# Patient Record
Sex: FEMALE | Race: WHITE | Employment: OTHER | URBAN - NONMETROPOLITAN AREA
[De-identification: names, ages, dates, MRNs, and addresses within clinical notes are randomized per-mention and may not be internally consistent; named-entity substitution may affect disease eponyms.]

---

## 2017-01-11 ENCOUNTER — OFFICE VISIT (OUTPATIENT)
Dept: SURGERY | Age: 72
End: 2017-01-11
Payer: MEDICARE

## 2017-01-11 VITALS
TEMPERATURE: 98.5 F | SYSTOLIC BLOOD PRESSURE: 148 MMHG | WEIGHT: 156.9 LBS | DIASTOLIC BLOOD PRESSURE: 70 MMHG | BODY MASS INDEX: 26.14 KG/M2 | HEIGHT: 65 IN

## 2017-01-11 DIAGNOSIS — N60.12 FIBROCYSTIC CHANGES OF LEFT BREAST: Primary | ICD-10-CM

## 2017-01-11 PROCEDURE — 3017F COLORECTAL CA SCREEN DOC REV: CPT | Performed by: PHYSICIAN ASSISTANT

## 2017-01-11 PROCEDURE — 1090F PRES/ABSN URINE INCON ASSESS: CPT | Performed by: PHYSICIAN ASSISTANT

## 2017-01-11 PROCEDURE — 99212 OFFICE O/P EST SF 10 MIN: CPT | Performed by: PHYSICIAN ASSISTANT

## 2017-01-11 PROCEDURE — 1036F TOBACCO NON-USER: CPT | Performed by: PHYSICIAN ASSISTANT

## 2017-01-11 PROCEDURE — G8427 DOCREV CUR MEDS BY ELIG CLIN: HCPCS | Performed by: PHYSICIAN ASSISTANT

## 2017-01-11 PROCEDURE — G8484 FLU IMMUNIZE NO ADMIN: HCPCS | Performed by: PHYSICIAN ASSISTANT

## 2017-01-11 PROCEDURE — G8420 CALC BMI NORM PARAMETERS: HCPCS | Performed by: PHYSICIAN ASSISTANT

## 2017-01-11 PROCEDURE — 1123F ACP DISCUSS/DSCN MKR DOCD: CPT | Performed by: PHYSICIAN ASSISTANT

## 2017-01-11 PROCEDURE — 3014F SCREEN MAMMO DOC REV: CPT | Performed by: PHYSICIAN ASSISTANT

## 2017-01-11 PROCEDURE — 4040F PNEUMOC VAC/ADMIN/RCVD: CPT | Performed by: PHYSICIAN ASSISTANT

## 2017-01-11 PROCEDURE — G8400 PT W/DXA NO RESULTS DOC: HCPCS | Performed by: PHYSICIAN ASSISTANT

## 2017-04-06 RX ORDER — M-VIT,TX,IRON,MINS/CALC/FOLIC 27MG-0.4MG
1 TABLET ORAL DAILY
COMMUNITY
End: 2022-09-27

## 2017-04-17 ENCOUNTER — OFFICE VISIT (OUTPATIENT)
Dept: GASTROENTEROLOGY | Age: 72
End: 2017-04-17
Payer: MEDICARE

## 2017-04-17 VITALS
BODY MASS INDEX: 25.83 KG/M2 | WEIGHT: 145.8 LBS | DIASTOLIC BLOOD PRESSURE: 88 MMHG | SYSTOLIC BLOOD PRESSURE: 140 MMHG | HEIGHT: 63 IN | HEART RATE: 76 BPM | OXYGEN SATURATION: 99 %

## 2017-04-17 DIAGNOSIS — Z86.010 HISTORY OF COLON POLYPS: Primary | ICD-10-CM

## 2017-04-17 ASSESSMENT — ENCOUNTER SYMPTOMS
BACK PAIN: 0
ABDOMINAL DISTENTION: 0
NAUSEA: 0
COUGH: 0
CHEST TIGHTNESS: 0
VOMITING: 0
CONSTIPATION: 0
DIARRHEA: 0
ABDOMINAL PAIN: 0
BLOOD IN STOOL: 0
SHORTNESS OF BREATH: 0
SORE THROAT: 0
VOICE CHANGE: 0
RECTAL PAIN: 0

## 2017-06-21 ENCOUNTER — TELEPHONE (OUTPATIENT)
Dept: SURGERY | Age: 72
End: 2017-06-21

## 2017-07-06 ENCOUNTER — OFFICE VISIT (OUTPATIENT)
Dept: SURGERY | Age: 72
End: 2017-07-06
Payer: MEDICARE

## 2017-07-06 ENCOUNTER — HOSPITAL ENCOUNTER (OUTPATIENT)
Dept: WOMENS IMAGING | Age: 72
Discharge: HOME | End: 2017-07-06
Payer: MEDICARE

## 2017-07-06 VITALS
RESPIRATION RATE: 18 BRPM | DIASTOLIC BLOOD PRESSURE: 82 MMHG | SYSTOLIC BLOOD PRESSURE: 144 MMHG | HEIGHT: 63 IN | WEIGHT: 136.6 LBS | BODY MASS INDEX: 24.2 KG/M2 | HEART RATE: 80 BPM

## 2017-07-06 DIAGNOSIS — N60.12 FIBROCYSTIC CHANGES OF LEFT BREAST: ICD-10-CM

## 2017-07-06 DIAGNOSIS — N63.0 BREAST MASS: ICD-10-CM

## 2017-07-06 DIAGNOSIS — Z12.31 VISIT FOR SCREENING MAMMOGRAM: Primary | ICD-10-CM

## 2017-07-06 DIAGNOSIS — N63.0 BREAST NODULE: ICD-10-CM

## 2017-07-06 PROCEDURE — 4040F PNEUMOC VAC/ADMIN/RCVD: CPT | Performed by: PHYSICIAN ASSISTANT

## 2017-07-06 PROCEDURE — G8400 PT W/DXA NO RESULTS DOC: HCPCS | Performed by: PHYSICIAN ASSISTANT

## 2017-07-06 PROCEDURE — G8420 CALC BMI NORM PARAMETERS: HCPCS | Performed by: PHYSICIAN ASSISTANT

## 2017-07-06 PROCEDURE — 3014F SCREEN MAMMO DOC REV: CPT | Performed by: PHYSICIAN ASSISTANT

## 2017-07-06 PROCEDURE — 1036F TOBACCO NON-USER: CPT | Performed by: PHYSICIAN ASSISTANT

## 2017-07-06 PROCEDURE — 99212 OFFICE O/P EST SF 10 MIN: CPT | Performed by: PHYSICIAN ASSISTANT

## 2017-07-06 PROCEDURE — 3017F COLORECTAL CA SCREEN DOC REV: CPT | Performed by: PHYSICIAN ASSISTANT

## 2017-07-06 PROCEDURE — 76642 ULTRASOUND BREAST LIMITED: CPT

## 2017-07-06 PROCEDURE — G0279 TOMOSYNTHESIS, MAMMO: HCPCS

## 2017-07-06 PROCEDURE — 1090F PRES/ABSN URINE INCON ASSESS: CPT | Performed by: PHYSICIAN ASSISTANT

## 2017-07-06 PROCEDURE — G8427 DOCREV CUR MEDS BY ELIG CLIN: HCPCS | Performed by: PHYSICIAN ASSISTANT

## 2017-07-06 PROCEDURE — 1123F ACP DISCUSS/DSCN MKR DOCD: CPT | Performed by: PHYSICIAN ASSISTANT

## 2017-09-12 ENCOUNTER — TRANSCRIBE ORDERS (OUTPATIENT)
Dept: PHYSICAL THERAPY | Facility: HOSPITAL | Age: 72
End: 2017-09-12

## 2017-09-12 DIAGNOSIS — M54.9 BACKACHE, UNSPECIFIED: Primary | ICD-10-CM

## 2017-09-19 ENCOUNTER — HOSPITAL ENCOUNTER (OUTPATIENT)
Dept: PHYSICAL THERAPY | Facility: HOSPITAL | Age: 72
Setting detail: THERAPIES SERIES
Discharge: HOME | End: 2017-09-19

## 2017-09-19 DIAGNOSIS — M54.9 BACKACHE, UNSPECIFIED: Primary | ICD-10-CM

## 2017-09-19 PROCEDURE — G8978 MOBILITY CURRENT STATUS: HCPCS | Performed by: PHYSICAL THERAPIST

## 2017-09-19 PROCEDURE — 97162 PT EVAL MOD COMPLEX 30 MIN: CPT | Performed by: PHYSICAL THERAPIST

## 2017-09-19 PROCEDURE — G8979 MOBILITY GOAL STATUS: HCPCS | Performed by: PHYSICAL THERAPIST

## 2017-09-19 NOTE — THERAPY EVALUATION
Outpatient Physical Therapy Ortho Initial Evaluation  [facility]     Patient Name: Rama Goel  : 1945  MRN: [id number]  Today's Date: 2017      Visit Date: 2017    There is no problem list on file for this patient.     Visit  Medicare  Recert date 10/17/17  MD visit TBD    No past medical history on file.     No past surgical history on file.    Visit Dx:     ICD-10-CM ICD-9-CM   1. Backache, unspecified M54.9 724.5             Patient History       17 1800          History    Chief Complaint Pain  -KW      Type of Pain Back pain  -KW      Date Current Problem(s) Began --   few months  -KW      Brief Description of Current Complaint back pain started after remodeling of her home started  -KW      Previous treatment for THIS PROBLEM Medication  -KW      Patient/Caregiver Goals Relieve pain;Return to work;Improve mobility;Improve strength  -KW      Current Tobacco Use none  -KW      Smoking Status no  -KW      Patient's Rating of General Health Excellent  -KW      Hand Dominance right-handed  -KW      Pain     Pain Location Back  -KW      Pain at Present 6  -KW      Pain at Best 4  -KW      Pain at Worst 6  -KW      What Performance Factors Make the Current Problem(s) WORSE? bending, lifting  -KW      What Performance Factors Make the Current Problem(s) BETTER? meds  -KW      Fall Risk Assessment    Any falls in the past year: No  -KW      Services    Prior Rehab/Home Health Experiences No  -KW      Daily Activities    Primary Language English  -KW      Safety    Are you being hurt, hit, or frightened by anyone at home or in your life? No  -KW        User Key  (r) = Recorded By, (t) = Taken By, (c) = Cosigned By    Initials Name Provider Type    SYLVAIN Guerra, PT Physical Therapist                PT Ortho       17 1600    Subjective Comments    Subjective Comments Reports she is remodelling a house and is doing most of the work herself. States she has never had  back pain before, and is complaining  of LBP since the start of her project.  -KW    Subjective Pain    Able to rate subjective pain? yes  -KW    Pre-Treatment Pain Level 5  -KW    Post-Treatment Pain Level 4  -KW    Posture/Observations    Alignment Options Forward head;Cervical lordosis  -KW    Forward Head Mild  -KW    Cervical Lordosis Mild  -KW    Posture/Observations Comments pt ambulates with forward flexion of her trunk  -KW    Myotomal Screen- Lower Quarter Clearing    Hip flexion (L2) 4- (Good -);Left:  -KW    Knee extension (L3) 4- (Good -);Left:  -KW    Ankle DF (L4) 4- (Good -);Left:  -KW    Ankle PF (S1) 4- (Good -);Left:  -KW    Knee flexion (S2) 4- (Good -);Left:  -KW    Lumbar ROM Screen- Lower Quarter Clearing    Lumbar Flexion Normal  -KW    Lumbar Extension Normal  -KW    Lumbar Lateral Flexion Normal  -KW    Lumbar Rotation Normal  -KW    Lumbosacral Palpation    Thoracolumbar Segment Left:;Tender;Guarded/taut  -KW    Quadratus Lumborum Left:;Guarded/taut  -KW    Erector Spinae (Paraspinals) Left:;Guarded/taut  -KW    Lumbosacral Palpation? Yes  -KW    ROM (Range of Motion)    General ROM Detail LUmbar ROM is full and WNL  -KW    MMT (Manual Muscle Testing)    General MMT Assessment Detail bilat hip abd MMT is 3+/5  -KW    Flexibility    Flexibility Tested? Lower Extremity  -KW    Lower Extremity Flexibility    Hamstrings Bilateral:;WNL  -KW    Hip Flexors Bilateral:;Mildly limited  -KW      User Key  (r) = Recorded By, (t) = Taken By, (c) = Cosigned By    Initials Name Provider Type    SYLVAIN Guerra, PT Physical Therapist                            Therapy Education       09/19/17 1845          Therapy Education    Given Pain management  -KW      Program New  -KW        User Key  (r) = Recorded By, (t) = Taken By, (c) = Cosigned By    Initials Name Provider Type    SYLVAIN Guerra, PT Physical Therapist                PT OP Goals       09/19/17 1800       PT Short Term Goals    STG  Date to Achieve 10/10/17  -KW     STG 1 decrease LBP to 3/10  -KW     STG 2 Tolerate 45 min of aquatics with minimum discomfort  -KW     STG 3 Inc bilat hip abd MMT to 4-/5  -KW     Long Term Goals    LTG Date to Achieve 10/17/17  -KW     LTG 1 Ind w/ final HEP  -KW     LTG 2 Decrease Oswersty LBP score to 25% or less  -KW     LTG 3 Tolerate 45 min of aquatics pain free  -KW     LTG 4 Decrease LBP to no pain during activities  -KW     Time Calculation    PT Goal Re-Cert Due Date 10/17/17  -KW       User Key  (r) = Recorded By, (t) = Taken By, (c) = Cosigned By    Initials Name Provider Type    KW Maria E Guerra, PT Physical Therapist                PT Assessment/Plan       09/19/17 1848       PT Assessment    Functional Limitations Impaired gait;Impaired locomotion;Limitation in home management;Limitations in community activities;Performance in leisure activities;Performance in work activities  -KW     Impairments Posture;Joint integrity;Joint mobility;Muscle strength;Pain;Gait;Endurance;Balance  -KW     Assessment Comments weakness in bilat LE R > L,  poor standing posture, antalgic gait  -KW     Please refer to paper survey for additional self-reported information No  -KW     Rehab Potential Good  -KW     Patient/caregiver participated in establishment of treatment plan and goals Yes  -KW     Patient would benefit from skilled therapy intervention Yes  -KW     PT Plan    PT Frequency 2x/week  -KW     Predicted Duration of Therapy Intervention (days/wks) 4 weeks  -KW     Planned CPT's? PT EVAL MOD COMPLELITY: 92222;PT RE-EVAL: 74509;PT THER PROC EA 15 MIN: 00915;PT THER ACT EA 15 MIN: 01595;PT MANUAL THERAPY EA 15 MIN: 07181;PT NEUROMUSC RE-EDUCATION EA 15 MIN: 65593;PT GAIT TRAINING EA 15 MIN: 74469;PT AQUATIC THERAPY EA 15 MIN: 66659;PT ELECTRICAL STIM UNATTEND: ;PT THER SUPP EA 15 MIN;PT ULTRASOUND EA 15 MIN: 75474;PT TRACTION LUMBAR: 02913  -KW     Physical Therapy Interventions (Optional Details)  aquatics exercise;lumbar stabilization;balance training;strengthening;stretching;modalities;manual therapy techniques;swiss ball techniques;gross motor skills;home exercise program;joint mobilization;neuromuscular re-education;patient/family education;postural re-education  -KW     PT Plan Comments PT 2x per week for LBP. aquatics first for the next 4 visits then land PT.  -KW       User Key  (r) = Recorded By, (t) = Taken By, (c) = Cosigned By    Initials Name Provider Type    SYLVAIN Guerra, PT Physical Therapist                  Exercises       09/19/17 1600          Subjective Comments    Subjective Comments Reports she is remodelling a house and is doing most of the work herself. States she has never had back pain before, and is complaining  of LBP since the start of her project.  -KW      Subjective Pain    Able to rate subjective pain? yes  -KW      Pre-Treatment Pain Level 5  -KW      Post-Treatment Pain Level 4  -KW        User Key  (r) = Recorded By, (t) = Taken By, (c) = Cosigned By    Initials Name Provider Type    SYLVAIN Guerra, PT Physical Therapist                              Outcome Measures       09/19/17 1808          Modified Oswestry    Modified Oswestry Score/Comments 38%  -KW        User Key  (r) = Recorded By, (t) = Taken By, (c) = Cosigned By    Initials Name Provider Type    SYLVAIN Guerra, PT Physical Therapist            Time Calculation:   Start Time: 1600  Stop Time: 1644  Time Calculation (min): 44 min  Total Timed Code Minutes- PT: 0 minute(s)     Therapy Charges for Today     Code Description Service Date Service Provider Modifiers Qty    47830788094 HC PT EVAL MOD COMPLEXITY 3 9/19/2017 Maria E Guerra, PT GP 1    89627606053 HC PT MOBILITY CURRENT 9/19/2017 Maria E Guerra, PT GP, CJ 1    48768969198 HC PT MOBILITY PROJECTED 9/19/2017 Maria E Guerra, PT GP, CJ 1          PT G-Codes  PT Professional Judgement Used?: Yes  Outcome Measure Options: Modifed  Owestry  Score: 38%  Functional Limitation: Mobility: Walking and moving around  Mobility: Walking and Moving Around Current Status (): At least 20 percent but less than 40 percent impaired, limited or restricted  Mobility: Walking and Moving Around Goal Status (): At least 20 percent but less than 40 percent impaired, limited or restricted         Maria E Guerra, PT  9/19/2017

## 2017-09-21 ENCOUNTER — HOSPITAL ENCOUNTER (OUTPATIENT)
Dept: PHYSICAL THERAPY | Facility: HOSPITAL | Age: 72
Setting detail: THERAPIES SERIES
Discharge: HOME | End: 2017-09-21

## 2017-09-21 DIAGNOSIS — M54.9 BACKACHE, UNSPECIFIED: Primary | ICD-10-CM

## 2017-09-21 PROCEDURE — 97110 THERAPEUTIC EXERCISES: CPT

## 2017-09-21 NOTE — THERAPY TREATMENT NOTE
Outpatient Physical Therapy Ortho Treatment Note  [facility]     Patient Name: Rama Goel  : 1945  MRN: [id number]  Today's Date: 2017      Visit Date: 2017  Subjective Improvement: 0%  Visit Number:     Recert Date:   10/17/17  MD Visit:   TBD  Total Approved Visits:  Medicare    Visit Dx:    ICD-10-CM ICD-9-CM   1. Backache, unspecified M54.9 724.5       There is no problem list on file for this patient.       No past medical history on file.     No past surgical history on file.          PT Ortho       17 1400    Posture/Observations    Posture/Observations Comments fwd head, cues tonot go to high with SLR  -BB      17 1600    Subjective Comments    Subjective Comments Reports she is remodelling a house and is doing most of the work herself. States she has never had back pain before, and is complaining  of LBP since the start of her project.  -KW    Subjective Pain    Able to rate subjective pain? yes  -KW    Pre-Treatment Pain Level 5  -KW    Post-Treatment Pain Level 4  -KW    Posture/Observations    Alignment Options Forward head;Cervical lordosis  -KW    Forward Head Mild  -KW    Cervical Lordosis Mild  -KW    Posture/Observations Comments pt ambulates with forward flexion of her trunk  -KW    Myotomal Screen- Lower Quarter Clearing    Hip flexion (L2) 4- (Good -);Left:  -KW    Knee extension (L3) 4- (Good -);Left:  -KW    Ankle DF (L4) 4- (Good -);Left:  -KW    Ankle PF (S1) 4- (Good -);Left:  -KW    Knee flexion (S2) 4- (Good -);Left:  -KW    Lumbar ROM Screen- Lower Quarter Clearing    Lumbar Flexion Normal  -KW    Lumbar Extension Normal  -KW    Lumbar Lateral Flexion Normal  -KW    Lumbar Rotation Normal  -KW    Lumbosacral Palpation    Thoracolumbar Segment Left:;Tender;Guarded/taut  -KW    Quadratus Lumborum Left:;Guarded/taut  -KW    Erector Spinae (Paraspinals) Left:;Guarded/taut  -KW    Lumbosacral Palpation? Yes  -KW    ROM (Range of Motion)     General ROM Detail LUmbar ROM is full and WNL  -KW    MMT (Manual Muscle Testing)    General MMT Assessment Detail bilat hip abd MMT is 3+/5  -KW    Flexibility    Flexibility Tested? Lower Extremity  -KW    Lower Extremity Flexibility    Hamstrings Bilateral:;WNL  -KW    Hip Flexors Bilateral:;Mildly limited  -KW      User Key  (r) = Recorded By, (t) = Taken By, (c) = Cosigned By    Initials Name Provider Type    ADELINE Perez PTA Physical Therapy Assistant    SYLVAIN Guerra, PT Physical Therapist                            PT Assessment/Plan       09/21/17 1507       PT Assessment    Assessment Comments Good reji to pool.   -BB     PT Plan    PT Frequency 2x/week  -BB     Predicted Duration of Therapy Intervention (days/wks) x 4 weeks  -BB     PT Plan Comments 3 more pool visits then progress to land per POC.  -BB       User Key  (r) = Recorded By, (t) = Taken By, (c) = Cosigned By    Initials Name Provider Type    ADELINE Perez PTA Physical Therapy Assistant                    Exercises       09/21/17 1400          Subjective Comments    Subjective Comments States she was sore after eval.   -BB      Subjective Pain    Able to rate subjective pain? yes  -BB      Pre-Treatment Pain Level 5  -BB      Post-Treatment Pain Level 3  -BB      Aquatics    Aquatics performed? Yes  -BB      Exercise 1    Exercise Name 1 AQU 3 WAY WALK  -BB      Time (Minutes) 1 5 EA  -BB      Exercise 2    Exercise Name 2 AQU MS  -BB      Reps 2 15  -BB      Exercise 3    Exercise Name 3 AQU CR/TR  -BB      Reps 3 15  -BB      Exercise 4    Exercise Name 4 AQU WLR 3 WAY B  -BB      Reps 4 10  -BB      Exercise 5    Exercise Name 5 AQU PUSH PULL ABC  -BB      Reps 5 15  -BB      Exercise 6    Exercise Name 6 AQU DEEP HANG  -BB      Time (Minutes) 6 5  -BB        User Key  (r) = Recorded By, (t) = Taken By, (c) = Cosigned By    Initials Name Provider Type    ADELINE Perez PTA Physical Therapy Assistant                                PT OP Goals       09/21/17 1400       PT Short Term Goals    STG Date to Achieve 10/10/17  -BB     STG 1 decrease LBP to 3/10  -BB     STG 1 Progress Not Met  -BB     STG 2 Tolerate 45 min of aquatics with minimum discomfort  -BB     STG 2 Progress Progressing  -BB     STG 3 Inc bilat hip abd MMT to 4-/5  -BB     STG 3 Progress Not Met  -BB     Long Term Goals    LTG Date to Achieve 10/17/17  -BB     LTG 1 Ind w/ final HEP  -BB     LTG 1 Progress Not Met  -BB     LTG 2 Decrease Oswersty LBP score to 25% or less  -BB     LTG 2 Progress Not Met  -BB     LTG 3 Tolerate 45 min of aquatics pain free  -BB     LTG 3 Progress Not Met  -BB     LTG 4 Decrease LBP to no pain during activities  -BB     LTG 4 Progress Not Met  -BB     Time Calculation    PT Goal Re-Cert Due Date 10/17/17  -BB       User Key  (r) = Recorded By, (t) = Taken By, (c) = Cosigned By    Initials Name Provider Type    ADELINE Perez PTA Physical Therapy Assistant                Therapy Education       09/21/17 1504          Therapy Education    Given HEP  -BB      Program Reinforced  -BB      How Provided Verbal  -BB      Provided to Patient  -BB      Level of Understanding Verbalized  -BB        User Key  (r) = Recorded By, (t) = Taken By, (c) = Cosigned By    Initials Name Provider Type    ADELINE Perez PTA Physical Therapy Assistant                Outcome Measures       09/19/17 1808          Modified Oswestry    Modified Oswestry Score/Comments 38%  -KW        User Key  (r) = Recorded By, (t) = Taken By, (c) = Cosigned By    Initials Name Provider Type    SYLVAIN Guerra, PT Physical Therapist            Time Calculation:   Start Time: 1437  Stop Time: 1515  Time Calculation (min): 38 min  Total Timed Code Minutes- PT: 38 minute(s)    Therapy Charges for Today     Code Description Service Date Service Provider Modifiers Qty    76143244503 HC PT THER PROC EA 15 MIN 9/21/2017 Darlyn Perez PTA GP 3                     Darlyn Perez, PTA  9/21/2017

## 2017-09-26 ENCOUNTER — HOSPITAL ENCOUNTER (OUTPATIENT)
Dept: PHYSICAL THERAPY | Facility: HOSPITAL | Age: 72
Setting detail: THERAPIES SERIES
Discharge: HOME | End: 2017-09-26

## 2017-09-26 DIAGNOSIS — M54.9 BACKACHE, UNSPECIFIED: Primary | ICD-10-CM

## 2017-09-26 PROCEDURE — 97113 AQUATIC THERAPY/EXERCISES: CPT | Performed by: PHYSICAL THERAPIST

## 2017-09-26 NOTE — THERAPY TREATMENT NOTE
Outpatient Physical Therapy Ortho Treatment Note  [facility]     Patient Name: Rama Goel  : 1945  MRN: [id number]  Today's Date: 2017      Visit Date: 2017    Visit 3/3 Medicare   20% improvement  Recert date 10/17/17  MD visit TBD          Visit Dx:    ICD-10-CM ICD-9-CM   1. Backache, unspecified M54.9 724.5       There is no problem list on file for this patient.       No past medical history on file.     No past surgical history on file.                          PT Assessment/Plan       17 1555       PT Assessment    Assessment Comments very good tolerance to aquatics  -KW     PT Plan    PT Plan Comments 2 more pool visits then progress to land PT as per POC  -KW       User Key  (r) = Recorded By, (t) = Taken By, (c) = Cosigned By    Initials Name Provider Type    SYLVAIN Guerra, PT Physical Therapist                    Exercises       17 1500          Subjective Comments    Subjective Comments Reports the therapy is helping her very much  -KW      Subjective Pain    Able to rate subjective pain? yes  -KW      Pre-Treatment Pain Level 2  -KW      Post-Treatment Pain Level 0  -KW      Exercise 1    Exercise Name 1 aqua walking  -KW      Time (Minutes) 1 15  -KW      Exercise 2    Exercise Name 2 3 way SLR  -KW      Sets 2 3  -KW      Reps 2 30  -KW      Additional Comments each  -KW      Exercise 3    Exercise Name 3 toe raises   -KW      Sets 3 3  -KW      Reps 3 10  -KW      Exercise 4    Exercise Name 4 aqua HS stretch  -KW      Sets 4 3  -KW      Time (Seconds) 4 30  -KW      Exercise 5    Exercise Name 5 Aqua deep hand  -KW      Time (Minutes) 5 15  -KW        User Key  (r) = Recorded By, (t) = Taken By, (c) = Cosigned By    Initials Name Provider Type    SYLVAIN Guerra, PT Physical Therapist                               PT OP Goals       17 1500       PT Short Term Goals    STG Date to Achieve 10/10/17  -KW     STG 1 decrease LBP to 3/10  -KW      STG 1 Progress Met  -KW     STG 2 Tolerate 45 min of aquatics with minimum discomfort  -KW     STG 2 Progress Met  -KW     STG 3 Inc bilat hip abd MMT to 4-/5  -KW     STG 3 Progress Not Met  -KW     Long Term Goals    LTG Date to Achieve 10/17/17  -KW     LTG 1 Ind w/ final HEP  -KW     LTG 1 Progress Not Met  -KW     LTG 2 Decrease Oswersty LBP score to 25% or less  -KW     LTG 2 Progress Not Met  -KW     LTG 3 Tolerate 45 min of aquatics pain free  -KW     LTG 3 Progress Met  -KW     LTG 4 Decrease LBP to no pain during activities  -KW     LTG 4 Progress Not Met  -KW     Time Calculation    PT Goal Re-Cert Due Date 10/17/17  -KW       User Key  (r) = Recorded By, (t) = Taken By, (c) = Cosigned By    Initials Name Provider Type    SYLVAIN Guerra, PT Physical Therapist                Therapy Education       09/26/17 1555          Therapy Education    Given HEP  -KW      Program Reinforced  -KW      How Provided Verbal  -KW      Provided to Patient  -KW      Level of Understanding Verbalized  -KW        User Key  (r) = Recorded By, (t) = Taken By, (c) = Cosigned By    Initials Name Provider Type    SYLVAIN Guerra, PT Physical Therapist                Time Calculation:   Start Time: 1430  Stop Time: 1515  Time Calculation (min): 45 min  Total Timed Code Minutes- PT: 45 minute(s)    Therapy Charges for Today     Code Description Service Date Service Provider Modifiers Qty    88905126715 HC PT AQUATIC THERAPY EA 15 MIN 9/26/2017 Maria E Guerra, PT GP 3                    Maria E Guerra, PT  9/26/2017

## 2017-09-28 ENCOUNTER — HOSPITAL ENCOUNTER (OUTPATIENT)
Dept: PHYSICAL THERAPY | Facility: HOSPITAL | Age: 72
Setting detail: THERAPIES SERIES
Discharge: HOME | End: 2017-09-28

## 2017-09-28 DIAGNOSIS — M54.9 BACKACHE, UNSPECIFIED: Primary | ICD-10-CM

## 2017-09-28 PROCEDURE — 97113 AQUATIC THERAPY/EXERCISES: CPT | Performed by: PHYSICAL THERAPIST

## 2017-09-28 NOTE — THERAPY TREATMENT NOTE
Outpatient Physical Therapy Ortho Treatment Note  [facility]     Patient Name: Rama Goel  : 1945  MRN: [id number]  Today's Date: 2017      Visit Date: 2017      Visit 4/4 Medicare  MD visit TBD  Recert date 10/17/17  20% improvement          Visit Dx:    ICD-10-CM ICD-9-CM   1. Backache, unspecified M54.9 724.5       There is no problem list on file for this patient.       No past medical history on file.     No past surgical history on file.                          PT Assessment/Plan       17 1533       PT Assessment    Assessment Comments very good tolerance to aquatics  -KW     PT Plan    PT Plan Comments 2 more pool visits then progress to land  PT as per POC  -KW       User Key  (r) = Recorded By, (t) = Taken By, (c) = Cosigned By    Initials Name Provider Type    KW Maria E Guerra, PT Physical Therapist                    Exercises       17 1500          Subjective Comments    Subjective Comments Reports R sided LBP today  -KW      Subjective Pain    Able to rate subjective pain? yes  -KW      Pre-Treatment Pain Level 4  -KW      Post-Treatment Pain Level 0  -KW      Aquatics    Aquatics performed? Yes  -KW      Exercise 1    Exercise Name 1 aqua walking  -KW      Time (Minutes) 1 15  -KW      Exercise 2    Exercise Name 2 3 way SLR  -KW      Sets 2 3  -KW      Reps 2 30  -KW      Exercise 3    Exercise Name 3 toe raises   -KW      Sets 3 3  -KW      Reps 3 10  -KW      Exercise 4    Exercise Name 4 aqua HS stretch  -KW      Sets 4 3  -KW      Reps 4 10  -KW      Time (Seconds) 4 30  -KW      Exercise 5    Exercise Name 5 Aqua deep hand  -KW      Reps 5 15  -KW      Time (Minutes) 5 15  -KW      Exercise 6    Exercise Name 6 AQU DEEP HANG  -KW      Time (Minutes) 6 5  -KW      Exercise 7    Exercise Name 7 trans abs with paddles  -KW      Sets 7 3  -KW      Reps 7 10  -KW        User Key  (r) = Recorded By, (t) = Taken By, (c) = Cosigned By    Initials Name  Provider Type    SYLVAIN Guerra, PT Physical Therapist                               PT OP Goals       09/28/17 1500       PT Short Term Goals    STG Date to Achieve 10/10/17  -KW     STG 1 decrease LBP to 3/10  -KW     STG 1 Progress Met  -KW     STG 2 Tolerate 45 min of aquatics with minimum discomfort  -KW     STG 2 Progress Met  -KW     STG 3 Inc bilat hip abd MMT to 4-/5  -KW     STG 3 Progress Not Met  -KW     Long Term Goals    LTG Date to Achieve 10/17/17  -KW     LTG 1 Ind w/ final HEP  -KW     LTG 1 Progress Not Met  -KW     LTG 2 Decrease Oswersty LBP score to 25% or less  -KW     LTG 2 Progress Not Met  -KW     LTG 3 Tolerate 45 min of aquatics pain free  -KW     LTG 3 Progress Met  -KW     LTG 4 Decrease LBP to no pain during activities  -KW     LTG 4 Progress Not Met  -KW     Time Calculation    PT Goal Re-Cert Due Date 10/17/17  -KW       User Key  (r) = Recorded By, (t) = Taken By, (c) = Cosigned By    Initials Name Provider Type    SYLVAIN Guerra, PT Physical Therapist                    Time Calculation:   Start Time: 1430  Stop Time: 1515  Time Calculation (min): 45 min  Total Timed Code Minutes- PT: 45 minute(s)    Therapy Charges for Today     Code Description Service Date Service Provider Modifiers Qty    61224931673 HC PT AQUATIC THERAPY EA 15 MIN 9/28/2017 Maria E Guerra, PT GP 3                    Maria E Guerra, PT  9/28/2017

## 2017-10-04 ENCOUNTER — HOSPITAL ENCOUNTER (OUTPATIENT)
Dept: PHYSICAL THERAPY | Facility: HOSPITAL | Age: 72
Setting detail: THERAPIES SERIES
Discharge: HOME | End: 2017-10-04

## 2017-10-04 DIAGNOSIS — M54.9 ACUTE BACK PAIN, UNSPECIFIED BACK LOCATION, UNSPECIFIED BACK PAIN LATERALITY: Primary | ICD-10-CM

## 2017-10-04 PROCEDURE — 97110 THERAPEUTIC EXERCISES: CPT

## 2017-10-04 NOTE — THERAPY TREATMENT NOTE
Outpatient Physical Therapy Ortho Treatment Note  [facility]     Patient Name: Rama Goel  : 1945  MRN: [id number]  Today's Date: 10/4/2017      Visit Date: 10/04/2017    Subjective Improvement:    Visits Attended: 5/5   Visits Approved Med nec   MD visit: PRN   Recert Date: 10/17/2017       Visit Dx:    ICD-10-CM ICD-9-CM   1. Acute back pain, unspecified back location, unspecified back pain laterality M54.9 724.5       There is no problem list on file for this patient.       No past medical history on file.     No past surgical history on file.                          PT Assessment/Plan       10/04/17 1500       PT Plan    PT Frequency 2x/week  -TW     Predicted Duration of Therapy Intervention (days/wks) 2 more weeks  -TW     PT Plan Comments 1 more pool tx and then progress to land.  -TW       User Key  (r) = Recorded By, (t) = Taken By, (c) = Cosigned By    Initials Name Provider Type    PHILLIP Barrett PTA Physical Therapy Assistant                    Exercises       10/04/17 1500          Subjective Pain    Able to rate subjective pain? yes  -TW      Exercise 1    Exercise Name 1 aqua walking  -TW      Time (Minutes) 1 15  -TW      Exercise 2    Exercise Name 2 3 way SLR  -TW      Sets 2 3  -TW      Reps 2 30  -TW      Exercise 3    Exercise Name 3 toe raises   -TW      Sets 3 3  -TW      Reps 3 10  -TW      Exercise 4    Exercise Name 4 aqua HS stretch  -TW      Sets 4 3  -TW      Time (Seconds) 4 30  -TW      Exercise 5    Exercise Name 5 Aqua deep hang  -TW      Time (Minutes) 5 8'  -TW      Exercise 6    Exercise Name 6 AQU DEEP BIKE  -TW      Time (Minutes) 6 8'  -TW      Exercise 7    Exercise Name 7 trans abs with paddles  -TW      Sets 7 2  -TW      Reps 7 20  -TW      Exercise 8    Exercise Name 8 Mini squats  -TW      Sets 8 2  -TW      Reps 8 20  -TW        User Key  (r) = Recorded By, (t) = Taken By, (c) = Cosigned By    Initials Name Provider Type    PHILLIP ORTIZ  TOAN Barrett Physical Therapy Assistant                               PT OP Goals       10/04/17 1611 10/04/17 1500    PT Short Term Goals    STG Date to Achieve  10/10/17  -TW    STG 1  decrease LBP to 3/10  -TW    STG 1 Progress  Met  -TW    STG 2  Tolerate 45 min of aquatics with minimum discomfort  -TW    STG 2 Progress  Met  -TW    STG 3  Inc bilat hip abd MMT to 4-/5  -TW    STG 3 Progress  Not Met  -TW    Long Term Goals    LTG Date to Achieve  10/17/17  -TW    LTG 1  Ind w/ final HEP  -TW    LTG 1 Progress  Not Met  -TW    LTG 2  Decrease Oswersty LBP score to 25% or less  -TW    LTG 2 Progress  Not Met  -TW    LTG 3  Tolerate 45 min of aquatics pain free  -TW    LTG 3 Progress  Met  -TW    LTG 4  Decrease LBP to no pain during activities  -TW    LTG 4 Progress  Not Met  -TW    Time Calculation    PT Goal Re-Cert Due Date 10/17/17  -TW       User Key  (r) = Recorded By, (t) = Taken By, (c) = Cosigned By    Initials Name Provider Type    PHILLIP Barrett PTA Physical Therapy Assistant                Therapy Education       10/04/17 1500          Therapy Education    Given HEP  -TW      Program Reinforced  -TW      How Provided Verbal  -TW      Provided to Patient  -TW      Level of Understanding Verbalized;Demonstrated  -TW        User Key  (r) = Recorded By, (t) = Taken By, (c) = Cosigned By    Initials Name Provider Type    PHILLIP Barrett PTA Physical Therapy Assistant                Time Calculation:   Start Time: 1501  Stop Time: 1604  Time Calculation (min): 63 min  Total Timed Code Minutes- PT: 63 minute(s)    Therapy Charges for Today     Code Description Service Date Service Provider Modifiers Qty    39321356277  PT THER PROC EA 15 MIN 10/4/2017 Abundio Barrett PTA GP 4                    Abundio Barrett PTA  10/4/2017

## 2017-10-05 ENCOUNTER — HOSPITAL ENCOUNTER (OUTPATIENT)
Dept: PHYSICAL THERAPY | Facility: HOSPITAL | Age: 72
Setting detail: THERAPIES SERIES
Discharge: HOME | End: 2017-10-05

## 2017-10-05 DIAGNOSIS — M54.9 ACUTE BACK PAIN, UNSPECIFIED BACK LOCATION, UNSPECIFIED BACK PAIN LATERALITY: Primary | ICD-10-CM

## 2017-10-05 PROCEDURE — 97110 THERAPEUTIC EXERCISES: CPT

## 2017-10-05 NOTE — THERAPY TREATMENT NOTE
Outpatient Physical Therapy Ortho Treatment Note  [facility]     Patient Name: Rama Goel  : 1945  MRN: [id number]  Today's Date: 10/5/2017      Visit Date: 10/05/2017    Subjective Improvement:    Visits Attended: 6/6   Visits Approved Med nec   MD visit: PRN   Recert Date: 10/17/2017         Visit Dx:    ICD-10-CM ICD-9-CM   1. Acute back pain, unspecified back location, unspecified back pain laterality M54.9 724.5       There is no problem list on file for this patient.       No past medical history on file.     No past surgical history on file.                          PT Assessment/Plan       10/05/17 1400       PT Assessment    Assessment Comments Pt with good pain relief noted after tx today.  -TW     PT Plan    PT Frequency 2x/week  -TW     Predicted Duration of Therapy Intervention (days/wks) 2 more weeks  -TW     PT Plan Comments Re cert next visit.  -TW       User Key  (r) = Recorded By, (t) = Taken By, (c) = Cosigned By    Initials Name Provider Type    TW Abundio Barrett PTA Physical Therapy Assistant                    Exercises       10/05/17 1400          Subjective Comments    Subjective Comments Pt reports she was very sore after last tx but is feeling really good today.  -TW      Subjective Pain    Able to rate subjective pain? yes  -TW      Pre-Treatment Pain Level 0  -TW      Exercise 1    Exercise Name 1 aqua walking  -TW      Time (Minutes) 1 15  -TW      Exercise 2    Exercise Name 2 3 way SLR  -TW      Sets 2 3  -TW      Reps 2 30  -TW      Exercise 3    Exercise Name 3 toe raises   -TW      Sets 3 3  -TW      Reps 3 10  -TW      Exercise 4    Exercise Name 4 aqua HS stretch  -TW      Sets 4 3  -TW      Time (Seconds) 4 30  -TW      Exercise 5    Exercise Name 5 Aqua deep hang  -TW      Time (Minutes) 5 8'  -TW      Exercise 6    Exercise Name 6 AQU DEEP BIKE  -TW      Time (Minutes) 6 8'  -TW      Exercise 7    Exercise Name 7 trans abs with paddles  -TW      Sets  7 2  -TW      Reps 7 10  -TW      Exercise 8    Exercise Name 8 Mini squats  -TW      Sets 8 2  -TW      Reps 8 20  -TW        User Key  (r) = Recorded By, (t) = Taken By, (c) = Cosigned By    Initials Name Provider Type    PHILLIP Barrett PTA Physical Therapy Assistant                               PT OP Goals       10/05/17 1535 10/05/17 1400    PT Short Term Goals    STG Date to Achieve  10/10/17  -TW    STG 1  decrease LBP to 3/10  -TW    STG 1 Progress  Met  -TW    STG 2  Tolerate 45 min of aquatics with minimum discomfort  -TW    STG 2 Progress  Met  -TW    STG 3  Inc bilat hip abd MMT to 4-/5  -TW    STG 3 Progress  Not Met  -TW    Long Term Goals    LTG Date to Achieve  10/17/17  -TW    LTG 1  Ind w/ final HEP  -TW    LTG 1 Progress  Not Met  -TW    LTG 2  Decrease Oswersty LBP score to 25% or less  -TW    LTG 2 Progress  Not Met  -TW    LTG 3  Tolerate 45 min of aquatics pain free  -TW    LTG 3 Progress  Met  -TW    LTG 4  Decrease LBP to no pain during activities  -TW    LTG 4 Progress  Not Met  -TW    Time Calculation    PT Goal Re-Cert Due Date 10/17/17  -TW       10/04/17 1611       Time Calculation    PT Goal Re-Cert Due Date 10/17/17  -TW       User Key  (r) = Recorded By, (t) = Taken By, (c) = Cosigned By    Initials Name Provider Type    PHILLIP Barrett PTA Physical Therapy Assistant                Therapy Education       10/05/17 1400          Therapy Education    Given HEP  -TW      Program Reinforced  -TW      How Provided Verbal  -TW      Provided to Patient  -TW      Level of Understanding Verbalized  -TW        User Key  (r) = Recorded By, (t) = Taken By, (c) = Cosigned By    Initials Name Provider Type    PHILLIP Barrett PTA Physical Therapy Assistant                Time Calculation:   Start Time: 1435  Stop Time: 1531  Time Calculation (min): 56 min  Total Timed Code Minutes- PT: 56 minute(s)    Therapy Charges for Today     Code Description Service Date Service Provider  Modifiers Qty    [id number]  PT THER PROC EA 15 MIN 10/5/2017 Abundio Barrett, PTA GP 4                    Abundio Barrett PTA  10/5/2017

## 2017-10-10 ENCOUNTER — HOSPITAL ENCOUNTER (OUTPATIENT)
Dept: PHYSICAL THERAPY | Facility: HOSPITAL | Age: 72
Setting detail: THERAPIES SERIES
Discharge: HOME | End: 2017-10-10

## 2017-10-10 DIAGNOSIS — M54.9 ACUTE BACK PAIN, UNSPECIFIED BACK LOCATION, UNSPECIFIED BACK PAIN LATERALITY: Primary | ICD-10-CM

## 2017-10-10 PROCEDURE — G8978 MOBILITY CURRENT STATUS: HCPCS | Performed by: PHYSICAL THERAPIST

## 2017-10-10 PROCEDURE — G8979 MOBILITY GOAL STATUS: HCPCS | Performed by: PHYSICAL THERAPIST

## 2017-10-10 PROCEDURE — 97140 MANUAL THERAPY 1/> REGIONS: CPT | Performed by: PHYSICAL THERAPIST

## 2017-10-11 NOTE — THERAPY PROGRESS REPORT/RE-CERT
"    Outpatient Physical Therapy Ortho Progress Note  [facility]     Patient Name: Rama Gole  : 1945  MRN: [id number]  Today's Date: 10/10/2017      Visit Date: 10/10/2017  Attendance:  (Medicare)  Subjective Improvement: 80%  Next MD Appt: PRN  Recert Date: 10/31/17    Therapy Diagnosis: Acute back pain    There is no problem list on file for this patient.       Past Medical History:   Diagnosis Date   • Arrhythmia     intermittent   • Hypertension    • Lupus     drug induced        Past Surgical History:   Procedure Laterality Date   • ANKLE SURGERY Right     \"bone replacement with coral\"   • APPENDECTOMY     • CHOLECYSTECTOMY     • PARATHYROIDECTOMY     • ROTATOR CUFF REPAIR Left    • TYMPANOPLASTY         Visit Dx:     ICD-10-CM ICD-9-CM   1. Acute back pain, unspecified back location, unspecified back pain laterality M54.9 724.5     Changes in Medications: none noted  Changes in MD Orders: none noted  Number of Work Days Lost: n/a                PT Ortho       10/10/17 1400    Subjective Comments    Subjective Comments Doing better. Continued trouble sleeping. \"That is my biggest problem at the moment.\" Reports that she is unable to turn over in bed without the pain waking her up. She typically turns over frequently at night. Starts feeling it at the end of the day. Pain in the right lumbar region. She reports that the pain \"bends me over\" at the end of the day. Has not been given a HEP. 80% subjective improvement.  -SS    Precautions and Contraindications    Precautions/Limitations no known precautions/limitations  -SS    Precautions none  -SS    Contraindications none  -SS    Subjective Pain    Able to rate subjective pain? yes  -SS    Pre-Treatment Pain Level --   0.5; \"I feel it. I am aware of the muscle.\"  -SS    Post-Treatment Pain Level --   worse  -SS    Posture/Observations    Alignment Options Forward head;Cervical lordosis;Thoracic kyphosis;Rounded shoulders;Scapular " elevation;Scapular winging;Lumbar lordosis  -SS    Forward Head Mild  -SS    Cervical Lordosis Normal  -SS    Thoracic Kyphosis Mild  -SS    Rounded Shoulders Mild  -SS    Scapular Elevation Normal  -SS    Scapular winging Bilateral:;Mild;Moderate  -SS    Lumbar lordosis Decreased  -SS    Posture/Observations Comments NAG this date. B knees flexed when lying supine  -SS    Myotomal Screen- Lower Quarter Clearing    Hip flexion (L2) Bilateral:;5 (Normal)  -SS    Knee extension (L3) Bilateral:;5 (Normal)  -SS    Ankle DF (L4) Bilateral:;5 (Normal)  -SS    Great toe extension (L5) Bilateral:;5 (Normal)  -SS    Ankle PF (S1) Bilateral:;5 (Normal)  -SS    Knee flexion (S2) Bilateral:;5 (Normal)  -SS    Lumbar ROM Screen- Lower Quarter Clearing    Lumbar Flexion Normal   pain R LB  -SS    Lumbar Extension Impaired   pain R LB  -SS    Lumbar Lateral Flexion Normal   pain R LB each, to R>L  -SS    Lumbosacral Palpation    SI Right:;Tender   upslip right hemipelvis  -SS    Thoracolumbar Segment Right:;Tender   question L rotation of upper lumbar vertebrae  -SS    Piriformis --   non-tender  -SS    Quadratus Lumborum Right:   non-tender  -SS    Erector Spinae (Paraspinals) Right:;Tender   lower thoracic and superior lumbar  -SS      User Key  (r) = Recorded By, (t) = Taken By, (c) = Cosigned By    Initials Name Provider Type    SS Luis Kraus, DANNY DPT Physical Therapist                            Therapy Education       10/10/17 1400          Therapy Education    Education Details progress, post-manual therapy expectations  -SS      Given Symptoms/condition management  -SS      Program Reinforced  -SS      How Provided Verbal  -SS      Provided to Patient  -SS      Level of Understanding Verbalized  -SS        User Key  (r) = Recorded By, (t) = Taken By, (c) = Cosigned By    Initials Name Provider Type    SS Luis Kraus, PT DPT Physical Therapist                PT OP Goals       10/10/17 1400       PT Short  Term Goals    STG Date to Achieve 10/10/17   further STGs deferred  -     STG 1 decrease LBP to 3/10  -     STG 1 Progress Met  -     STG 2 Tolerate 45 min of aquatics with minimum discomfort  -     STG 2 Progress Met  -     STG 3 Inc bilat hip abd MMT to 4-/5  -     STG 3 Progress --   not assessed  -     Long Term Goals    LTG Date to Achieve 10/31/17  -     LTG 1 Ind w/ final HEP  -SS     LTG 1 Progress Progressing;Ongoing  -     LTG 2 Decrease Oswestry LBP score to 25% or less  -     LTG 2 Progress Progressing;Ongoing  -SS     LTG 3 Tolerate 45 min of aquatics pain free  -SS     LTG 3 Progress Met  -     LTG 4 Decrease LBP to no pain during activities  -     LTG 4 Progress Progressing;Ongoing  -     Time Calculation    PT Goal Re-Cert Due Date 10/31/17  -       User Key  (r) = Recorded By, (t) = Taken By, (c) = Cosigned By    Initials Name Provider Type     Luis Kraus, PT DPT Physical Therapist                PT Assessment/Plan       10/10/17 1400       PT Assessment    Functional Limitations Impaired gait;Impaired locomotion;Limitation in home management;Limitations in community activities;Performance in leisure activities;Performance in work activities  -     Impairments Posture;Joint integrity;Joint mobility;Muscle strength;Pain;Gait;Endurance;Balance  -     Assessment Comments SI/pelvic alignment corrected with manual therapy this date. Patient was more painful and guarded after manual therapy.  -     Rehab Potential Good  -     Patient/caregiver participated in establishment of treatment plan and goals Yes  -     Patient would benefit from skilled therapy intervention Yes  -SS     PT Plan    PT Frequency 2x/week  -     Predicted Duration of Therapy Intervention (days/wks) 2-3 weeks  -     PT Plan Comments Continued aquatics for trunk mobility and generalized strengthening. Manual therapy and MHP as indicated.  -       User Key  (r) = Recorded By, (t)  "= Taken By, (c) = Cosigned By    Initials Name Provider Type     Luis Kraus, PT DPT Physical Therapist                Modalities       10/10/17 1400          Moist Heat    MH Applied Yes  -SS      Location trunk  -SS      Rx Minutes Other:   15 mins  -SS      MH Prior to Rx No  -SS      MH S/P Rx Yes  -SS        User Key  (r) = Recorded By, (t) = Taken By, (c) = Cosigned By    Initials Name Provider Type     Luis Kraus, PT DPT Physical Therapist              Exercises       10/10/17 1400          Subjective Comments    Subjective Comments Doing better. Continued trouble sleeping. \"That is my biggest problem at the moment.\" Reports that she is unable to turn over in bed without the pain waking her up. She typically turns over frequently at night. Starts feeling it at the end of the day. Pain in the right lumbar region. She reports that the pain \"bends me over\" at the end of the day. Has not been given a HEP. 80% subjective improvement.  -SS      Subjective Pain    Able to rate subjective pain? yes  -SS      Pre-Treatment Pain Level --   0.5; \"I feel it. I am aware of the muscle.\"  -SS      Post-Treatment Pain Level --   worse  -SS        User Key  (r) = Recorded By, (t) = Taken By, (c) = Cosigned By    Initials Name Provider Type    SS Luis Kraus, PT DPT Physical Therapist           Manual Rx (last 36 hours)      Manual Treatments       10/10/17 1400          Manual Rx 1    Manual Rx 1 Location lumbar spine  -SS      Manual Rx 1 Type joint mobilization  -SS      Manual Rx 1 Grade 3/4  -SS      Manual Rx 2    Manual Rx 2 Location thoracic spine  -SS      Manual Rx 2 Type joint mobilization  -SS      Manual Rx 2 Grade 4  -SS      Manual Rx 3    Manual Rx 3 Location SI/pelvis  -SS      Manual Rx 3 Type ME shotgun  -SS      Manual Rx 4    Manual Rx 4 Location SI/pelvis  -SS      Manual Rx 4 Type ME R leg length correction  -        User Key  (r) = Recorded By, (t) = Taken By, (c) = " Cosigned By    Initials Name Provider Type    SS Luis Kraus, PT DPT Physical Therapist                            Outcome Measures       10/10/17 1400          Modified Oswestry    Modified Oswestry Score/Comments 15/50 = 30%  -      Functional Assessment    Outcome Measure Options Modified Owestry  -SS        User Key  (r) = Recorded By, (t) = Taken By, (c) = Cosigned By    Initials Name Provider Type    SS Luis Kraus, PT DPT Physical Therapist            Time Calculation:   Start Time: 1435  Stop Time: 1530  Time Calculation (min): 55 min     Therapy Charges for Today     Code Description Service Date Service Provider Modifiers Qty    93337977584 HC PT MOBILITY CURRENT 10/10/2017 Luis Kraus, PT DPT GP, CJ 1    [id number] HC PT MOBILITY PROJECTED 10/10/2017 Luis Kraus, PT DPT GP, CI 1    [id number] HC PT THER SUPP EA 15 MIN 10/10/2017 Luis Kraus, PT DPT GP 1    [id number] HC PT MANUAL THERAPY EA 15 MIN 10/10/2017 Luis Kraus, DANNY DPT GP 3          PT G-Codes  Outcome Measure Options: Modified Owestry  Score: 30%  Functional Limitation: Mobility: Walking and moving around  Mobility: Walking and Moving Around Current Status (): At least 20 percent but less than 40 percent impaired, limited or restricted  Mobility: Walking and Moving Around Goal Status (): At least 1 percent but less than 20 percent impaired, limited or restricted         Luis Kraus, PT, DPT, CHT  10/10/2017

## 2017-10-12 ENCOUNTER — HOSPITAL ENCOUNTER (OUTPATIENT)
Dept: PHYSICAL THERAPY | Facility: HOSPITAL | Age: 72
Setting detail: THERAPIES SERIES
Discharge: HOME | End: 2017-10-12

## 2017-10-12 DIAGNOSIS — M54.9 ACUTE BACK PAIN, UNSPECIFIED BACK LOCATION, UNSPECIFIED BACK PAIN LATERALITY: Primary | ICD-10-CM

## 2017-10-12 PROCEDURE — 97110 THERAPEUTIC EXERCISES: CPT

## 2017-10-12 NOTE — THERAPY TREATMENT NOTE
"    Outpatient Physical Therapy Ortho Treatment Note  [facility]     Patient Name: Rama Goel  : 1945  MRN: [id number]  Today's Date: 10/12/2017      Visit Date: 10/12/2017  Subjective Improvement: 85%  Visit Number:      Recert Date:   10/31/17  MD Visit:   PRN  Total Approved Visits:  Medicare    PT Diagnosis:   Acute Back Pain  Visit Dx:    ICD-10-CM ICD-9-CM   1. Acute back pain, unspecified back location, unspecified back pain laterality M54.9 724.5       There is no problem list on file for this patient.       Past Medical History:   Diagnosis Date   • Arrhythmia     intermittent   • Hypertension    • Lupus     drug induced        Past Surgical History:   Procedure Laterality Date   • ANKLE SURGERY Right     \"bone replacement with coral\"   • APPENDECTOMY     • CHOLECYSTECTOMY     • PARATHYROIDECTOMY     • ROTATOR CUFF REPAIR Left    • TYMPANOPLASTY               PT Ortho       10/12/17 1400    Posture/Observations    Posture/Observations Comments Rounded shlds, fwd head. NAG  -BB    Stairs Assessment/Treatment    Stairs, Comment Used 1 HR entering and exiting pool.   -BB      10/10/17 1400    Subjective Comments    Subjective Comments Doing better. Continued trouble sleeping. \"That is my biggest problem at the moment.\" Reports that she is unable to turn over in bed without the pain waking her up. She typically turns over frequently at night. Starts feeling it at the end of the day. Pain in the right lumbar region. She reports that the pain \"bends me over\" at the end of the day. Has not been given a HEP. 80% subjective improvement.  -SS    Precautions and Contraindications    Precautions/Limitations no known precautions/limitations  -SS    Precautions none  -SS    Contraindications none  -SS    Subjective Pain    Able to rate subjective pain? yes  -SS    Pre-Treatment Pain Level --   0.5; \"I feel it. I am aware of the muscle.\"  -SS    Post-Treatment Pain Level --   worse  -SS    " Posture/Observations    Alignment Options Forward head;Cervical lordosis;Thoracic kyphosis;Rounded shoulders;Scapular elevation;Scapular winging;Lumbar lordosis  -SS    Forward Head Mild  -SS    Cervical Lordosis Normal  -SS    Thoracic Kyphosis Mild  -SS    Rounded Shoulders Mild  -SS    Scapular Elevation Normal  -SS    Scapular winging Bilateral:;Mild;Moderate  -SS    Lumbar lordosis Decreased  -SS    Posture/Observations Comments NAG this date. B knees flexed when lying supine  -SS    Myotomal Screen- Lower Quarter Clearing    Hip flexion (L2) Bilateral:;5 (Normal)  -SS    Knee extension (L3) Bilateral:;5 (Normal)  -SS    Ankle DF (L4) Bilateral:;5 (Normal)  -SS    Great toe extension (L5) Bilateral:;5 (Normal)  -SS    Ankle PF (S1) Bilateral:;5 (Normal)  -SS    Knee flexion (S2) Bilateral:;5 (Normal)  -SS    Lumbar ROM Screen- Lower Quarter Clearing    Lumbar Flexion Normal   pain R LB  -SS    Lumbar Extension Impaired   pain R LB  -SS    Lumbar Lateral Flexion Normal   pain R LB each, to R>L  -SS    Lumbosacral Palpation    SI Right:;Tender   upslip right hemipelvis  -SS    Thoracolumbar Segment Right:;Tender   question L rotation of upper lumbar vertebrae  -SS    Piriformis --   non-tender  -SS    Quadratus Lumborum Right:   non-tender  -SS    Erector Spinae (Paraspinals) Right:;Tender   lower thoracic and superior lumbar  -SS      User Key  (r) = Recorded By, (t) = Taken By, (c) = Cosigned By    Initials Name Provider Type    SS Luis Kraus, PT DPT Physical Therapist    BB Darlyn Perez, PTA Physical Therapy Assistant                            PT Assessment/Plan       10/12/17 1451       PT Assessment    Assessment Comments Good reji to pool. Still has pain with turning over in bed,   -BB     PT Plan    PT Frequency 2x/week  -BB     Predicted Duration of Therapy Intervention (days/wks) x 2-3 weeks  -BB     PT Plan Comments Cont with aquatics. Per PoC manual and MHP as indicated.  -BB       User  Key  (r) = Recorded By, (t) = Taken By, (c) = Cosigned By    Initials Name Provider Type    ADELINE Maloneie ISHAN Perez PTA Physical Therapy Assistant                    Exercises       10/12/17 1400          Subjective Comments    Subjective Comments States she cleaned out a few kenels yesterday and it may have bothered her a bit. States it only hurts on R side now.   Turning over in bed and bending fwd hurt the most.  -BB      Subjective Pain    Able to rate subjective pain? yes  -BB      Pre-Treatment Pain Level --   1.5 Right side.  -BB      Post-Treatment Pain Level 1  -BB      Aquatics    Aquatics performed? Yes  -BB      Exercise 1    Exercise Name 1 AQU 3 WAY WALK  -BB      Time (Minutes) 1 15  -BB      Exercise 2    Exercise Name 2 AQU WAND TRUNK ROTATION  -BB      Reps 2 20  -BB      Time (Seconds) 2 5   -BB      Exercise 3    Exercise Name 3 AQU SLR 3 WAY  -BB      Reps 3 20  -BB      Exercise 4    Exercise Name 4 AQU ABD OPEN/CLOSE DOOR  -BB      Reps 4 20  -BB      Exercise 5    Exercise Name 5 AQU DEEP HANG  -BB      Time (Minutes) 5 8  -BB        User Key  (r) = Recorded By, (t) = Taken By, (c) = Cosigned By    Initials Name Provider Type    ADELINE Malonedavid OLMSTEAD TOAN Perez Physical Therapy Assistant                               PT OP Goals       10/12/17 1511 10/12/17 1400    PT Short Term Goals    STG Date to Achieve  --   further STGs deferred  -BB    Long Term Goals    LTG Date to Achieve  10/31/17  -BB    LTG 1  Ind w/ final HEP  -BB    LTG 1 Progress  Progressing;Ongoing  -BB    LTG 2  Decrease Oswestry LBP score to 25% or less  -BB    LTG 2 Progress  Progressing;Ongoing  -BB    LTG 3  Tolerate 45 min of aquatics pain free  -BB    LTG 3 Progress  Met  -BB    LTG 4  Decrease LBP to no pain during activities  -BB    LTG 4 Progress  Progressing;Ongoing  -BB    Time Calculation    PT Goal Re-Cert Due Date 10/31/17  -BB 10/31/17  -BB      User Key  (r) = Recorded By, (t) = Taken By, (c) = Cosigned By    Initials  Name Provider Type    ADELINE Perez PTA Physical Therapy Assistant                Therapy Education       10/12/17 1448          Therapy Education    Given HEP   Pt to continue original HEP.  -BB      Program Reinforced  -BB      How Provided Verbal  -BB      Provided to Patient  -BB      Level of Understanding Verbalized  -BB        User Key  (r) = Recorded By, (t) = Taken By, (c) = Cosigned By    Initials Name Provider Type    ADELINE Perez PTA Physical Therapy Assistant                Outcome Measures       10/10/17 1400          Modified Oswestry    Modified Oswestry Score/Comments 15/50 = 30%  -SS      Functional Assessment    Outcome Measure Options Modifed Owestry  -SS        User Key  (r) = Recorded By, (t) = Taken By, (c) = Cosigned By    Initials Name Provider Type     Luis Kraus, PT DPT Physical Therapist            Time Calculation:   Start Time: 1440  Stop Time: 1518  Time Calculation (min): 38 min  Total Timed Code Minutes- PT: 38 minute(s)    Therapy Charges for Today     Code Description Service Date Service Provider Modifiers Qty    18798735450 HC PT THER PROC EA 15 MIN 10/12/2017 Darlyn Perez PTA GP 3                    Darlyn Perez PTA  10/12/2017

## 2017-10-17 ENCOUNTER — HOSPITAL ENCOUNTER (OUTPATIENT)
Dept: PHYSICAL THERAPY | Facility: HOSPITAL | Age: 72
Setting detail: THERAPIES SERIES
Discharge: HOME | End: 2017-10-17

## 2017-10-17 DIAGNOSIS — M54.9 ACUTE BACK PAIN, UNSPECIFIED BACK LOCATION, UNSPECIFIED BACK PAIN LATERALITY: Primary | ICD-10-CM

## 2017-10-17 PROCEDURE — 97110 THERAPEUTIC EXERCISES: CPT

## 2017-10-17 NOTE — THERAPY TREATMENT NOTE
"    Outpatient Physical Therapy Ortho Treatment Note  [facility]     Patient Name: Rama Goel  : 1945  MRN: [id number]  Today's Date: 10/17/2017      Visit Date: 10/17/2017     Subjective Improvement 85%  Visits   Visits approved medicare cap  RTMD PRN  recert Date 10-    Acute Back Pain    Visit Dx:    ICD-10-CM ICD-9-CM   1. Acute back pain, unspecified back location, unspecified back pain laterality M54.9 724.5       There is no problem list on file for this patient.       Past Medical History:   Diagnosis Date   • Arrhythmia     intermittent   • Hypertension    • Lupus     drug induced        Past Surgical History:   Procedure Laterality Date   • ANKLE SURGERY Right     \"bone replacement with coral\"   • APPENDECTOMY     • CHOLECYSTECTOMY     • PARATHYROIDECTOMY     • ROTATOR CUFF REPAIR Left    • TYMPANOPLASTY                               PT Assessment/Plan       10/17/17 1516       PT Assessment    Assessment Comments patient was 15 minutes late for appointment this date.  tolerated rx well.  -CP     PT Plan    PT Frequency 2x/week  -CP     Predicted Duration of Therapy Intervention (days/wks) 4 weeks  -CP     PT Plan Comments cont with aquatics progressing as tolerated  -CP       User Key  (r) = Recorded By, (t) = Taken By, (c) = Cosigned By    Initials Name Provider Type    CP Paula Martinez PTA Physical Therapy Assistant                    Exercises       10/17/17 1400          Subjective Comments    Subjective Comments patient states that most of her pain is in low back  -CP      Subjective Pain    Able to rate subjective pain? yes  -CP      Pre-Treatment Pain Level 1  -CP      Post-Treatment Pain Level 2  -CP      Aquatics    Aquatics performed? Yes  -CP      Exercise 1    Exercise Name 1 aqu:walk 3 way  -CP      Time (Minutes) 1 3 min each way  -CP      Exercise 2    Exercise Name 2 aqu trunk rotatiom with cane  -CP      Reps 2 20  -CP      Exercise 3    Exercise Name 3 " aqu; shoulder 3 way with ta  -CP      Reps 3 15  -CP      Exercise 4    Exercise Name 4 aqu mini squats  -CP      Sets 4 2  -CP      Reps 4 10  -CP      Exercise 5    Exercise Name 5 aqu; float steps  -CP      Sets 5 2  -CP      Reps 5 10  -CP      Exercise 6    Exercise Name 6 aqu; deep hang  -CP      Time (Minutes) 6 8  -CP        User Key  (r) = Recorded By, (t) = Taken By, (c) = Cosigned By    Initials Name Provider Type    CP Paula Martinez PTA Physical Therapy Assistant                               PT OP Goals       10/17/17 1600       PT Short Term Goals    STG Date to Achieve --   further STGs deferred  -CP     Long Term Goals    LTG Date to Achieve 10/31/17  -CP     LTG 1 Ind w/ final HEP  -CP     LTG 1 Progress Progressing;Ongoing  -CP     LTG 2 Decrease Oswestry LBP score to 25% or less  -CP     LTG 2 Progress Progressing;Ongoing  -CP     LTG 3 Tolerate 45 min of aquatics pain free  -CP     LTG 3 Progress Met  -CP     LTG 4 Decrease LBP to no pain during activities  -CP     LTG 4 Progress Progressing;Ongoing  -CP     Time Calculation    PT Goal Re-Cert Due Date 10/31/17  -CP       User Key  (r) = Recorded By, (t) = Taken By, (c) = Cosigned By    Initials Name Provider Type    CP Paula Martinez PTA Physical Therapy Assistant                    Time Calculation:   Start Time: 1447  Stop Time: 1520  Time Calculation (min): 33 min  Total Timed Code Minutes- PT: 33 minute(s)    Therapy Charges for Today     Code Description Service Date Service Provider Modifiers Qty    63655355296 HC PT THER PROC EA 15 MIN 10/17/2017 Paula Martinez PTA GP 2                    Paula Martinez PTA  10/17/2017

## 2017-10-19 ENCOUNTER — HOSPITAL ENCOUNTER (OUTPATIENT)
Dept: PHYSICAL THERAPY | Facility: HOSPITAL | Age: 72
Setting detail: THERAPIES SERIES
Discharge: HOME | End: 2017-10-19

## 2017-10-19 DIAGNOSIS — M54.9 ACUTE BACK PAIN, UNSPECIFIED BACK LOCATION, UNSPECIFIED BACK PAIN LATERALITY: Primary | ICD-10-CM

## 2017-10-19 PROCEDURE — 97110 THERAPEUTIC EXERCISES: CPT

## 2017-10-19 NOTE — THERAPY DISCHARGE NOTE
"     Outpatient Physical Therapy Ortho Treatment Note/Discharge Summary  [facility]     Patient Name: Rama Goel  : 1945  MRN: [id number]  Today's Date: 10/19/2017      Visit Date: 10/19/2017    Subjective Improvement 85%  Visits 10/10  Visits approved medicare cp  RTMD PRN  Recert Date 10-    Back Pain    Visit Dx:    ICD-10-CM ICD-9-CM   1. Acute back pain, unspecified back location, unspecified back pain laterality M54.9 724.5       There is no problem list on file for this patient.       Past Medical History:   Diagnosis Date   • Arrhythmia     intermittent   • Hypertension    • Lupus     drug induced        Past Surgical History:   Procedure Laterality Date   • ANKLE SURGERY Right     \"bone replacement with coral\"   • APPENDECTOMY     • CHOLECYSTECTOMY     • PARATHYROIDECTOMY     • ROTATOR CUFF REPAIR Left    • TYMPANOPLASTY                               PT Assessment/Plan       10/19/17 1635       PT Assessment    Assessment Comments Patient is compliant with aquatic HEP  -CP     PT Plan    PT Plan Comments D/C to home and one month free fitness membership  -CP       User Key  (r) = Recorded By, (t) = Taken By, (c) = Cosigned By    Initials Name Provider Type    CP Paula Martinez PTA Physical Therapy Assistant                    Exercises       10/19/17 1600          Subjective Comments    Subjective Comments Patient states that she has been busy today cleaning out her kennels.  States that at the end of the day, her back feels tired.  -CP      Subjective Pain    Able to rate subjective pain? yes  -CP      Pre-Treatment Pain Level 1  -CP      Post-Treatment Pain Level 1  -CP      Aquatics    Aquatics performed? Yes  -CP      Exercise 1    Exercise Name 1 AQU walk 3 way  -CP      Time (Minutes) 1 5  -CP      Time (Seconds) 1 ABC board  -CP      Exercise 2    Exercise Name 2 AQU push pull  -CP      Sets 2 2  -CP      Reps 2 10  -CP      Time (Minutes) 2 ABC board  -CP      Exercise 3 "    Exercise Name 3 AQU: push down  -CP      Sets 3 2  -CP      Reps 3 10  -CP      Time (Minutes) 3 ABC board  -CP      Exercise 4    Exercise Name 4 Aqu float steps  -CP      Sets 4 2  -CP      Reps 4 10  -CP      Time (Minutes) 4 yellow ring float  -CP      Time (Seconds) 4 bilateral  -CP      Exercise 5    Exercise Name 5 Aqu hip 3 way  -CP      Reps 5 10  -CP      Time (Minutes) 5 green ring float  -CP      Time (Seconds) 5 Bilaterl  -CP      Exercise 6    Exercise Name 6 Aqu deep hang  -CP      Time (Minutes) 6 10  -CP        User Key  (r) = Recorded By, (t) = Taken By, (c) = Cosigned By    Initials Name Provider Type    CP Paula Martinez PTA Physical Therapy Assistant                               PT OP Goals       10/19/17 1600       PT Short Term Goals    STG Date to Achieve --   further STGs deferred  -CP     Long Term Goals    LTG Date to Achieve 10/31/17  -CP     LTG 1 Ind w/ final HEP  -CP     LTG 1 Progress Met  -CP     LTG 2 Decrease Oswestry LBP score to 25% or less  -CP     LTG 2 Progress Progressing;Ongoing  -CP     LTG 3 Tolerate 45 min of aquatics pain free  -CP     LTG 3 Progress Met  -CP     LTG 4 Decrease LBP to no pain during activities  -CP     LTG 4 Progress Met  -CP     Time Calculation    PT Goal Re-Cert Due Date 10/31/17  -CP       User Key  (r) = Recorded By, (t) = Taken By, (c) = Cosigned By    Initials Name Provider Type    CP Paula Martinez PTA Physical Therapy Assistant                    Time Calculation:   Start Time: 1437  Stop Time: 1515  Time Calculation (min): 38 min  Total Timed Code Minutes- PT: 38 minute(s)    Therapy Charges for Today     Code Description Service Date Service Provider Modifiers Qty    02843941963 HC PT THER PROC EA 15 MIN 10/19/2017 Paula Martienz PTA GP 3                       Paula Martinez PTA  10/19/2017

## 2017-10-24 ENCOUNTER — APPOINTMENT (OUTPATIENT)
Dept: PHYSICAL THERAPY | Facility: HOSPITAL | Age: 72
End: 2017-10-24

## 2017-10-25 ENCOUNTER — APPOINTMENT (OUTPATIENT)
Dept: PHYSICAL THERAPY | Facility: HOSPITAL | Age: 72
End: 2017-10-25

## 2017-10-26 ENCOUNTER — APPOINTMENT (OUTPATIENT)
Dept: PHYSICAL THERAPY | Facility: HOSPITAL | Age: 72
End: 2017-10-26

## 2017-11-17 ENCOUNTER — TELEPHONE (OUTPATIENT)
Dept: SURGERY | Age: 72
End: 2017-11-17

## 2017-11-17 NOTE — TELEPHONE ENCOUNTER
Called patient and left the following appointment information on her vm. This information will be mailed to the patient as well:    Patient scheduled for follow up ultrsound on 12/5/17 at 2:00.   She will see Celine Romero the same day at 3:00

## 2017-12-05 ENCOUNTER — HOSPITAL ENCOUNTER (OUTPATIENT)
Dept: WOMENS IMAGING | Age: 72
Discharge: HOME | End: 2017-12-05
Payer: MEDICARE

## 2017-12-05 ENCOUNTER — OFFICE VISIT (OUTPATIENT)
Dept: SURGERY | Age: 72
End: 2017-12-05
Payer: MEDICARE

## 2017-12-05 VITALS
WEIGHT: 135 LBS | DIASTOLIC BLOOD PRESSURE: 90 MMHG | HEIGHT: 63 IN | HEART RATE: 80 BPM | SYSTOLIC BLOOD PRESSURE: 160 MMHG | BODY MASS INDEX: 23.92 KG/M2

## 2017-12-05 DIAGNOSIS — Z12.31 VISIT FOR SCREENING MAMMOGRAM: ICD-10-CM

## 2017-12-05 DIAGNOSIS — N63.20 BREAST MASS, LEFT: ICD-10-CM

## 2017-12-05 DIAGNOSIS — Z12.31 VISIT FOR SCREENING MAMMOGRAM: Primary | ICD-10-CM

## 2017-12-05 PROCEDURE — G8420 CALC BMI NORM PARAMETERS: HCPCS | Performed by: PHYSICIAN ASSISTANT

## 2017-12-05 PROCEDURE — G8484 FLU IMMUNIZE NO ADMIN: HCPCS | Performed by: PHYSICIAN ASSISTANT

## 2017-12-05 PROCEDURE — G8400 PT W/DXA NO RESULTS DOC: HCPCS | Performed by: PHYSICIAN ASSISTANT

## 2017-12-05 PROCEDURE — 3017F COLORECTAL CA SCREEN DOC REV: CPT | Performed by: PHYSICIAN ASSISTANT

## 2017-12-05 PROCEDURE — 99212 OFFICE O/P EST SF 10 MIN: CPT | Performed by: PHYSICIAN ASSISTANT

## 2017-12-05 PROCEDURE — 4040F PNEUMOC VAC/ADMIN/RCVD: CPT | Performed by: PHYSICIAN ASSISTANT

## 2017-12-05 PROCEDURE — 1123F ACP DISCUSS/DSCN MKR DOCD: CPT | Performed by: PHYSICIAN ASSISTANT

## 2017-12-05 PROCEDURE — 76642 ULTRASOUND BREAST LIMITED: CPT

## 2017-12-05 PROCEDURE — 1090F PRES/ABSN URINE INCON ASSESS: CPT | Performed by: PHYSICIAN ASSISTANT

## 2017-12-05 PROCEDURE — 1036F TOBACCO NON-USER: CPT | Performed by: PHYSICIAN ASSISTANT

## 2017-12-05 PROCEDURE — 77063 BREAST TOMOSYNTHESIS BI: CPT

## 2017-12-05 PROCEDURE — G8427 DOCREV CUR MEDS BY ELIG CLIN: HCPCS | Performed by: PHYSICIAN ASSISTANT

## 2017-12-05 PROCEDURE — 3014F SCREEN MAMMO DOC REV: CPT | Performed by: PHYSICIAN ASSISTANT

## 2017-12-13 NOTE — PROGRESS NOTES
HPI:  Bony Hemphill is in for 6 month follow-up breast check. She has not noticed any changes in her breasts. Diagnostic mammogram, bilateral breast standard views, 2-D and   tomosynthesis. CAD is also utilized for this exam.   2. Targeted left breast ultrasound. CLINICAL HISTORY: Prior left breast biopsy, reevaluate adjacent   nodule. COMPARISON STUDIES: Exams dated 7/6/2017, 12/28/2016, 10/25/2016. FINDINGS:    Breast density: Heterogeneously dense-category 3   Background breast echotexture: Heterogeneous   A biopsy clip is again identified in the upper outer left breast.   Adjacent circumscribed equal density masses are identified with   developing coarse calcification. Otherwise, no suspicious mass is   identified. Targeted breast ultrasound reveals the [facility] clip at the 3:00   position, approximately 4 cm from the nipple. An adjacent oval   hypoechoic mass with no significant posterior acoustic features is   identified. This is unchanged from the comparison exam.   IMPRESSION AND RECOMMENDATION:    1. Overall stable mammogram and ultrasound. The previously identified   circumscribed mass adjacent to the biopsy clip is unchanged and may   reflect a degenerating fibroadenoma given the mammographic appearance. This is likely benign. 2. Recommend bilateral mammogram in 12 months with additional targeted   left breast ultrasound to ensure stability. BI-RADS CATEGORY 3- Likely benign finding, left breast.         BREAST EXAM:  On examination, she has fibrocystic changes throughout both breasts, no dominant masses, no skin or nipple changes and no axillary adenopathy. I see nothing suspicious for breast cancer. ASSESSMENT:  Normal breast exam and mammogram    PLAN:  She refuses a follow up visit or mammograms. I encouraged her to at least see a primary care provider and have her annual screening mammograms. I will be happy to see her if she changes her mind.

## 2018-02-21 ENCOUNTER — TELEPHONE (OUTPATIENT)
Dept: NEUROSURGERY | Age: 73
End: 2018-02-21

## 2018-02-23 ENCOUNTER — TELEPHONE (OUTPATIENT)
Dept: NEUROSURGERY | Age: 73
End: 2018-02-23

## 2018-03-01 ENCOUNTER — OFFICE VISIT (OUTPATIENT)
Dept: NEUROSURGERY | Age: 73
End: 2018-03-01
Payer: MEDICARE

## 2018-03-01 VITALS
HEIGHT: 63 IN | BODY MASS INDEX: 23.92 KG/M2 | WEIGHT: 135 LBS | HEART RATE: 91 BPM | SYSTOLIC BLOOD PRESSURE: 188 MMHG | OXYGEN SATURATION: 100 % | DIASTOLIC BLOOD PRESSURE: 78 MMHG

## 2018-03-01 DIAGNOSIS — M54.42 CHRONIC MIDLINE LOW BACK PAIN WITH BILATERAL SCIATICA: ICD-10-CM

## 2018-03-01 DIAGNOSIS — M41.80 LEVOSCOLIOSIS: ICD-10-CM

## 2018-03-01 DIAGNOSIS — M79.652 BILATERAL THIGH PAIN: ICD-10-CM

## 2018-03-01 DIAGNOSIS — M79.651 BILATERAL THIGH PAIN: ICD-10-CM

## 2018-03-01 DIAGNOSIS — M46.46 DISCITIS OF LUMBAR REGION: ICD-10-CM

## 2018-03-01 DIAGNOSIS — G89.29 CHRONIC MIDLINE LOW BACK PAIN WITH BILATERAL SCIATICA: ICD-10-CM

## 2018-03-01 DIAGNOSIS — M51.37 DDD (DEGENERATIVE DISC DISEASE), LUMBOSACRAL: ICD-10-CM

## 2018-03-01 DIAGNOSIS — M43.16 SPONDYLOLISTHESIS AT L4-L5 LEVEL: ICD-10-CM

## 2018-03-01 DIAGNOSIS — M54.41 CHRONIC MIDLINE LOW BACK PAIN WITH BILATERAL SCIATICA: ICD-10-CM

## 2018-03-01 DIAGNOSIS — M46.46 DISCITIS OF LUMBAR REGION: Primary | ICD-10-CM

## 2018-03-01 LAB
BASOPHILS ABSOLUTE: 0 K/UL (ref 0–0.2)
BASOPHILS RELATIVE PERCENT: 0.5 % (ref 0–1)
C-REACTIVE PROTEIN: 0.05 MG/DL (ref 0–0.5)
EOSINOPHILS ABSOLUTE: 0.1 K/UL (ref 0–0.6)
EOSINOPHILS RELATIVE PERCENT: 2.3 % (ref 0–5)
HCT VFR BLD CALC: 40.1 % (ref 37–47)
HEMOGLOBIN: 13 G/DL (ref 12–16)
LYMPHOCYTES ABSOLUTE: 1.7 K/UL (ref 1.1–4.5)
LYMPHOCYTES RELATIVE PERCENT: 30.1 % (ref 20–40)
MCH RBC QN AUTO: 29.7 PG (ref 27–31)
MCHC RBC AUTO-ENTMCNC: 32.4 G/DL (ref 33–37)
MCV RBC AUTO: 91.6 FL (ref 81–99)
MONOCYTES ABSOLUTE: 0.5 K/UL (ref 0–0.9)
MONOCYTES RELATIVE PERCENT: 9.4 % (ref 0–10)
NEUTROPHILS ABSOLUTE: 3.2 K/UL (ref 1.5–7.5)
NEUTROPHILS RELATIVE PERCENT: 57.3 % (ref 50–65)
PDW BLD-RTO: 11.9 % (ref 11.5–14.5)
PLATELET # BLD: 307 K/UL (ref 130–400)
PMV BLD AUTO: 10.1 FL (ref 9.4–12.3)
RBC # BLD: 4.38 M/UL (ref 4.2–5.4)
SEDIMENTATION RATE, ERYTHROCYTE: 19 MM/HR (ref 0–25)
WBC # BLD: 5.6 K/UL (ref 4.8–10.8)

## 2018-03-01 PROCEDURE — G8420 CALC BMI NORM PARAMETERS: HCPCS | Performed by: NEUROLOGICAL SURGERY

## 2018-03-01 PROCEDURE — 1090F PRES/ABSN URINE INCON ASSESS: CPT | Performed by: NEUROLOGICAL SURGERY

## 2018-03-01 PROCEDURE — 99204 OFFICE O/P NEW MOD 45 MIN: CPT | Performed by: NEUROLOGICAL SURGERY

## 2018-03-01 PROCEDURE — 3014F SCREEN MAMMO DOC REV: CPT | Performed by: NEUROLOGICAL SURGERY

## 2018-03-01 PROCEDURE — 4040F PNEUMOC VAC/ADMIN/RCVD: CPT | Performed by: NEUROLOGICAL SURGERY

## 2018-03-01 PROCEDURE — G8427 DOCREV CUR MEDS BY ELIG CLIN: HCPCS | Performed by: NEUROLOGICAL SURGERY

## 2018-03-01 PROCEDURE — G8484 FLU IMMUNIZE NO ADMIN: HCPCS | Performed by: NEUROLOGICAL SURGERY

## 2018-03-01 PROCEDURE — 1036F TOBACCO NON-USER: CPT | Performed by: NEUROLOGICAL SURGERY

## 2018-03-01 PROCEDURE — 3017F COLORECTAL CA SCREEN DOC REV: CPT | Performed by: NEUROLOGICAL SURGERY

## 2018-03-01 PROCEDURE — G8400 PT W/DXA NO RESULTS DOC: HCPCS | Performed by: NEUROLOGICAL SURGERY

## 2018-03-01 PROCEDURE — 1123F ACP DISCUSS/DSCN MKR DOCD: CPT | Performed by: NEUROLOGICAL SURGERY

## 2018-03-01 RX ORDER — IBUPROFEN 200 MG
200 TABLET ORAL EVERY 6 HOURS PRN
COMMUNITY
End: 2022-09-27

## 2018-03-01 ASSESSMENT — ENCOUNTER SYMPTOMS
EYES NEGATIVE: 1
GASTROINTESTINAL NEGATIVE: 1
BACK PAIN: 1
RESPIRATORY NEGATIVE: 1

## 2018-03-01 NOTE — PROGRESS NOTES
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 Bilateral anterior thighs R>L that has been present for about 8 months after a fall. The pain does radiate into bilateral anterior thighs with the R>L. Her pain is mostly located within the legs. The patient complains of numbness of the right anterior thigh. She states that she can walk 100 yards. She states that she cannot extend backwards. She reports that she feels some days as if she cannot get out of bed at all. She reports she has gotten worse. She reports that the only infection that she can think she has had over the last 5 years is the shingles. She denies any IV drug use. Her pain is not changed when going from a seated to standing position. Her pain is not changed with walking. Her pain is not changed when lying flat. Overall, indicative that the patient does not have a mechanical nature to their pain. She states that 15% of her pain is located in the back and 85% is leg pain. The patient states that she can no longer turn over in bed without severe pain which has dramatically affected her quality of life. The patient has underwent a non-operative treatment course that has included:  NSAIDs  Muscle Relaxers  Physical Therapy with core strengthening  Water therapy       Of note she does not use tobacco and does not take blood thinning medications.                Past Medical History:   Diagnosis Date    Arthritis     Hemorrhoids     Herpes zoster     Hypertension     Irregular heartbeat 81 y.o. female retired who presents with an abnormal MRI of the lumbar spine concerning for discitis/osteomyelitis at L3-4      ICD-10-CM ICD-9-CM    1. Discitis of lumbar region M46.46 722.93 CBC Auto Differential      Sedimentation Rate      C-Reactive Protein   2. Bilateral thigh pain M79.651 729.5     M79.652     3. Chronic midline low back pain with bilateral sciatica M54.41 724.2     M54.42 724.3     G89.29 338.29    4. DDD (degenerative disc disease), lumbosacral M51.37 722.52    5. Levoscoliosis M41.80 737.39    6. Spondylolisthesis at L4-L5 level M43.16 756.12        PLAN:  -We discussed and reviewed the results/findings of the MRI lumbar spine at length with Mrs. Nikko Dillard and her . We explained that she definitely has pathology on her imaging that may explain her described symptoms. We also explained that we need to obtain further testing before we make further recommendations.     -ESR, CRP, CBC  -Follow up in 1 month         Waldemar Velez DO

## 2018-04-04 ENCOUNTER — OFFICE VISIT (OUTPATIENT)
Dept: PODIATRY | Facility: CLINIC | Age: 73
End: 2018-04-04

## 2018-04-04 VITALS
OXYGEN SATURATION: 97 % | WEIGHT: 135 LBS | HEART RATE: 66 BPM | BODY MASS INDEX: 23.92 KG/M2 | HEIGHT: 63 IN | DIASTOLIC BLOOD PRESSURE: 96 MMHG | SYSTOLIC BLOOD PRESSURE: 209 MMHG

## 2018-04-04 DIAGNOSIS — L84 CORNS: ICD-10-CM

## 2018-04-04 DIAGNOSIS — M20.41 HAMMER TOE OF RIGHT FOOT: Primary | ICD-10-CM

## 2018-04-04 PROCEDURE — 99202 OFFICE O/P NEW SF 15 MIN: CPT | Performed by: PODIATRIST

## 2018-04-04 RX ORDER — IBUPROFEN 200 MG
200 TABLET ORAL
COMMUNITY
End: 2019-11-15 | Stop reason: HOSPADM

## 2018-04-04 RX ORDER — CYCLOBENZAPRINE HCL 10 MG
TABLET ORAL
COMMUNITY
End: 2019-11-15 | Stop reason: HOSPADM

## 2018-04-04 RX ORDER — M-VIT,TX,IRON,MINS/CALC/FOLIC 27MG-0.4MG
TABLET ORAL
COMMUNITY

## 2018-04-04 RX ORDER — ATENOLOL 100 MG/1
100 TABLET ORAL DAILY
COMMUNITY
End: 2019-11-15 | Stop reason: HOSPADM

## 2018-04-04 NOTE — PROGRESS NOTES
"Rama Goel  1945  72 y.o. female   Patient presents today for right 2nd toenail discoloration.   Patient states that it has turned purple 3 different times.     2018    Chief Complaint   Patient presents with   • Right Foot - Nail Problem       History of Present Illness    Rama Goel is a 72 y.o.female presents to clinic today with chief complaint of right second toe pain and discoloration.  Patient states that the second digit has turned purple and been sore on 3 separate occasions.  She denies any injuries to the toe.  Currently she rates her pain as a 0 out of 10.  She has done nothing to treat it.  She has no other pedal complaints at this time.      Past Medical History:   Diagnosis Date   • Arrhythmia     intermittent   • Hypertension    • Lupus     drug induced         Past Surgical History:   Procedure Laterality Date   • ANKLE SURGERY Right     \"bone replacement with coral\"   • APPENDECTOMY     •  SECTION     • CHOLECYSTECTOMY     • PARATHYROIDECTOMY     • ROTATOR CUFF REPAIR Left    • TYMPANOPLASTY           Family History   Problem Relation Age of Onset   • Hypertension Mother    • Heart disease Father    • Hypertension Brother        Allergies   Allergen Reactions   • Calcium Channel Blockers Other (See Comments)   • Hydralazine Hcl Other (See Comments)   • Hyzaar [Losartan Potassium-Hctz]    • Lisinopril    • Procardia [Nifedipine]        Social History     Social History   • Marital status:      Spouse name: N/A   • Number of children: N/A   • Years of education: N/A     Occupational History   • Not on file.     Social History Main Topics   • Smoking status: Never Smoker   • Smokeless tobacco: Never Used   • Alcohol use Yes      Comment: occasional   • Drug use: Unknown   • Sexual activity: Defer     Other Topics Concern   • Not on file     Social History Narrative   • No narrative on file         Current Outpatient Prescriptions   Medication Sig Dispense Refill " "  • atenolol (TENORMIN) 100 MG tablet Take 100 mg by mouth Daily.     • cyclobenzaprine (FLEXERIL) 10 MG tablet Take  by mouth.     • ibuprofen (ADVIL,MOTRIN) 200 MG tablet Take 200 mg by mouth.     • therapeutic multivitamin-minerals (THERAGRAN-M) tablet Take  by mouth.       No current facility-administered medications for this visit.          OBJECTIVE    BP (!) 209/96   Pulse 66   Ht 160 cm (63\")   Wt 61.2 kg (135 lb)   SpO2 97%   BMI 23.91 kg/m²       Review of Systems   Constitutional: Positive for activity change.   HENT: Negative.    Respiratory: Negative.    Cardiovascular: Negative.    Gastrointestinal: Negative.    Genitourinary: Negative.    Musculoskeletal: Positive for back pain.        Foot pain     Skin: Negative.    Psychiatric/Behavioral: Negative.            Physical Exam   Constitutional: She is oriented to person, place, and time. She appears well-developed and well-nourished. No distress.   HENT:   Head: Normocephalic and atraumatic.   Pulmonary/Chest: Effort normal. She has no wheezes.   Neurological: She is alert and oriented to person, place, and time.   Psychiatric: She has a normal mood and affect. Her behavior is normal.         Right Lower Extremity    Cardiovascular:    DP/PT pulses palpable    CFT brisk  to all digits  Skin temp is warm to cool from proximal tibia to distal digits  Pedal hair growth present.   No erythema or edema noted     Musculoskeletal:  Muscle strength is 5/5 for all muscle groups tested   ROM of the 1st MTP is full without pain or crepitus  Semi-rigidly contracted right second digit     Dermatological:   Nails 1-5 are are thickened, discolored with subungual debris  Webspaces 1-4 bilateral are clean, dry and intact.   No subcutaneous nodules or masses noted    No open wounds noted   Hyperkeratotic lesion noted to the distal tip of the right second digit    Neurological:   Protective sensation intact    Sensation intact to light touch  "       Procedures        ASSESSMENT AND PLAN    Rama was seen today for nail problem.    Diagnoses and all orders for this visit:    Hammer toe of right foot    Corns    Other orders  -     Cancel: Small Joint Arthrocentesis  -     Cancel: Nail Removal  -     Cancel: Arthrocentesis      - Comprehensive foot and ankle exam performed.   - Brief discussion held with patient regarding surgical intervention for her second digit hammertoe.  - Educated patient on callus care  - All questions were answered to the patients satisfaction.  - RTC as needed          This document has been electronically signed by Maykel Marinelli DPM on April 4, 2018 5:17 PM     4/4/2018  5:17 PM

## 2018-06-07 ENCOUNTER — TRANSCRIBE ORDERS (OUTPATIENT)
Dept: PHYSICAL THERAPY | Facility: HOSPITAL | Age: 73
End: 2018-06-07

## 2018-06-11 ENCOUNTER — TRANSCRIBE ORDERS (OUTPATIENT)
Dept: PHYSICAL THERAPY | Facility: HOSPITAL | Age: 73
End: 2018-06-11

## 2018-06-11 DIAGNOSIS — I97.821: ICD-10-CM

## 2018-06-11 DIAGNOSIS — G81.94 LEFT HEMIPARESIS (HCC): ICD-10-CM

## 2018-06-11 DIAGNOSIS — I69.354 HEMIPARESIS AFFECTING LEFT SIDE AS LATE EFFECT OF CEREBROVASCULAR ACCIDENT (HCC): Primary | ICD-10-CM

## 2018-06-14 ENCOUNTER — HOSPITAL ENCOUNTER (OUTPATIENT)
Dept: PHYSICAL THERAPY | Facility: HOSPITAL | Age: 73
Setting detail: THERAPIES SERIES
Discharge: HOME | End: 2018-06-14

## 2018-06-14 DIAGNOSIS — I69.354 HEMIPARESIS AFFECTING LEFT SIDE AS LATE EFFECT OF CEREBROVASCULAR ACCIDENT (CVA) (HCC): Primary | ICD-10-CM

## 2018-06-14 PROCEDURE — G8979 MOBILITY GOAL STATUS: HCPCS | Performed by: PHYSICAL THERAPIST

## 2018-06-14 PROCEDURE — 97162 PT EVAL MOD COMPLEX 30 MIN: CPT | Performed by: PHYSICAL THERAPIST

## 2018-06-14 PROCEDURE — G8978 MOBILITY CURRENT STATUS: HCPCS | Performed by: PHYSICAL THERAPIST

## 2018-06-14 PROCEDURE — 97110 THERAPEUTIC EXERCISES: CPT | Performed by: PHYSICAL THERAPIST

## 2018-06-14 NOTE — THERAPY EVALUATION
"    Outpatient Physical Therapy Ortho Initial Evaluation  [facility]     Patient Name: Rama Goel  : 1945  MRN: [id number]  Today's Date: 6/15/2018      Visit Date: 2018     Subjective Improvement: N/A      Attendance:   Approved:  Medicare guidelines         MD follow up:   Ortho 18, Rehab MD 18, Neuro end of July        RC date:  18         There is no problem list on file for this patient.       Past Medical History:   Diagnosis Date   • Arrhythmia     intermittent   • Hypertension    • Lupus     drug induced   • Stroke         Past Surgical History:   Procedure Laterality Date   • ANKLE SURGERY Right     \"bone replacement with coral\"   • APPENDECTOMY     •  SECTION     • CHOLECYSTECTOMY     • PARATHYROIDECTOMY     • ROTATOR CUFF REPAIR Left    • SPINAL FUSION  2018    T11-S1   • TYMPANOPLASTY       Allergies   Allergen Reactions   • Calcium Channel Blockers Other (See Comments)   • Hydralazine Hcl Other (See Comments)   • Hyzaar [Losartan Potassium-Hctz]    • Lisinopril    • Procardia [Nifedipine]        Current Outpatient Prescriptions:   •  atenolol (TENORMIN) 100 MG tablet, Take 100 mg by mouth Daily., Disp: , Rfl:   •  cyclobenzaprine (FLEXERIL) 10 MG tablet, Take  by mouth., Disp: , Rfl:   •  ibuprofen (ADVIL,MOTRIN) 200 MG tablet, Take 200 mg by mouth., Disp: , Rfl:   •  therapeutic multivitamin-minerals (THERAGRAN-M) tablet, Take  by mouth., Disp: , Rfl:       Visit Dx:     ICD-10-CM ICD-9-CM   1. Hemiparesis affecting left side as late effect of cerebrovascular accident (CVA) I69.354 438.20             Patient History     Row Name 18 1400             History    Chief Complaint Balance Problems;Muscle weakness;Joint swelling  -KG      Date Current Problem(s) Began 18  -KG      Brief Description of Current Complaint Pt presents s/p T11-S1 fusion and during the procedura had a CVA with residual L sided weakness. Last 3 days prior " to surgery had difficulty walking. Pt received PT at [facility] until 6/12/18. Pt has a ramp to enter single level home. Pt lives alone; friend from [place] stays with her sometimes as well as her significant other. Has friends close by to assist when needed. Was not ambulating with AD prior to CVA/surgery. Using SPC at this time. Reports her balance and strength are what she wants to improve the most. Reports she wants to be able to walk better. Reports she doesn't go back to the ortho MD for follow up until 6/19/18. Was told to defer any back exercises until then.  -KG      Previous treatment for THIS PROBLEM Rehabilitation  -KG      Patient/Caregiver Goals Improve strength;Improve mobility;Relieve pain   Improve balance, normal gait  -KG      Hand Dominance right-handed  -KG      Occupation/sports/leisure activities Pt is a retired ICU nurse  -KG         Pain     Pain Location Knee  -KG      Pain at Present 0  -KG      Pain at Best 0  -KG      Pain at Worst 7   In knee  -KG      Pain Frequency Intermittent  -KG      Pain Comments States she has no pain in the back since the surgery. L knee is painful most of the time.  -KG      Is your sleep disturbed? No  -KG      Difficulties at work? N/A  -KG      Difficulties with ADL's? Independent but with increased time.  -KG         Fall Risk Assessment    Any falls in the past year: No  -KG        User Key  (r) = Recorded By, (t) = Taken By, (c) = Cosigned By    Initials Name Provider Type    KG Michelle Jamison, PT Physical Therapist                PT Ortho     Row Name 06/14/18 1400       Subjective Comments    Subjective Comments Refer to therapy pt history for details.  -KG       Precautions and Contraindications    Precautions/Limitations spinal precautions;lifting restrictions (specify in comments)  -KG    Precautions No BLT; no lifting more than 10#; s/p CVA  -KG       Subjective Pain    Able to rate subjective pain? yes  -KG    Pre-Treatment Pain Level 0  -KG     Post-Treatment Pain Level 0  -KG       Posture/Observations    Posture/Observations Comments No acute distress. Ambulates with SPC. Decreased john, decreased stance time LLE. R trunk lean at hips.  -KG       Quarter Clearing    Quarter Clearing Lower Quarter Clearing  -KG       Neural Tension Signs- Lower Quarter Clearing    SLR Bilateral:;Negative  -KG       Sensory Screen for Light Touch- Lower Quarter Clearing    L1 (inguinal area) Bilateral:;Intact  -KG    L2 (anterior mid thigh) Bilateral:;Intact  -KG    L3 (distal anterior thigh) Bilateral:;Intact  -KG    L4 (medial lower leg/foot) Bilateral:;Intact  -KG    L5 (lateral lower leg/great toe) Bilateral:;Intact  -KG    S1 (bottom of foot) Bilateral:;Intact  -KG       Lumbar ROM Screen- Lower Quarter Clearing    Lumbar Flexion --   Deferre due to recent surgery/precautions  -KG       Special Tests/Palpation    Special Tests/Palpation Lumbar/SI  -KG       General ROM    RT Upper Ext Rt Shoulder ABduction;Rt Shoulder Flexion  -KG    LT Upper Ext Lt Shoulder ABduction;Lt Shoulder Flexion  -KG       Right Upper Ext    Rt Shoulder Abduction AROM 160  -KG    Rt Shoulder Flexion AROM 160  -KG       Left Upper Ext    Lt Shoulder Abduction AROM 145  -KG    Lt Shoulder Flexion AROM 148  -KG       MMT (Manual Muscle Testing)    Additional Documentation General Assessment (Manual Muscle Testing) (Group)  -KG       General Assessment (Manual Muscle Testing)    General Manual Muscle Testing (MMT) Assessment upper extremity strength deficits identified;lower extremity strength deficits identified  -KG       Upper Extremity (Manual Muscle Testing)    Upper Extremity: Manual Muscle Testing (MMT) left shoulder strength deficit;right shoulder strength deficit;right elbow/forearm strength deficit;left elbow/forearm strength deficit  -KG       Left Shoulder (Manual Muscle Testing)    Comment, MMT: Left Shoulder Flex 4/5, abd 4-/5, ER/IR 4+/5  -KG       Right Shoulder (Manual Muscle  "Testing)    Comment, MMT: Right Shoulder Grossly 5/5  -KG       Left Elbow/Forearm (Manual Muscle Testing)    Comment, MMT: Left Elbow/Forearm Flex 4/5, ext 4+/5  -KG       Right Elbow/Forearm (Manual Muscle Testing)    Comment, MMT: Right Elbow/Forearm Grossly 5/5  -KG       Lower Extremity (Manual Muscle Testing)    Lower Extremity: Manual Muscle Testing (MMT) left hip strength deficit;right hip strength deficit;left knee strength deficit;right knee strength deficit;left ankle strength deficit;right ankle strength deficit  -KG       Left Hip (Manual Muscle Testing)    MMT, Left Hip: Manual Muscle Testing (MMT) Flex 3+/5. abd 3+/5, add 4-/5  -KG       Right Hip (Manual Muscle Testing)    MMT, Right Hip: Manual Muscle Testing (MMT) Flex 4-/5, abd 4-/5, add 4+/5  -KG       Left Knee (Manual Muscle Testing)    Comment, Left Knee: Manual Muscle Testing (MMT) Flex 4/5, ext 4/5  -KG       Right Knee (Manual Muscle Testing)    Comment, Right Knee: Manual Muscle Testing (MMT) Flex/ext 4+/5  -KG       Left Ankle/Foot (Manual Muscle Testing)    Comment, MMT: Left Ankle/Foot DF/PF 4/5  -KG       Right Ankle/Foot (Manual Muscle Testing)    Comment, MMT: Right Ankle/Foot Grossly 5/5 all planes  -KG        Strength Right    # Reps 3  -KG    Right Rung 2  -KG    Right  Test 1 45  -KG    Right  Test 2 40  -KG    Right  Test 3 42  -KG     Strength Average Right 42.33  -KG        Strength Left    # Reps 3  -KG    Left Rung 2  -KG    Left  Test 1 20  -KG    Left  Test 2 26  -KG    Left  Test 3 25  -KG     Strength Average Left 23.67  -KG       Sensation    Sensation WNL? WNL  -KG    Light Touch No apparent deficits  -KG       Balance Skills Training    Balance Comments Level ground: EO/FA 30\", EO/FT 30\", tandem R lead 30\", tandem L lead 20\". Airex: FT/EO 30\", tandem L lead 10\", tandem R lead 18\"  -KG       Hand  Strength     Strength Affected Side Bilateral  -KG      User Key  (r) = " "Recorded By, (t) = Taken By, (c) = Cosigned By    Initials Name Provider Type    KG Michelle Jamison, PT Physical Therapist                      Therapy Education  Education Details: POC education. [facility]; added putty   Given: HEP, Symptoms/condition management, Pain management, Posture/body mechanics, Mobility training  Program: New  How Provided: Verbal, Demonstration, Written  Provided to: Patient  Level of Understanding: Teach back education performed, Verbalized, Demonstrated           PT OP Goals     Row Name 06/14/18 1400          PT Short Term Goals    STG Date to Achieve 07/05/18  -KG     STG 1 Independent/compliant with HEP  -KG     STG 1 Progress New  -KG     STG 2 Complete TUG test in 12\" with least restrictive AD  -KG     STG 2 Progress New  -KG     STG 3 Demonstrate improvements in L hip flex to 4-/5  -KG     STG 3 Progress New  -KG     STG 4 Tolerate 45 minute treatment session without increased pain  -KG     STG 4 Progress New  -KG     STG 5 Perform tandem stance R lead on level surface for 30\" or greater  -KG     STG 5 Progress New  -KG        Long Term Goals    LTG Date to Achieve 07/26/18  -KG     LTG 1 Subjectively report 60% improvement or greater  -KG     LTG 1 Progress New  -KG     LTG 2 Complete TUG test in 9\" or less with no AD  -KG     LTG 2 Progress New  -KG     LTG 3 Ambulate independently with no AD, non-antalgic gait  -KG     LTG 3 Progress New  -KG     LTG 4 Demonstrate bilateral hip flex/abd MMT to 4+/5  -KG     LTG 4 Progress New  -KG     LTG 5 Demonstrate L shoulder flex/abd MMT to 4+/5  -KG     LTG 5 Progress New  -KG     LTG 6 Independent with aquatics  -KG     LTG 6 Progress New  -KG     LTG 7 Improve L  strength to 30# or greater  -KG     LTG 7 Progress New  -KG     LTG 8 Perform tandem stance L/R lead on compliant surface for 30\"  -KG     LTG 8 Progress New  -KG        Time Calculation    PT Goal Re-Cert Due Date 07/05/18  " -KG       User Key  (r) = Recorded By, (t) = Taken By, (c) = Cosigned By    Initials Name Provider Type    KG Michelle Jamison, PT Physical Therapist                PT Assessment/Plan     Row Name 06/14/18 1400          PT Assessment    Functional Limitations Decreased safety during functional activities;Impaired gait;Impaired locomotion;Limitation in home management;Limitations in community activities;Limitations in functional capacity and performance;Performance in leisure activities  -KG     Impairments Balance;Gait;Endurance;Impaired flexibility;Impaired muscle endurance;Muscle strength;Pain;Posture;Range of motion  -KG     Assessment Comments Pt presents s/p T11-S1 spinal fusion and subsequently had CVA during the surgery. Pt presents with residual L sided weakness. Had inpatient rehab at [facility] and was discharged on 6/12/18. Pt demonstrates balance deficits as well, limiting performance of functional mobility/gait performance at this time. Pt will benefit from skilled PT services to address these deficits for improvements in overall functional strength, gait mechanics, balance, and performance with functional mobility.  -KG     Please refer to paper survey for additional self-reported information Yes  -KG     Rehab Potential Good  -KG     Patient/caregiver participated in establishment of treatment plan and goals Yes  -KG     Patient would benefit from skilled therapy intervention Yes  -KG        PT Plan    PT Frequency 3x/week;2x/week  -KG     Predicted Duration of Therapy Intervention (Therapy Eval) 6 weeks  -KG     Planned CPT's? PT EVAL MOD COMPLELITY: 34855;PT RE-EVAL: 40151;PT THER PROC EA 15 MIN: 46534;PT THER ACT EA 15 MIN: 75163;PT MANUAL THERAPY EA 15 MIN: 24492;PT GAIT TRAINING EA 15 MIN: 28922;PT NEUROMUSC RE-EDUCATION EA 15 MIN: 37366;PT AQUATIC THERAPY EA 15 MIN: 68094;PT SELF CARE/HOME MGMT/TRAIN EA 15: 38517;PT THER SUPP EA 15 MIN  -KG     Physical Therapy Interventions (Optional Details)  aquatics exercise;balance training;gait training;home exercise program;lumbar stabilization;manual therapy techniques;modalities;neuromuscular re-education;patient/family education;ROM (Range of Motion);postural re-education;strengthening;stretching  -KG     PT Plan Comments Spinal precautions (No BLT). BLE/BUE strengthening, postural strengthening/core stabilization, challenge dynamic balance, gait training, stretching. Will begin aquatics 1x per week.  -KG       User Key  (r) = Recorded By, (t) = Taken By, (c) = Cosigned By    Initials Name Provider Type    FABIANA Jamison, PT Physical Therapist                  Exercises     Row Name 06/14/18 1400             Precautions    Existing Precautions/Restrictions spinal  -KG         Subjective Comments    Subjective Comments Refer to therapy pt history for details.  -KG         Subjective Pain    Able to rate subjective pain? yes  -KG      Pre-Treatment Pain Level 0  -KG      Post-Treatment Pain Level 0  -KG         Aquatics    Aquatics performed? No  -KG         Exercise 1    Exercise Name 1 Putty  yellow  -KG      Reps 1 2 min  -KG      Additional Comments Review/demo for HEP  -KG         Exercise 2    Exercise Name 2 Putty pinch yellow  -KG      Additional Comments Review/demo for HEP  -KG         Exercise 3    Exercise Name 3 Mini-squats  -KG      Sets 3 1  -KG      Reps 3 10  -KG      Additional Comments Review/demo for current HEP  -KG         Exercise 4    Exercise Name 4 Tandem gait at handrail  -KG      Sets 4 1  -KG      Reps 4 3  -KG      Additional Comments Review/demo for current HEP  -KG         Exercise 5    Exercise Name 5 Static tandem stance  -KG      Additional Comments Review/demo for current HEP  -KG        User Key  (r) = Recorded By, (t) = Taken By, (c) = Cosigned By    Initials Name Provider Type    FABIANA Jamison, PT Physical Therapist                        Outcome Measure Options: Timed Up and Go (TUG)  Timed Up and Go (TUG)  TUG  "Test 1: 16.13 seconds  TUG Test 2: 14.9 seconds  Timed Up and Go Comments: Use of SPC      Time Calculation:     Therapy Suggested Charges     Code   Minutes Charges    None             Start Time: 1435  Stop Time: 1518  Time Calculation (min): 43 min  Total Timed Code Minutes- PT: 15 minute(s)     Therapy Charges for Today     Code Description Service Date Service Provider Modifiers Qty    65126350073 HC PT MOBILITY CURRENT 6/14/2018 Michelle Jamison, PT GP, CK 1    73643339089 HC PT MOBILITY PROJECTED 6/14/2018 Michelle Jamison, PT GP, CI 1    98854665038 HC PT EVAL MOD COMPLEXITY 2 6/14/2018 Michelle Jamison, PT GP 1    57295528898 HC PT THER PROC EA 15 MIN 6/14/2018 Michelle Jamison, PT GP 1          PT G-Codes  PT Professional Judgement Used?: Yes  Outcome Measure Options: Timed Up and Go (TUG)  Score: 16.13\", 14.90\"  Functional Limitation: Mobility: Walking and moving around  Mobility: Walking and Moving Around Current Status (): At least 40 percent but less than 60 percent impaired, limited or restricted  Mobility: Walking and Moving Around Goal Status (): At least 1 percent but less than 20 percent impaired, limited or restricted         Michelle Jamison, PT  6/15/2018      "

## 2018-06-18 ENCOUNTER — HOSPITAL ENCOUNTER (OUTPATIENT)
Dept: PHYSICAL THERAPY | Facility: HOSPITAL | Age: 73
Setting detail: THERAPIES SERIES
Discharge: HOME | End: 2018-06-18

## 2018-06-18 DIAGNOSIS — I69.354 HEMIPARESIS AFFECTING LEFT SIDE AS LATE EFFECT OF CEREBROVASCULAR ACCIDENT (CVA) (HCC): Primary | ICD-10-CM

## 2018-06-18 DIAGNOSIS — M54.9 ACUTE BACK PAIN, UNSPECIFIED BACK LOCATION, UNSPECIFIED BACK PAIN LATERALITY: ICD-10-CM

## 2018-06-18 PROCEDURE — 97110 THERAPEUTIC EXERCISES: CPT | Performed by: PHYSICAL THERAPIST

## 2018-06-18 NOTE — THERAPY TREATMENT NOTE
"    Outpatient Physical Therapy Ortho Treatment Note  [facility]     Patient Name: Rama Goel  : 1945  MRN: [id number]  Today's Date: 2018      Visit Date: 2018     Subjective Improvement: N/A      Attendance:   Approved:  Medicare guidelines         MD follow up:   Ortho 18, Rehab MD 18, Neuro end of July        RC date:  18     Visit Dx:    ICD-10-CM ICD-9-CM   1. Hemiparesis affecting left side as late effect of cerebrovascular accident (CVA) I69.354 438.20   2. Acute back pain, unspecified back location, unspecified back pain laterality M54.9 724.5       There is no problem list on file for this patient.       Past Medical History:   Diagnosis Date   • Arrhythmia     intermittent   • Hypertension    • Lupus     drug induced   • Stroke         Past Surgical History:   Procedure Laterality Date   • ANKLE SURGERY Right     \"bone replacement with coral\"   • APPENDECTOMY     •  SECTION     • CHOLECYSTECTOMY     • PARATHYROIDECTOMY     • ROTATOR CUFF REPAIR Left    • SPINAL FUSION  2018    T11-S1   • TYMPANOPLASTY               PT Ortho     Row Name 18 1600       Subjective Comments    Subjective Comments Pt states she's still doing HEP faithfully at home. Had a fall on Saturday. L knee gave out on her when standing up from a chair and she tried to more weight through her cane, faling on her R side. Reports having no pain from the incident, no bruising. Reports it scared her more than anything.  -KG       Precautions and Contraindications    Precautions/Limitations spinal precautions  -KG    Precautions No BLT; no lifting more than 10#; s/p CVA  -KG       Subjective Pain    Post-Treatment Pain Level 0  -KG       Posture/Observations    Posture/Observations Comments No acute distress. Ambulates with SPC, decreased john, slight decrease stance time LLE. R trunk lean at hips.  -KG      User Key  (r) = Recorded By, (t) = Taken By, (c) = " Cosigned By    Initials Name Provider Type    KG Michelle Jamison, PT Physical Therapist                            PT Assessment/Plan     Row Name 06/18/18 1600          PT Assessment    Functional Limitations Decreased safety during functional activities;Impaired gait;Impaired locomotion;Limitation in home management;Limitations in community activities;Limitations in functional capacity and performance;Performance in leisure activities  -KG     Impairments Balance;Gait;Endurance;Impaired flexibility;Impaired muscle endurance;Muscle strength;Pain;Posture;Range of motion  -KG     Assessment Comments Pt with overall good tolerance to therex this date. Did require ocassional seated rest breaks due to fatigue. [facility]. L quad weakness noteable with standing activities.  -KG     Rehab Potential Good  -KG     Patient/caregiver participated in establishment of treatment plan and goals Yes  -KG     Patient would benefit from skilled therapy intervention Yes  -KG        PT Plan    PT Frequency 3x/week;2x/week  -KG     Predicted Duration of Therapy Intervention (Therapy Eval) 6 weeks  -KG     PT Plan Comments Continue PN/postural education. Continue to progress CKC/balance activities as tolerated. Pt was performing high level activies in rehab at [facility]. Possible tandem gait on airex next visit. Tband shoulder ext NV. Will begin pool 1x/week next week.  -KG       User Key  (r) = Recorded By, (t) = Taken By, (c) = Cosigned By    Initials Name Provider Type    KG Michelle Jamison, PT Physical Therapist                    Exercises     Row Name 06/18/18 1600             Precautions    Existing Precautions/Restrictions spinal  -KG         Subjective Comments    Subjective Comments Pt states she's still doing HEP faithfully at home. Had a fall on Saturday. L knee gave out on her when standing up from a chair and she tried to more weight through her cane,  "faling on her R side. Reports having no pain from the incident, no bruising. Reports it scared her more than anything.  -KG         Subjective Pain    Able to rate subjective pain? yes  -KG      Pre-Treatment Pain Level 0  -KG      Post-Treatment Pain Level 0  -KG         Aquatics    Aquatics performed? No  -KG         Exercise 1    Exercise Name 1 Pro II for ROM/endurance  -KG      Time 1 8 min  -KG      Additional Comments L 2.0  -KG         Exercise 2    Exercise Name 2 Seated HS stretch  -KG      Reps 2 2  -KG      Time 2 30\" hold  -KG         Exercise 3    Exercise Name 3 Seated tband mid rows  -KG      Sets 3 2  -KG      Reps 3 10  -KG      Additional Comments Red tband  -KG         Exercise 4    Exercise Name 4 Seated PN/posture ed  -KG      Time 4 2 min  -KG         Exercise 5    Exercise Name 5 LAQ + hip adduction squeeze  -KG      Sets 5 2  -KG      Reps 5 10  -KG      Time 5 3\" hold  -KG         Exercise 6    Exercise Name 6 Seated CR/TR  -KG      Sets 6 2  -KG      Reps 6 10  -KG         Exercise 7    Exercise Name 7 Airex standing march  -KG      Sets 7 2  -KG      Reps 7 10  -KG      Additional Comments gait belt  -KG         Exercise 8    Exercise Name 8 Airex beam lat sidestepping  -KG      Sets 8 1  -KG      Reps 8 5  -KG      Additional Comments Gait belt  -KG         Exercise 9    Exercise Name 9 Tandem gait at handrail  -KG      Cueing 9 Verbal;Tactile  -KG      Sets 9 1  -KG      Reps 9 5  -KG      Additional Comments Gait belt  -KG         Exercise 10    Exercise Name 10 Standing TKE  -KG      Sets 10 2  -KG      Reps 10 10  -KG      Time 10 5\" hold  -KG      Additional Comments Red tband  -KG         Exercise 11    Exercise Name 11 Standing hip abd bilaterally  -KG      Sets 11 2  -KG      Reps 11 10  -KG      Additional Comments Gait belt  -KG         Exercise 12    Exercise Name 12 Mini squats  -KG      Cueing 12 Verbal  -KG      Reps 12 2  -KG      Time 12 10  -KG      Additional Comments " "Gait belt  -KG        User Key  (r) = Recorded By, (t) = Taken By, (c) = Cosigned By    Initials Name Provider Type    KG Michelle Jamison, PT Physical Therapist                               PT OP Goals     Row Name 06/18/18 1600          PT Short Term Goals    STG Date to Achieve 07/05/18  -KG     STG 1 Independent/compliant with HEP  -KG     STG 1 Progress Progressing  -KG     STG 2 Complete TUG test in 12\" with least restrictive AD  -KG     STG 2 Progress Progressing  -KG     STG 3 Demonstrate improvements in L hip flex to 4-/5  -KG     STG 3 Progress Progressing  -KG     STG 4 Tolerate 45 minute treatment session without increased pain  -KG     STG 4 Progress Progressing  -KG     STG 5 Perform tandem stance R lead on level surface for 30\" or greater  -KG     STG 5 Progress Progressing  -KG        Long Term Goals    LTG Date to Achieve 07/26/18  -KG     LTG 1 Subjectively report 60% improvement or greater  -KG     LTG 1 Progress Progressing  -KG     LTG 2 Complete TUG test in 10\" or less with no AD  -KG     LTG 2 Progress Goal Revised  -KG     LTG 3 Ambulate independently with no AD, non-antalgic gait  -KG     LTG 3 Progress Progressing  -KG     LTG 4 Demonstrate bilateral hip flex/abd MMT to 4+/5  -KG     LTG 4 Progress Progressing  -KG     LTG 5 Demonstrate L shoulder flex/abd MMT to 4+/5  -KG     LTG 5 Progress Progressing  -KG     LTG 6 Independent with aquatics  -KG     LTG 6 Progress Progressing  -KG     LTG 7 Improve L  strength to 30# or greater  -KG     LTG 7 Progress Progressing  -KG     LTG 8 Perform tandem stance L/R lead on compliant surface for 30\"  -KG     LTG 8 Progress Progressing  -KG        Time Calculation    PT Goal Re-Cert Due Date 07/05/18  -KG       User Key  (r) = Recorded By, (t) = Taken By, (c) = Cosigned By    Initials Name Provider Type    KG Michelle Jamison, PT Physical Therapist          Therapy Education  Given: HEP, Symptoms/condition management, Posture/body " mechanics  Program: Reinforced  How Provided: Verbal  Provided to: Patient  Level of Understanding: Verbalized, Demonstrated              Time Calculation:   Start Time: 1600  Stop Time: 1653  Time Calculation (min): 53 min  Total Timed Code Minutes- PT: 53 minute(s)  Therapy Suggested Charges     Code   Minutes Charges    None           Therapy Charges for Today     Code Description Service Date Service Provider Modifiers Qty    17439121996 HC PT THER PROC EA 15 MIN 6/18/2018 Michelle Jamison, PT GP 4                    Michelle Jamison, PT  6/18/2018

## 2018-06-20 ENCOUNTER — HOSPITAL ENCOUNTER (OUTPATIENT)
Dept: PHYSICAL THERAPY | Facility: HOSPITAL | Age: 73
Setting detail: THERAPIES SERIES
Discharge: HOME | End: 2018-06-20

## 2018-06-20 DIAGNOSIS — I69.354 HEMIPARESIS AFFECTING LEFT SIDE AS LATE EFFECT OF CEREBROVASCULAR ACCIDENT (CVA) (HCC): Primary | ICD-10-CM

## 2018-06-20 DIAGNOSIS — M54.9 ACUTE BACK PAIN, UNSPECIFIED BACK LOCATION, UNSPECIFIED BACK PAIN LATERALITY: ICD-10-CM

## 2018-06-20 PROCEDURE — 97110 THERAPEUTIC EXERCISES: CPT

## 2018-06-20 NOTE — THERAPY TREATMENT NOTE
"    Outpatient Physical Therapy Ortho Treatment Note  [facility]       Patient Name: Rama Goel  : 1945  MRN: [id number]  Today's Date: 2018      Visit Date: 2018  Subjective Improvement: N/A      Attendance: 3/3  Approved:  Medicare guidelines         MD follow up:   Ortho 18, Rehab MD 18, Neuro end of July        RC date:  18   Visit Dx:    ICD-10-CM ICD-9-CM   1. Hemiparesis affecting left side as late effect of cerebrovascular accident (CVA) I69.354 438.20   2. Acute back pain, unspecified back location, unspecified back pain laterality M54.9 724.5       There is no problem list on file for this patient.       Past Medical History:   Diagnosis Date   • Arrhythmia     intermittent   • Hypertension    • Lupus     drug induced   • Stroke         Past Surgical History:   Procedure Laterality Date   • ANKLE SURGERY Right     \"bone replacement with coral\"   • APPENDECTOMY     •  SECTION     • CHOLECYSTECTOMY     • PARATHYROIDECTOMY     • ROTATOR CUFF REPAIR Left    • SPINAL FUSION  2018    T11-S1   • TYMPANOPLASTY               PT Ortho     Row Name 18 1300       Subjective Comments    Subjective Comments Pt states she is doing ok. States she walks in her small home sometimes without the cane.  -PM       Precautions and Contraindications    Precautions/Limitations spinal precautions  -PM    Precautions No BLT; no lifting more than 10#; s/p CVA  -PM       Subjective Pain    Able to rate subjective pain? yes  -PM    Pre-Treatment Pain Level 0  -PM    Post-Treatment Pain Level 0   \"fatigued, janet L knee\"  -PM       Posture/Observations    Posture/Observations Comments No acute distress. Ambulates with SPC, decreased john, slight decrease stance time LLE. R trunk lean at hips.  -PM    Row Name 18 1600       Subjective Comments    Subjective Comments Pt states she's still doing HEP faithfully at home. Had a fall on Saturday. L knee gave out " on her when standing up from a chair and she tried to more weight through her cane, faling on her R side. Reports having no pain from the incident, no bruising. Reports it scared her more than anything.  -KG       Precautions and Contraindications    Precautions/Limitations spinal precautions  -KG    Precautions No BLT; no lifting more than 10#; s/p CVA  -KG       Subjective Pain    Post-Treatment Pain Level 0  -KG       Posture/Observations    Posture/Observations Comments No acute distress. Ambulates with SPC, decreased john, slight decrease stance time LLE. R trunk lean at hips.  -KG      User Key  (r) = Recorded By, (t) = Taken By, (c) = Cosigned By    Initials Name Provider Type    PM Day Manzano, PTA Physical Therapy Assistant    KG Michelle Jamison, PT Physical Therapist                            PT Assessment/Plan     Row Name 06/20/18 1300          PT Assessment    Functional Limitations Decreased safety during functional activities;Impaired gait;Impaired locomotion;Limitation in home management;Limitations in community activities;Limitations in functional capacity and performance;Performance in leisure activities  -PM     Impairments Balance;Gait;Endurance;Impaired flexibility;Impaired muscle endurance;Muscle strength;Pain;Posture;Range of motion  -PM     Assessment Comments Gait unsteady at times. Unable to get completely pelvic neutral(note that hardware goes to sacral area). Excellent effort.   -PM     Rehab Potential Good  -PM     Patient/caregiver participated in establishment of treatment plan and goals Yes  -PM     Patient would benefit from skilled therapy intervention Yes  -PM        PT Plan    PT Frequency 2x/week;3x/week  -PM     Predicted Duration of Therapy Intervention (Therapy Eval) 6 weeks  -PM     PT Plan Comments tandem stance @ HR with CGA; incr cycle to 10 min  -PM       User Key  (r) = Recorded By, (t) = Taken By, (c) = Cosigned By    Initials Name Provider Type    IVELISSE Bernstein  "Vivienne Manzano PTA Physical Therapy Assistant                    Exercises     Row Name 06/20/18 1300             Precautions    Existing Precautions/Restrictions spinal  -PM         Subjective Comments    Subjective Comments Pt states she is doing ok. States she walks in her small home sometimes without the cane.  -PM         Subjective Pain    Able to rate subjective pain? yes  -PM      Pre-Treatment Pain Level 0  -PM      Post-Treatment Pain Level 0   \"fatigued, janet L knee\"  -PM         Aquatics    Aquatics performed? No  -PM         Exercise 1    Exercise Name 1 Pro II for ROM/endurance  -PM      Time 1 8 min  -PM      Additional Comments L3 UE/LE's  -PM         Exercise 2    Exercise Name 2 Seated HS stretch  -PM      Reps 2 2  -PM      Time 2 30\" hold  -PM         Exercise 3    Exercise Name 3 Seated tband mid rows PN instr w/TA  -PM      Sets 3 2  -PM      Reps 3 10  -PM      Additional Comments red TB  -PM         Exercise 4    Exercise Name 4 Seated PN/posture ed  -PM      Cueing 4 Verbal;Tactile  -PM      Time 4 2 min  -PM         Exercise 5    Exercise Name 5 std shldr ext w/posture TA  -PM      Sets 5 2  -PM      Reps 5 10  -PM      Time 5 --  -PM      Additional Comments red TB  -PM         Exercise 6    Exercise Name 6 Seated CR/TR  -PM      Sets 6 1  -PM      Reps 6 10  -PM      Additional Comments review  -PM         Exercise 7    Exercise Name 7 Airex standing march  -PM      Sets 7 2  -PM      Reps 7 10  -PM         Exercise 8    Exercise Name 8 Airex beam lat sidestepping  -PM      Sets 8 1  -PM      Reps 8 5  -PM         Exercise 9    Exercise Name 9 Tandem gait at handrail  -PM      Cueing 9 Verbal;Tactile  -PM      Sets 9 1  -PM      Reps 9 5  -PM      Additional Comments gait belt CGA  -PM         Exercise 10    Exercise Name 10 Standing TKE  -PM      Sets 10 2  -PM      Reps 10 10  -PM      Time 10 5\" hold  -PM      Additional Comments red TB  -PM         Exercise 11    Exercise Name 11 " "Standing hip abd bilaterally  -PM      Sets 11 2  -PM      Reps 11 10  -PM      Additional Comments gait belt CGA  -PM         Exercise 12    Exercise Name 12 Mini squats  -PM      Cueing 12 Verbal  -PM      Reps 12 2  -PM      Time 12 10  -PM      Additional Comments gait belt CGA  -PM         Exercise 13    Exercise Name 13 standing hip ext B alternating with iso TA  -PM      Cueing 13 Verbal  -PM      Sets 13 2  -PM      Reps 13 10  -PM        User Key  (r) = Recorded By, (t) = Taken By, (c) = Cosigned By    Initials Name Provider Type    PM Day Manzano, PTA Physical Therapy Assistant                               PT OP Goals     Row Name 06/20/18 1300          PT Short Term Goals    STG Date to Achieve 07/05/18  -PM     STG 1 Independent/compliant with HEP  -PM     STG 1 Progress Progressing  -PM     STG 2 Complete TUG test in 12\" with least restrictive AD  -PM     STG 2 Progress Progressing  -PM     STG 3 Demonstrate improvements in L hip flex to 4-/5  -PM     STG 3 Progress Progressing  -PM     STG 4 Tolerate 45 minute treatment session without increased pain  -PM     STG 4 Progress Progressing  -PM     STG 5 Perform tandem stance R lead on level surface for 30\" or greater  -PM     STG 5 Progress Progressing  -PM        Long Term Goals    LTG Date to Achieve 07/26/18  -PM     LTG 1 Subjectively report 60% improvement or greater  -PM     LTG 1 Progress Progressing  -PM     LTG 2 Complete TUG test in 10\" or less with no AD  -PM     LTG 2 Progress Goal Revised  -PM     LTG 3 Ambulate independently with no AD, non-antalgic gait  -PM     LTG 3 Progress Progressing  -PM     LTG 4 Demonstrate bilateral hip flex/abd MMT to 4+/5  -PM     LTG 4 Progress Progressing  -PM     LTG 5 Demonstrate L shoulder flex/abd MMT to 4+/5  -PM     LTG 5 Progress Progressing  -PM     LTG 6 Independent with aquatics  -PM     LTG 6 Progress Progressing  -PM     LTG 7 Improve L  strength to 30# or greater  -PM     LTG 7 Progress " "Progressing  -PM     LTG 8 Perform tandem stance L/R lead on compliant surface for 30\"  -PM     LTG 8 Progress Progressing  -PM        Time Calculation    PT Goal Re-Cert Due Date 07/05/18  -PM       User Key  (r) = Recorded By, (t) = Taken By, (c) = Cosigned By    Initials Name Provider Type    PM Day Manzano PTA Physical Therapy Assistant          Therapy Education  Given: HEP, Symptoms/condition management, Posture/body mechanics  Program: Reinforced  How Provided: Verbal  Provided to: Patient  Level of Understanding: Verbalized, Demonstrated              Time Calculation:   Start Time: 1350  Stop Time: 1440  Time Calculation (min): 50 min  Total Timed Code Minutes- PT: 50 minute(s)  Therapy Suggested Charges     Code   Minutes Charges    None           Therapy Charges for Today     Code Description Service Date Service Provider Modifiers Qty    65488329381 HC PT THER PROC EA 15 MIN 6/20/2018 Day Manzano PTA GP 3                    Day Manzano PTA  6/20/2018     "

## 2018-06-22 ENCOUNTER — HOSPITAL ENCOUNTER (OUTPATIENT)
Dept: PHYSICAL THERAPY | Facility: HOSPITAL | Age: 73
Setting detail: THERAPIES SERIES
Discharge: HOME | End: 2018-06-22

## 2018-06-22 DIAGNOSIS — I69.354 HEMIPARESIS AFFECTING LEFT SIDE AS LATE EFFECT OF CEREBROVASCULAR ACCIDENT (CVA) (HCC): Primary | ICD-10-CM

## 2018-06-22 DIAGNOSIS — M54.9 ACUTE BACK PAIN, UNSPECIFIED BACK LOCATION, UNSPECIFIED BACK PAIN LATERALITY: ICD-10-CM

## 2018-06-22 PROCEDURE — 97110 THERAPEUTIC EXERCISES: CPT

## 2018-06-22 NOTE — THERAPY TREATMENT NOTE
"    Outpatient Physical Therapy Ortho Treatment Note  [facility]  Tiera Hernandez, PTA  18  2:12 PM       Patient Name: Rama Goel  : 1945  MRN: [id number]  Today's Date: 2018      Visit Date: 2018    Subjective Improvement: 0%       Attendance:   Approved: medicare guidelines           MD follow up:Rehab MD 18, Neuro end                    date:     18    Visit Dx:    ICD-10-CM ICD-9-CM   1. Hemiparesis affecting left side as late effect of cerebrovascular accident (CVA) I69.354 438.20   2. Acute back pain, unspecified back location, unspecified back pain laterality M54.9 724.5       There is no problem list on file for this patient.       Past Medical History:   Diagnosis Date   • Arrhythmia     intermittent   • Hypertension    • Lupus     drug induced   • Stroke         Past Surgical History:   Procedure Laterality Date   • ANKLE SURGERY Right     \"bone replacement with coral\"   • APPENDECTOMY     •  SECTION     • CHOLECYSTECTOMY     • PARATHYROIDECTOMY     • ROTATOR CUFF REPAIR Left    • SPINAL FUSION  2018    T11-S1   • TYMPANOPLASTY               PT Ortho     Row Name 18 1300       Subjective Comments    Subjective Comments pt states that her neck/shoulder is tender from sleeping in a weird position she thinks. Pt reports no pain in back  -KM       Precautions and Contraindications    Precautions/Limitations spinal precautions  -KM    Precautions No BLT; no lifting more than 10#; s/p CVA  -KM       Subjective Pain    Able to rate subjective pain? yes  -KM    Pre-Treatment Pain Level 0   \"2/10 in neck/shoulder from sleeping funny\"  -KM    Post-Treatment Pain Level 0  -KM       Posture/Observations    Posture/Observations Comments No acute distress. Ambulates with SPC, decreased john, slight decrease stance time LLE. R trunk lean at hips.  -KM    Row Name 18 1300       Subjective Comments    Subjective Comments " "Pt states she is doing ok. States she walks in her small home sometimes without the cane.  -PM       Precautions and Contraindications    Precautions/Limitations spinal precautions  -PM    Precautions No BLT; no lifting more than 10#; s/p CVA  -PM       Subjective Pain    Able to rate subjective pain? yes  -PM    Pre-Treatment Pain Level 0  -PM    Post-Treatment Pain Level 0   \"fatigued, janet L knee\"  -PM       Posture/Observations    Posture/Observations Comments No acute distress. Ambulates with SPC, decreased john, slight decrease stance time LLE. R trunk lean at hips.  -PM      User Key  (r) = Recorded By, (t) = Taken By, (c) = Cosigned By    Initials Name Provider Type    PM Day Manzano [facility] Physical Therapy Assistant     Tiera Fournier [facility] Physical Therapy Assistant                            PT Assessment/Plan     Row Name 06/22/18 1400          PT Assessment    Functional Limitations Decreased safety during functional activities;Impaired gait;Impaired locomotion;Limitation in home management;Limitations in community activities;Limitations in functional capacity and performance;Performance in leisure activities  -KM     Impairments Balance;Gait;Endurance;Impaired flexibility;Impaired muscle endurance;Muscle strength;Pain;Posture;Range of motion  -KM     Assessment Comments pt able to acheive 30 sec in tandem stance with both L lead and R lead (CGA). Improved performance of tandem gait on airex beam as well.   -KM     Rehab Potential Good  -KM     Patient/caregiver participated in establishment of treatment plan and goals Yes  -KM     Patient would benefit from skilled therapy intervention Yes  -KM        PT Plan    PT Frequency 2x/week;3x/week  -KM     Predicted Duration of Therapy Intervention (Therapy Eval) 6 weeks  -KM     PT Plan Comments Possible step ups NV  -KM       User Key  (r) = Recorded By, (t) = Taken By, (c) = Cosigned By    Initials Name Provider Type    NIEVES JONES" "TOAN Fournier Physical Therapy Assistant                    Exercises     Row Name 06/22/18 1300             Precautions    Existing Precautions/Restrictions spinal  -KM         Subjective Comments    Subjective Comments pt states that her neck/shoulder is tender from sleeping in a weird position she thinks. Pt reports no pain in back  -KM         Subjective Pain    Able to rate subjective pain? yes  -KM      Pre-Treatment Pain Level 0   \"2/10 in neck/shoulder from sleeping funny\"  -KM      Post-Treatment Pain Level 0  -KM         Aquatics    Aquatics performed? No  -KM         Exercise 1    Exercise Name 1 Pro II for ROM/endurance  -KM      Time 1 10  -KM      Additional Comments L3.0 UE/LES  -KM         Exercise 2    Exercise Name 2 Seated HS stretch  -KM      Reps 2 2  -KM      Time 2 30\" hold  -KM         Exercise 3    Exercise Name 3 Seated tband mid rows PN instr w/TA  -KM      Sets 3 2  -KM      Reps 3 10  -KM         Exercise 4    Exercise Name 4 Seated PN/posture ed  -KM      Cueing 4 Verbal;Tactile  -KM      Time 4 2 min  -KM         Exercise 5    Exercise Name 5 std shldr ext w/posture TA  -KM      Sets 5 2  -KM      Reps 5 10  -KM         Exercise 6    Exercise Name 6 Seated CR/TR  -KM      Sets 6 1  -KM      Reps 6 10  -KM         Exercise 7    Exercise Name 7 Airex standing march  -KM      Sets 7 2  -KM      Reps 7 10  -KM         Exercise 8    Exercise Name 8 Airex beam lat sidestepping  -KM      Sets 8 1  -KM      Reps 8 5  -KM         Exercise 9    Exercise Name 9 Sit to stands from airex in chair  -KM      Cueing 9 --  -KM      Sets 9 1  -KM      Reps 9 10  -KM      Additional Comments using B arm rests  -KM         Exercise 10    Exercise Name 10 Standing TKE  -KM      Sets 10 2  -KM      Reps 10 10  -KM      Time 10 5\" hold  -KM      Additional Comments red  -KM         Exercise 11    Exercise Name 11 Standing hip abd bilaterally  -KM      Sets 11 2  -KM      Reps 11 10  -KM         Exercise 12 " "   Exercise Name 12 Mini squats  -KM      Cueing 12 Verbal  -KM      Reps 12 2  -KM      Time 12 10  -KM         Exercise 13    Exercise Name 13 standing hip ext B alternating with iso TA  -KM      Cueing 13 Verbal  -KM      Sets 13 2  -KM      Reps 13 10  -KM         Exercise 14    Exercise Name 14 Tandem: R lead  -KM      Time 14 30 sec   -KM      Additional Comments CGA  -KM         Exercise 15    Exercise Name 15 Tandem L lead    -KM      Time 15 30 sec   -KM      Additional Comments CGA  -KM        User Key  (r) = Recorded By, (t) = Taken By, (c) = Cosigned By    Initials Name Provider Type    NIEVES Fournier, PTA Physical Therapy Assistant                               PT OP Goals     Row Name 06/22/18 1300          PT Short Term Goals    STG Date to Achieve 07/05/18  -KM     STG 1 Independent/compliant with HEP  -KM     STG 1 Progress Progressing  -KM     STG 2 Complete TUG test in 12\" with least restrictive AD  -KM     STG 2 Progress Progressing  -KM     STG 3 Demonstrate improvements in L hip flex to 4-/5  -KM     STG 3 Progress Progressing  -KM     STG 4 Tolerate 45 minute treatment session without increased pain  -KM     STG 4 Progress Progressing  -KM     STG 5 Perform tandem stance R lead on level surface for 30\" or greater  -KM     STG 5 Progress Progressing  -KM        Long Term Goals    LTG Date to Achieve 07/26/18  -KM     LTG 1 Subjectively report 60% improvement or greater  -KM     LTG 1 Progress Progressing  -KM     LTG 2 Complete TUG test in 10\" or less with no AD  -KM     LTG 2 Progress Goal Revised  -KM     LTG 3 Ambulate independently with no AD, non-antalgic gait  -KM     LTG 3 Progress Progressing  -KM     LTG 4 Demonstrate bilateral hip flex/abd MMT to 4+/5  -KM     LTG 4 Progress Progressing  -KM     LTG 5 Demonstrate L shoulder flex/abd MMT to 4+/5  -KM     LTG 5 Progress Progressing  -KM     LTG 6 Independent with aquatics  -KM     LTG 6 Progress Progressing  -KM     LTG 7 " "Improve L  strength to 30# or greater  -KM     LTG 7 Progress Progressing  -KM     LTG 8 Perform tandem stance L/R lead on compliant surface for 30\"  -KM     LTG 8 Progress Progressing  -KM        Time Calculation    PT Goal Re-Cert Due Date 07/05/18  -KM       User Key  (r) = Recorded By, (t) = Taken By, (c) = Cosigned By    Initials Name Provider Type     Tiera Hernandez PTA Physical Therapy Assistant          Therapy Education  Given: HEP, Symptoms/condition management, Posture/body mechanics  Program: Reinforced  How Provided: Verbal  Provided to: Patient  Level of Understanding: Verbalized, Demonstrated              Time Calculation:   Start Time: 1320  Stop Time: 1405  Time Calculation (min): 45 min  Total Timed Code Minutes- PT: 45 minute(s)  Therapy Suggested Charges     Code   Minutes Charges    None           Therapy Charges for Today     Code Description Service Date Service Provider Modifiers Qty    74263207242 HC PT THER PROC EA 15 MIN 6/22/2018 Tiera Hernandez PTA GP 3                    Tiera Hernandez PTA  6/22/2018     "

## 2018-06-25 ENCOUNTER — HOSPITAL ENCOUNTER (OUTPATIENT)
Dept: PHYSICAL THERAPY | Facility: HOSPITAL | Age: 73
Setting detail: THERAPIES SERIES
Discharge: HOME | End: 2018-06-25

## 2018-06-25 DIAGNOSIS — I69.354 HEMIPARESIS AFFECTING LEFT SIDE AS LATE EFFECT OF CEREBROVASCULAR ACCIDENT (CVA) (HCC): Primary | ICD-10-CM

## 2018-06-25 DIAGNOSIS — M54.9 ACUTE BACK PAIN, UNSPECIFIED BACK LOCATION, UNSPECIFIED BACK PAIN LATERALITY: ICD-10-CM

## 2018-06-25 PROCEDURE — 97110 THERAPEUTIC EXERCISES: CPT | Performed by: PHYSICAL THERAPIST

## 2018-06-25 NOTE — THERAPY TREATMENT NOTE
"    Outpatient Physical Therapy Ortho Treatment Note  [facility]     Patient Name: Rama Goel  : 1945  MRN: [id number]  Today's Date: 2018      Visit Date: 2018     Subjective Improvement: \"little\"       Attendance:   Approved:  Medicare guidelines         MD follow up:    Rehab MD 18, Neuro end of July        date: 18          Visit Dx:    ICD-10-CM ICD-9-CM   1. Hemiparesis affecting left side as late effect of cerebrovascular accident (CVA) I69.354 438.20   2. Acute back pain, unspecified back location, unspecified back pain laterality M54.9 724.5       There is no problem list on file for this patient.       Past Medical History:   Diagnosis Date   • Arrhythmia     intermittent   • Hypertension    • Lupus     drug induced   • Stroke         Past Surgical History:   Procedure Laterality Date   • ANKLE SURGERY Right     \"bone replacement with coral\"   • APPENDECTOMY     •  SECTION     • CHOLECYSTECTOMY     • PARATHYROIDECTOMY     • ROTATOR CUFF REPAIR Left    • SPINAL FUSION  2018    T11-S1   • TYMPANOPLASTY               PT Ortho     Row Name 18 1600       Precautions and Contraindications    Precautions/Limitations spinal precautions  -KG    Precautions No BLT; no lifting more than 25#; s/p CVA  -KG       Subjective Pain    Post-Treatment Pain Level 2  -KG       Posture/Observations    Posture/Observations Comments No acute distress. Ambulates with SPC. Decreased john, decreased stance time LLE. Guarded at c-spine this date.  -KG      User Key  (r) = Recorded By, (t) = Taken By, (c) = Cosigned By    Initials Name Provider Type    KG Michelle Jamison PT Physical Therapist                            PT Assessment/Plan     Row Name 18 1600          PT Assessment    Functional Limitations Decreased safety during functional activities;Impaired gait;Impaired locomotion;Limitation in home management;Limitations in community " "activities;Limitations in functional capacity and performance;Performance in leisure activities  -KG     Impairments Balance;Gait;Endurance;Impaired flexibility;Impaired muscle endurance;Muscle strength;Pain;Posture;Range of motion  -KG     Assessment Comments Pt more sore/painful today, janet in neck. Reports cleaning floors in home today. Educated on keeping good body mechanics/spinal precautions with activity at home. Pt with more difficulty this date standing core but good effort overall. Pt very challenged with sup<>sit log roll transfer due to BUE weakness.  -KG     Rehab Potential Good  -KG     Patient/caregiver participated in establishment of treatment plan and goals Yes  -KG     Patient would benefit from skilled therapy intervention Yes  -KG        PT Plan    PT Frequency 2x/week;3x/week  -KG     Predicted Duration of Therapy Intervention (Therapy Eval) 6 weeks  -KG     PT Plan Comments First aquatic treatment session next visit. Continue core activities, balance, endurance. Recheck next week.  -KG       User Key  (r) = Recorded By, (t) = Taken By, (c) = Cosigned By    Initials Name Provider Type    KG Michelle Jamison, PT Physical Therapist                    Exercises     Row Name 06/25/18 1600             Precautions    Existing Precautions/Restrictions spinal  -KG         Subjective Comments    Subjective Comments Pt states she's having trouble get in/out of bed without pain as her bed is so tall. Painful in neck & back  -KG         Subjective Pain    Able to rate subjective pain? yes  -KG      Pre-Treatment Pain Level 2  -KG      Post-Treatment Pain Level 2  -KG      Subjective Pain Comment Pain in neck  -KG         Aquatics    Aquatics performed? No  -KG         Exercise 1    Exercise Name 1 Pro II for ROM/endurance  -KG      Time 1 10  -KG      Additional Comments L3.0 UE/LES  -KG         Exercise 2    Exercise Name 2 Seated HS stretch  -KG      Reps 2 2  -KG      Time 2 30\" hold  -KG         " "Exercise 3    Exercise Name 3 Standing tband mid rows PN instr w/TA  -KG      Sets 3 2  -KG      Reps 3 10  -KG      Additional Comments Red tband  -KG         Exercise 4    Exercise Name 4 std shldr ext w/posture TA  -KG      Sets 4 2  -KG      Reps 4 10  -KG      Additional Comments Red tband  -KG         Exercise 5    Exercise Name 5 Airex standing march  -KG      Sets 5 2  -KG      Reps 5 10  -KG         Exercise 6    Exercise Name 6 Airex beam lat sidestepping  -KG      Cueing 6 Verbal;Tactile  -KG      Sets 6 1  -KG      Reps 6 5  -KG         Exercise 7    Exercise Name 7 Airex beam tandem gait  -KG      Cueing 7 Verbal;Tactile  -KG      Sets 7 1  -KG      Reps 7 5  -KG         Exercise 8    Exercise Name 8 Sit to stands from airex in chair  -KG      Sets 8 1  -KG      Reps 8 10  -KG      Additional Comments using B arm rests  -KG         Exercise 9    Exercise Name 9 Mini squats  -KG      Sets 9 2  -KG      Reps 9 10  -KG         Exercise 10    Exercise Name 10 Supine<>sit education/demonstration  -KG      Time 10 10 min  -KG      Additional Comments Pt very weak BUE's and difficult to perform transfer at home.  -KG        User Key  (r) = Recorded By, (t) = Taken By, (c) = Cosigned By    Initials Name Provider Type    KG Michelle Jamison, PT Physical Therapist                               PT OP Goals     Row Name 06/25/18 1600          PT Short Term Goals    STG Date to Achieve 07/05/18  -KG     STG 1 Independent/compliant with HEP  -KG     STG 1 Progress Met;Ongoing  -KG     STG 2 Complete TUG test in 12\" with least restrictive AD  -KG     STG 2 Progress Progressing  -KG     STG 3 Demonstrate improvements in L hip flex to 4-/5  -KG     STG 3 Progress Progressing  -KG     STG 4 Tolerate 45 minute treatment session without increased pain  -KG     STG 4 Progress Progressing  -KG     STG 5 Perform tandem stance R lead on level surface for 30\" or greater  -KG     STG 5 Progress Progressing  -KG        Long Term " "Goals    LTG Date to Achieve 07/26/18  -KG     LTG 1 Subjectively report 60% improvement or greater  -KG     LTG 1 Progress Progressing  -KG     LTG 2 Complete TUG test in 10\" or less with no AD  -KG     LTG 2 Progress Goal Revised  -KG     LTG 3 Ambulate independently with no AD, non-antalgic gait  -KG     LTG 3 Progress Progressing  -KG     LTG 4 Demonstrate bilateral hip flex/abd MMT to 4+/5  -KG     LTG 4 Progress Progressing  -KG     LTG 5 Demonstrate L shoulder flex/abd MMT to 4+/5  -KG     LTG 5 Progress Progressing  -KG     LTG 6 Independent with aquatics  -KG     LTG 6 Progress Progressing  -KG     LTG 7 Improve L  strength to 30# or greater  -KG     LTG 7 Progress Progressing  -KG     LTG 8 Perform tandem stance L/R lead on compliant surface for 30\"  -KG     LTG 8 Progress Progressing  -KG        Time Calculation    PT Goal Re-Cert Due Date 07/05/18  -KG       User Key  (r) = Recorded By, (t) = Taken By, (c) = Cosigned By    Initials Name Provider Type    KG Michelle Jamison, PT Physical Therapist          Therapy Education  Given: HEP, Symptoms/condition management, Posture/body mechanics  Program: Reinforced  How Provided: Verbal, Demonstration  Provided to: Patient  Level of Understanding: Verbalized, Demonstrated              Time Calculation:   Start Time: 1605  Stop Time: 1650  Time Calculation (min): 45 min  Total Timed Code Minutes- PT: 45 minute(s)  Therapy Suggested Charges     Code   Minutes Charges    None           Therapy Charges for Today     Code Description Service Date Service Provider Modifiers Qty    13006840469 HC PT THER PROC EA 15 MIN 6/25/2018 Michelle Jamison, PT GP 3                    Michelle Jamison, PT  6/25/2018     "

## 2018-06-27 ENCOUNTER — HOSPITAL ENCOUNTER (OUTPATIENT)
Dept: PHYSICAL THERAPY | Facility: HOSPITAL | Age: 73
Setting detail: THERAPIES SERIES
Discharge: HOME | End: 2018-06-27

## 2018-06-27 DIAGNOSIS — I69.354 HEMIPARESIS AFFECTING LEFT SIDE AS LATE EFFECT OF CEREBROVASCULAR ACCIDENT (CVA) (HCC): Primary | ICD-10-CM

## 2018-06-27 PROCEDURE — 97113 AQUATIC THERAPY/EXERCISES: CPT | Performed by: PHYSICAL THERAPIST

## 2018-06-27 NOTE — THERAPY TREATMENT NOTE
"    Outpatient Physical Therapy Ortho Treatment Note  [facility]     Patient Name: Rama Goel  : 1945  MRN: [id number]  Today's Date: 2018      Visit Date: 2018     Subjective Improvement: \"little\"       Attendance:   Approved:  Medicare guidelines         MD follow up:    Rehab MD 18, Neuro end of July        date: 18        Visit Dx:    ICD-10-CM ICD-9-CM   1. Hemiparesis affecting left side as late effect of cerebrovascular accident (CVA) I69.354 438.20       There is no problem list on file for this patient.       Past Medical History:   Diagnosis Date   • Arrhythmia     intermittent   • Hypertension    • Lupus     drug induced   • Stroke         Past Surgical History:   Procedure Laterality Date   • ANKLE SURGERY Right     \"bone replacement with coral\"   • APPENDECTOMY     •  SECTION     • CHOLECYSTECTOMY     • PARATHYROIDECTOMY     • ROTATOR CUFF REPAIR Left    • SPINAL FUSION  2018    T11-S1   • TYMPANOPLASTY               PT Ortho     Row Name 18 1400       Precautions and Contraindications    Precautions/Limitations spinal precautions  -KG    Precautions No BLT; no lifting more than 25#; s/p CVA  -KG       Subjective Pain    Able to rate subjective pain? yes  -KG       Posture/Observations    Posture/Observations Comments No acute distress. Slow john entering pool area with SPC. Decreased stance time RLE.  -KG    Row Name 18 1600       Precautions and Contraindications    Precautions/Limitations spinal precautions  -KG    Precautions No BLT; no lifting more than 25#; s/p CVA  -KG       Subjective Pain    Post-Treatment Pain Level 2  -KG       Posture/Observations    Posture/Observations Comments No acute distress. Ambulates with SPC. Decreased john, decreased stance time LLE. Guarded at c-spine this date.  -KG      User Key  (r) = Recorded By, (t) = Taken By, (c) = Cosigned By    Initials Name Provider Type    FABIANA ROBERTS" [place], PT Physical Therapist                            PT Assessment/Plan     Row Name 06/27/18 1400          PT Assessment    Functional Limitations Decreased safety during functional activities;Impaired gait;Impaired locomotion;Limitation in home management;Limitations in community activities;Limitations in functional capacity and performance;Performance in leisure activities  -KG     Impairments Balance;Gait;Endurance;Impaired flexibility;Impaired muscle endurance;Muscle strength;Pain;Posture;Range of motion  -KG     Assessment Comments Pt with good tolerance to first aquatic treatment. Able to maintain to maintain TA/pelvic positioning more easily in the water, still required cuing. Effects of water assist in mobility and performance and tolerance of activity.  -KG     Rehab Potential Good  -KG     Patient/caregiver participated in establishment of treatment plan and goals Yes  -KG     Patient would benefit from skilled therapy intervention Yes  -KG        PT Plan    PT Frequency 2x/week;3x/week  -KG     Predicted Duration of Therapy Intervention (Therapy Eval) 6 weeks  -KG     PT Plan Comments Continue standing core/CKC strengthening. Step ups next visits on land; challenge static/dynamic balance.  -KG       User Key  (r) = Recorded By, (t) = Taken By, (c) = Cosigned By    Initials Name Provider Type    FABIANA Jamison, PT Physical Therapist                Modalities     Row Name 06/27/18 1400             Subjective Pain    Post-Treatment Pain Level 0  -KG        User Key  (r) = Recorded By, (t) = Taken By, (c) = Cosigned By    Initials Name Provider Type    FABIANA Jamison PT Physical Therapist                Exercises     Row Name 06/27/18 1400             Precautions    Existing Precautions/Restrictions spinal  -KG         Subjective Comments    Subjective Comments Pt states she's felt better the past 2 days than she did Monday. Neck still feels stiff but better.  -KG         Subjective Pain     Able to rate subjective pain? yes  -KG      Pre-Treatment Pain Level 0  -KG      Post-Treatment Pain Level 0  -KG      Subjective Pain Comment Stiff neck  -KG         Aquatics    Aquatics performed? Yes  -KG         Exercise 1    Exercise Name 1 AQ fwd walk  -KG      Time 1 4 min  -KG         Exercise 2    Exercise Name 2 AQ lateral walking  -KG      Time 2 4 min  -KG         Exercise 3    Exercise Name 3 AQ standing CR/TR  -KG      Cueing 3 Verbal  -KG      Sets 3 2  -KG      Reps 3 10  -KG         Exercise 4    Exercise Name 4 AQ Mini squats  -KG      Cueing 4 Verbal  -KG      Sets 4 2  -KG      Reps 4 10  -KG         Exercise 5    Exercise Name 5 AQ 3 way SLR BLE  -KG      Cueing 5 Verbal  -KG      Sets 5 2  -KG      Reps 5 10  -KG         Exercise 6    Exercise Name 6 AQ bilateral shoulder horizontal abd/add  -KG      Cueing 6 Verbal  -KG      Sets 6 2  -KG      Reps 6 10  -KG      Additional Comments Wall sit + TA  -KG         Exercise 7    Exercise Name 7 AQ bilateral shoulder abd/add  -KG      Cueing 7 Verbal  -KG      Sets 7 2  -KG      Reps 7 10  -KG      Additional Comments Wall sit + TA  -KG         Exercise 8    Exercise Name 8 AQ bilateral shoulder flex/ext  -KG      Cueing 8 Verbal  -KG      Sets 8 2  -KG      Reps 8 10  -KG      Additional Comments Wall sit + TA  -KG         Exercise 9    Exercise Name 9 AQ push/pull with med cookie  -KG      Sets 9 2  -KG      Reps 9 10  -KG      Additional Comments Wall sit + TA  -KG         Exercise 10    Exercise Name 10 AQ deep end bicycle kick  -KG      Time 10 3 min  -KG         Exercise 11    Exercise Name 11 AQ deep end scissor kick  -KG      Time 11 3 min  -KG         Exercise 12    Exercise Name 12 AQ deep end hang  -KG      Time 12 3 min  -KG        User Key  (r) = Recorded By, (t) = Taken By, (c) = Cosigned By    Initials Name Provider Type    KG Michelle Jamison, PT Physical Therapist                               PT OP Goals     Row Name 06/27/18 1400  "         PT Short Term Goals    STG Date to Achieve 07/05/18  -KG     STG 1 Independent/compliant with HEP  -KG     STG 1 Progress Met;Ongoing  -KG     STG 2 Complete TUG test in 12\" with least restrictive AD  -KG     STG 2 Progress Progressing  -KG     STG 3 Demonstrate improvements in L hip flex to 4-/5  -KG     STG 3 Progress Progressing  -KG     STG 4 Tolerate 45 minute treatment session without increased pain  -KG     STG 4 Progress Progressing  -KG     STG 5 Perform tandem stance R lead on level surface for 30\" or greater  -KG     STG 5 Progress Progressing  -KG        Long Term Goals    LTG Date to Achieve 07/26/18  -KG     LTG 1 Subjectively report 60% improvement or greater  -KG     LTG 1 Progress Progressing  -KG     LTG 2 Complete TUG test in 10\" or less with no AD  -KG     LTG 2 Progress Goal Revised  -KG     LTG 3 Ambulate independently with no AD, non-antalgic gait  -KG     LTG 3 Progress Progressing  -KG     LTG 4 Demonstrate bilateral hip flex/abd MMT to 4+/5  -KG     LTG 4 Progress Progressing  -KG     LTG 5 Demonstrate L shoulder flex/abd MMT to 4+/5  -KG     LTG 5 Progress Progressing  -KG     LTG 6 Independent with aquatics  -KG     LTG 6 Progress Progressing  -KG     LTG 7 Improve L  strength to 30# or greater  -KG     LTG 7 Progress Progressing  -KG     LTG 8 Perform tandem stance L/R lead on compliant surface for 30\"  -KG     LTG 8 Progress Progressing  -KG        Time Calculation    PT Goal Re-Cert Due Date 07/05/18  -KG       User Key  (r) = Recorded By, (t) = Taken By, (c) = Cosigned By    Initials Name Provider Type    KG Michelle Jamison, PT Physical Therapist          Therapy Education  Education Details: Educated pt that she may need railing on bed to assist with sup<>sit transfers, as she is having so much difficulty.  Given: HEP, Symptoms/condition management, Posture/body mechanics, Mobility training  Program: Reinforced  How Provided: Verbal  Provided to: Patient  Level of " Understanding: Verbalized, Demonstrated              Time Calculation:   Start Time: 1430  Stop Time: 1515  Time Calculation (min): 45 min  Total Timed Code Minutes- PT: 45 minute(s)  Therapy Suggested Charges     Code   Minutes Charges    None           Therapy Charges for Today     Code Description Service Date Service Provider Modifiers Qty    49555886365 HC PT AQUATIC THERAPY EA 15 MIN 6/27/2018 Michelle Jamison, PT GP 3                    Michelle Jamison, PT  6/27/2018

## 2018-06-29 ENCOUNTER — HOSPITAL ENCOUNTER (OUTPATIENT)
Dept: PHYSICAL THERAPY | Facility: HOSPITAL | Age: 73
Setting detail: THERAPIES SERIES
Discharge: HOME | End: 2018-06-29

## 2018-06-29 DIAGNOSIS — M54.9 ACUTE BACK PAIN, UNSPECIFIED BACK LOCATION, UNSPECIFIED BACK PAIN LATERALITY: ICD-10-CM

## 2018-06-29 DIAGNOSIS — I69.354 HEMIPARESIS AFFECTING LEFT SIDE AS LATE EFFECT OF CEREBROVASCULAR ACCIDENT (CVA) (HCC): Primary | ICD-10-CM

## 2018-06-29 PROCEDURE — 97110 THERAPEUTIC EXERCISES: CPT

## 2018-06-29 NOTE — THERAPY TREATMENT NOTE
"    Outpatient Physical Therapy Ortho Treatment Note  [facility]  Tiera Hernandez, PTA  18  2:01 PM       Patient Name: Rama Goel  : 1945  MRN: [id number]  Today's Date: 2018      Visit Date: 2018    Subjective Improvement: \"little\"       Attendance:    Approved: medicare guidelines          MD follow up: Rehab MD 18, Neuro end                   date: 18        Visit Dx:    ICD-10-CM ICD-9-CM   1. Hemiparesis affecting left side as late effect of cerebrovascular accident (CVA) I69.354 438.20   2. Acute back pain, unspecified back location, unspecified back pain laterality M54.9 724.5       There is no problem list on file for this patient.       Past Medical History:   Diagnosis Date   • Arrhythmia     intermittent   • Hypertension    • Lupus     drug induced   • Stroke         Past Surgical History:   Procedure Laterality Date   • ANKLE SURGERY Right     \"bone replacement with coral\"   • APPENDECTOMY     •  SECTION     • CHOLECYSTECTOMY     • PARATHYROIDECTOMY     • ROTATOR CUFF REPAIR Left    • SPINAL FUSION  2018    T11-S1   • TYMPANOPLASTY               PT Ortho     Row Name 18 1300       Subjective Comments    Subjective Comments pt states that her neck is not as stiff as it was last visit. pt states that her muscles feel sore today  -KM       Precautions and Contraindications    Precautions/Limitations spinal precautions  -KM    Precautions No BLT; no lifting more than 25#; s/p CVA  -KM       Subjective Pain    Able to rate subjective pain? yes  -KM    Pre-Treatment Pain Level 0  -KM    Post-Treatment Pain Level 0  -KM       Posture/Observations    Posture/Observations Comments No acute distress. Slow john entering pool area with SPC. Decreased stance time RLE.  -KM    Row Name 18 1400       Precautions and Contraindications    Precautions/Limitations spinal precautions  -KG    Precautions No BLT; no lifting " more than 25#; s/p CVA  -KG       Subjective Pain    Able to rate subjective pain? yes  -KG       Posture/Observations    Posture/Observations Comments No acute distress. Slow john entering pool area with SPC. Decreased stance time RLE.  -KG      User Key  (r) = Recorded By, (t) = Taken By, (c) = Cosigned By    Initials Name Provider Type    KG Michelle Jamison, PT Physical Therapist     Tiera Fournier PTA Physical Therapy Assistant                            PT Assessment/Plan     Row Name 06/29/18 1300          PT Assessment    Functional Limitations Decreased safety during functional activities;Impaired gait;Impaired locomotion;Limitation in home management;Limitations in community activities;Limitations in functional capacity and performance;Performance in leisure activities  -KM     Impairments Balance;Gait;Endurance;Impaired flexibility;Impaired muscle endurance;Muscle strength;Pain;Posture;Range of motion  -KM     Assessment Comments pt did fairly well with intro to step ups. Pt challenged with LLE step ups more than R. pt still quite unsteady with tandem gait on airex beam. pt able to do sit to stands much easier today when cued for nose over toes.  -KM     Rehab Potential Good  -KM     Patient/caregiver participated in establishment of treatment plan and goals Yes  -KM     Patient would benefit from skilled therapy intervention Yes  -KM        PT Plan    PT Frequency 2x/week;3x/week  -KM     Predicted Duration of Therapy Intervention (Therapy Eval) 6 weeks  -KM     PT Plan Comments Possible obstacle course w/ hurdles and step ups at HR next visit  -KM       User Key  (r) = Recorded By, (t) = Taken By, (c) = Cosigned By    Initials Name Provider Type     Tiera Fournier PTA Physical Therapy Assistant                    Exercises     Row Name 06/29/18 1300             Precautions    Existing Precautions/Restrictions spinal  -KM         Subjective Comments    Subjective Comments pt states  "that her neck is not as stiff as it was last visit. pt states that her muscles feel sore today  -KM         Subjective Pain    Able to rate subjective pain? yes  -KM      Pre-Treatment Pain Level 0  -KM      Post-Treatment Pain Level 0  -KM         Aquatics    Aquatics performed? No  -KM         Exercise 1    Exercise Name 1 Pro II for ROM/endurance  -KM      Time 1 10  -KM         Exercise 2    Exercise Name 2 Seated HS stretch  -KM      Reps 2 2  -KM      Time 2 30\" hold  -KM         Exercise 3    Exercise Name 3 Standing tband mid rows PN instr w/TA  -KM      Sets 3 2  -KM      Reps 3 10  -KM         Exercise 4    Exercise Name 4 std shldr ext w/posture TA  -KM      Sets 4 2  -KM      Reps 4 10  -KM         Exercise 5    Exercise Name 5 Airex standing march  -KM      Sets 5 2  -KM      Reps 5 10  -KM         Exercise 6    Exercise Name 6 Airex beam lat sidestepping  -KM      Cueing 6 Verbal;Tactile  -KM      Sets 6 1  -KM      Reps 6 5  -KM         Exercise 7    Exercise Name 7 Airex beam tandem gait  -KM      Cueing 7 Verbal;Tactile  -KM      Sets 7 1  -KM      Reps 7 5  -KM         Exercise 8    Exercise Name 8 Sit to stands from airex in chair  -KM      Sets 8 1  -KM      Reps 8 10  -KM         Exercise 9    Exercise Name 9 Airex Mini squats  -KM      Sets 9 2  -KM      Reps 9 10  -KM         Exercise 10    Exercise Name 10 Step ups fwd  -KM      Sets 10 2  -KM      Reps 10 10  -KM         Exercise 11    Exercise Name 11 Step ups lat  -KM      Sets 11 2  -KM      Reps 11 10  -KM         Exercise 12    Exercise Name 12 Airex march  -KM      Reps 12 2x10  -KM        User Key  (r) = Recorded By, (t) = Taken By, (c) = Cosigned By    Initials Name Provider Type    NIEVES Fournier, PTA Physical Therapy Assistant                               PT OP Goals     Row Name 06/29/18 1300          PT Short Term Goals    STG Date to Achieve 07/05/18  -KM     STG 1 Independent/compliant with HEP  -KM     STG 1 " "Progress Met;Ongoing  -KM     STG 2 Complete TUG test in 12\" with least restrictive AD  -KM     STG 2 Progress Progressing  -KM     STG 3 Demonstrate improvements in L hip flex to 4-/5  -KM     STG 3 Progress Progressing  -KM     STG 4 Tolerate 45 minute treatment session without increased pain  -KM     STG 4 Progress Progressing  -KM     STG 5 Perform tandem stance R lead on level surface for 30\" or greater  -KM     STG 5 Progress Progressing  -KM        Long Term Goals    LTG Date to Achieve 07/26/18  -KM     LTG 1 Subjectively report 60% improvement or greater  -KM     LTG 1 Progress Progressing  -KM     LTG 2 Complete TUG test in 10\" or less with no AD  -KM     LTG 2 Progress Goal Revised  -KM     LTG 3 Ambulate independently with no AD, non-antalgic gait  -KM     LTG 3 Progress Progressing  -KM     LTG 4 Demonstrate bilateral hip flex/abd MMT to 4+/5  -KM     LTG 4 Progress Progressing  -KM     LTG 5 Demonstrate L shoulder flex/abd MMT to 4+/5  -KM     LTG 5 Progress Progressing  -KM     LTG 6 Independent with aquatics  -KM     LTG 6 Progress Progressing  -KM     LTG 7 Improve L  strength to 30# or greater  -KM     LTG 7 Progress Progressing  -KM     LTG 8 Perform tandem stance L/R lead on compliant surface for 30\"  -KM     LTG 8 Progress Progressing  -KM        Time Calculation    PT Goal Re-Cert Due Date 07/05/18  -KM       User Key  (r) = Recorded By, (t) = Taken By, (c) = Cosigned By    Initials Name Provider Type     Tiera Fournier, TOAN Physical Therapy Assistant          Therapy Education  Given: HEP, Symptoms/condition management, Posture/body mechanics, Mobility training  Program: Reinforced  How Provided: Verbal  Provided to: Patient  Level of Understanding: Verbalized, Demonstrated              Time Calculation:   Start Time: 1315  Stop Time: 1400  Time Calculation (min): 45 min  Total Timed Code Minutes- PT: 45 minute(s)  Therapy Suggested Charges     Code   Minutes Charges    None       "     Therapy Charges for Today     Code Description Service Date Service Provider Modifiers Qty    [id number] HC PT THER PROC EA 15 MIN 6/29/2018 Tiera Hernandez, PTA GP 3                    Tiera Hernandez, PTA  6/29/2018

## 2018-07-02 ENCOUNTER — HOSPITAL ENCOUNTER (OUTPATIENT)
Dept: PHYSICAL THERAPY | Facility: HOSPITAL | Age: 73
Setting detail: THERAPIES SERIES
Discharge: HOME | End: 2018-07-02

## 2018-07-02 DIAGNOSIS — I69.354 HEMIPARESIS AFFECTING LEFT SIDE AS LATE EFFECT OF CEREBROVASCULAR ACCIDENT (CVA) (HCC): Primary | ICD-10-CM

## 2018-07-02 DIAGNOSIS — M54.9 ACUTE BACK PAIN, UNSPECIFIED BACK LOCATION, UNSPECIFIED BACK PAIN LATERALITY: ICD-10-CM

## 2018-07-02 PROCEDURE — 97110 THERAPEUTIC EXERCISES: CPT | Performed by: PHYSICAL THERAPIST

## 2018-07-02 PROCEDURE — G8978 MOBILITY CURRENT STATUS: HCPCS | Performed by: PHYSICAL THERAPIST

## 2018-07-02 PROCEDURE — G8979 MOBILITY GOAL STATUS: HCPCS | Performed by: PHYSICAL THERAPIST

## 2018-07-02 NOTE — THERAPY PROGRESS REPORT/RE-CERT
"    Outpatient Physical Therapy Ortho Progress Note  [facility]     Patient Name: Rama Goel  : 1945  MRN: [id number]  Today's Date: 2018      Visit Date: 2018     Subjective Improvement: 50%       Attendance:   Approved:    Medicare guidelines       MD follow up:      Rehab MD 18, Neuro end of July      RC date:   18        There is no problem list on file for this patient.       Past Medical History:   Diagnosis Date   • Arrhythmia     intermittent   • Hypertension    • Lupus     drug induced   • Stroke (CMS/Formerly [facility])         Past Surgical History:   Procedure Laterality Date   • ANKLE SURGERY Right     \"bone replacement with coral\"   • APPENDECTOMY     •  SECTION     • CHOLECYSTECTOMY     • PARATHYROIDECTOMY     • ROTATOR CUFF REPAIR Left    • SPINAL FUSION  2018    T11-S1   • TYMPANOPLASTY         Visit Dx:     ICD-10-CM ICD-9-CM   1. Hemiparesis affecting left side as late effect of cerebrovascular accident (CVA) (CMS/[facility]ly [facility]) I69.354 438.20   2. Acute back pain, unspecified back location, unspecified back pain laterality M54.9 724.5                 PT Ortho     Row Name 18 1500       Precautions and Contraindications    Precautions/Limitations spinal precautions  -KG    Precautions No BLT; no lifting more than 25#; s/p CVA  -KG       Posture/Observations    Posture/Observations Comments No acute distress. Ambulate with improved john with SPC. Decreased L TKE, stance time. R trunk lean at hips present.  -KG       Sensory Screen for Light Touch- Lower Quarter Clearing    L1 (inguinal area) Bilateral:;Intact  -KG    L2 (anterior mid thigh) Bilateral:;Intact  -KG    L3 (distal anterior thigh) Bilateral:;Intact  -KG    L4 (medial lower leg/foot) Bilateral:;Intact  -KG    L5 (lateral lower leg/great toe) Bilateral:;Intact  -KG    S1 (bottom of foot) Bilateral:;Intact  -KG       Left Upper Ext    Lt Shoulder Abduction AROM 155  -KG    Lt Shoulder " "Flexion AROM 150  -KG       Left Shoulder (Manual Muscle Testing)    Comment, MMT: Left Shoulder Flex 4/5, abd 4/5, ER/IR 4+/5  -KG       Left Elbow/Forearm (Manual Muscle Testing)    Comment, MMT: Left Elbow/Forearm Flex 4/5, ext 4+/5  -KG       Left Hip (Manual Muscle Testing)    MMT, Left Hip: Manual Muscle Testing (MMT) Flex 4-/5, abd 3+/5, add 4-/5  -KG       Right Hip (Manual Muscle Testing)    MMT, Right Hip: Manual Muscle Testing (MMT) Flex 4/5. and 4-/5, add 4+/5  -KG       Left Knee (Manual Muscle Testing)    Comment, Left Knee: Manual Muscle Testing (MMT) Flex & ext 4/5  -KG       Right Knee (Manual Muscle Testing)    Comment, Right Knee: Manual Muscle Testing (MMT) Flex/ext 4+/5  -KG        Strength Left    # Reps 3  -KG    Left Rung 2  -KG    Left  Test 1 28  -KG    Left  Test 2 26  -KG    Left  Test 3 32  -KG     Strength Average Left 28.67  -KG       Balance Skills Training    Balance Comments Level ground: tandem L/R lead 30\" each  -KG      User Key  (r) = Recorded By, (t) = Taken By, (c) = Cosigned By    Initials Name Provider Type    KG Michelle Jamison, PT Physical Therapist                      Therapy Education  Given: HEP, Symptoms/condition management, Posture/body mechanics, Fall prevention and home safety  Program: Reinforced  How Provided: Verbal  Provided to: Patient  Level of Understanding: Verbalized, Demonstrated           PT OP Goals     Row Name 07/02/18 1500          PT Short Term Goals    STG Date to Achieve 07/05/18  -KG     STG 1 Independent/compliant with HEP  -KG     STG 1 Progress Met;Ongoing  -KG     STG 2 Complete TUG test in 12\" with least restrictive AD  -KG     STG 2 Progress Met  -KG     STG 3 Demonstrate improvements in L hip flex MMT to 4-/5  -KG     STG 3 Progress Progressing  -KG     STG 4 Tolerate 45 minute treatment session without increased pain  -KG     STG 4 Progress Met  -KG     STG 5 Perform tandem stance R lead on level surface for 30\" or " "greater  -KG     STG 5 Progress Progressing  -KG        Long Term Goals    LTG Date to Achieve 07/26/18  -KG     LTG 1 Subjectively report 60% improvement or greater  -KG     LTG 1 Progress Progressing  -KG     LTG 2 Complete TUG test in 10\" or less with no AD  -KG     LTG 2 Progress Goal Revised  -KG     LTG 3 Ambulate independently with no AD, non-antalgic gait  -KG     LTG 3 Progress Progressing  -KG     LTG 4 Demonstrate bilateral hip flex/abd MMT to 4+/5  -KG     LTG 4 Progress Progressing  -KG     LTG 5 Demonstrate L shoulder flex/abd MMT to 4+/5  -KG     LTG 5 Progress Progressing  -KG     LTG 6 Independent with aquatics  -KG     LTG 6 Progress Progressing  -KG     LTG 7 Improve L  strength to 30# or greater  -KG     LTG 7 Progress Progressing  -KG     LTG 8 Perform tandem stance L/R lead on compliant surface for 30\"  -KG     LTG 8 Progress Progressing  -KG        Time Calculation    PT Goal Re-Cert Due Date 07/23/18  -KG       User Key  (r) = Recorded By, (t) = Taken By, (c) = Cosigned By    Initials Name Provider Type    KG Michelle Jamison PT Physical Therapist                PT Assessment/Plan     Row Name 07/02/18 1500          PT Assessment    Functional Limitations Decreased safety during functional activities;Impaired gait;Impaired locomotion;Limitation in home management;Limitations in community activities;Limitations in functional capacity and performance;Performance in leisure activities  -KG     Impairments Balance;Gait;Endurance;Impaired flexibility;Impaired muscle endurance;Muscle strength;Pain;Posture;Range of motion  -KG     Assessment Comments Pt is progressing fairly well with PT at this time as evidenced by improvements in gait speed/TUG score, posture awareness, and tolerance to standing activities. Pt continues to require intermittent cuing for proper posture/TA activation but overall does well when cued and self corrects at times. Pt does fatigue easily but works hard & gives great " "effort throughout treatment. Gait mechanics have improved but pt continues to be unsteady at time with ambulation & would benefit from further use of SPC at this time. Pt will continue to benefit from skilled PT services to further improve functional strength, balance, gait mechanics, and tolerance to daily functional activities.  -KG     Rehab Potential Good  -KG     Patient/caregiver participated in establishment of treatment plan and goals Yes  -KG     Patient would benefit from skilled therapy intervention Yes  -KG        PT Plan    PT Frequency 2x/week  -KG     Predicted Duration of Therapy Intervention (Therapy Eval) 4-6 weeks  -KG     PT Plan Comments Continue to challenge dyamic/static balance. Possible obstacle course activities next visit. Continue core stabilization, BLE strengthening.  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 Overall states therapy is helping and she's more steady on her feet. States she does not use SPC around the house.  -KG         Subjective Pain    Able to rate subjective pain? yes  -KG      Pre-Treatment Pain Level 0  -KG      Post-Treatment Pain Level 0  -KG         Aquatics    Aquatics performed? No  -KG         Exercise 1    Exercise Name 1 Pro II for ROM/endurance  -KG      Time 1 10 min  -KG      Additional Comments L 3.0 UE/LEs  -KG         Exercise 2    Exercise Name 2 Seated HS stretch  -KG      Reps 2 2  -KG      Time 2 30\" hold  -KG         Exercise 4    Exercise Name 4 std shldr ext w/posture TA  -KG      Cueing 4 Verbal  -KG      Sets 4 2  -KG      Reps 4 10  -KG         " "Exercise 5    Exercise Name 5 Fwd step up over small hurdles  -KG      Cueing 5 Verbal  -KG      Sets 5 1  -KG      Reps 5 5  -KG         Exercise 6    Exercise Name 6 Lat step up/over small hurdles  -KG      Cueing 6 Verbal  -KG      Sets 6 1  -KG      Reps 6 5  -KG         Exercise 7    Exercise Name 7 Airex beam lat sidestepping  -KG      Cueing 7 Verbal  -KG      Sets 7 1  -KG      Reps 7 5  -KG         Exercise 8    Exercise Name 8 Airex beam tandem gait  -KG      Cueing 8 Verbal  -KG      Sets 8 1  -KG      Reps 8 5  -KG         Exercise 9    Exercise Name 9 Step ups fwd 4\"  -KG      Sets 9 2  -KG      Reps 9 10  -KG         Exercise 10    Exercise Name 10 TUG test  -KG      Additional Comments See outcome measures for details.  -KG        User Key  (r) = Recorded By, (t) = Taken By, (c) = Cosigned By    Initials Name Provider Type    KG Michelle Jamison, PT Physical Therapist                        Outcome Measure Options: Timed Up and Go (TUG)  Timed Up and Go (TUG)  TUG Test 1: 12.86 seconds  TUG Test 2: 11.54 seconds  Timed Up and Go Comments: Use of SPC      Time Calculation:     Therapy Suggested Charges     Code   Minutes Charges    None             Start Time: 1520  Stop Time: 1603  Time Calculation (min): 43 min  Total Timed Code Minutes- PT: 43 minute(s)     Therapy Charges for Today     Code Description Service Date Service Provider Modifiers Qty    59388750138 HC PT MOBILITY CURRENT 7/2/2018 Michelle Jamison, PT GP, CK 1    80726249639 HC PT MOBILITY PROJECTED 7/2/2018 Michelle Jamison, PT GP, CI 1    49790611214 HC PT THER PROC EA 15 MIN 7/2/2018 Michelle Jamison, PT GP 3          PT G-Codes  PT Professional Judgement Used?: Yes  Outcome Measure Options: Timed Up and Go (TUG)  Score: 11.54\"  Functional Limitation: Mobility: Walking and moving around  Mobility: Walking and Moving Around Current Status (): At least 40 percent but less than 60 percent impaired, limited or restricted  Mobility: " Walking and Moving Around Goal Status (): At least 1 percent but less than 20 percent impaired, limited or restricted         Michelle Jamison, PT  7/2/2018

## 2018-07-05 ENCOUNTER — HOSPITAL ENCOUNTER (OUTPATIENT)
Dept: PHYSICAL THERAPY | Facility: HOSPITAL | Age: 73
Setting detail: THERAPIES SERIES
Discharge: HOME | End: 2018-07-05

## 2018-07-05 DIAGNOSIS — I69.354 HEMIPARESIS AFFECTING LEFT SIDE AS LATE EFFECT OF CEREBROVASCULAR ACCIDENT (CVA) (HCC): Primary | ICD-10-CM

## 2018-07-05 PROCEDURE — 97113 AQUATIC THERAPY/EXERCISES: CPT | Performed by: PHYSICAL THERAPIST

## 2018-07-05 NOTE — THERAPY TREATMENT NOTE
"    Outpatient Physical Therapy Ortho Treatment Note  [facility]     Patient Name: Rama Goel  : 1945  MRN: [id number]  Today's Date: 2018      Visit Date: 2018     Subjective Improvement: 50%       Attendance:   Approved:   Medicare guidelines        MD follow up:      Rehab MD 18, Neuro end of July          date:    18       Visit Dx:    ICD-10-CM ICD-9-CM   1. Hemiparesis affecting left side as late effect of cerebrovascular accident (CVA) (CMS/[facility]) I69.354 438.20       There is no problem list on file for this patient.       Past Medical History:   Diagnosis Date   • Arrhythmia     intermittent   • Hypertension    • Lupus     drug induced   • Stroke (CMS/[facility])         Past Surgical History:   Procedure Laterality Date   • ANKLE SURGERY Right     \"bone replacement with coral\"   • APPENDECTOMY     •  SECTION     • CHOLECYSTECTOMY     • PARATHYROIDECTOMY     • ROTATOR CUFF REPAIR Left    • SPINAL FUSION  2018    T11-S1   • TYMPANOPLASTY               PT Ortho     Row Name 18 1300       Subjective Comments    Subjective Comments Pt states she's feeling good today. Been really working hard on her walking/balance.  -KG       Precautions and Contraindications    Precautions/Limitations spinal precautions  -KG    Precautions No BLT; no lifting more than 25#; s/p CVA  -KG       Subjective Pain    Able to rate subjective pain? yes  -KG    Pre-Treatment Pain Level 0  -KG    Post-Treatment Pain Level 0  -KG       Posture/Observations    Posture/Observations Comments No acute distress. Ambulates with SPC, slow john. Slow getting in/out of pool.  -KG      User Key  (r) = Recorded By, (t) = Taken By, (c) = Cosigned By    Initials Name Provider Type    FABIANA Jamison, PT Physical Therapist                            PT Assessment/Plan     Row Name 18 1300          PT Assessment    Functional Limitations Decreased safety during functional " activities;Impaired gait;Impaired locomotion;Limitation in home management;Limitations in community activities;Limitations in functional capacity and performance;Performance in leisure activities  -KG     Impairments Balance;Gait;Endurance;Impaired flexibility;Impaired muscle endurance;Muscle strength;Pain;Posture;Range of motion  -KG     Assessment Comments Pt with less cuing needed for wall sit positioning + TA with aquatic activities. Did well with new progressions. Intermittent postural cuing required throughout. Good effort & performance.  -KG     Rehab Potential Good  -KG     Patient/caregiver participated in establishment of treatment plan and goals Yes  -KG     Patient would benefit from skilled therapy intervention Yes  -KG        PT Plan    PT Frequency 2x/week  -KG     Predicted Duration of Therapy Intervention (Therapy Eval) 4-6 weeks  -KG     PT Plan Comments Add low resistance to hip activities in pool next visit.  -KG       User Key  (r) = Recorded By, (t) = Taken By, (c) = Cosigned By    Initials Name Provider Type    KG Michelle Jamison, PT Physical Therapist                    Exercises     Row Name 07/05/18 1300             Precautions    Existing Precautions/Restrictions spinal  -KG         Subjective Comments    Subjective Comments Pt states she's feeling good today. Been really working hard on her walking/balance.  -KG         Subjective Pain    Able to rate subjective pain? yes  -KG      Pre-Treatment Pain Level 0  -KG      Post-Treatment Pain Level 0  -KG         Aquatics    Aquatics performed? No  -KG         Exercise 1    Exercise Name 1 AQ fwd/bwd walk  -KG      Time 1 4 min  -KG         Exercise 2    Exercise Name 2 AQ lateral walking  -KG      Time 2 4 min  -KG         Exercise 3    Exercise Name 3 AQ march walk  -KG      Time 3 3 min  -KG         Exercise 4    Exercise Name 4 AQ CR/TR  -KG      Sets 4 2  -KG      Reps 4 10  -KG         Exercise 5    Exercise Name 5 AQ Mini squats  -KG    "   Cueing 5 Verbal  -KG      Sets 5 2  -KG      Reps 5 10  -KG         Exercise 6    Exercise Name 6 AQ 3 way SLR BLE  -KG      Cueing 6 Verbal  -KG      Sets 6 2  -KG      Reps 6 10  -KG      Additional Comments Each direction bilaterally  -KG         Exercise 7    Exercise Name 7 AQ Float steps  -KG      Cueing 7 Verbal  -KG      Sets 7 2  -KG      Reps 7 10  -KG      Additional Comments YRF; bilateral  -KG         Exercise 8    Exercise Name 8 AQ bilateral shoulder horizontal abd/add  -KG      Cueing 8 Verbal  -KG      Sets 8 2  -KG      Reps 8 10  -KG      Additional Comments Wall sit + TA  -KG         Exercise 9    Exercise Name 9 AQ bilateral shoulder abd/add  -KG      Cueing 9 Verbal  -KG      Sets 9 2  -KG      Reps 9 10  -KG      Additional Comments Wall sit + TA  -KG         Exercise 10    Exercise Name 10 AQ push/pull with large cookie  -KG      Cueing 10 Verbal  -KG      Sets 10 2  -KG      Reps 10 10  -KG      Additional Comments Wall sit + TA  -KG         Exercise 11    Exercise Name 11 AQ push down with large cookie  -KG      Cueing 11 Verbal  -KG      Sets 11 2  -KG      Reps 11 10  -KG      Additional Comments Wall sit + TA  -KG         Exercise 12    Exercise Name 12 AQ deep end bicycle kick  -KG      Time 12 3 min  -KG         Exercise 13    Exercise Name 13 AQ deep end scissor kick  -KG      Reps 13 3 min  -KG         Exercise 14    Exercise Name 14 AQ deep end hang  -KG      Time 14 4 min  -KG        User Key  (r) = Recorded By, (t) = Taken By, (c) = Cosigned By    Initials Name Provider Type    KG Michelle Jamison, PT Physical Therapist                               PT OP Goals     Row Name 07/05/18 1300          PT Short Term Goals    STG Date to Achieve 07/05/18  -KG     STG 1 Independent/compliant with HEP  -KG     STG 1 Progress Met;Ongoing  -KG     STG 2 Complete TUG test in 12\" with least restrictive AD  -KG     STG 2 Progress Met  -KG     STG 3 Demonstrate improvements in L hip flex MMT " "to 4-/5  -KG     STG 3 Progress Progressing  -KG     STG 4 Tolerate 45 minute treatment session without increased pain  -KG     STG 4 Progress Met  -KG     STG 5 Perform tandem stance R lead on level surface for 30\" or greater  -KG     STG 5 Progress Progressing  -KG        Long Term Goals    LTG Date to Achieve 07/26/18  -KG     LTG 1 Subjectively report 60% improvement or greater  -KG     LTG 1 Progress Progressing  -KG     LTG 2 Complete TUG test in 10\" or less with no AD  -KG     LTG 2 Progress Goal Revised  -KG     LTG 3 Ambulate independently with no AD, non-antalgic gait  -KG     LTG 3 Progress Progressing  -KG     LTG 4 Demonstrate bilateral hip flex/abd MMT to 4+/5  -KG     LTG 4 Progress Progressing  -KG     LTG 5 Demonstrate L shoulder flex/abd MMT to 4+/5  -KG     LTG 5 Progress Progressing  -KG     LTG 6 Independent with aquatics  -KG     LTG 6 Progress Progressing  -KG     LTG 7 Improve L  strength to 30# or greater  -KG     LTG 7 Progress Progressing  -KG     LTG 8 Perform tandem stance L/R lead on compliant surface for 30\"  -KG     LTG 8 Progress Progressing  -KG        Time Calculation    PT Goal Re-Cert Due Date 07/23/18  -KG       User Key  (r) = Recorded By, (t) = Taken By, (c) = Cosigned By    Initials Name Provider Type    KG Michelle Jamison, PT Physical Therapist          Therapy Education  Given: HEP, Symptoms/condition management, Posture/body mechanics  Program: Reinforced  How Provided: Verbal  Provided to: Patient  Level of Understanding: Verbalized, Demonstrated              Time Calculation:   Start Time: 1301  Stop Time: 1355  Time Calculation (min): 54 min  Total Timed Code Minutes- PT: 54 minute(s)  Therapy Suggested Charges     Code   Minutes Charges    None           Therapy Charges for Today     Code Description Service Date Service Provider Modifiers Qty    84563061080 HC PT AQUATIC THERAPY EA 15 MIN 7/5/2018 Michelle Jamison, PT GP 4                    Michelle Jamison, " PT  7/5/2018

## 2018-07-09 ENCOUNTER — HOSPITAL ENCOUNTER (OUTPATIENT)
Dept: PHYSICAL THERAPY | Facility: HOSPITAL | Age: 73
Setting detail: THERAPIES SERIES
Discharge: HOME | End: 2018-07-09

## 2018-07-09 DIAGNOSIS — I69.354 HEMIPARESIS AFFECTING LEFT SIDE AS LATE EFFECT OF CEREBROVASCULAR ACCIDENT (CVA) (HCC): Primary | ICD-10-CM

## 2018-07-09 DIAGNOSIS — M54.9 ACUTE BACK PAIN, UNSPECIFIED BACK LOCATION, UNSPECIFIED BACK PAIN LATERALITY: ICD-10-CM

## 2018-07-09 PROCEDURE — 97113 AQUATIC THERAPY/EXERCISES: CPT

## 2018-07-09 NOTE — THERAPY TREATMENT NOTE
"    Outpatient Physical Therapy Ortho Treatment Note  [facility]  Tiera Hernandez PTA  18  4:07 PM       Patient Name: Rama Goel  : 1945  MRN: [id number]  Today's Date: 2018      Visit Date: 2018    Subjective Improvement: 50%       Attendance: 10/10  Approved: medicare guidelines          MD follow up: Rehab MD 18, Neuro end of July                   date: 18         Visit Dx:    ICD-10-CM ICD-9-CM   1. Hemiparesis affecting left side as late effect of cerebrovascular accident (CVA) (CMS/[facility]) I69.354 438.20   2. Acute back pain, unspecified back location, unspecified back pain laterality M54.9 724.5       There is no problem list on file for this patient.       Past Medical History:   Diagnosis Date   • Arrhythmia     intermittent   • Hypertension    • Lupus     drug induced   • Stroke (CMS/[facility])         Past Surgical History:   Procedure Laterality Date   • ANKLE SURGERY Right     \"bone replacement with coral\"   • APPENDECTOMY     •  SECTION     • CHOLECYSTECTOMY     • PARATHYROIDECTOMY     • ROTATOR CUFF REPAIR Left    • SPINAL FUSION  2018    T11-S1   • TYMPANOPLASTY               PT Ortho     Row Name 18 1500       Subjective Comments    Subjective Comments pt states that she has not had any pain since she has been operated on.  -KM       Precautions and Contraindications    Precautions/Limitations spinal precautions  -KM    Precautions No BLT; no lifting more than 25#; s/p CVA  -KM       Subjective Pain    Able to rate subjective pain? yes  -KM    Pre-Treatment Pain Level 0  -KM    Post-Treatment Pain Level 0  -KM       Posture/Observations    Posture/Observations Comments No acute distress. Ambulates with SPC, slow john. Slow getting in/out of pool.  -KM      User Key  (r) = Recorded By, (t) = Taken By, (c) = Cosigned By    Initials Name Provider Type     Tiera Hernandez PTA Physical Therapy Assistant    "                         PT Assessment/Plan     Row Name 07/09/18 1500          PT Assessment    Functional Limitations Decreased safety during functional activities;Impaired gait;Impaired locomotion;Limitation in home management;Limitations in community activities;Limitations in functional capacity and performance;Performance in leisure activities  -KM     Impairments Balance;Gait;Endurance;Impaired flexibility;Impaired muscle endurance;Muscle strength;Pain;Posture;Range of motion  -KM     Assessment Comments pt did fairly well with posture today and only needed minor cues. pt very focused throughout treatment and would constantly be reminding herself for TA contraction. pt did well with light resistance added to hip exercises.  -KM     Rehab Potential Good  -KM     Patient/caregiver participated in establishment of treatment plan and goals Yes  -KM     Patient would benefit from skilled therapy intervention Yes  -KM        PT Plan    PT Frequency 2x/week  -KM     Predicted Duration of Therapy Intervention (Therapy Eval) 4-6 weeks  -KM     PT Plan Comments Hip circles next aquatic visit  -KM       User Key  (r) = Recorded By, (t) = Taken By, (c) = Cosigned By    Initials Name Provider Type     Tiera Fournier, TOAN Physical Therapy Assistant                    Exercises     Row Name 07/09/18 1500             Precautions    Existing Precautions/Restrictions spinal  -KM         Subjective Comments    Subjective Comments pt states that she has not had any pain since she has been operated on.  -KM         Subjective Pain    Able to rate subjective pain? yes  -KM      Pre-Treatment Pain Level 0  -KM      Post-Treatment Pain Level 0  -KM         Aquatics    Aquatics performed? No  -KM         Exercise 1    Exercise Name 1 AQ fwd/bwd walk  -KM      Time 1 4 min  -KM         Exercise 2    Exercise Name 2 AQ lateral walking  -KM      Time 2 4 min  -KM         Exercise 3    Exercise Name 3 AQ march walk  -KM      Time  "3 3 min  -KM         Exercise 4    Exercise Name 4 AQ CR/TR  -KM      Sets 4 2  -KM      Reps 4 10  -KM         Exercise 5    Exercise Name 5 AQ Mini squats  -KM      Cueing 5 Verbal  -KM      Sets 5 2  -KM      Reps 5 10  -KM         Exercise 6    Exercise Name 6 AQ 3 way SLR BLE  -KM      Cueing 6 Verbal  -KM      Sets 6 2  -KM      Reps 6 10  -KM      Additional Comments YRF bilateral  -KM         Exercise 7    Exercise Name 7 AQ Float steps  -KM      Cueing 7 Verbal  -KM      Sets 7 2  -KM      Reps 7 10  -KM      Additional Comments YRF; bilateral  -KM         Exercise 8    Exercise Name 8 AQ bilateral shoulder horizontal abd/add  -KM      Cueing 8 Verbal  -KM      Sets 8 2  -KM      Reps 8 10  -KM      Additional Comments wall sit + TA  -KM         Exercise 9    Exercise Name 9 AQ bilateral shoulder abd/add  -KM      Cueing 9 Verbal  -KM      Sets 9 2  -KM      Reps 9 10  -KM      Additional Comments wall sit + TA  -KM         Exercise 10    Exercise Name 10 AQ push/pull with large cookie  -KM      Cueing 10 Verbal  -KM      Sets 10 2  -KM      Reps 10 10  -KM         Exercise 11    Exercise Name 11 AQ push down with large cookie  -KM      Cueing 11 Verbal  -KM      Sets 11 2  -KM      Reps 11 10  -KM         Exercise 12    Exercise Name 12 AQ deep end bicycle kick  -KM      Time 12 3 min  -KM         Exercise 13    Exercise Name 13 AQ deep end scissor kick  -KM      Reps 13 3 min  -KM         Exercise 14    Exercise Name 14 AQ deep end hang  -KM      Time 14 4 min  -KM        User Key  (r) = Recorded By, (t) = Taken By, (c) = Cosigned By    Initials Name Provider Type    NIEVES Fournier, PTA Physical Therapy Assistant                               PT OP Goals     Row Name 07/09/18 1500          PT Short Term Goals    STG Date to Achieve 07/05/18  -KM     STG 1 Independent/compliant with HEP  -KM     STG 1 Progress Met;Ongoing  -KM     STG 2 Complete TUG test in 12\" with least restrictive AD  -KM     " "STG 2 Progress Met  -KM     STG 3 Demonstrate improvements in L hip flex MMT to 4-/5  -KM     STG 3 Progress Progressing  -KM     STG 4 Tolerate 45 minute treatment session without increased pain  -KM     STG 4 Progress Met  -KM     STG 5 Perform tandem stance R lead on level surface for 30\" or greater  -KM     STG 5 Progress Progressing  -KM        Long Term Goals    LTG Date to Achieve 07/26/18  -KM     LTG 1 Subjectively report 60% improvement or greater  -KM     LTG 1 Progress Progressing  -KM     LTG 2 Complete TUG test in 10\" or less with no AD  -KM     LTG 2 Progress Goal Revised  -KM     LTG 3 Ambulate independently with no AD, non-antalgic gait  -KM     LTG 3 Progress Progressing  -KM     LTG 4 Demonstrate bilateral hip flex/abd MMT to 4+/5  -KM     LTG 4 Progress Progressing  -KM     LTG 5 Demonstrate L shoulder flex/abd MMT to 4+/5  -KM     LTG 5 Progress Progressing  -KM     LTG 6 Independent with aquatics  -KM     LTG 6 Progress Progressing  -KM     LTG 7 Improve L  strength to 30# or greater  -KM     LTG 7 Progress Progressing  -KM     LTG 8 Perform tandem stance L/R lead on compliant surface for 30\"  -KM     LTG 8 Progress Progressing  -KM        Time Calculation    PT Goal Re-Cert Due Date 07/23/18  -KM       User Key  (r) = Recorded By, (t) = Taken By, (c) = Cosigned By    Initials Name Provider Type     Tiera Fournier, TOAN Physical Therapy Assistant          Therapy Education  Given: HEP, Symptoms/condition management, Posture/body mechanics  Program: Reinforced  How Provided: Verbal  Provided to: Patient  Level of Understanding: Verbalized, Demonstrated              Time Calculation:   Start Time: 1515  Stop Time: 1606  Time Calculation (min): 51 min  Total Timed Code Minutes- PT: 51 minute(s)  Therapy Suggested Charges     Code   Minutes Charges    None           Therapy Charges for Today     Code Description Service Date Service Provider Modifiers Qty    90580058230 HC PT AQUATIC " THERAPY EA 15 MIN 7/9/2018 Tiera Hernandez, PTA GP 3                    Tiera Hernandez, PTA  7/9/2018

## 2018-07-11 ENCOUNTER — HOSPITAL ENCOUNTER (OUTPATIENT)
Dept: PHYSICAL THERAPY | Facility: HOSPITAL | Age: 73
Setting detail: THERAPIES SERIES
Discharge: HOME | End: 2018-07-11

## 2018-07-11 DIAGNOSIS — M54.9 ACUTE BACK PAIN, UNSPECIFIED BACK LOCATION, UNSPECIFIED BACK PAIN LATERALITY: ICD-10-CM

## 2018-07-11 DIAGNOSIS — I69.354 HEMIPARESIS AFFECTING LEFT SIDE AS LATE EFFECT OF CEREBROVASCULAR ACCIDENT (CVA) (HCC): Primary | ICD-10-CM

## 2018-07-11 PROCEDURE — 97110 THERAPEUTIC EXERCISES: CPT

## 2018-07-11 NOTE — THERAPY TREATMENT NOTE
"    Outpatient Physical Therapy Ortho Treatment Note  [facility]  Tiera Hernandez, PTA  18  4:52 PM       Patient Name: Rama Goel  : 1945  MRN: [id number]  Today's Date: 2018      Visit Date: 2018    Subjective Improvement: 50%       Attendance:    Approved:medicare guidelines           MD follow up: Rehab MD 18, Neuro end of July             date: 18         Visit Dx:    ICD-10-CM ICD-9-CM   1. Hemiparesis affecting left side as late effect of cerebrovascular accident (CVA) (CMS/[facility]) I69.354 438.20   2. Acute back pain, unspecified back location, unspecified back pain laterality M54.9 724.5       There is no problem list on file for this patient.       Past Medical History:   Diagnosis Date   • Arrhythmia     intermittent   • Hypertension    • Lupus     drug induced   • Stroke (CMS/[facility])         Past Surgical History:   Procedure Laterality Date   • ANKLE SURGERY Right     \"bone replacement with coral\"   • APPENDECTOMY     •  SECTION     • CHOLECYSTECTOMY     • PARATHYROIDECTOMY     • ROTATOR CUFF REPAIR Left    • SPINAL FUSION  2018    T11-S1   • TYMPANOPLASTY               PT Ortho     Row Name 18 1500       Precautions and Contraindications    Precautions/Limitations spinal precautions  -KM    Precautions No BLT; no lifting more than 25#; s/p CVA  -KM       Posture/Observations    Posture/Observations Comments No acute distress. Ambulates with SPC, slow john. Slow getting in/out of pool.  -KM    Row Name 18 1500       Subjective Comments    Subjective Comments pt states that she has not had any pain since she has been operated on.  -KM       Precautions and Contraindications    Precautions/Limitations spinal precautions  -KM    Precautions No BLT; no lifting more than 25#; s/p CVA  -KM       Subjective Pain    Able to rate subjective pain? yes  -KM    Pre-Treatment Pain Level 0  -KM    Post-Treatment Pain Level 0  " -KM       Posture/Observations    Posture/Observations Comments No acute distress. Ambulates with SPC, slow john. Slow getting in/out of pool.  -KM      User Key  (r) = Recorded By, (t) = Taken By, (c) = Cosigned By    Initials Name Provider Type    NIEVES Fournier PTA Physical Therapy Assistant                            PT Assessment/Plan     Row Name 07/11/18 1500          PT Assessment    Functional Limitations Decreased safety during functional activities;Impaired gait;Impaired locomotion;Limitation in home management;Limitations in community activities;Limitations in functional capacity and performance;Performance in leisure activities  -KM     Impairments Balance;Gait;Endurance;Impaired flexibility;Impaired muscle endurance;Muscle strength;Pain;Posture;Range of motion  -KM     Assessment Comments MD note sent. pt did well with balance activites today. Did require CGA for SLS and for FT/EC activitiy. Minimal fatigue noted at this time  -KM     Rehab Potential Good  -KM     Patient/caregiver participated in establishment of treatment plan and goals Yes  -KM     Patient would benefit from skilled therapy intervention Yes  -KM        PT Plan    PT Frequency 2x/week  -KM     Predicted Duration of Therapy Intervention (Therapy Eval) 4-6 weeks  -KM     PT Plan Comments Recip stairs or step up and overs NV  -KM       User Key  (r) = Recorded By, (t) = Taken By, (c) = Cosigned By    Initials Name Provider Type    NIEVES Fournier PTA Physical Therapy Assistant                    Exercises     Row Name 07/11/18 1500             Precautions    Existing Precautions/Restrictions spinal  -KM         Subjective Comments    Subjective Comments pt states that she goes to MD on Monday  -KM         Subjective Pain    Able to rate subjective pain? yes  -KM      Pre-Treatment Pain Level 0  -KM      Post-Treatment Pain Level 0  -KM         Aquatics    Aquatics performed? No  -KM         Exercise 1    Exercise  "Name 1 PRO II for ROM  -KM      Time 1 10 min  -KM      Additional Comments L4.0  -KM         Exercise 2    Exercise Name 2 Seated HS S  -KM      Reps 2 3  -KM      Time 2 30 sec hold  -KM         Exercise 3    Exercise Name 3 Sit to stands   -KM      Sets 3 2  -KM      Reps 3 10  -KM      Additional Comments no UE assist  -KM         Exercise 4    Exercise Name 4 Airex beam: side stepping  -KM      Reps 4 5 laps  -KM         Exercise 5    Exercise Name 5 Airex beam tandem gait  -KM      Reps 5 5 laps  -KM         Exercise 6    Exercise Name 6 Step ups fwd  -KM      Sets 6 2  -KM      Reps 6 10  -KM      Additional Comments 6 inch  -KM         Exercise 7    Exercise Name 7 Lat step ups  -KM      Sets 7 2  -KM      Reps 7 10  -KM      Additional Comments 6 inch  -KM         Exercise 8    Exercise Name 8 SLS RLE  -KM      Time 8 18 sec best trial  -KM         Exercise 9    Exercise Name 9 SLS on LLE  -KM      Time 9 16 sec best trial  -KM         Exercise 10    Exercise Name 10 Tandem stance R lead  -KM      Time 10 30 sec  -KM         Exercise 11    Exercise Name 11 Tandem stance L lead  -KM      Time 11 30 sec   -KM         Exercise 12    Exercise Name 12 FT/EC on compliant surface  -KM      Time 12 30 sec   -KM      Additional Comments CGA  -KM        User Key  (r) = Recorded By, (t) = Taken By, (c) = Cosigned By    Initials Name Provider Type     Tiera Fournier PTA Physical Therapy Assistant                               PT OP Goals     Row Name 07/11/18 1500          PT Short Term Goals    STG Date to Achieve 07/05/18  -KM     STG 1 Independent/compliant with HEP  -KM     STG 1 Progress Met;Ongoing  -KM     STG 2 Complete TUG test in 12\" with least restrictive AD  -KM     STG 2 Progress Met  -KM     STG 3 Demonstrate improvements in L hip flex MMT to 4-/5  -KM     STG 3 Progress Progressing  -KM     STG 4 Tolerate 45 minute treatment session without increased pain  -KM     STG 4 Progress Met  -KM     " "STG 5 Perform tandem stance R lead on level surface for 30\" or greater  -KM     STG 5 Progress Progressing  -KM        Long Term Goals    LTG Date to Achieve 07/26/18  -KM     LTG 1 Subjectively report 60% improvement or greater  -KM     LTG 1 Progress Progressing  -KM     LTG 2 Complete TUG test in 10\" or less with no AD  -KM     LTG 2 Progress Goal Revised  -KM     LTG 3 Ambulate independently with no AD, non-antalgic gait  -KM     LTG 3 Progress Progressing  -KM     LTG 4 Demonstrate bilateral hip flex/abd MMT to 4+/5  -KM     LTG 4 Progress Progressing  -KM     LTG 5 Demonstrate L shoulder flex/abd MMT to 4+/5  -KM     LTG 5 Progress Progressing  -KM     LTG 6 Independent with aquatics  -KM     LTG 6 Progress Progressing  -KM     LTG 7 Improve L  strength to 30# or greater  -KM     LTG 7 Progress Progressing  -KM     LTG 8 Perform tandem stance L/R lead on compliant surface for 30\"  -KM     LTG 8 Progress Progressing  -KM        Time Calculation    PT Goal Re-Cert Due Date 07/23/18  -KM       User Key  (r) = Recorded By, (t) = Taken By, (c) = Cosigned By    Initials Name Provider Type     Tiera Hernandez PTA Physical Therapy Assistant          Therapy Education  Given: HEP, Symptoms/condition management, Posture/body mechanics  Program: Reinforced  How Provided: Verbal  Provided to: Patient  Level of Understanding: Verbalized, Demonstrated              Time Calculation:   Start Time: 1515  Stop Time: 1600  Time Calculation (min): 45 min  Total Timed Code Minutes- PT: 45 minute(s)  Therapy Suggested Charges     Code   Minutes Charges    None           Therapy Charges for Today     Code Description Service Date Service Provider Modifiers Qty    65097668415 HC PT THER PROC EA 15 MIN 7/11/2018 Tiera Hernandez PTA GP 3                    Tiera Hernandez PTA  7/11/2018     "

## 2019-10-31 ENCOUNTER — TRANSCRIBE ORDERS (OUTPATIENT)
Dept: PHYSICAL THERAPY | Facility: CLINIC | Age: 74
End: 2019-10-31

## 2019-10-31 DIAGNOSIS — I63.50 CEREBROVASCULAR ACCIDENT DUE TO CEREBRAL ARTERY OCCLUSION (HCC): Primary | ICD-10-CM

## 2019-11-03 ENCOUNTER — APPOINTMENT (OUTPATIENT)
Dept: GENERAL RADIOLOGY | Facility: HOSPITAL | Age: 74
End: 2019-11-03

## 2019-11-03 ENCOUNTER — HOSPITAL ENCOUNTER (OUTPATIENT)
Facility: HOSPITAL | Age: 74
Setting detail: OBSERVATION
Discharge: NURSING, REHAB OR LONG-TERM CARE | End: 2019-11-06
Attending: EMERGENCY MEDICINE | Admitting: INTERNAL MEDICINE

## 2019-11-03 ENCOUNTER — APPOINTMENT (OUTPATIENT)
Dept: CT IMAGING | Facility: HOSPITAL | Age: 74
End: 2019-11-03

## 2019-11-03 DIAGNOSIS — R41.82 ALTERED MENTAL STATUS, UNSPECIFIED ALTERED MENTAL STATUS TYPE: Primary | ICD-10-CM

## 2019-11-03 DIAGNOSIS — R13.12 OROPHARYNGEAL DYSPHAGIA: ICD-10-CM

## 2019-11-03 DIAGNOSIS — Z74.09 IMPAIRED FUNCTIONAL MOBILITY, BALANCE, GAIT, AND ENDURANCE: ICD-10-CM

## 2019-11-03 DIAGNOSIS — Z74.09 IMPAIRED MOBILITY AND ADLS: ICD-10-CM

## 2019-11-03 DIAGNOSIS — Z78.9 IMPAIRED MOBILITY AND ADLS: ICD-10-CM

## 2019-11-03 DIAGNOSIS — R47.1 DYSARTHRIA: ICD-10-CM

## 2019-11-03 LAB
ABO GROUP BLD: NORMAL
ALBUMIN SERPL-MCNC: 4.2 G/DL (ref 3.5–5.2)
ALBUMIN/GLOB SERPL: 1.4 G/DL
ALP SERPL-CCNC: 121 U/L (ref 39–117)
ALT SERPL W P-5'-P-CCNC: 17 U/L (ref 1–33)
ANION GAP SERPL CALCULATED.3IONS-SCNC: 14 MMOL/L (ref 5–15)
APTT PPP: 27.5 SECONDS (ref 20–40.3)
AST SERPL-CCNC: 29 U/L (ref 1–32)
BASOPHILS # BLD AUTO: 0.02 10*3/MM3 (ref 0–0.2)
BASOPHILS NFR BLD AUTO: 0.3 % (ref 0–1.5)
BILIRUB SERPL-MCNC: 0.6 MG/DL (ref 0.2–1.2)
BLD GP AB SCN SERPL QL: NEGATIVE
BUN BLD-MCNC: 19 MG/DL (ref 8–23)
BUN/CREAT SERPL: 20.2 (ref 7–25)
CALCIUM SPEC-SCNC: 9.4 MG/DL (ref 8.6–10.5)
CHLORIDE SERPL-SCNC: 103 MMOL/L (ref 98–107)
CO2 SERPL-SCNC: 27 MMOL/L (ref 22–29)
CREAT BLD-MCNC: 0.94 MG/DL (ref 0.57–1)
DEPRECATED RDW RBC AUTO: 42.5 FL (ref 37–54)
EOSINOPHIL # BLD AUTO: 0.08 10*3/MM3 (ref 0–0.4)
EOSINOPHIL NFR BLD AUTO: 1 % (ref 0.3–6.2)
ERYTHROCYTE [DISTWIDTH] IN BLOOD BY AUTOMATED COUNT: 12.9 % (ref 12.3–15.4)
GFR SERPL CREATININE-BSD FRML MDRD: 59 ML/MIN/1.73
GLOBULIN UR ELPH-MCNC: 3 GM/DL
GLUCOSE BLD-MCNC: 122 MG/DL (ref 65–99)
GLUCOSE BLDC GLUCOMTR-MCNC: 114 MG/DL (ref 70–130)
HCT VFR BLD AUTO: 35.9 % (ref 34–46.6)
HGB BLD-MCNC: 11.7 G/DL (ref 12–15.9)
HOLD SPECIMEN: NORMAL
HOLD SPECIMEN: NORMAL
IMM GRANULOCYTES # BLD AUTO: 0.03 10*3/MM3 (ref 0–0.05)
IMM GRANULOCYTES NFR BLD AUTO: 0.4 % (ref 0–0.5)
INR PPP: 1.06 (ref 0.8–1.2)
LYMPHOCYTES # BLD AUTO: 0.93 10*3/MM3 (ref 0.7–3.1)
LYMPHOCYTES NFR BLD AUTO: 11.7 % (ref 19.6–45.3)
Lab: NORMAL
MCH RBC QN AUTO: 29.6 PG (ref 26.6–33)
MCHC RBC AUTO-ENTMCNC: 32.6 G/DL (ref 31.5–35.7)
MCV RBC AUTO: 90.9 FL (ref 79–97)
MONOCYTES # BLD AUTO: 0.64 10*3/MM3 (ref 0.1–0.9)
MONOCYTES NFR BLD AUTO: 8 % (ref 5–12)
NEUTROPHILS # BLD AUTO: 6.26 10*3/MM3 (ref 1.7–7)
NEUTROPHILS NFR BLD AUTO: 78.6 % (ref 42.7–76)
NRBC BLD AUTO-RTO: 0 /100 WBC (ref 0–0.2)
PLATELET # BLD AUTO: 231 10*3/MM3 (ref 140–450)
PMV BLD AUTO: 9.9 FL (ref 6–12)
POTASSIUM BLD-SCNC: 4.3 MMOL/L (ref 3.5–5.2)
PROT SERPL-MCNC: 7.2 G/DL (ref 6–8.5)
PROTHROMBIN TIME: 13.6 SECONDS (ref 11.1–15.3)
RBC # BLD AUTO: 3.95 10*6/MM3 (ref 3.77–5.28)
RH BLD: POSITIVE
SODIUM BLD-SCNC: 144 MMOL/L (ref 136–145)
T&S EXPIRATION DATE: NORMAL
TROPONIN T SERPL-MCNC: <0.01 NG/ML (ref 0–0.03)
WBC NRBC COR # BLD: 7.96 10*3/MM3 (ref 3.4–10.8)
WHOLE BLOOD HOLD SPECIMEN: NORMAL
WHOLE BLOOD HOLD SPECIMEN: NORMAL

## 2019-11-03 PROCEDURE — 80053 COMPREHEN METABOLIC PANEL: CPT | Performed by: EMERGENCY MEDICINE

## 2019-11-03 PROCEDURE — 96361 HYDRATE IV INFUSION ADD-ON: CPT

## 2019-11-03 PROCEDURE — 86901 BLOOD TYPING SEROLOGIC RH(D): CPT | Performed by: EMERGENCY MEDICINE

## 2019-11-03 PROCEDURE — 72170 X-RAY EXAM OF PELVIS: CPT

## 2019-11-03 PROCEDURE — G0378 HOSPITAL OBSERVATION PER HR: HCPCS

## 2019-11-03 PROCEDURE — 70450 CT HEAD/BRAIN W/O DYE: CPT

## 2019-11-03 PROCEDURE — 85610 PROTHROMBIN TIME: CPT | Performed by: EMERGENCY MEDICINE

## 2019-11-03 PROCEDURE — 99204 OFFICE O/P NEW MOD 45 MIN: CPT | Performed by: PSYCHIATRY & NEUROLOGY

## 2019-11-03 PROCEDURE — 71045 X-RAY EXAM CHEST 1 VIEW: CPT

## 2019-11-03 PROCEDURE — 99285 EMERGENCY DEPT VISIT HI MDM: CPT

## 2019-11-03 PROCEDURE — 86900 BLOOD TYPING SEROLOGIC ABO: CPT | Performed by: EMERGENCY MEDICINE

## 2019-11-03 PROCEDURE — 84484 ASSAY OF TROPONIN QUANT: CPT | Performed by: EMERGENCY MEDICINE

## 2019-11-03 PROCEDURE — 96360 HYDRATION IV INFUSION INIT: CPT

## 2019-11-03 PROCEDURE — 85730 THROMBOPLASTIN TIME PARTIAL: CPT | Performed by: EMERGENCY MEDICINE

## 2019-11-03 PROCEDURE — 73552 X-RAY EXAM OF FEMUR 2/>: CPT

## 2019-11-03 PROCEDURE — 82962 GLUCOSE BLOOD TEST: CPT

## 2019-11-03 PROCEDURE — 93005 ELECTROCARDIOGRAM TRACING: CPT | Performed by: EMERGENCY MEDICINE

## 2019-11-03 PROCEDURE — 85025 COMPLETE CBC W/AUTO DIFF WBC: CPT | Performed by: EMERGENCY MEDICINE

## 2019-11-03 PROCEDURE — 86850 RBC ANTIBODY SCREEN: CPT | Performed by: EMERGENCY MEDICINE

## 2019-11-03 PROCEDURE — 93010 ELECTROCARDIOGRAM REPORT: CPT | Performed by: INTERNAL MEDICINE

## 2019-11-03 RX ORDER — ROPINIROLE 0.5 MG/1
0.5 TABLET, FILM COATED ORAL NIGHTLY
COMMUNITY

## 2019-11-03 RX ORDER — ATORVASTATIN CALCIUM 40 MG/1
80 TABLET, FILM COATED ORAL NIGHTLY
Status: DISCONTINUED | OUTPATIENT
Start: 2019-11-04 | End: 2019-11-06 | Stop reason: HOSPADM

## 2019-11-03 RX ORDER — GABAPENTIN 400 MG/1
400 CAPSULE ORAL EVERY 6 HOURS
Status: DISCONTINUED | OUTPATIENT
Start: 2019-11-04 | End: 2019-11-06 | Stop reason: HOSPADM

## 2019-11-03 RX ORDER — ASPIRIN 81 MG/1
81 TABLET, CHEWABLE ORAL DAILY
Status: DISCONTINUED | OUTPATIENT
Start: 2019-11-04 | End: 2019-11-06 | Stop reason: HOSPADM

## 2019-11-03 RX ORDER — GABAPENTIN 300 MG/1
400 CAPSULE ORAL 4 TIMES DAILY
COMMUNITY

## 2019-11-03 RX ORDER — TRIAMTERENE AND HYDROCHLOROTHIAZIDE 37.5; 25 MG/1; MG/1
1 CAPSULE ORAL EVERY MORNING
COMMUNITY
End: 2019-11-15 | Stop reason: HOSPADM

## 2019-11-03 RX ORDER — BACLOFEN 10 MG/1
10 TABLET ORAL 2 TIMES DAILY
Status: ON HOLD | COMMUNITY
End: 2019-11-15 | Stop reason: SDUPTHER

## 2019-11-03 RX ORDER — SODIUM CHLORIDE 9 MG/ML
100 INJECTION, SOLUTION INTRAVENOUS CONTINUOUS
Status: DISCONTINUED | OUTPATIENT
Start: 2019-11-03 | End: 2019-11-06 | Stop reason: HOSPADM

## 2019-11-03 RX ORDER — ASPIRIN 300 MG/1
300 SUPPOSITORY RECTAL ONCE
Status: COMPLETED | OUTPATIENT
Start: 2019-11-03 | End: 2019-11-03

## 2019-11-03 RX ORDER — SODIUM CHLORIDE 0.9 % (FLUSH) 0.9 %
10 SYRINGE (ML) INJECTION EVERY 12 HOURS SCHEDULED
Status: DISCONTINUED | OUTPATIENT
Start: 2019-11-04 | End: 2019-11-06 | Stop reason: HOSPADM

## 2019-11-03 RX ORDER — ATORVASTATIN CALCIUM 20 MG/1
20 TABLET, FILM COATED ORAL NIGHTLY
COMMUNITY
End: 2019-11-15 | Stop reason: HOSPADM

## 2019-11-03 RX ORDER — SODIUM CHLORIDE 0.9 % (FLUSH) 0.9 %
10 SYRINGE (ML) INJECTION AS NEEDED
Status: DISCONTINUED | OUTPATIENT
Start: 2019-11-03 | End: 2019-11-06 | Stop reason: HOSPADM

## 2019-11-03 RX ORDER — DIAZEPAM 10 MG/1
10 TABLET ORAL 2 TIMES DAILY PRN
COMMUNITY
End: 2019-11-15 | Stop reason: HOSPADM

## 2019-11-03 RX ORDER — GABAPENTIN 400 MG/1
400 CAPSULE ORAL 4 TIMES DAILY
COMMUNITY
End: 2019-11-06 | Stop reason: HOSPADM

## 2019-11-03 RX ORDER — CLOPIDOGREL BISULFATE 75 MG/1
75 TABLET ORAL DAILY
Status: DISCONTINUED | OUTPATIENT
Start: 2019-11-04 | End: 2019-11-06 | Stop reason: HOSPADM

## 2019-11-03 RX ORDER — ASPIRIN 81 MG/1
81 TABLET, CHEWABLE ORAL DAILY
COMMUNITY

## 2019-11-03 RX ORDER — CLOPIDOGREL BISULFATE 75 MG/1
75 TABLET ORAL DAILY
COMMUNITY

## 2019-11-03 RX ORDER — DIAZEPAM 2 MG/1
2 TABLET ORAL 3 TIMES DAILY PRN
COMMUNITY
End: 2019-11-15 | Stop reason: HOSPADM

## 2019-11-03 RX ORDER — CARVEDILOL 25 MG/1
25 TABLET ORAL 2 TIMES DAILY WITH MEALS
COMMUNITY

## 2019-11-03 RX ADMIN — SODIUM CHLORIDE 100 ML/HR: 9 INJECTION, SOLUTION INTRAVENOUS at 21:39

## 2019-11-03 RX ADMIN — ASPIRIN 300 MG: 300 SUPPOSITORY RECTAL at 22:27

## 2019-11-04 ENCOUNTER — APPOINTMENT (OUTPATIENT)
Dept: GENERAL RADIOLOGY | Facility: HOSPITAL | Age: 74
End: 2019-11-04

## 2019-11-04 ENCOUNTER — APPOINTMENT (OUTPATIENT)
Dept: CARDIOLOGY | Facility: HOSPITAL | Age: 74
End: 2019-11-04

## 2019-11-04 ENCOUNTER — APPOINTMENT (OUTPATIENT)
Dept: MRI IMAGING | Facility: HOSPITAL | Age: 74
End: 2019-11-04

## 2019-11-04 ENCOUNTER — APPOINTMENT (OUTPATIENT)
Dept: CT IMAGING | Facility: HOSPITAL | Age: 74
End: 2019-11-04

## 2019-11-04 LAB
CHOLEST SERPL-MCNC: 112 MG/DL (ref 0–200)
HBA1C MFR BLD: 5.3 % (ref 4.8–5.6)
HDLC SERPL-MCNC: 53 MG/DL (ref 40–60)
HOLD SPECIMEN: NORMAL
LDLC SERPL CALC-MCNC: 43 MG/DL (ref 0–100)
LDLC/HDLC SERPL: 0.82 {RATIO}
TRIGL SERPL-MCNC: 78 MG/DL (ref 0–150)
VLDLC SERPL-MCNC: 15.6 MG/DL
WHOLE BLOOD HOLD SPECIMEN: NORMAL

## 2019-11-04 PROCEDURE — 0 IOPAMIDOL PER 1 ML: Performed by: INTERNAL MEDICINE

## 2019-11-04 PROCEDURE — 70496 CT ANGIOGRAPHY HEAD: CPT

## 2019-11-04 PROCEDURE — 83036 HEMOGLOBIN GLYCOSYLATED A1C: CPT | Performed by: PSYCHIATRY & NEUROLOGY

## 2019-11-04 PROCEDURE — 80061 LIPID PANEL: CPT | Performed by: PSYCHIATRY & NEUROLOGY

## 2019-11-04 PROCEDURE — 70551 MRI BRAIN STEM W/O DYE: CPT

## 2019-11-04 PROCEDURE — 96361 HYDRATE IV INFUSION ADD-ON: CPT

## 2019-11-04 PROCEDURE — 25010000002 ENOXAPARIN PER 10 MG: Performed by: PSYCHIATRY & NEUROLOGY

## 2019-11-04 PROCEDURE — G0378 HOSPITAL OBSERVATION PER HR: HCPCS

## 2019-11-04 PROCEDURE — 92610 EVALUATE SWALLOWING FUNCTION: CPT | Performed by: SPEECH-LANGUAGE PATHOLOGIST

## 2019-11-04 PROCEDURE — 99213 OFFICE O/P EST LOW 20 MIN: CPT | Performed by: PSYCHIATRY & NEUROLOGY

## 2019-11-04 PROCEDURE — 97530 THERAPEUTIC ACTIVITIES: CPT

## 2019-11-04 PROCEDURE — 92523 SPEECH SOUND LANG COMPREHEN: CPT | Performed by: SPEECH-LANGUAGE PATHOLOGIST

## 2019-11-04 PROCEDURE — 96372 THER/PROPH/DIAG INJ SC/IM: CPT

## 2019-11-04 PROCEDURE — 70498 CT ANGIOGRAPHY NECK: CPT

## 2019-11-04 PROCEDURE — 97166 OT EVAL MOD COMPLEX 45 MIN: CPT

## 2019-11-04 PROCEDURE — 74230 X-RAY XM SWLNG FUNCJ C+: CPT

## 2019-11-04 PROCEDURE — 92611 MOTION FLUOROSCOPY/SWALLOW: CPT | Performed by: SPEECH-LANGUAGE PATHOLOGIST

## 2019-11-04 PROCEDURE — 97162 PT EVAL MOD COMPLEX 30 MIN: CPT

## 2019-11-04 RX ORDER — ROPINIROLE 0.5 MG/1
0.5 TABLET, FILM COATED ORAL NIGHTLY
Status: DISCONTINUED | OUTPATIENT
Start: 2019-11-04 | End: 2019-11-06 | Stop reason: HOSPADM

## 2019-11-04 RX ORDER — ASPIRIN 81 MG/1
81 TABLET, CHEWABLE ORAL DAILY
Status: DISCONTINUED | OUTPATIENT
Start: 2019-11-04 | End: 2019-11-04 | Stop reason: SDUPTHER

## 2019-11-04 RX ORDER — DIAZEPAM 5 MG/1
10 TABLET ORAL 2 TIMES DAILY PRN
Status: DISCONTINUED | OUTPATIENT
Start: 2019-11-04 | End: 2019-11-06 | Stop reason: HOSPADM

## 2019-11-04 RX ORDER — CYCLOBENZAPRINE HCL 10 MG
10 TABLET ORAL 3 TIMES DAILY PRN
Status: DISCONTINUED | OUTPATIENT
Start: 2019-11-04 | End: 2019-11-06 | Stop reason: HOSPADM

## 2019-11-04 RX ORDER — GABAPENTIN 400 MG/1
400 CAPSULE ORAL 4 TIMES DAILY
Status: DISCONTINUED | OUTPATIENT
Start: 2019-11-04 | End: 2019-11-04 | Stop reason: SDUPTHER

## 2019-11-04 RX ORDER — MULTIVITAMIN,THERAPEUTIC
1 TABLET ORAL DAILY
Status: DISCONTINUED | OUTPATIENT
Start: 2019-11-04 | End: 2019-11-06 | Stop reason: HOSPADM

## 2019-11-04 RX ORDER — CLOPIDOGREL BISULFATE 75 MG/1
75 TABLET ORAL DAILY
Status: DISCONTINUED | OUTPATIENT
Start: 2019-11-04 | End: 2019-11-04 | Stop reason: SDUPTHER

## 2019-11-04 RX ORDER — TRIAMTERENE AND HYDROCHLOROTHIAZIDE 37.5; 25 MG/1; MG/1
1 CAPSULE ORAL EVERY MORNING
Status: DISCONTINUED | OUTPATIENT
Start: 2019-11-05 | End: 2019-11-06 | Stop reason: HOSPADM

## 2019-11-04 RX ORDER — BACLOFEN 10 MG/1
10 TABLET ORAL 2 TIMES DAILY
Status: DISCONTINUED | OUTPATIENT
Start: 2019-11-04 | End: 2019-11-06 | Stop reason: HOSPADM

## 2019-11-04 RX ORDER — ATENOLOL 50 MG/1
100 TABLET ORAL DAILY
Status: DISCONTINUED | OUTPATIENT
Start: 2019-11-04 | End: 2019-11-04 | Stop reason: ALTCHOICE

## 2019-11-04 RX ORDER — IBUPROFEN 200 MG
200 TABLET ORAL EVERY 6 HOURS PRN
Status: DISCONTINUED | OUTPATIENT
Start: 2019-11-04 | End: 2019-11-06 | Stop reason: HOSPADM

## 2019-11-04 RX ORDER — CARVEDILOL 25 MG/1
25 TABLET ORAL 2 TIMES DAILY WITH MEALS
Status: DISCONTINUED | OUTPATIENT
Start: 2019-11-04 | End: 2019-11-06 | Stop reason: HOSPADM

## 2019-11-04 RX ORDER — ATORVASTATIN CALCIUM 20 MG/1
20 TABLET, FILM COATED ORAL NIGHTLY
Status: DISCONTINUED | OUTPATIENT
Start: 2019-11-04 | End: 2019-11-04 | Stop reason: ALTCHOICE

## 2019-11-04 RX ORDER — DIAZEPAM 2 MG/1
2 TABLET ORAL 3 TIMES DAILY PRN
Status: DISCONTINUED | OUTPATIENT
Start: 2019-11-04 | End: 2019-11-04 | Stop reason: ALTCHOICE

## 2019-11-04 RX ADMIN — ROPINIROLE 0.5 MG: 0.5 TABLET, FILM COATED ORAL at 21:47

## 2019-11-04 RX ADMIN — ASPIRIN 81 MG 81 MG: 81 TABLET ORAL at 09:13

## 2019-11-04 RX ADMIN — GABAPENTIN 400 MG: 400 CAPSULE ORAL at 06:31

## 2019-11-04 RX ADMIN — BARIUM SULFATE 40 ML: 960 POWDER, FOR SUSPENSION ORAL at 13:30

## 2019-11-04 RX ADMIN — SODIUM CHLORIDE 100 ML/HR: 9 INJECTION, SOLUTION INTRAVENOUS at 17:15

## 2019-11-04 RX ADMIN — ATORVASTATIN CALCIUM 80 MG: 40 TABLET, FILM COATED ORAL at 21:47

## 2019-11-04 RX ADMIN — THERA TABS 1 TABLET: TAB at 17:13

## 2019-11-04 RX ADMIN — CLOPIDOGREL BISULFATE 75 MG: 75 TABLET ORAL at 09:13

## 2019-11-04 RX ADMIN — CARVEDILOL 25 MG: 25 TABLET, FILM COATED ORAL at 17:13

## 2019-11-04 RX ADMIN — SODIUM CHLORIDE 100 ML/HR: 9 INJECTION, SOLUTION INTRAVENOUS at 06:32

## 2019-11-04 RX ADMIN — GABAPENTIN 400 MG: 400 CAPSULE ORAL at 11:26

## 2019-11-04 RX ADMIN — SODIUM CHLORIDE, PRESERVATIVE FREE 10 ML: 5 INJECTION INTRAVENOUS at 09:14

## 2019-11-04 RX ADMIN — BACLOFEN 10 MG: 10 TABLET ORAL at 21:47

## 2019-11-04 RX ADMIN — GABAPENTIN 400 MG: 400 CAPSULE ORAL at 17:13

## 2019-11-04 RX ADMIN — GABAPENTIN 400 MG: 400 CAPSULE ORAL at 00:06

## 2019-11-04 RX ADMIN — ENOXAPARIN SODIUM 40 MG: 40 INJECTION SUBCUTANEOUS at 01:18

## 2019-11-04 RX ADMIN — IOPAMIDOL 90 ML: 755 INJECTION, SOLUTION INTRAVENOUS at 11:30

## 2019-11-04 NOTE — ED PROVIDER NOTES
"Subjective   72yo female pmh significant htn/hyperlipidemia/pad/cva/LLE spasticity, recently discharged [facility] 10.29.2019 secondary to acute left pontine cva, presents ED via EMS reported last known well 4hrs prior to arrival, with reported AMS/slurred speech.  Pt unable to provide hx secondary to dysarthria.  Pt reportedly may have fallen.        History provided by:  Patient and EMS personnel  Neurologic Problem   The patient's primary symptoms include an altered mental status and slurred speech. This is a new problem. The current episode started today.       Review of Systems   Unable to perform ROS: Mental status change       Past Medical History:   Diagnosis Date   • Arrhythmia     intermittent   • Hypertension    • Lupus     drug induced   • Stroke (CMS/HCC)        Allergies   Allergen Reactions   • Calcium Channel Blockers Other (See Comments)   • Hydralazine Hcl Other (See Comments)   • Hyzaar [Losartan Potassium-Hctz]    • Lisinopril    • Procardia [Nifedipine]        Past Surgical History:   Procedure Laterality Date   • ANKLE SURGERY Right     \"bone replacement with coral\"   • APPENDECTOMY     •  SECTION     • CHOLECYSTECTOMY     • PARATHYROIDECTOMY     • ROTATOR CUFF REPAIR Left    • SPINAL FUSION  2018    T11-S1   • TYMPANOPLASTY         Family History   Problem Relation Age of Onset   • Hypertension Mother    • Heart disease Father    • Hypertension Brother        Social History     Socioeconomic History   • Marital status:      Spouse name: Not on file   • Number of children: Not on file   • Years of education: Not on file   • Highest education level: Not on file   Tobacco Use   • Smoking status: Never Smoker   • Smokeless tobacco: Never Used   Substance and Sexual Activity   • Alcohol use: Yes     Comment: occasional   • Drug use: Defer   • Sexual activity: Defer           Objective   Physical Exam   Constitutional: She appears well-developed and well-nourished. She " appears lethargic.   HENT:   Head: Normocephalic and atraumatic.   Right Ear: External ear normal.   Left Ear: External ear normal.   Nose: Nose normal.   Mouth/Throat: Mucous membranes are dry.   Eyes: EOM are normal. Pupils are equal, round, and reactive to light.   Neck: Normal range of motion. Neck supple. No JVD present. No tracheal deviation present.   Cardiovascular: Normal rate, regular rhythm, normal heart sounds and intact distal pulses. Exam reveals no gallop and no friction rub.   No murmur heard.  Pulmonary/Chest: Effort normal and breath sounds normal. She has no wheezes. She has no rales.   Abdominal: Soft. Bowel sounds are normal. She exhibits no distension and no mass. There is no tenderness. There is no guarding.   Musculoskeletal: She exhibits no edema.        Legs:  Lymphadenopathy:     She has no cervical adenopathy.   Neurological: She has normal strength. She appears lethargic. No cranial nerve deficit or sensory deficit. Coordination normal. GCS eye subscore is 3. GCS verbal subscore is 5. GCS motor subscore is 6.   Skin: Skin is warm and dry.   Nursing note and vitals reviewed.      ECG 12 Lead    Date/Time: 11/3/2019 8:58 PM  Performed by: Dutch Venegas MD  Authorized by: Dutch Venegas MD   Interpreted by physician  Rhythm: sinus rhythm  Rate: normal  BPM: 77  QRS axis: normal  Conduction: conduction normal  ST Segments: ST segments normal  T depression: aVL  Other: no other findings  Clinical impression: non-specific ECG                 ED Course  ED Course as of Nov 03 2203   Sun Nov 03, 2019 2102 [facility] Stroke consult requested.   TPA contraindicated secondary to documented acute left pontine CVA demonstrated on MRI brain 10.28.2019.  [SD]      ED Course User Index  [SD] Dutch Venegas MD        Labs Reviewed   COMPREHENSIVE METABOLIC PANEL - Abnormal; Notable for the following components:       Result Value    Glucose 122 (*)     Alkaline Phosphatase 121 (*)     eGFR Non   Amer 59 (*)     All other components within normal limits    Narrative:     GFR Normal >60  Chronic Kidney Disease <60  Kidney Failure <15   CBC WITH AUTO DIFFERENTIAL - Abnormal; Notable for the following components:    Hemoglobin 11.7 (*)     Neutrophil % 78.6 (*)     Lymphocyte % 11.7 (*)     All other components within normal limits   PROTIME-INR - Normal    Narrative:     Therapeutic range for most indications is 2.0-3.0 INR,  or 2.5-3.5 for mechanical heart valves.   APTT - Normal    Narrative:     The recommended Heparin therapeutic range is 68-97 seconds.   TROPONIN (IN-HOUSE) - Normal    Narrative:     Troponin T Reference Range:  <= 0.03 ng/mL-   Negative for AMI  >0.03 ng/mL-     Abnormal for myocardial necrosis.  Clinicians would have to utilize clinical acumen, EKG, Troponin and serial changes to determine if it is an Acute Myocardial Infarction or myocardial injury due to an underlying chronic condition.    POCT GLUCOSE FINGERSTICK - Normal   RAINBOW DRAW    Narrative:     The following orders were created for panel order [place]ayer Draw.  Procedure                               Abnormality         Status                     ---------                               -----------         ------                     Light Blue Top[[id number]]                                   In process                 Green Top (Gel)[[id number]]                                  In process                 Lavender Top[[id number]]                                     In process                 Gold Top - SST[[id number]]                                   In process                   Please view results for these tests on the individual orders.   POCT GLUCOSE FINGERSTICK   TYPE AND SCREEN   PREVIOUS HISTORY   CBC AND DIFFERENTIAL    Narrative:     The following orders were created for panel order CBC & Differential.  Procedure                               Abnormality         Status                     ---------                                -----------         ------                     CBC Auto Differential[[id number]]        Abnormal            Final result                 Please view results for these tests on the individual orders.   LIGHT BLUE TOP   GREEN TOP   LAVENDER TOP   GOLD TOP - SST   EXTRA TUBES    Narrative:     The following orders were created for panel order Extra Tubes.  Procedure                               Abnormality         Status                     ---------                               -----------         ------                     Alvarez Top[[id number]]                                         In process                   Please view results for these tests on the individual orders.   GRAY TOP     Xr Femur 2 View Bilateral    Result Date: 11/3/2019  Narrative: Bilateral femurs two view on 11/3/2019 CLINICAL INDICATION: Bilateral leg pain after fall COMPARISON: None FINDINGS: Left femur: Changes of osteoarthritis are noted in the knee. There are no fractures. Visualized joints are well aligned. Right femur: Mild changes of osteoarthritis are noted in the knee. There are no fractures. Visualized joints are well aligned.     Impression: No acute abnormality in either femur. Electronically signed by:  Hesham Hernandez  11/3/2019 9:43 PM CST Workstation: [id number]    Xr Chest 1 View    Result Date: 11/3/2019  Narrative: Chest single view on  11/3/2019 CLINICAL INDICATION: Stroke protocol COMPARISON: 8/6/2015 FINDINGS: Posterior fusion hardware is noted in the lower thoracic and upper lumbar spine. Vascular calcification is noted in the aorta. The lungs are clear. Cardiac, hilar and mediastinal contours are within normal limits. Pulmonary vascularity is within normal.     Impression: No acute disease. Electronically signed by:  Hesham Hernandez  11/3/2019 9:08 PM CST Workstation: [id number]    Xr Pelvis 1 Or 2 View    Result Date: 11/3/2019  Narrative: Pelvis single view on 11/3/2019 CLINICAL INDICATION: Pain after fall  COMPARISON: None FINDINGS: Extensive hardware is partially imaged in the lower lumbar spine extending into the bilateral iliac bones. The hips are well located. The SI joints are well aligned. Calcification in the right pelvis likely represents calcified uterine fibroid. There are no fractures.     Impression: No acute abnormality. Electronically signed by:  Hesham Hernandez  11/3/2019 9:42 PM CST Workstation: [id number]    Ct Head Without Contrast Stroke Protocol    Result Date: 11/3/2019  Narrative: CT head without contrast on 11/3/2019 CLINICAL INDICATION: Slurred speech, weakness, stroke TECHNIQUE: Multiple axial images are obtained throughout the head without the administration of contrast. This exam was performed according to our departmental dose-optimization program, which includes automated exposure control, adjustment of the mA and/or kV according to patient size and/or use of iterative reconstruction technique. Total DLP is 908.2 mGy*cm. COMPARISON: None FINDINGS: There is no hydrocephalus. There is no hemorrhage. There are no abnormal extra-axial fluid collections. There is no mass, mass effect or midline shift. Mucous retention cyst is noted in the right maxillary sinus. No bony abnormality is noted. There is a small age-indeterminate left periventricular lacunar infarct. If clinically indicated MRI could better evaluate. No other CT evidence of infarct is noted.     Impression: Small age-indeterminate left periventricular lacunar infarct. If clinically indicated MRI could better evaluate. Electronically signed by:  Hesham Hernandez  11/3/2019 9:07 PM CST Workstation: [id number]              [facility]    Final diagnoses:   Altered mental status, unspecified altered mental status type              Dutch Venegas MD  11/03/19 [id number]

## 2019-11-04 NOTE — NURSING NOTE
"Pt can be heard screaming from the nursing station pt is c/o \"excruciating\" muscle spasms in right leg. There is no visible spasms noted nor felt. Pt states her spasms had been under control with her bacolfen which she hasn't gotten while here. Dr. Barrett notified of pt condition, complaints. No new orders received. Will continue to monitor.  "

## 2019-11-04 NOTE — THERAPY EVALUATION
"Acute Care - Speech Language Pathology   Swallow Initial Evaluation [facility]     Patient Name: Rama Goel  : 1945  MRN: [id number]  Today's Date: 2019  Onset of Illness/Injury or Date of Surgery: 19     Referring Physician: Roman Guerrero MD       Admit Date: 11/3/2019    Recommendation: Mechanical soft. Thin liquids via 1/2   teaspoon size with chin tuck.       Visit Dx:     ICD-10-CM ICD-9-CM   1. Altered mental status, unspecified altered mental status type R41.82 780.97   2. Oropharyngeal dysphagia R13.12 787.22     Patient Active Problem List   Diagnosis   • Altered mental status     Past Medical History:   Diagnosis Date   • Arrhythmia     intermittent   • CVA (cerebral vascular accident) (CMS/HCC) 2018    also had one last week 10/2019 in [facility]   • Hypertension    • Lupus (CMS/HCC)     drug induced   • Stroke (CMS/HCC)      Past Surgical History:   Procedure Laterality Date   • ANKLE SURGERY Right     \"bone replacement with coral\"   • APPENDECTOMY     •  SECTION     • CHOLECYSTECTOMY     • PARATHYROIDECTOMY     • ROTATOR CUFF REPAIR Left    • SPINAL FUSION  2018    T11-S1   • TYMPANOPLASTY          SWALLOW EVALUATION (last 72 hours)      SLP Adult Swallow Evaluation     Row Name 19 1315 19 0738                Rehab Evaluation    Document Type  evaluation  -EK  evaluation  -CK       Subjective Information  no complaints  -EK  complains of;pain  -CK       Patient Observations  alert;agree to therapy;cooperative  -EK  alert;cooperative;agree to therapy  -CK       Patient/Family Observations  MBS completed in radiology.   -EK  Pt alert; supine in bed.  No family present  -CK       Patient Effort  good  -EK  good  -CK          General Information    Patient Profile Reviewed  --  yes  -CK       Pertinent History Of Current Problem  --  Pt w/recent hx of pontine cva and admitted to [place] w/dysarthria and dysphagia.  Pt d/c'd w/no f/u ST services " according to pt.  SLP at [facility]thin liquids 1/2 tsp w/chin tuck  -CK       Current Method of Nutrition  --  NPO  -CK       Precautions/Limitations, Vision  --  WFL;for purposes of eval  -CK       Precautions/Limitations, Hearing  --  WFL;for purposes of eval  -CK       Prior Level of Function-Communication  --  motor speech impairment  -CK       Prior Level of Function-Swallowing  --  mechanical soft textures;thin liquids  -CK          Pain Assessment    Additional Documentation  --  Pain Scale: Numbers Pre/Post-Treatment (Group)  -CK          Pain Scale: Numbers Pre/Post-Treatment    Pain Scale: Numbers, Pretreatment  --  3/10  -CK       Pain Location - Side  Right  -EK  --       Pain Location  --  other (see comments) coccyx  -CK          Oral Motor and Function    Dentition Assessment  --  natural, present and adequate  -CK       Secretion Management  --  WNL/WFL  -CK       Mucosal Quality  --  moist, healthy  -CK       Volitional Swallow  --  delayed  -CK       Volitional Cough  --  weak  -CK          MBS/VFSS    Utensils Used  spoon;cup  -EK  --       Consistencies Trialed  thin liquids;pureed;soft textures;regular textures  -EK  --          MBS/VFSS Interpretation    Oral Prep Phase  WFL  -EK  --       Oral Transit Phase  WFL  -EK  --       Oral Residue  WFL  -EK  --          Initiation of Pharyngeal Swallow    Initiation of Pharyngeal Swallow  bolus in pyriform sinuses  -EK  --       Pharyngeal Phase  impaired pharyngeal phase of swallowing  -EK  --       Penetration During the Swallow  thin liquids  -EK  --       Aspiration During the Swallow  nectar-thick liquids;thin liquids  -EK  --       Response to Penetration  shallow;deep  -EK  --       Response to Aspiration  other (see comments) silent penetration  -EK  --       Attempted Compensatory Maneuvers  chin tuck  -EK  --       Response to Attempted Compensatory Maneuvers  other (see comments) reduced penetration   -EK  --       Pharyngeal Phase, Comment  All trials of thin and NTL resulted to premature spillage, delayed swallow reflex to the level of the pyriform sinus. Pt with deep laryngeal penetration on the thin by spoon; deep penetration on the NTL by cup. Pt displayed decrease bolus control. SLP recommends mech soft and thin liquids by spoon with chin tuck. SLP educated on less than a full tsp and chin tuck. SLP will also address and educate pt on oral prep set and ensure safety of chin tuck.   -EK  --          SLP Communication to Radiology    Severity Level of Dysphagia  mild dysphagia  -EK  --       Consistencies Aspirated/Penetrated  penetrated;thin liquids;nectar-thick liquids  -EK  --       Summary Statement  Pt with silent laryngeal penetration, the depth the penetration is contigent on the bolus size. Least restrictive diet recommendation is mech soft and thin liquids by spoon with chin tuck.   -EK  --          Clinical Impression    SLP Swallowing Diagnosis  mild;pharyngeal dysfunction  -EK  --       Functional Impact  risk of aspiration/pneumonia  -EK  --       Rehab Potential/Prognosis, Swallowing  good, to achieve stated therapy goals  -EK  --          Recommendations    Therapy Frequency (Swallow)  5 days per week  -EK  --       Predicted Duration Therapy Intervention (Days)  until discharge  -EK  --       SLP Diet Recommendation  soft textures;ground;thin liquids  -EK  --       Recommended Precautions and Strategies  upright posture during/after eating;small bites of food and sips of liquid;alternate between small bites of food and sips of liquid  -EK  --       SLP Rec. for Method of Medication Administration  meds whole;with pudding or applesauce  -EK  --       Monitor for Signs of Aspiration  yes  -EK  --          Swallow Goals (SLP)    Oral Nutrition/Hydration Goal Selection (SLP)  oral nutrition/hydration, SLP goal 1  -EK  --       Swallow Compensatory Strategies Goal Selection (SLP)  swallow  compensatory strategies, SLP goal 1  -EK  --          Oral Nutrition/Hydration Goal 1 (SLP)    Oral Nutrition/Hydration Goal 1, SLP  Pt to tolerate least restrictive diet with no s/s of aspiration for adequate nutirtion and hydration.   -EK  --       Time Frame (Oral Nutrition/Hydration Goal 1, SLP)  by discharge  -EK  --       Progress/Outcomes (Oral Nutrition/Hydration Goal 1, SLP)  other (see comments) new goal  -EK  --          Swallow Compensatory Strategies Goal 1 (SLP)    Activity (Swallow Compensatory Strategies/Techniques Goal 1, SLP)  compensatory strategies;small bites;chin tuck posture;alternate food/liquid intake;postural techniques;during p.o. trials;liquids by teaspoon only  -EK  --       [place]/Accuracy (Swallow Compensatory Strategies/Techniques Goal 1, SLP)  independently (over 90% accuracy)  -EK  --       Progress/Outcomes (Swallow Compensatory Strategies/Techniques Goal 1, SLP)  other (see comments) new goal  -EK  --         User Key  (r) = Recorded By, (t) = Taken By, (c) = Cosigned By    Initials Name Effective Dates    Megan Wells, CCC-[facility] 07/24/19 -     CK Romina Garcia MS CCC-SLP 04/03/18 -           EDUCATION  The patient has been educated in the following areas:   Dysphagia (Swallowing Impairment).    SLP Recommendation and Plan  SLP Swallowing Diagnosis: mild, pharyngeal dysfunction  SLP Diet Recommendation: soft textures, ground, thin liquids  Recommended Precautions and Strategies: upright posture during/after eating, small bites of food and sips of liquid, alternate between small bites of food and sips of liquid  SLP Rec. for Method of Medication Administration: meds whole, with pudding or applesauce     Monitor for Signs of Aspiration: yes           Rehab Potential/Prognosis, Swallowing: good, to achieve stated therapy goals  Therapy Frequency (Swallow): 5 days per week  Predicted Duration Therapy Intervention (Days): until discharge            SLP  GOALS     Row Name 11/04/19 1315             Oral Nutrition/Hydration Goal 1 (SLP)    Oral Nutrition/Hydration Goal 1, SLP  Pt to tolerate least restrictive diet with no s/s of aspiration for adequate nutirtion and hydration.   -EK      Time Frame (Oral Nutrition/Hydration Goal 1, SLP)  by discharge  -EK      Progress/Outcomes (Oral Nutrition/Hydration Goal 1, SLP)  other (see comments) new goal  -EK         Swallow Compensatory Strategies Goal 1 (SLP)    Activity (Swallow Compensatory Strategies/Techniques Goal 1, SLP)  compensatory strategies;small bites;chin tuck posture;alternate food/liquid intake;postural techniques;during p.o. trials;liquids by teaspoon only  -EK      [place]/Accuracy (Swallow Compensatory Strategies/Techniques Goal 1, SLP)  independently (over 90% accuracy)  -EK      Progress/Outcomes (Swallow Compensatory Strategies/Techniques Goal 1, SLP)  other (see comments) new goal  -EK        User Key  (r) = Recorded By, (t) = Taken By, (c) = Cosigned By    Initials Name Provider Type    Megan Wells CCC-SLP Speech and Language Pathologist             Time Calculation:   Time Calculation- SLP     Row Name 11/04/19 1454             Time Calculation- SLP    SLP Start Time  1315  -EK      SLP Stop Time  1340  -EK      SLP Time Calculation (min)  25 min  -EK      SLP Received On  11/04/19  -EK        User Key  (r) = Recorded By, (t) = Taken By, (c) = Cosigned By    Initials Name Provider Type    Megan Wells CCC-SLP Speech and Language Pathologist          Therapy Charges for Today     Code Description Service Date Service Provider Modifiers Qty    [id number] HC ST MOTION FLUORO EVAL SWALLOW 2 11/4/2019 Megan Garcia CCC-SLP GN 1               ELIAS Rod  11/4/2019

## 2019-11-04 NOTE — THERAPY EVALUATION
"Acute Care - Occupational Therapy Initial Evaluation  [facility]     Patient Name: Rama Goel  : 1945  MRN: [id number]  Today's Date: 2019  Onset of Illness/Injury or Date of Surgery: 19  Date of Referral to OT: 19  Referring Physician: Yolanda METCALF    Admit Date: 11/3/2019       ICD-10-CM ICD-9-CM   1. Altered mental status, unspecified altered mental status type R41.82 780.97   2. Oropharyngeal dysphagia R13.12 787.22   3. Impaired mobility and ADLs Z74.09 799.89   4. Impaired functional mobility, balance, gait, and endurance Z74.09 V49.89     Patient Active Problem List   Diagnosis   • Altered mental status     Past Medical History:   Diagnosis Date   • Arrhythmia     intermittent   • CVA (cerebral vascular accident) (CMS/HCC) 2018    also had one last week 10/2019 in [facility]   • Hypertension    • Lupus (CMS/HCC)     drug induced   • Stroke (CMS/HCC)      Past Surgical History:   Procedure Laterality Date   • ANKLE SURGERY Right     \"bone replacement with coral\"   • APPENDECTOMY     •  SECTION     • CHOLECYSTECTOMY     • PARATHYROIDECTOMY     • ROTATOR CUFF REPAIR Left    • SPINAL FUSION  2018    T11-S1   • TYMPANOPLASTY            OT ASSESSMENT FLOWSHEET (last 12 hours)      Occupational Therapy Evaluation     Row Name 19 1402 19 1335                OT Evaluation Time/Intention    Subjective Information  complains of;pain  -ME  --       Document Type  evaluation  -ME  --       Mode of Treatment  individual therapy;occupational therapy  -ME  occupational therapy;physical therapy  -ME       Total Evaluation Minutes, Occupational Therapy  85 25 treatment units   -ME  --       Evaluation Not Performed  --  patient unavailable for evaluation;other (see comments) out of room at this time; barium swallow study; will recheck  -ME       Patient Effort  good  -ME  --       Comment  pt complains frequently of RLE; states that she had the same spasms following " previous stroke (affecting the LLE); spasms were visibly observed; pt educated on fine motor exercises throughout session. also educated on good positioning for spasm relief. pt is impulsive at times, and lacks good safety. Simultaneous filing. User may be unaware of other data.  -ME  --          General Information    Patient Profile Reviewed?  yes  -ME  --       Onset of Illness/Injury or Date of Surgery  11/03/19  -ME  --       Referring Physician  Roman Guerrero MD   -ME  --       Patient Observations  alert;cooperative;agree to therapy  -ME  --       Patient/Family Observations  no family present   -ME  --       General Observations of Patient  pt supine in bed, HOB elevated, nursing in room, pt grimacing and loudly moaning in pain, having spasms in RLE, tele, IV, on room air  -ME  --       Prior Level of Function  independent:;all household mobility;community mobility;home management;ADL's;driving  -ME  --       Equipment Currently Used at Home  walker, rolling;cane, straight;grab bar raised toilet; built in shower seat;using RW after [place]   -ME  --       Pertinent History of Current Functional Problem  s/p CVA (one in 2018, other last week)  -ME  --       Existing Precautions/Restrictions  fall  -ME  --       Limitations/Impairments  safety/cognitive  -ME  --       Equipment Issued to Patient  gait belt  -ME  --       Risks Reviewed  patient:;nausea/vomiting;LOB;dizziness;increased discomfort;change in vital signs  -ME  --       Benefits Reviewed  patient:;improve function;increase independence;increase strength;increase balance;decrease pain;increase knowledge  -ME  --       Barriers to Rehab  -- frequent falls;safety  -ME  --          Relationship/Environment    Lives With  alone Ruba (friend) has been staying with her since out of [place]  -ME  --          Resource/Environmental Concerns    Current Living Arrangements  home/apartment/condo  -ME  --          Home Main Entrance    Number of Stairs, Main Entrance   four  -ME  --       Stair Railings, Main Entrance  railing on right side (ascending)  -ME  --          Cognitive Assessment/Intervention- PT/OT    Affect/Mental Status (Cognitive)  WFL gets a little agitated at times about pain meds   -ME  --       Orientation Status (Cognition)  oriented x 4  -ME  --       Follows Commands (Cognition)  follows one step commands;follows two step commands;over 90% accuracy;repetition of directions required;verbal cues/prompting required  -ME  --       Safety Deficit (Cognitive)  awareness of need for assistance;impulsivity;insight into deficits/self awareness;safety precautions awareness;safety precautions follow-through/compliance  -ME  --       Personal Safety Interventions  fall prevention program maintained;gait belt;nonskid shoes/slippers when out of bed  -ME  --          Safety Issues, Functional Mobility    Safety Issues Affecting Function (Mobility)  awareness of need for assistance;impulsivity;insight into deficits/self awareness;judgment;safety precaution awareness;safety precautions follow-through/compliance  -ME  --       Impairments Affecting Function (Mobility)  balance;coordination;pain;strength;other (see comments);motor planning spasms in RLE  -ME  --       Comment, Safety Issues/Impairments (Mobility)  pts RLE spasms are a limiting factor to functional mobility at this time. pt also states that she needs a R knee replacement, and this knee often jesus   -ME  --          Bed Mobility Assessment/Treatment    Bed Mobility Assessment/Treatment  supine-sit;scooting/bridging  -ME  --       Scooting/Bridging [place] (Bed Mobility)  supervision  -ME  --       Supine-Sit [place] (Bed Mobility)  minimum assist (75% patient effort)  -ME  --       Assistive Device (Bed Mobility)  head of bed elevated;bed rails  -ME  --       Comment (Bed Mobility)  to relieve pressure, and attempt to help with spasms, pt was asked to scoot up in bed in order to create a straighter  posture. educated on benefits of this posture. completed with supervision and use of bed rails and HOB elevated. min A to sit EOB with HOB almost fully elevated.   -ME  --          Functional Mobility    Functional Mobility- Ind. Level  minimum assist (75% patient effort);verbal cues required  -ME  --       Functional Mobility- Device  rolling walker  -ME  --       Functional Mobility- Safety Issues  balance decreased during turns;step length decreased;sequencing ability decreased;other (see comments) RLE spasm and knee buckling   -ME  --       Functional Mobility- Comment  pt ambulated from one side of the bed to the other (to the restroom) with RW and min A. pt then ambulated out of the room and down the braden, back to the room. then took a break on the EOB to get chair set up, pt then ambulated from far EOB to other side of room in order to sit up in recliner chair. educated proper hand placement on walker for safest functional mobility. pt often looks down at the floor while ambulating, pt reminded to look up when completing mobility. pt presents with stooped posture. pt states that she frequently falls, OT/PT assessing the why to this, and discussing with pt, mostly stating it was from the spasms and the locking/buckling of the R knee). motor planning decreased. when going out of the bathroom pt required increased vc's to complete (unable to turn to get out of bathroom).   -ME  --          Transfer Assessment/Treatment    Transfer Assessment/Treatment  sit-stand transfer;stand-sit transfer;toilet transfer  -ME  --       Comment (Transfers)  pt requires reminders to push up from surface she is transferring from instead of pushing up from the walker. pt demonstrates impulsivity at times. toilet in this room is much lower than the one that she has at home, requires more assistance to get down to the toilet than she normally would.   -ME  --          Sit-Stand Transfer    Sit-Stand [place] (Transfers)  minimum  assist (75% patient effort);verbal cues  -ME  --       Assistive Device (Sit-Stand Transfers)  walker, front-wheeled  -ME  --          Stand-Sit Transfer    Stand-Sit [place] (Transfers)  minimum assist (75% patient effort);verbal cues  -ME  --       Assistive Device (Stand-Sit Transfers)  walker, front-wheeled  -ME  --          Toilet Transfer    Type (Toilet Transfer)  sit-stand;stand-sit  -ME  --       [place] Level (Toilet Transfer)  minimum assist (75% patient effort);verbal cues  -ME  --       Assistive Device (Toilet Transfer)  walker, front-wheeled;grab bars/safety frame  -ME  --          ADL Assessment/Intervention    BADL Assessment/Intervention  lower body dressing;toileting  -ME  --          Lower Body Dressing Assessment/Training    Lower Body Dressing [place] Level  doff;don;socks;set up;supervision  -ME  --       Lower Body Dressing Position  edge of bed sitting  -ME  --       Comment (Lower Body Dressing)  pt donned/doffed socks while sitting on EOB. pt is able to complete this task with no physical assistance, but supervision for safety due to pt decreased strength and core control.   -ME  --          Toileting Assessment/Training    [place] Level (Toileting)  adjust/manage clothing;minimum assist (75% patient effort);verbal cues;perform perineal hygiene;supervision  -ME  --       Assistive Devices (Toileting)  grab bar/safety frame  -ME  --       Toileting Position  unsupported sitting;supported standing  -ME  --       Comment (Toileting)  pt completed management of clothes before and after toileting with min A, use of grab bar, and RW in standing. pt completed perineal hygiene while sitting on the toilet with supervision.   -ME  --          BADL Safety/Performance    Impairments, BADL Safety/Performance  balance;endurance/activity tolerance;coordination;motor planning;pain;strength;trunk/postural control  -ME  --          General ROM    GENERAL ROM COMMENTS  BUE AROM WFL  grossly; oppostion completed with good accuracy   -ME  --          MMT (Manual Muscle Testing)    General MMT Comments  BUE and  strength 4- to 4/5 grossly   -ME  --          Motor Assessment/Interventions    Additional Documentation  Balance (Group);Fine Motor Testing & Training (Group)  -ME  --          Balance    Balance  static sitting balance  -ME  --          Static Sitting Balance    Level of [place] (Unsupported Sitting, Static Balance)  supervision  -ME  --       Sitting Position (Unsupported Sitting, Static Balance)  sitting on edge of bed  -ME  --       Time Able to Maintain Position (Unsupported Sitting, Static Balance)  more than 5 minutes  -ME  --          Fine Motor Testing & Training    Fine Motor Tests  9 Hole Peg Test of Fine Motor Coordination Results  -ME  --       Results, 9 Hole Peg Test of Fine Motor Coordination-Right  40 seconds   -ME  --       Results, 9 Hole Peg Test of Fine Motor Coordination-Left  33 seconds   -ME  --       Comment, Fine Motor Coordination  pt is right handed; during testing pt dropped pegs x 1; noticeable difficulty with R hand finger translation, coordination, and over all fine motor coordination. pt is concerned with decreased fine motor coordination of the R hand as she quilts, and has to handwrite lots of things. pt also wrote name for OT. name is legible but pt states that this is not her baseline, and that it usually looks nicer. education on exercises that she can practice to increase her fine motor coordination skills. exercises given include coin translation from palm to finger tips, and picking up coins from slick surface facilitating coordination.   -ME  --          Sensory Assessment/Intervention    Sensory General Assessment  other (see comments) light touch testing   -ME  --          Light Touch Sensation Assessment    Left Upper Extremity: Light Touch Sensation Assessment  intact  -ME  --       Right Upper Extremity: Light Touch Sensation  "Assessment  intact  -ME  --          Hearing Assessment    Hearing Status  WFL  -ME  --       Impact of Hearing Impairment on Functional Status  states that she does have a \"fake\" ear drum in the R ear  -ME  --          Vision Assessment/Intervention    Visual Impairment/Limitations  WFL  -ME  --          Positioning and Restraints    Pre-Treatment Position  in bed  -ME  --       Post Treatment Position  chair  -ME  --       In Chair  notified nsg;sitting;call light within reach;encouraged to call for assist;exit alarm on  -ME  --          Pain Assessment    Additional Documentation  Pain Scale: Numbers Pre/Post-Treatment (Group)  -ME  --          Pain Scale: Numbers Pre/Post-Treatment    Pain Scale: Numbers, Pretreatment  10/10  -ME  --       Pain Location - Side  Right  -ME  --       Pain Location  other (see comments) states that pain is from the knee up to the coccyx   -ME  --       Pre/Post Treatment Pain Comment  also reported R buttock pain;very noticeable bruises all along the R side including buttocks, back, and leg   -ME  --       Pain Intervention(s)  Repositioned;Medication (See MAR);Ambulation/increased activity;Distraction  -ME  --          Plan of Care Review    Plan of Care Reviewed With  patient  -ME  --          Clinical Impression (OT)    Date of Referral to OT  11/03/19  -ME  --       OT Diagnosis  impaired mobility and ADLs   -ME  --       Prognosis (OT Eval)  good   -ME  --       Functional Level at Time of Evaluation (OT Eval)  pt is min A for all transfers and functional mobility with use of RW. supervision and set-up for donning/doffing socks while sitting EOB. toileting (clothing management) completed with min A, RW, and grab bar. toileting crystal hygiene completed with supervision while seated on the toilet. pt is impulsive at times, and lacks good safety. pt frequently falls at home.   -ME  --       Patient/Family Goals Statement (OT Eval)  to return back home   -ME  --       Criteria for " Skilled Therapeutic Interventions Met (OT Eval)  yes;treatment indicated  -ME  --       Rehab Potential (OT Eval)  good, to achieve stated therapy goals  -ME  --       Therapy Frequency (OT Eval)  daily  -ME  --       Predicted Duration of Therapy Intervention (Therapy Eval)  until d/c   -ME  --       Care Plan Review (OT)  evaluation/treatment results reviewed;care plan/treatment goals reviewed;risks/benefits reviewed;current/potential barriers reviewed;patient/other agree to care plan  -ME  --       Anticipated Equipment Needs at Discharge (OT)  other (see comments) unsure at this time;will continue to assess with progress   -ME  --       Anticipated Discharge Disposition (OT)  anticipate therapy at next level of care;inpatient rehabilitation facility;home with 24/7 care  -ME  --          Vital Signs    Post Systolic BP Rehab  165  -ME  --       Post Treatment Diastolic BP  76  -ME  --       Posttreatment Heart Rate (beats/min)  73  -ME  --       Post SpO2 (%)  96  -ME  --       O2 Delivery Post Treatment  room air  -ME  --       Post Patient Position  Sitting  -ME  --          Planned OT Interventions    Planned Therapy Interventions (OT Eval)  activity tolerance training;BADL retraining;adaptive equipment training;functional balance retraining;IADL retraining;neuromuscular control/coordination retraining;occupation/activity based interventions;patient/caregiver education/training;ROM/therapeutic exercise;strengthening exercise;transfer/mobility retraining  -ME  --          OT Goals    Transfer Goal Selection (OT)  transfer, OT goal 1  -ME  --       Bathing Goal Selection (OT)  bathing, OT goal 1  -ME  --       Dressing Goal Selection (OT)  dressing, OT goal 1  -ME  --       Toileting Goal Selection (OT)  toileting, OT goal 1  -ME  --       Functional Mobility Goal Selection (OT)  functional mobility, OT goal 1  -ME  --       Coordination Goal Selection (OT)  --  -ME  --       Problem Specific Goal Selection (OT)   problem specific goal 1, OT  -ME  --       Additional Documentation  Functional Mobility Selection (OT) (Row);Problem Specific Goal Selection (OT) (Row)  -ME  --          Transfer Goal 1 (OT)    Activity/Assistive Device (Transfer Goal 1, OT)  toilet  -ME  --       [place] Level/Cues Needed (Transfer Goal 1, OT)  supervision required  -ME  --       Time Frame (Transfer Goal 1, OT)  long term goal (LTG);by discharge  -ME  --       Progress/Outcome (Transfer Goal 1, OT)  goal not met  -ME  --          Bathing Goal 1 (OT)    Activity/Assistive Device (Bathing Goal 1, OT)  bathing skills, all;shower chair  -ME  --       [place] Level/Cues Needed (Bathing Goal 1, OT)  supervision required  -ME  --       Time Frame (Bathing Goal 1, OT)  long term goal (LTG);by discharge  -ME  --       Progress/Outcomes (Bathing Goal 1, OT)  goal not met  -ME  --          Dressing Goal 1 (OT)    Activity/Assistive Device (Dressing Goal 1, OT)  dressing skills, all  -ME  --       [place]/Cues Needed (Dressing Goal 1, OT)  supervision required  -ME  --       Time Frame (Dressing Goal 1, OT)  long term goal (LTG);by discharge  -ME  --       Progress/Outcome (Dressing Goal 1, OT)  goal not met  -ME  --          Toileting Goal 1 (OT)    Activity/Device (Toileting Goal 1, OT)  toileting skills, all  -ME  --       [place] Level/Cues Needed (Toileting Goal 1, OT)  supervision required  -ME  --       Time Frame (Toileting Goal 1, OT)  long term goal (LTG);by discharge  -ME  --       Progress/Outcome (Toileting Goal 1, OT)  goal not met  -ME  --          Functional Mobility Goal 1 (OT)    Activity/Assistive Device (Functional Mobility Goal 1, OT)  walker, rolling  -ME  --       [place] Level/Cues Needed (Functional Mobility Goal 1, OT)  standby assist  -ME  --       Distance Goal 1 (Functional Mobility, OT)  for ADLs and community mobility with no LOB and good safety.  -ME  --       Time Frame (Functional Mobility Goal 1,  OT)  long term goal (LTG);by discharge  -ME  --       Progress/Outcome (Functional Mobility Goal 1, OT)  goal not met  -ME  --          Coordination Goal 1 (OT)    Activity/Assistive Device (Coordination Goal 1, OT)  --  -ME  --       Time Frame (Coordination Goal 1, OT)  --  -ME  --       Progress/Outcomes (Coordination Goal 1, OT)  --  -ME  --          Problem Specific Goal 1 (OT)    Problem Specific Goal 1 (OT)  pt will complete 9 hole peg using R hand in 35 seconds or less, and without dropping a peg.   -ME  --       Time Frame (Problem Specific Goal 1, OT)  long term goal (LTG);by discharge  -ME  --       Progress/Outcome (Problem Specific Goal 1, OT)  goal not met  -ME  --          Living Environment    Home Accessibility  stairs to enter home pt has a ramp at home   -ME  --         User Key  (r) = Recorded By, (t) = Taken By, (c) = Cosigned By    Initials Name Effective Dates    ME Che Oh OT 09/10/19 -          Occupational Therapy Education     Title: PT OT SLP Therapies (Done)     Topic: Occupational Therapy (Done)     Point: ADL training (Done)     Description: Instruct learner(s) on proper safety adaptation and remediation techniques during self care or transfers.   Instruct in proper use of assistive devices.    Learning Progress Summary           Patient Acceptance, E, GISSELLE HYDE,NR by ME at 11/4/2019  4:40 PM    Comment:  Educated on OT and POC. Educated to call for assist. Educated on fine motor coordination/translation exercises. Educated on proper hand placement for mobility and transfers. Educated on good/relieving positions for spasms. Educated on possible AE needs.                   Point: Home exercise program (Done)     Description: Instruct learner(s) on appropriate technique for monitoring, assisting and/or progressing therapeutic exercises/activities.    Learning Progress Summary           Patient Acceptance, E, BARRETT,GISSELLE,NR by ME at 11/4/2019  4:40 PM    Comment:  Educated on OT and POC.  Educated to call for assist. Educated on fine motor coordination/translation exercises. Educated on proper hand placement for mobility and transfers. Educated on good/relieving positions for spasms. Educated on possible AE needs.                   Point: Precautions (Done)     Description: Instruct learner(s) on prescribed precautions during self-care and functional transfers.    Learning Progress Summary           Patient Acceptance, E, VU,DU,NR by ME at 11/4/2019  4:40 PM    Comment:  Educated on OT and POC. Educated to call for assist. Educated on fine motor coordination/translation exercises. Educated on proper hand placement for mobility and transfers. Educated on good/relieving positions for spasms. Educated on possible AE needs.                   Point: Body mechanics (Done)     Description: Instruct learner(s) on proper positioning and spine alignment during self-care, functional mobility activities and/or exercises.    Learning Progress Summary           Patient Acceptance, E, VU,DU,NR by ME at 11/4/2019  4:40 PM    Comment:  Educated on OT and POC. Educated to call for assist. Educated on fine motor coordination/translation exercises. Educated on proper hand placement for mobility and transfers. Educated on good/relieving positions for spasms. Educated on possible AE needs.                               User Key     Initials Effective Dates Name Provider Type Discipline    ME 09/10/19 -  Che Oh, OT Occupational Therapist OT                  OT Recommendation and Plan  Outcome Summary/Treatment Plan (OT)  Anticipated Equipment Needs at Discharge (OT): other (see comments)(unsure at this time;will continue to assess with progress )  Anticipated Discharge Disposition (OT): anticipate therapy at next level of care, inpatient rehabilitation facility, home with 24/7 care  Planned Therapy Interventions (OT Eval): activity tolerance training, BADL retraining, adaptive equipment training, functional balance  retraining, IADL retraining, neuromuscular control/coordination retraining, occupation/activity based interventions, patient/caregiver education/training, ROM/therapeutic exercise, strengthening exercise, transfer/mobility retraining  Therapy Frequency (OT Eval): daily  Plan of Care Review  Plan of Care Reviewed With: patient  Plan of Care Reviewed With: patient  Outcome Summary: OT evaluation completed this date. Pt was pleasant and cooperative, but pt is in a lot of pain, and severe RLE spasms were observed. Pt states that these spasms have increased her falls at home, as well as R knee buckling. Sup to sit completed with min A, HOB elevated, and use of bed rails. Scooting up in bed completed with supervision, HOB elevated, and use of bed rails. Sit-stand, stand-sit, and toilet transfers completed with min A, use of RW, and vc's for proper hand placement and safety. functional mobility completed with min A, use of RW, and increased vc's for safe/proper gait as pt has stooped posture, and decreased step length. while sitting EOB pt donned/doffed socks with set-up and supervision. clothing management before and after toileting completed with min A, use of RW and grab bar for steadying assistance. crystal hygiene following toileting completed while sitting on toilet, and with supervision. fine motor deficit noted when completing 9 hole peg test (40 seconds with R dominant hand, 33 seconds with L non dominant hand). translation and coordination difficulties noted with the R hand. pt educated on exercises that she can complete while in the hospital and once returning home to work on this fine motor skills (translation of coins, picking up coins from table top surface). at time of evaluation deficits noted in the areas of ADL/IADL completion, functional mobility, safety, transfers, and balance. pt would benefit from further skilled OT services to address these deficits. OT recommends IRF prior to discharging home. she will  need 24/7 care at this time for safety until more stable.     Outcome Measures     Row Name 11/04/19 1600 11/04/19 1402          9 Hole Peg    9-Hole Peg Left  --  33 seconds   -ME     9-Hole Peg Right  --  40 seconds  pt is right handed   -ME        How much help from another person do you currently need...    Turning from your back to your side while in flat bed without using bedrails?  4  -GB  --     Moving from lying on back to sitting on the side of a flat bed without bedrails?  3  -GB  --     Moving to and from a bed to a chair (including a wheelchair)?  3  -GB  --     Standing up from a chair using your arms (e.g., wheelchair, bedside chair)?  3  -GB  --     Climbing 3-5 steps with a railing?  2  -GB  --     To walk in hospital room?  3  -GB  --     AM-PAC 6 Clicks Score (PT)  18  -GB  --        How much help from another is currently needed...    Putting on and taking off regular lower body clothing?  --  3  -ME     Bathing (including washing, rinsing, and drying)  --  2  -ME     Toileting (which includes using toilet bed pan or urinal)  --  3  -ME     Putting on and taking off regular upper body clothing  --  3  -ME     Taking care of personal grooming (such as brushing teeth)  --  3  -ME     Eating meals  --  4  -ME     AM-PAC 6 Clicks Score (OT)  --  18  -ME        Modified Barthel    Modified Barthel Comments  --  feeding 5, bathing 0, grooming 0, dressing 5, bowels 10, bladder 10, toilet use 5, transfers 5, mobility 10, stairs 5, total 45/100  -ME        Modified Nicolasa Scale    Modified Nicolasa Scale  --  4 - Moderately severe disability.  Unable to walk without assistance, and unable to attend to own bodily needs without assistance.  -ME        Functional Assessment    Outcome Measure Options  AM-PAC 6 Clicks Basic Mobility (PT)  -GB  AM-PAC 6 Clicks Daily Activity (OT);9 Hole Peg;Modified Barthel Index;Modified Cobb  -ME       User Key  (r) = Recorded By, (t) = Taken By, (c) = Cosigned By     Initials Name Provider Type    Kandy Tolentino, PT Physical Therapist    ME Che Oh, OT Occupational Therapist          Time Calculation:   Time Calculation- OT     Row Name 11/04/19 1542             Time Calculation- OT    OT Start Time  1402  -ME      OT Stop Time  1527  -ME      OT Time Calculation (min)  85 min  -ME      Total Timed Code Minutes- OT  25 minute(s)  -ME      OT Received On  11/04/19  -ME      OT Goal Re-Cert Due Date  11/17/19  -ME        User Key  (r) = Recorded By, (t) = Taken By, (c) = Cosigned By    Initials Name Provider Type    ME Che Oh, OT Occupational Therapist        Therapy Charges for Today     Code Description Service Date Service Provider Modifiers Qty    84683426035  OT EVAL MOD COMPLEXITY 4 11/4/2019 Che Oh OT GO 1    [id number]  OT THERAPEUTIC ACT EA 15 MIN 11/4/2019 Che Oh OT GO 2               Che Oh OT  11/4/2019

## 2019-11-04 NOTE — PROGRESS NOTES
Stroke Progress Note       Chief Complaint:  Spasms in legs    Subjective     Subjective:  Feeling better than yest, and back to her baseline. Does have spasms more on Rt leg compared to L, and trouble swallowing. Denies any new symptoms.     Review of Systems   Constitutional: Negative.    HENT: Negative.    Eyes: Negative.    Respiratory: Negative.    Cardiovascular: Negative.    Gastrointestinal: Negative.    Endocrine: Negative.    Genitourinary: Negative.    Musculoskeletal:        Denies any neck pain or back pain. Does have spasms in BLE, janet in thighs.    Hematological: Negative.    Psychiatric/Behavioral: Negative.         Objective      Temp:  [96.6 °F (35.9 °C)-98.7 °F (37.1 °C)] 96.6 °F (35.9 °C)  Heart Rate:  [61-79] 61  Resp:  [12-21] 18  BP: (139-175)/(63-74) 139/63    Neurological Exam  Mental Status  Awake, alert and oriented to person, place and time. Mild dysarthria present. Language is fluent with no aphasia. Attention and concentration are normal.    Cranial Nerves  CN II: Visual fields full to confrontation.  CN III, IV, VI: Extraocular movements intact bilaterally.  CN V: Facial sensation is normal.  CN VII: Full and symmetric facial movement.  CN VIII: Hearing is normal.  CN IX, X:  Right: Gag is intact.  Left: Gag is reduced.  CN XI: Shoulder shrug strength is normal.  CN XII: Tongue midline without atrophy or fasciculations.    Motor  Normal muscle bulk throughout. No fasciculations present. Normal muscle tone.  Mild decreased FFM on Rt side. RUE/LUE- 5/5  RLE- some drift, maybe 4/5, 2/2 spasms. LLE- okay.   .    Sensory  Light touch is normal in upper and lower extremities.     Reflexes  Not assessed.    Coordination  Right: Finger-to-nose normal.  Left: Finger-to-nose normal.    Gait  Not assessed.      Physical Exam   Constitutional: She appears well-developed and well-nourished.   Neck: Normal range of motion.   Cardiovascular: Normal rate.   Pulmonary/Chest: Effort normal.        Results Review:    I reviewed the patient's new clinical results.    Lab Results (last 24 hours)     Procedure Component Value Units Date/Time    Extra Tubes [[id number]] Collected:  11/04/19 0728    Specimen:  Blood, Venous Line Updated:  11/04/19 0902    Narrative:       The following orders were created for panel order Extra Tubes.  Procedure                               Abnormality         Status                     ---------                               -----------         ------                     Lavender Top[[id number]]                                     Final result                 Please view results for these tests on the individual orders.    Lavender Top [[id number]] Collected:  11/04/19 0728    Specimen:  Blood Updated:  11/04/19 0902     Extra Tube hold for add-on     Comment: Auto resulted       Lipid Panel [[id number]] Collected:  11/04/19 0728    Specimen:  Blood Updated:  11/04/19 0817     Total Cholesterol 112 mg/dL      Triglycerides 78 mg/dL      HDL Cholesterol 53 mg/dL      LDL Cholesterol  43 mg/dL      VLDL Cholesterol 15.6 mg/dL      LDL/HDL Ratio 0.82    Narrative:       Cholesterol Reference Ranges  (U.S. Department of Health and Human Services ATP III Classifications)    Desirable          <200 mg/dL  Borderline High    200-239 mg/dL  High Risk          >240 mg/dL      Triglyceride Reference Ranges  (U.S. Department of Health and Human Services ATP III Classifications)    Normal           <150 mg/dL  Borderline High  150-199 mg/dL  High             200-499 mg/dL  Very High        >500 mg/dL    HDL Reference Ranges  (U.S. Department of Health and Human Services ATP III Classifcations)    Low     <40 mg/dl (major risk factor for CHD)  High    >60 mg/dl ('negative' risk factor for CHD)        LDL Reference Ranges  (U.S. Department of Health and Human Services ATP III Classifcations)    Optimal          <100 mg/dL  Near Optimal     100-129 mg/dL  Borderline High  130-159 mg/dL  High              160-189 mg/dL  Very High        >189 mg/dL    Hemoglobin A1c [[id number]]  (Normal) Collected:  11/04/19 0558    Specimen:  Blood Updated:  11/04/19 0723     Hemoglobin A1C 5.30 %     Narrative:       Hemoglobin A1C Ranges:    Increased Risk for Diabetes  5.7% to 6.4%  Diabetes                     >= 6.5%  Diabetic Goal                < 7.0%    Extra Tubes [[id number]] Collected:  11/03/19 2107    Specimen:  Blood, Venous Line Updated:  11/04/19 0116    Narrative:       The following orders were created for panel order Extra Tubes.  Procedure                               Abnormality         Status                     ---------                               -----------         ------                     Alvarez Top[[id number]]                                         Final result                 Please view results for these tests on the individual orders.    Gray Top [[id number]] Collected:  11/03/19 2107    Specimen:  Blood Updated:  11/04/19 0116     Extra Tube Hold for add-ons.     Comment: Auto resulted.       [place] Draw [[id number]] Collected:  11/03/19 2107    Specimen:  Blood Updated:  11/03/19 2215    Narrative:       The following orders were created for panel order [place] Draw.  Procedure                               Abnormality         Status                     ---------                               -----------         ------                     Light Blue Top[[id number]]                                   Final result               Green Top (Gel)[[id number]]                                  Final result               Lavender Top[[id number]]                                     Final result               Gold Top - SST[[id number]]                                   Final result                 Please view results for these tests on the individual orders.    Light Blue Top [[id number]] Collected:  11/03/19 2107    Specimen:  Blood Updated:  11/03/19 2215     Extra Tube hold for add-on     Comment: Auto  resulted       Green Top (Gel) [[id number]] Collected:  11/03/19 2107    Specimen:  Blood Updated:  11/03/19 2215     Extra Tube Hold for add-ons.     Comment: Auto resulted.       Lavender Top [[id number]] Collected:  11/03/19 2107    Specimen:  Blood Updated:  11/03/19 2215     Extra Tube hold for add-on     Comment: Auto resulted       Gold Top - SST [[id number]] Collected:  11/03/19 2107    Specimen:  Blood Updated:  11/03/19 2215     Extra Tube Hold for add-ons.     Comment: Auto resulted.       Comprehensive Metabolic Panel [[id number]]  (Abnormal) Collected:  11/03/19 2107    Specimen:  Blood Updated:  11/03/19 2133     Glucose 122 mg/dL      BUN 19 mg/dL      Creatinine 0.94 mg/dL      Sodium 144 mmol/L      Potassium 4.3 mmol/L      Chloride 103 mmol/L      CO2 27.0 mmol/L      Calcium 9.4 mg/dL      Total Protein 7.2 g/dL      Albumin 4.20 g/dL      ALT (SGPT) 17 U/L      AST (SGOT) 29 U/L      Alkaline Phosphatase 121 U/L      Total Bilirubin 0.6 mg/dL      eGFR Non African Amer 59 mL/min/1.73      Globulin 3.0 gm/dL      A/G Ratio 1.4 g/dL      BUN/Creatinine Ratio 20.2     Anion Gap 14.0 mmol/L     Narrative:       GFR Normal >60  Chronic Kidney Disease <60  Kidney Failure <15    Troponin [[id number]]  (Normal) Collected:  11/03/19 2107    Specimen:  Blood Updated:  11/03/19 2133     Troponin T <0.010 ng/mL     Narrative:       Troponin T Reference Range:  <= 0.03 ng/mL-   Negative for AMI  >0.03 ng/mL-     Abnormal for myocardial necrosis.  Clinicians would have to utilize clinical acumen, EKG, Troponin and serial changes to determine if it is an Acute Myocardial Infarction or myocardial injury due to an underlying chronic condition.     Protime-INR [[id number]]  (Normal) Collected:  11/03/19 2107    Specimen:  Blood Updated:  11/03/19 2128     Protime 13.6 Seconds      INR 1.06    Narrative:       Therapeutic range for most indications is 2.0-3.0 INR,  or 2.5-3.5 for mechanical heart valves.    aPTT  [[id number]]  (Normal) Collected:  11/03/19 2107    Specimen:  Blood Updated:  11/03/19 2128     PTT 27.5 seconds     Narrative:       The recommended Heparin therapeutic range is 68-97 seconds.    CBC & Differential [[id number]] Collected:  11/03/19 2107    Specimen:  Blood Updated:  11/03/19 2111    Narrative:       The following orders were created for panel order CBC & Differential.  Procedure                               Abnormality         Status                     ---------                               -----------         ------                     CBC Auto Differential[[id number]]        Abnormal            Final result                 Please view results for these tests on the individual orders.    CBC Auto Differential [[id number]]  (Abnormal) Collected:  11/03/19 2107    Specimen:  Blood Updated:  11/03/19 2111     WBC 7.96 10*3/mm3      RBC 3.95 10*6/mm3      Hemoglobin 11.7 g/dL      Hematocrit 35.9 %      MCV 90.9 fL      MCH 29.6 pg      MCHC 32.6 g/dL      RDW 12.9 %      RDW-SD 42.5 fl      MPV 9.9 fL      Platelets 231 10*3/mm3      Neutrophil % 78.6 %      Lymphocyte % 11.7 %      Monocyte % 8.0 %      Eosinophil % 1.0 %      Basophil % 0.3 %      Immature Grans % 0.4 %      Neutrophils, Absolute 6.26 10*3/mm3      Lymphocytes, Absolute 0.93 10*3/mm3      Monocytes, Absolute 0.64 10*3/mm3      Eosinophils, Absolute 0.08 10*3/mm3      Basophils, Absolute 0.02 10*3/mm3      Immature Grans, Absolute 0.03 10*3/mm3      nRBC 0.0 /100 WBC     POC Glucose Once [[id number]]  (Normal) Collected:  11/03/19 2036    Specimen:  Blood Updated:  11/03/19 2048     Glucose 114 mg/dL      Comment: Result Not ConfirmedOperator: [id number] LEOBARDO ALONAMeter ID: [id number]           Xr Femur 2 View Bilateral    Result Date: 11/3/2019  No acute abnormality in either femur. Electronically signed by:  Hesham Hernandez  11/3/2019 9:43 PM [facility] Workstation: [id number]    Xr Chest 1 View    Result Date: 11/3/2019  No acute  disease. Electronically signed by:  Hesham David  11/3/2019 9:08 PM [facility] Workstation: [id number]    Xr Pelvis 1 Or 2 View    Result Date: 11/3/2019  No acute abnormality. Electronically signed by:  Hesham David  11/3/2019 9:42 PM [facility] Workstation: [id number]    Ct Head Without Contrast Stroke Protocol    Result Date: 11/3/2019  Small age-indeterminate left periventricular lacunar infarct. If clinically indicated MRI could better evaluate. Electronically signed by:  Hesham Hernandez  11/3/2019 9:07 PM CST Workstation: [id number]           Assessment/Plan     Assessment/Plan:  74 yo RHWF with h/o HTN, HLD, chronic back pain s/p back surgery in 2018, when post-op she had small Rt pontine stroke, with complete recovery- who was recently admitted to [facility] with R>L body spasms, worse in legs, when she was found to have a small L pontine stroke- was discharged from [facility], and yest had worsening of her spasms, her legs gave away, and had colorful spots in front of her vision with clsoed eyes. She was eval by Stroke team in ER, and given she still had some dysphagia, and worsening symptoms- to make sure no new events or worsening of recent stroke.       1. L pontine stroke- Recent. Pt with some worsening of her symptoms, for which she has been admitted. Will get MRI brain stroke protocol today. Cont ASA, plavix and statins. Pt had CTA head/neck done at [facility] recently, which had shown extensive atherosclerotic disease in anterior and posterior circulation. No images available, but reprts noted.   2. BLE weakness- with spasms- Unusual symptoms for stroke. Pt has had back surgery done, and she had MRI C-spine at [facility] which shows significant degenerative changes. Will recommend her to see her surgeon as outpt within few weeks. Concern is that her symptoms could be 2/2 degenerative spine issues rather than stroke.   3. HTN- Normal BP goals. Okay to start BP medications.   4. HLD- Cont statins.   5. Activity-  Increase activity with PT/OT. May need acute rehab, f/u on recommendations  6. Diet- Speech therapy eval, and pt will need MBS today. F/u recommendations.     Case was d/w pt, nursing, and Dr Barrett. All questions answered.           Anirudh Marquez MD  11/04/19  9:17 AM    This was an audio and video enabled telemedicine encounter.

## 2019-11-04 NOTE — PLAN OF CARE
Problem: Patient Care Overview  Goal: Plan of Care Review  Outcome: Ongoing (interventions implemented as appropriate)   11/04/19 8123   Coping/Psychosocial   Plan of Care Reviewed With patient   OTHER   Outcome Summary Cognitive-linguistic and dysphagia evaluations completed this date. SLP administered the MoCA to pt who scored a 30/30 placing her WNL. Pt does present w/slurred speech, but intelligible to which she states she is approximately 20% away from baseline. Pt had FEES completed at [facility] that reported silent aspiration on thin and NTL. Recommendations were Dysphagia diet level 2 w/thin liquids in 1/2 tsp bolus size and w/chin tuck. Pt demonstrated inconsistent cough w/thin liquids via spoon both w/a full 5 ml and 1/2 tsp. SLP recommended MBS and spoke w/MD concerning wishes to have MBS completed. MD in agreement. MBS was completed at 1300 this date w/recommendations of [facility] soft solids/thin liquids via spoon w/chin tuck. Pt did demonstrate penetration w/thin liquids and more significant penetration w/NTL. No aspiration was noted during swallow study. SLP discussed results and recommendations w/pt who was in agreement.

## 2019-11-04 NOTE — PLAN OF CARE
Problem: Patient Care Overview  Goal: Plan of Care Review  Outcome: Ongoing (interventions implemented as appropriate)   11/04/19 8202   Coping/Psychosocial   Plan of Care Reviewed With patient   OTHER   Outcome Summary OT evaluation completed this date. Pt was pleasant and cooperative, but pt is in a lot of pain, and severe RLE spasms were observed. Pt states that these spasms have increased her falls at home, as well as R knee buckling. Sup to sit completed with min A, HOB elevated, and use of bed rails. Scooting up in bed completed with supervision, HOB elevated, and use of bed rails. Sit-stand, stand-sit, and toilet transfers completed with min A, use of RW, and vc's for proper hand placement and safety. functional mobility completed with min A, use of RW, and increased vc's for safe/proper gait as pt has stooped posture, and decreased step length. while sitting EOB pt donned/doffed socks with set-up and supervision. clothing management before and after toileting completed with min A, use of RW and grab bar for steadying assistance. crystal hygiene following toileting completed while sitting on toilet, and with supervision. fine motor deficit noted when completing 9 hole peg test (40 seconds with R dominant hand, 33 seconds with L non dominant hand). translation and coordination difficulties noted with the R hand. pt educated on exercises that she can complete while in the hospital and once returning home to work on this fine motor skills (translation of coins, picking up coins from table top surface). at time of evaluation deficits noted in the areas of ADL/IADL completion, functional mobility, safety, transfers, and balance. pt would benefit from further skilled OT services to address these deficits. OT recommends IRF prior to discharging home. she will need 24/7 care at this time for safety until more stable.

## 2019-11-04 NOTE — NURSING NOTE
Code status discussed with POA and patient.  Both agreed upon DNR status.  MD notified and order changed from FULL CODE to DNR.

## 2019-11-04 NOTE — NURSING NOTE
"Brought water to patient to do swallow eval.  Pt states she cannot drink liquids, she will choke.  She states that she hasn't been able to drink liquids since her previous stroke last week.  Pt states she was advised not to drink liquids, to have popsicles, Jello, ect in its place.  She said she was advised to have pills in applesauce.  Informed patient that she would need to stay NPO until speech saw her if she was unable to drink thin liquids.  Pt states that she can take her Neurontin with applesauce.  I educated patient on the possiblity of aspiration.  Pt states understanding but insists that she wants her medication.  I gave patient pill in applesauce.  Pt coughed immediately afterwards.  Pt states \"I always do that.  It's fine.\"  Pt sit upright afterwards and monitored for s/s of aspiration.  Did not give other PO meds because of this.  Speech eval ordered for patient in AM.    "

## 2019-11-04 NOTE — ED NOTES
Dr. Venegas at bedside to speak with [facility] physician.      Jeferson Cintron RN  11/03/19 8178

## 2019-11-04 NOTE — PROGRESS NOTES
Discharge Planning Assessment  [facility]     Patient Name: Rama Goel  MRN: [id number]  Today's Date: 11/4/2019    Admit Date: 11/3/2019    Discharge Needs Assessment     Row Name 11/04/19 1125       Living Environment    Lives With  friend(s)    Current Living Arrangements  home/apartment/condo    Primary Care Provided by  self    Provides Primary Care For  no one    Family Caregiver if Needed  friend(s) Pt's friend Ruba has been staying with her since her d/c from [place].     Quality of Family Relationships  helpful;supportive    Able to Return to Prior Arrangements  -- May benefit from ARU.    Living Arrangement Comments  Pt resides at home with her friend Rbua. She has been staying with her since her d/c from [place].        Transition Planning    Patient/Family Anticipates Transition to  inpatient rehabilitation facility;home    Patient/Family Anticipated Services at Transition  rehabilitation services    Transportation Anticipated  family or friend will provide       Discharge Needs Assessment    Concerns to be Addressed  adjustment to diagnosis/illness    Equipment Currently Used at Home  cane, straight;walker, rolling    Equipment Needed After Discharge  -- Awaiting PT/OT recomendations.     Discharge Facility/Level of Care Needs  acute rehab    Current Discharge Risk  chronically ill    Discharge Coordination/Progress  Pt refuses home health follow up.         Discharge Plan     Row Name 11/04/19 1130       Plan    Plan Comments  LSW assessment complete. Pt typically resides at home alone. She reports her friend Ruba has been staying with her since her d/c from [place]. Pt reports being independent prior to hospitalization. She informed LSW that Ruba assist with housekeeping, shopping and caring for her dogs. Pt reports falling at home several times. She does use cane and rolling walker to assist with mobility. Pt's goal is to return home at d/c. She refuses home health. She informed  LSW that she would be agreeable to ARU if Ruba could stay at her home and care for the dogs. LSW awaiting recomendations from MD and therapy. LSW/case mgt will follow up as consulted and complete arrangements as ordered. Huan Mandel IONAW    Row Name 11/04/19 1048       Plan    Plan Comments  continued stay review- recent stroke, MRI pending. d/c from [place] on 10/29/19, failed swallow evaluation, plan modified barium swallow, PT/OT consults pending...Trixie Wang RN    Row Name 11/04/19 1038       Plan    Plan Comments  LSW attempted to assess pt. She was gone to MRI. LSW/case mgt will follow up later today. Huan Mandel LSW        Destination      No service coordination in this encounter.      Durable Medical Equipment      No service coordination in this encounter.      Dialysis/Infusion      No service coordination in this encounter.      Home Medical Care      No service coordination in this encounter.      Therapy      No service coordination in this encounter.      Community Resources      No service coordination in this encounter.        Expected Discharge Date and Time     Expected Discharge Date Expected Discharge Time    Nov 5, 2019         Demographic Summary     Row Name 11/04/19 1120       General Information    Admission Type  observation    Referral Source  physician    Reason for Consult  discharge planning    Preferred Language  English     Used During This Interaction  no       Contact Information    Contact Information Obtained for          Functional Status     Row Name 11/04/19 1121       Functional Status    Usual Activity Tolerance  fair    Current Activity Tolerance  fair    Functional Status Comments  Pt uses rolling walker and cane to assist with mobility. Pt reports falling several times at home.       Functional Status, IADL    Medications  independent Pt uses [facility].     Meal Preparation  independent    Housekeeping  assistive person    Laundry  independent     Shopping  assistive person    IADL Comments  Pt's friend Ruba helps with housekeeping and shopping.        Mental Status Summary    Recent Changes in Mental Status/Cognitive Functioning  no changes        Psychosocial    No documentation.       Abuse/Neglect    No documentation.       Legal    No documentation.       Substance Abuse    No documentation.       Patient Forms    No documentation.           VICKEY Lee

## 2019-11-04 NOTE — THERAPY EVALUATION
SDU - Speech Language Pathology Initial Evaluation  [facility]     Patient Name: Rama Goel  : 1945  MRN: [id number]  Today's Date: 2019  Onset of Illness/Injury or Date of Surgery: 19     Referring Physician: Yolanda METCALF      Admit Date: 11/3/2019     Cognitive-linguistic and dysphagia evaluations completed this date. SLP administered the MoCA to pt who scored a 30/30 placing her WNL. Pt does present w/slurred speech, but intelligible to which she states she is approximately 20% away from baseline. Pt had FEES completed at [place] that reported silent aspiration on thin and NTL. Recommendations were Dysphagia diet level 2 w/thin liquids in 1/2 tsp bolus size and w/chin tuck. Pt demonstrated inconsistent cough w/thin liquids via spoon both w/a full 5 ml and 1/2 tsp. SLP recommended MBS and spoke w/MD concerning wishes to have MBS completed. MD in agreement. MBS was completed at 1300 this date w/recommendations of [facility] soft solids/thin liquids via spoon w/chin tuck. Pt did demonstrate penetration w/thin liquids and more significant penetration w/NTL. No aspiration was noted during swallow study. SLP discussed results and recommendations w/pt who was in agreement.    Goals:  1.  Pt to tolerate least restrictive diet with no s/s of aspiration for adequate nutirtion and hydration:    2.  Pt will implement compensatory strategies of small bites, chin tuck posture, alternate food/liquid intake, postural techniques during PO trials w/liquids by teaspoon only:    3.  Pt will complete over-articulation at phrase level and connected speech w/90% intelligibility:       Visit Dx:    ICD-10-CM ICD-9-CM   1. Altered mental status, unspecified altered mental status type R41.82 780.97   2. Oropharyngeal dysphagia R13.12 787.22   3. Impaired mobility and ADLs Z74.09 799.89   4. Impaired functional mobility, balance, gait, and endurance Z74.09 V49.89     Patient Active Problem List   Diagnosis   • Altered  "mental status     Past Medical History:   Diagnosis Date   • Arrhythmia     intermittent   • CVA (cerebral vascular accident) (CMS/HCC) 2018    also had one last week 10/2019 in [facility]   • Hypertension    • Lupus (CMS/HCC)     drug induced   • Stroke (CMS/HCC)      Past Surgical History:   Procedure Laterality Date   • ANKLE SURGERY Right     \"bone replacement with coral\"   • APPENDECTOMY     •  SECTION     • CHOLECYSTECTOMY     • PARATHYROIDECTOMY     • ROTATOR CUFF REPAIR Left    • SPINAL FUSION  2018    T11-S1   • TYMPANOPLASTY          SLP EVALUATION (last 72 hours)      SLP SLC Evaluation     Row Name 19 0738                   Communication Assessment/Intervention    Total Evaluation Minutes, SLP  70  -CK           General Information    Patient Level of Education  Bachelor's degree  -CK        Prior Level of Function-Communication  WFL  -CK        Plans/Goals Discussed with  patient;agreed upon  -CK        Barriers to Rehab  previous functional deficit  -CK        Patient's Goals for Discharge  return to home  -CK           Pain Scale: Numbers Pre/Post-Treatment    Pain Scale: Numbers, Post-Treatment  3/10  -CK           Comprehension Assessment/Intervention    Comprehension Assessment/Intervention  Auditory Comprehension  -CK           Auditory Comprehension Assessment/Intervention    Auditory Comprehension (Communication)  WFL  -CK        Able to Identify Objects/Pictures (Communication)  WFL  -CK        Answers Questions (Communication)  WFL  -CK        Able to Follow Commands (Communication)  WFL  -CK        Narrative Discourse  WFL  -CK           Expression Assessment/Intervention    Expression Assessment/Intervention  verbal expression  -CK           Verbal Expression Assessment/Intervention    Verbal Expression  WFL  -CK        Automatic Speech (Communication)  WFL  -CK        Repetition  WFL  -CK        Phrase Completion  WFL  -CK        Responsive Naming  WFL  -CK        " Confrontational Naming  WFL  -CK        Spontaneous/Functional Words  WFL  -CK        Sentence Formulation  WFL  -CK        Conversational Discourse/Fluency  WFL  -CK           Motor Speech Assessment/Intervention    Motor Speech Function  mild impairment  -CK        Characteristics Consistent with Dysarthria  slurred speech;decreased articulation  -CK        Initiation of Phonation (Communication)  WFL  -CK        Automatic Speech (Communication)  WFL  -CK        Verbal Repetition (Communication)  WFL  -CK        Phase Completion  WFL  -CK        Conversational Speech (Communication)  mild impairment  -CK           Cognitive Assessment Intervention- SLP    Cognitive Function (Cognition)  WFL  -CK        Orientation Status (Cognition)  WFL  -CK        Memory (Cognitive)  WFL  -CK        Attention (Cognitive)  WFL  -CK        Thought Organization (Cognitive)  WFL  -CK        Reasoning (Cognitive)  WFL  -CK        Problem Solving (Cognitive)  WFL  -CK        Functional Math (Cognitive)  WFL  -CK        Executive Function (Cognition)  WFL  -CK        Pragmatics (Communication)  WFL  -CK        Right Hemisphere Function  WFL  -CK           Standardized Tests    Cognitive/Memory Tests  MOCA: [place] Cognitive Assessment  -CK           MOCA: [employer] [place] Cognitive Assessment    MOCA Total Score  30  -CK        MOCA Total Score Indicative Of:  Normal Cognitive Function  -CK           SLP Clinical Impressions    SLP Diagnosis  Mild dysarthria  -CK        Rehab Potential/Prognosis  good  -CK        SLC Criteria for Skilled Therapy Interventions Met  yes  -CK        Functional Impact  functional impact in social situations  -CK           Recommendations    Therapy Frequency (SLP SLC)  5 days per week  -CK        Predicted Duration Therapy Intervention (Days)  until discharge  -CK        Anticipated Dischage Disposition  unknown  -CK           Communication Treatment Objective and Progress Goals (SLP)    Motor  Speech/Dysarthria Treatment Objectives  Motor Speech/Dysarthria Treatment Objectives (Group)  -CK           Motor Speech/Dysarthria Treatment Objectives    Articulation Selection  articulation, SLP goal 1  -CK           Articulation Goal 1 (SLP)    Improve Articulation Goal 1 (SLP)  by over-articulating at phrase level;by over-articulating in connected speech;90%  -CK        Time Frame (Articulation Goal 1, SLP)  by discharge  -CK        Barriers (Articulation Goal 1, SLP)  right sided weakness  -CK        Progress (Articulation Goal 1, SLP)  other (comment) new goal  -CK        Progress/Outcomes (Articulation Goal 1, SLP)  other (see comments) new goal  -CK          User Key  (r) = Recorded By, (t) = Taken By, (c) = Cosigned By    Initials Name Effective Dates    Romina Griffin, MS CCC-SLP 04/03/18 -              EDUCATION  The patient has been educated in the following areas:     Communication Impairment.    SLP Recommendation and Plan  SLP Diagnosis: Mild dysarthria     Swallow Criteria for Skilled Therapeutic Interventions Met: demonstrates skilled criteria  SLC Criteria for Skilled Therapy Interventions Met: yes  Anticipated Dischage Disposition: unknown  Therapy Frequency (Swallow): 5 days per week  Predicted Duration Therapy Intervention (Days): until discharge    Plan of Care Reviewed With: patient  Outcome Summary: Cognitive-linguistic and dysphagia evaluations completed this date.  SLP administered the MoCA to pt who scored a 30/30 placing her WNL.  Pt does present w/slurred speech, but intelligible to which she states she is approximately 20% away from baseline.  Pt had FEES completed at [place] that reported silent aspiration on thin and NTL.  Recommendations were Dysphagia diet level 2 w/thin liquids in 1/2 tsp bolus size and w/chin tuck.   Pt demonstrated inconsistent cough w/thin liquids via spoon both w/a full 5 ml and 1/2 tsp.  SLP recommended MBS and spoke w/MD concerning wishes to have  MBS completed.  MD in agreement.  MBS was completed at 1300 this date w/recommendations of [facility] soft solids/thin liquids via spoon w/chin tuck.  Pt did demonstrate penetration w/thin liquids and more significant penetration w/NTL.  No aspiration was noted during swallow study.  SLP discussed results and recommendations w/pt who was in agreement.      SLP GOALS     Row Name 11/04/19 1315 11/04/19 0795          Oral Nutrition/Hydration Goal 1 (SLP)    Oral Nutrition/Hydration Goal 1, SLP  Pt to tolerate least restrictive diet with no s/s of aspiration for adequate nutirtion and hydration.   -EK  --     Time Frame (Oral Nutrition/Hydration Goal 1, SLP)  by discharge  -EK  --     Progress/Outcomes (Oral Nutrition/Hydration Goal 1, SLP)  other (see comments) new goal  -EK  --        Swallow Compensatory Strategies Goal 1 (SLP)    Activity (Swallow Compensatory Strategies/Techniques Goal 1, SLP)  compensatory strategies;small bites;chin tuck posture;alternate food/liquid intake;postural techniques;during p.o. trials;liquids by teaspoon only  -EK  --     [place]/Accuracy (Swallow Compensatory Strategies/Techniques Goal 1, SLP)  independently (over 90% accuracy)  -EK  --     Progress/Outcomes (Swallow Compensatory Strategies/Techniques Goal 1, SLP)  other (see comments) new goal  -EK  --        Articulation Goal 1 (SLP)    Improve Articulation Goal 1 (SLP)  --  by over-articulating at phrase level;by over-articulating in connected speech;90%  -CK     Time Frame (Articulation Goal 1, SLP)  --  by discharge  -CK     Barriers (Articulation Goal 1, SLP)  --  right sided weakness  -CK     Progress (Articulation Goal 1, SLP)  --  other (comment) new goal  -CK     Progress/Outcomes (Articulation Goal 1, SLP)  --  other (see comments) new goal  -CK       User Key  (r) = Recorded By, (t) = Taken By, (c) = Cosigned By    Initials Name Provider Type    Megan Wells CCC-SLP Speech and Language Pathologist    CK  Romina Garcia, MS CCC-SLP Speech and Language Pathologist                  Time Calculation:     Time Calculation- SLP     Row Name 19 1558 19 1454          Time Calculation- SLP    SLP Start Time  0738  -CK  1315  -EK     SLP Stop Time  0903  -CK  1340  -EK     SLP Time Calculation (min)  85 min  -CK  25 min  -EK     Total Timed Code Minutes- SLP  70 minute(s)  -CK  --     SLP Received On  19  -CK  19  -EK     SLP Goal Re-Cert Due Date  19  -CK  --       User Key  (r) = Recorded By, (t) = Taken By, (c) = Cosigned By    Initials Name Provider Type    Megan Wells, CCC-SLP Speech and Language Pathologist    Romina Griffin, MS CCC-SLP Speech and Language Pathologist          Therapy Charges for Today     Code Description Service Date Service Provider Modifiers Qty    27439589066 HC ST EVAL SPEECH AND PROD W LANG  2 2019 Romina Garcia MS CCC-SLP GN 1    [id number] HC ST EVAL ORAL PHARYNG SWALLOW 2 2019 Romina Garcia MS CCC-SLP GN 1                     Romina Garcia MS CCC-SLP  2019 and SDU - Speech Language Pathology   Swallow Initial Evaluation [facility]     Patient Name: Rama Goel  : 1945  MRN: [id number]  Today's Date: 2019  Onset of Illness/Injury or Date of Surgery: 19     Referring Physician: Yolanda METCALF      Admit Date: 11/3/2019    Visit Dx:     ICD-10-CM ICD-9-CM   1. Altered mental status, unspecified altered mental status type R41.82 780.97   2. Oropharyngeal dysphagia R13.12 787.22   3. Impaired mobility and ADLs Z74.09 799.89   4. Impaired functional mobility, balance, gait, and endurance Z74.09 V49.89     Patient Active Problem List   Diagnosis   • Altered mental status     Past Medical History:   Diagnosis Date   • Arrhythmia     intermittent   • CVA (cerebral vascular accident) (CMS/HCC) 2018    also had one last week 10/2019 in [facility]   • Hypertension    • Lupus (CMS/HCC)      "drug induced   • Stroke (CMS/HCC)      Past Surgical History:   Procedure Laterality Date   • ANKLE SURGERY Right     \"bone replacement with coral\"   • APPENDECTOMY     •  SECTION     • CHOLECYSTECTOMY     • PARATHYROIDECTOMY     • ROTATOR CUFF REPAIR Left    • SPINAL FUSION  2018    T11-S1   • TYMPANOPLASTY          SWALLOW EVALUATION (last 72 hours)      SLP Adult Swallow Evaluation     Row Name 19 1315 19 0738                Rehab Evaluation    Document Type  evaluation  -EK  evaluation  -CK       Subjective Information  no complaints  -EK  complains of;pain  -CK       Patient Observations  alert;agree to therapy;cooperative  -EK  alert;cooperative;agree to therapy  -CK       Patient/Family Observations  MBS completed in radiology.   -EK  Pt alert; supine in bed.  No family present  -CK       Patient Effort  good  -EK  good  -CK          General Information    Patient Profile Reviewed  --  yes  -CK       Pertinent History Of Current Problem  --  Pt w/recent hx of pontine cva and admitted to [facility] w/dysarthria and dysphagia.  Pt d/c'd w/no f/u ST services according to pt.  SLP at [facility]thin liquids 1/2 tsp w/chin tuck  -CK       Current Method of Nutrition  --  NPO  -CK       Precautions/Limitations, Vision  --  WFL;for purposes of eval  -CK       Precautions/Limitations, Hearing  --  WFL;for purposes of eval  -CK       Prior Level of Function-Communication  --  motor speech impairment  -CK       Prior Level of Function-Swallowing  --  mechanical soft textures;thin liquids  -CK          Pain Assessment    Additional Documentation  --  Pain Scale: Numbers Pre/Post-Treatment (Group)  -CK          Pain Scale: Numbers Pre/Post-Treatment    Pain Scale: Numbers, Pretreatment  --  3/10  -CK       Pain Location - Side  Right  -EK  --       Pain Location  --  other (see comments) coccyx  -CK          Oral Motor and Function    Dentition Assessment  --  " natural, present and adequate  -CK       Secretion Management  --  WNL/WFL  -CK       Mucosal Quality  --  moist, healthy  -CK       Gag Response  --  absent or diminished diminished on left side  -CK       Volitional Swallow  --  delayed  -CK       Volitional Cough  --  weak  -CK          General Eating/Swallowing Observations    Respiratory Support Currently in Use  --  nasal cannula  -CK       Eating/Swallowing Skills  --  self-fed  -CK       Positioning During Eating  --  upright in bed  -CK       Utensils Used  --  spoon  -CK       Consistencies Trialed  --  regular textures;pureed;thin liquids  -CK          Clinical Swallow Eval    Oral Prep Phase  --  impaired  -CK       Oral Transit  --  impaired  -CK       Oral Residue  --  WFL  -CK       Pharyngeal Phase  --  suspected pharyngeal impairment  -CK       Esophageal Phase  --  unremarkable  -CK          Oral Prep Concerns    Oral Prep Concerns  --  prolonged mastication;increased prep time  -CK       Prolonged Mastication  --  regular consistencies  -CK       Increased Prep Time  --  regular consistencies  -CK          Oral Transit Concerns    Oral Transit Concerns  --  delayed initiation of bolus transit  -CK       Delayed Intiation of Bolus Transit  --  regular consistencies  -CK          Pharyngeal Phase Concerns    Pharyngeal Phase Concerns  --  cough  -CK       Cough  --  thin  -CK          MBS/VFSS    Utensils Used  spoon;cup  -EK  --       Consistencies Trialed  thin liquids;pureed;soft textures;regular textures  -EK  --          MBS/VFSS Interpretation    Oral Prep Phase  WFL  -EK  --       Oral Transit Phase  WFL  -EK  --       Oral Residue  WFL  -EK  --          Initiation of Pharyngeal Swallow    Initiation of Pharyngeal Swallow  bolus in pyriform sinuses  -EK  --       Pharyngeal Phase  impaired pharyngeal phase of swallowing  -EK  --       Penetration During the Swallow  thin liquids  -EK  --       Aspiration During the Swallow  nectar-thick  liquids;thin liquids  -EK  --       Response to Penetration  shallow;deep  -EK  --       Response to Aspiration  other (see comments) silent penetration  -EK  --       Attempted Compensatory Maneuvers  chin tuck  -EK  --       Response to Attempted Compensatory Maneuvers  other (see comments) reduced penetration  -EK  --       Pharyngeal Phase, Comment  All trials of thin and NTL resulted to premature spillage, delayed swallow reflex to the level of the pyriform sinus. Pt with deep laryngeal penetration on the thin by spoon; deep penetration on the NTL by cup. Pt displayed decrease bolus control. SLP recommends mech soft and thin liquids by spoon with chin tuck. SLP educated on less than a full tsp and chin tuck. SLP will also address and educate pt on oral prep set and ensure safety of chin tuck.   -EK  --          SLP Communication to Radiology    Severity Level of Dysphagia  mild dysphagia  -EK  --       Consistencies Aspirated/Penetrated  penetrated;thin liquids;nectar-thick liquids  -EK  --       Summary Statement  Pt with silent laryngeal penetration, the depth the penetration is contigent on the bolus size. Least restrictive diet recommendation is mech soft and thin liquids by spoon with chin tuck.   -EK  --          Clinical Impression    SLP Swallowing Diagnosis  mild;pharyngeal dysfunction  -EK  mild;suspected pharyngeal dysfunction;oral dysfunction  -CK       Functional Impact  risk of aspiration/pneumonia  -EK  risk of aspiration/pneumonia  -CK       Rehab Potential/Prognosis, Swallowing  good, to achieve stated therapy goals  -EK  good, to achieve stated therapy goals  -CK       Swallow Criteria for Skilled Therapeutic Interventions Met  --  demonstrates skilled criteria  -CK          Recommendations    Therapy Frequency (Swallow)  5 days per week  -EK  5 days per week  -CK       Predicted Duration Therapy Intervention (Days)  until discharge  -EK  --       SLP Diet Recommendation  soft  textures;ground;thin liquids  -EK  soft textures;ground;thin liquids  -CK       Recommended Diagnostics  --  VFSS (MBS)  -CK       Recommended Precautions and Strategies  upright posture during/after eating;small bites of food and sips of liquid;alternate between small bites of food and sips of liquid  -EK  upright posture during/after eating;small bites of food and sips of liquid;no straw;liquid via spoon only;chin Tuck  -CK       SLP Rec. for Method of Medication Administration  meds whole;with pudding or applesauce  -EK  --       Monitor for Signs of Aspiration  yes  -EK  yes  -CK          Swallow Goals (SLP)    Oral Nutrition/Hydration Goal Selection (SLP)  oral nutrition/hydration, SLP goal 1  -EK  --       Swallow Compensatory Strategies Goal Selection (SLP)  swallow compensatory strategies, SLP goal 1  -EK  --          Oral Nutrition/Hydration Goal 1 (SLP)    Oral Nutrition/Hydration Goal 1, SLP  Pt to tolerate least restrictive diet with no s/s of aspiration for adequate nutirtion and hydration.   -EK  --       Time Frame (Oral Nutrition/Hydration Goal 1, SLP)  by discharge  -EK  --       Progress/Outcomes (Oral Nutrition/Hydration Goal 1, SLP)  other (see comments) new goal  -EK  --          Swallow Compensatory Strategies Goal 1 (SLP)    Activity (Swallow Compensatory Strategies/Techniques Goal 1, SLP)  compensatory strategies;small bites;chin tuck posture;alternate food/liquid intake;postural techniques;during p.o. trials;liquids by teaspoon only  -EK  --       [place]/Accuracy (Swallow Compensatory Strategies/Techniques Goal 1, SLP)  independently (over 90% accuracy)  -EK  --       Progress/Outcomes (Swallow Compensatory Strategies/Techniques Goal 1, SLP)  other (see comments) new goal  -EK  --         User Key  (r) = Recorded By, (t) = Taken By, (c) = Cosigned By    Initials Name Effective Dates    EK Megan Garcia, CCC-SLP 07/24/19 -     Romina Griffin MS CCC-SLP 04/03/18 -            EDUCATION  The patient has been educated in the following areas:   Dysphagia (Swallowing Impairment) Modified Diet Instruction.    SLP Recommendation and Plan  SLP Swallowing Diagnosis: mild, suspected pharyngeal dysfunction, oral dysfunction  SLP Diet Recommendation: soft textures, ground, thin liquids  Recommended Precautions and Strategies: upright posture during/after eating, small bites of food and sips of liquid, no straw, liquid via spoon only, chin Tuck        Monitor for Signs of Aspiration: yes  Recommended Diagnostics: VFSS (MBS)  Swallow Criteria for Skilled Therapeutic Interventions Met: demonstrates skilled criteria  Anticipated Dischage Disposition: unknown  Rehab Potential/Prognosis, Swallowing: good, to achieve stated therapy goals  Therapy Frequency (Swallow): 5 days per week  Predicted Duration Therapy Intervention (Days): until discharge       Plan of Care Reviewed With: patient  Outcome Summary: Cognitive-linguistic and dysphagia evaluations completed this date.  SLP administered the MoCA to pt who scored a 30/30 placing her WNL.  Pt does present w/slurred speech, but intelligible to which she states she is approximately 20% away from baseline.  Pt had FEES completed at [place] that reported silent aspiration on thin and NTL.  Recommendations were Dysphagia diet level 2 w/thin liquids in 1/2 tsp bolus size and w/chin tuck.   Pt demonstrated inconsistent cough w/thin liquids via spoon both w/a full 5 ml and 1/2 tsp.  SLP recommended MBS and spoke w/MD concerning wishes to have MBS completed.  MD in agreement.  MBS was completed at 1300 this date w/recommendations of [facility] soft solids/thin liquids via spoon w/chin tuck.  Pt did demonstrate penetration w/thin liquids and more significant penetration w/NTL.  No aspiration was noted during swallow study.  SLP discussed results and recommendations w/pt who was in agreement.    SLP GOALS     Row Name 11/04/19 6451 11/04/19 8811          Oral  Nutrition/Hydration Goal 1 (SLP)    Oral Nutrition/Hydration Goal 1, SLP  Pt to tolerate least restrictive diet with no s/s of aspiration for adequate nutirtion and hydration.   -EK  --     Time Frame (Oral Nutrition/Hydration Goal 1, SLP)  by discharge  -EK  --     Progress/Outcomes (Oral Nutrition/Hydration Goal 1, SLP)  other (see comments) new goal  -EK  --        Swallow Compensatory Strategies Goal 1 (SLP)    Activity (Swallow Compensatory Strategies/Techniques Goal 1, SLP)  compensatory strategies;small bites;chin tuck posture;alternate food/liquid intake;postural techniques;during p.o. trials;liquids by teaspoon only  -EK  --     [place]/Accuracy (Swallow Compensatory Strategies/Techniques Goal 1, SLP)  independently (over 90% accuracy)  -EK  --     Progress/Outcomes (Swallow Compensatory Strategies/Techniques Goal 1, SLP)  other (see comments) new goal  -EK  --        Articulation Goal 1 (SLP)    Improve Articulation Goal 1 (SLP)  --  by over-articulating at phrase level;by over-articulating in connected speech;90%  -CK     Time Frame (Articulation Goal 1, SLP)  --  by discharge  -CK     Barriers (Articulation Goal 1, SLP)  --  right sided weakness  -CK     Progress (Articulation Goal 1, SLP)  --  other (comment) new goal  -CK     Progress/Outcomes (Articulation Goal 1, SLP)  --  other (see comments) new goal  -CK       User Key  (r) = Recorded By, (t) = Taken By, (c) = Cosigned By    Initials Name Provider Type    Megan Wells, CCC-SLP Speech and Language Pathologist    Romina Griffin MS CCC-SLP Speech and Language Pathologist             Time Calculation:   Time Calculation- SLP     Row Name 11/04/19 1558 11/04/19 1454          Time Calculation- SLP    SLP Start Time  0738  -CK  1315  -EK     SLP Stop Time  0903  -CK  1340  -EK     SLP Time Calculation (min)  85 min  -CK  25 min  -EK     Total Timed Code Minutes- SLP  70 minute(s)  -CK  --     SLP Received On  11/04/19  -CK   11/04/19  -FUENTES     SLP Goal Re-Cert Due Date  11/18/19  -EUGENIE  --       User Key  (r) = Recorded By, (t) = Taken By, (c) = Cosigned By    Initials Name Provider Type    Megan Wells, CCC-SLP Speech and Language Pathologist    Romina Griffin, MS CCC-SLP Speech and Language Pathologist          Therapy Charges for Today     Code Description Service Date Service Provider Modifiers Qty    13155034085 HC ST EVAL SPEECH AND PROD W LANG  2 11/4/2019 Romina Garcia, MS CCC-SLP GN 1    [id number] HC ST EVAL ORAL PHARYNG SWALLOW 2 11/4/2019 Romina Garcia, MS CCC-SLP GN 1               Romina Garcia MS CCC-SLP  11/4/2019

## 2019-11-04 NOTE — PROGRESS NOTES
[facility] Medicine Services  INPATIENT PROGRESS NOTE    Length of Stay: 0  Date of Admission: 11/3/2019  Primary Care Physician: Inna Harris APRN    Subjective   Chief Complaint: Confusion and leg cramps  HPI: Patient states that she is still having significant right lower extremity cramps.  She describes these as spasms.  She states these have happened increasingly frequently since the stroke she had at [place].    Review of Systems   Constitutional: Negative for appetite change, chills, fatigue, fever and unexpected weight change.   Respiratory: Negative for cough, choking, chest tightness, shortness of breath and wheezing.    Cardiovascular: Negative for chest pain, palpitations and leg swelling.   Gastrointestinal: Negative for abdominal pain, blood in stool, constipation, diarrhea, nausea and vomiting.   Genitourinary: Negative for dysuria, flank pain and hematuria.   Musculoskeletal: Positive for myalgias.        Right lower extremity cramps   Neurological: Negative for dizziness, seizures, syncope, speech difficulty, weakness, light-headedness, numbness and headaches.   Hematological: Does not bruise/bleed easily.        All pertinent negatives and positives are as above. All other systems have been reviewed and are negative unless otherwise stated.     Objective    Temp:  [96.6 °F (35.9 °C)-98.7 °F (37.1 °C)] 97.9 °F (36.6 °C)  Heart Rate:  [] 64  Resp:  [12-21] 18  BP: (139-175)/(63-79) 175/79    Physical Exam   Constitutional: She appears well-developed and well-nourished.   HENT:   Head: Normocephalic and atraumatic.   Eyes: EOM are normal. Pupils are equal, round, and reactive to light.   Neck: Normal range of motion. Neck supple.   Cardiovascular: Normal rate, regular rhythm and normal heart sounds. Exam reveals no gallop and no friction rub.   No murmur heard.  Pulmonary/Chest: Effort normal and breath sounds normal. No respiratory distress. She  has no wheezes. She has no rales. She exhibits no tenderness.   Abdominal: Soft. Bowel sounds are normal. She exhibits no distension. There is no tenderness. There is no guarding.   Musculoskeletal: She exhibits no edema.   Skin: Skin is warm and dry.   Psychiatric: She has a normal mood and affect. Her behavior is normal. Thought content normal.   Vitals reviewed.      Results Review:  I have reviewed the labs, radiology results, and diagnostic studies.    Laboratory Data:   Results from last 7 days   Lab Units 11/03/19 2107   SODIUM mmol/L 144   POTASSIUM mmol/L 4.3   CHLORIDE mmol/L 103   CO2 mmol/L 27.0   BUN mg/dL 19   CREATININE mg/dL 0.94   GLUCOSE mg/dL 122*   CALCIUM mg/dL 9.4   BILIRUBIN mg/dL 0.6   ALK PHOS U/L 121*   ALT (SGPT) U/L 17   AST (SGOT) U/L 29   ANION GAP mmol/L 14.0     Estimated Creatinine Clearance: 60.1 mL/min (by C-G formula based on SCr of 0.94 mg/dL).          Results from last 7 days   Lab Units 11/03/19 2107   WBC 10*3/mm3 7.96   HEMOGLOBIN g/dL 11.7*   HEMATOCRIT % 35.9   PLATELETS 10*3/mm3 231     Results from last 7 days   Lab Units 11/03/19 2107   INR  1.06       Culture Data:   No results found for: BLOODCX  No results found for: URINECX  No results found for: RESPCX  No results found for: WOUNDCX  No results found for: STOOLCX  No components found for: BODYFLD    Radiology Data:   Imaging Results (Last 24 Hours)     Procedure Component Value Units Date/Time    FL Video Swallow With Speech [[id number]] Collected:  11/04/19 1307     Updated:  11/04/19 1441    Narrative:       Procedure: Modified barium swallow with speech pathology    Reason for exam: CVA. Silent aspiration.    FINDINGS: Modified barium swallow was performed in the presence  of speech pathology. Total fluoroscopic time 1 minute 6 seconds.  One image obtained. Please see speech pathology report for  findings as well as recommendations.      Impression:       1.  Please see speech pathology report for findings as  well as  recommendations.    Electronically signed by:  Carmine De Luna MD  11/4/2019 2:39 PM CST  Workstation: [id number]    CT Angiogram Head [[id number]] Collected:  11/04/19 1004     Updated:  11/04/19 1204    Narrative:       PROCEDURE: CT ANGIOGRAM HEAD (accession [id number]), CT  ANGIOGRAM NECK (accession [id number])    CLINICAL INDICATION: Stroke, altered mental status    COMPARISON STUDY: CT of head without contrast dated 11/3/2019 and  MRI brain dated 11/4/2019    TECHNIQUE: 5 mm axial images were acquired without the use of  intravenous contrast. Following this, 3 mm axial images from the  aortic arch through the brain were acquired after the intravenous  administration of 90 mL of Isovue-370. Sagittal and coronal  reformatted images were also provided, as well as 3-D  reconstructions performed on separate workstation of major  vascular structures.    NASCET criteria were utilized to calculate degree of vascular  stenosis    DLP is 525.0    FINDINGS:  Precontrast images demonstrate no mass, midline shift,  intracranial mass or extra-axial collection. Patient's known  focal acute subacute medullary infarction can only be faintly  visualized. There is no mass, midline shift, intracranial  hemorrhage or extra-axial collection identified.    Mucosal thickening and a right maxillary retention cyst are seen  as well as mucosal thickening in the ethmoid sinuses. Mastoids  are clear    FINDINGS: The apex of the aortic arch is excluded from the study.  There appears to be three-vessel aortic arch. There is mild  narrowing of the origin of the left subclavian artery estimated  at approximately 40%. It is otherwise normal course and caliber.  The visualized portion of the left common carotid artery is  normal in course and caliber to the bifurcation, where there is  also a 60% stenosis of the origin of the right internal carotid  artery which is otherwise normal in course and caliber to the  skull base. It is mildly  tortuous proximally. The brachiocephalic  artery is patent. The right subclavian artery originates a normal  fashion and is normal in course and caliber. The right common  carotid artery originates a normal fashion. There is no  significant stenosis at the bifurcation, and the right internal  carotid artery is patent to the skull base and normal in caliber.  The right external carotid artery is patent and normal in course  and caliber.    The bilateral vertebral arteries originate in normal fashion from  the subclavian arteries bilaterally. There are tortuous at their  origin bilaterally. Bilateral vertebral arteries are normal in  course and caliber and appear codominant. Distal vertebral  arteries are moderately irregularly narrowed, left greater than  right, with approximately 50% stenosis of the distal left  vertebral artery. The vertebral junction and the basilar artery  are normal in course and caliber. The bilateral posterior  cerebral arteries are patent, but there is moderate stenosis of  the P1 segments bilaterally. A tiny right posterior communicating  artery and is somewhat more prominent left posterior  communicating artery are present, and are patent. The cavernous  portion of the carotid arteries demonstrate atherosclerotic  calcification moderate irregular narrowing, but no evidence of  occlusion. The carotid bifurcations, and bilateral middle  cerebral arteries are patent as well as there proximal branches.  Mild irregular narrowing of the right M1 segment is present,  though it is somewhat larger in caliber than the left M1 segment.  The left A1 segment is atretic. The right A1 segment is normal in  caliber. The anterior to the cava artery and A2 segments are  patent bilaterally.    There is no abnormal enhancement of brain parenchyma.    No cervical masses or lymphadenopathy is present. The parotid and  submandibular glands are symmetric. There is a low-attenuation  lesion in the left thyroid lobe  measuring 1.0 cm in greatest  dimension. Additional smaller hypoattenuating lesions are seen in  the thyroid lobes bilaterally. No further follow-up of these is  recommended based on size criteria, according to best practice  recommendations.    Emphysema is noted in visualized portion of lung apices.    It still note is made of prominence of venous structures draining  to the jugular veins in the posterior neck bilaterally, right  greater than left.    Degenerative changes of the cervical spinal present, which appear  age congruent      Impression:       1. The apex of the aortic arch is not included in the study,  somewhat limiting evaluation as the origins of the great vessels  are not evaluated.  2. Irregular narrowing of both distal vertebral arteries, right  left greater than right, with approximately 50% narrowing of the  distal left vertebral artery.  3. No evidence of aneurysm or major vascular occlusion  4. Chronic appearing ethmoid and maxillary sinusitis as above  5. Please see findings section above for further details.      Electronically signed by:  Camila Ortiz MD  11/4/2019 12:02 PM  CST Workstation: [id number]    CT Angiogram Neck [[id number]] Collected:  11/04/19 1004     Updated:  11/04/19 1204    Narrative:       PROCEDURE: CT ANGIOGRAM HEAD (accession [id number]), CT  ANGIOGRAM NECK (accession [id number])    CLINICAL INDICATION: Stroke, altered mental status    COMPARISON STUDY: CT of head without contrast dated 11/3/2019 and  MRI brain dated 11/4/2019    TECHNIQUE: 5 mm axial images were acquired without the use of  intravenous contrast. Following this, 3 mm axial images from the  aortic arch through the brain were acquired after the intravenous  administration of 90 mL of Isovue-370. Sagittal and coronal  reformatted images were also provided, as well as 3-D  reconstructions performed on separate workstation of major  vascular structures.    NASCET criteria were utilized to calculate degree of  vascular  stenosis    DLP is 525.0    FINDINGS:  Precontrast images demonstrate no mass, midline shift,  intracranial mass or extra-axial collection. Patient's known  focal acute subacute medullary infarction can only be faintly  visualized. There is no mass, midline shift, intracranial  hemorrhage or extra-axial collection identified.    Mucosal thickening and a right maxillary retention cyst are seen  as well as mucosal thickening in the ethmoid sinuses. Mastoids  are clear    FINDINGS: The apex of the aortic arch is excluded from the study.  There appears to be three-vessel aortic arch. There is mild  narrowing of the origin of the left subclavian artery estimated  at approximately 40%. It is otherwise normal course and caliber.  The visualized portion of the left common carotid artery is  normal in course and caliber to the bifurcation, where there is  also a 60% stenosis of the origin of the right internal carotid  artery which is otherwise normal in course and caliber to the  skull base. It is mildly tortuous proximally. The brachiocephalic  artery is patent. The right subclavian artery originates a normal  fashion and is normal in course and caliber. The right common  carotid artery originates a normal fashion. There is no  significant stenosis at the bifurcation, and the right internal  carotid artery is patent to the skull base and normal in caliber.  The right external carotid artery is patent and normal in course  and caliber.    The bilateral vertebral arteries originate in normal fashion from  the subclavian arteries bilaterally. There are tortuous at their  origin bilaterally. Bilateral vertebral arteries are normal in  course and caliber and appear codominant. Distal vertebral  arteries are moderately irregularly narrowed, left greater than  right, with approximately 50% stenosis of the distal left  vertebral artery. The vertebral junction and the basilar artery  are normal in course and caliber. The  bilateral posterior  cerebral arteries are patent, but there is moderate stenosis of  the P1 segments bilaterally. A tiny right posterior communicating  artery and is somewhat more prominent left posterior  communicating artery are present, and are patent. The cavernous  portion of the carotid arteries demonstrate atherosclerotic  calcification moderate irregular narrowing, but no evidence of  occlusion. The carotid bifurcations, and bilateral middle  cerebral arteries are patent as well as there proximal branches.  Mild irregular narrowing of the right M1 segment is present,  though it is somewhat larger in caliber than the left M1 segment.  The left A1 segment is atretic. The right A1 segment is normal in  caliber. The anterior to the cava artery and A2 segments are  patent bilaterally.    There is no abnormal enhancement of brain parenchyma.    No cervical masses or lymphadenopathy is present. The parotid and  submandibular glands are symmetric. There is a low-attenuation  lesion in the left thyroid lobe measuring 1.0 cm in greatest  dimension. Additional smaller hypoattenuating lesions are seen in  the thyroid lobes bilaterally. No further follow-up of these is  recommended based on size criteria, according to best practice  recommendations.    Emphysema is noted in visualized portion of lung apices.    It still note is made of prominence of venous structures draining  to the jugular veins in the posterior neck bilaterally, right  greater than left.    Degenerative changes of the cervical spinal present, which appear  age congruent      Impression:       1. The apex of the aortic arch is not included in the study,  somewhat limiting evaluation as the origins of the great vessels  are not evaluated.  2. Irregular narrowing of both distal vertebral arteries, right  left greater than right, with approximately 50% narrowing of the  distal left vertebral artery.  3. No evidence of aneurysm or major vascular  occlusion  4. Chronic appearing ethmoid and maxillary sinusitis as above  5. Please see findings section above for further details.      Electronically signed by:  Camila Ortiz MD  11/4/2019 12:02 PM  CST Workstation: [id number]    MRI Brain Without Contrast [[id number]] Collected:  11/04/19 0949     Updated:  11/04/19 1058    Addenda:         ADDENDUM   ADDENDUM #1       Addendum: Referring clinician notified of findings by phone at  10:57 AM on 11/4/2019.    Electronically signed by:  Carmine De Luna MD  11/4/2019 10:57 AM CST  Workstation: [id number]    Signed:  11/04/19 1057 by Jn De Luna MD    Narrative:       Procedure: Limited MR brain stroke protocol    Reason for exam: Altered mental status. Stroke.    FINDINGS: Multisequence multiplanar MR imaging of the brain was  performed stroke protocol. Diffusion weighted anterior inferior  left medullary small oval focus of increased signal which is  lower signal on the apparent coefficient map. This focus measures  3.5 mm in greatest diameter. This lesion would be suspicious for  acute lacunar infarct in this region. Otherwise the diffusion  weighted series is normal. Mild cerebral involutional changes.  Scattered right frontal lobe subcortical deep white matter very  small foci of increased signal on FLAIR sequence. Left frontal  lobe periventricular deep white matter small irregular shaped  focus of abnormal increased signal on FLAIR sequence. Otherwise  cerebral and cerebellar parenchymal are normal. Ventricular  system and subarachnoid spaces are normal.      Impression:       1.  Diffusion weighted anterior inferior left medullary small  oval focus of increased signal which is lower signal on the  apparent coefficient map. This focus measures 3.5 mm in greatest  diameter. This lesion would be suspicious for acute lacunar  infarct in this region.   2.  Mild cerebral involutional changes.  3.  Right frontal lobe subcortical deep white matter and left  frontal lobe  periventricular deep white matter small foci of  abnormal signal as described above suspicious for chronic  ischemic gliosis secondary to microvascular disease.  4.  Remainder of MR brain exam unremarkable.    Electronically signed by:  Carmine De Luna MD  11/4/2019 10:43 AM CST  Workstation: [id number]    XR Femur 2 View Bilateral [[id number]] Collected:  11/03/19 2123     Updated:  11/03/19 2144    Narrative:       Bilateral femurs two view on 11/3/2019    CLINICAL INDICATION: Bilateral leg pain after fall    COMPARISON: None    FINDINGS:    Left femur: Changes of osteoarthritis are noted in the knee.  There are no fractures. Visualized joints are well aligned.    Right femur: Mild changes of osteoarthritis are noted in the  knee. There are no fractures. Visualized joints are well aligned.      Impression:       No acute abnormality in either femur.    Electronically signed by:  Hesham Hernandez  11/3/2019 9:43 PM  CST Workstation: [id number]    XR Pelvis 1 or 2 View [[id number]] Collected:  11/03/19 2123     Updated:  11/03/19 2143    Narrative:       Pelvis single view on 11/3/2019    CLINICAL INDICATION: Pain after fall    COMPARISON: None    FINDINGS: Extensive hardware is partially imaged in the lower  lumbar spine extending into the bilateral iliac bones. The hips  are well located. The SI joints are well aligned. Calcification  in the right pelvis likely represents calcified uterine fibroid.  There are no fractures.      Impression:       No acute abnormality.    Electronically signed by:  Hesham Hernandez  11/3/2019 9:42 PM  CST Workstation: [id number]    XR Chest 1 View [[id number]] Collected:  11/03/19 2054     Updated:  11/03/19 2109    Narrative:         Chest single view on  11/3/2019     CLINICAL INDICATION: Stroke protocol    COMPARISON: 8/6/2015    FINDINGS: Posterior fusion hardware is noted in the lower  thoracic and upper lumbar spine. Vascular calcification is noted  in the aorta. The lungs are clear.  Cardiac, hilar and mediastinal  contours are within normal limits. Pulmonary vascularity is  within normal.      Impression:       No acute disease.    Electronically signed by:  Hesham Hernandez  11/3/2019 9:08 PM  CST Workstation: [id number]    CT Head Without Contrast Stroke Protocol [[id number]] Collected:  11/03/19 2051     Updated:  11/03/19 2108    Narrative:         CT head without contrast on 11/3/2019     CLINICAL INDICATION: Slurred speech, weakness, stroke    TECHNIQUE: Multiple axial images are obtained throughout the head  without the administration of contrast. This exam was performed  according to our departmental dose-optimization program, which  includes automated exposure control, adjustment of the mA and/or  kV according to patient size and/or use of iterative  reconstruction technique.   Total DLP is 908.2 mGy*cm.    COMPARISON: None    FINDINGS: There is no hydrocephalus. There is no hemorrhage.  There are no abnormal extra-axial fluid collections. There is no  mass, mass effect or midline shift. Mucous retention cyst is  noted in the right maxillary sinus. No bony abnormality is noted.  There is a small age-indeterminate left periventricular lacunar  infarct. If clinically indicated MRI could better evaluate. No  other CT evidence of infarct is noted.      Impression:       Small age-indeterminate left periventricular lacunar  infarct. If clinically indicated MRI could better evaluate.    Electronically signed by:  Hesham Hernandez  11/3/2019 9:07 PM  CST Workstation: [id number]          I have reviewed the patient's current medications.     Assessment/Plan     Active Hospital Problems    Diagnosis   • Altered mental status       Plan:    1.  Inferior left medullary lacunar infarct: Will continue aspirin, Plavix, and statins.  Physical therapy, Occupational Therapy are working with the patient.  Neurology following.  2.  Right lower extremity muscle spasm: Continue home baclofen.  3.  Dysphagia:  Speech therapy following.  Modified barium swallow today.  4.  Hypertension: Continue home medications.    Discharge Planning: I expect patient to be discharged to home vs ARU in 1-2 days.        This document has been electronically signed by Blayne Barrett MD on November 4, 2019 4:46 PM

## 2019-11-04 NOTE — PLAN OF CARE
Problem: Patient Care Overview  Goal: Plan of Care Review  Outcome: Ongoing (interventions implemented as appropriate)   11/04/19 0320   Coping/Psychosocial   Plan of Care Reviewed With patient   Plan of Care Review   Progress no change   OTHER   Outcome Summary Pt new admit to SDU for stroke     Goal: Individualization and Mutuality  Outcome: Ongoing (interventions implemented as appropriate)    Goal: Discharge Needs Assessment  Outcome: Ongoing (interventions implemented as appropriate)      Problem: Fall Risk (Adult)  Goal: Identify Related Risk Factors and Signs and Symptoms  Outcome: Ongoing (interventions implemented as appropriate)    Goal: Absence of Fall  Outcome: Ongoing (interventions implemented as appropriate)      Problem: Pain, Chronic (Adult)  Goal: Identify Related Risk Factors and Signs and Symptoms  Outcome: Ongoing (interventions implemented as appropriate)    Goal: Acceptable Pain/Comfort Level and Functional Ability  Outcome: Ongoing (interventions implemented as appropriate)      Problem: Stroke (Ischemic) (Adult)  Goal: Signs and Symptoms of Listed Potential Problems Will be Absent, Minimized or Managed (Stroke)  Outcome: Ongoing (interventions implemented as appropriate)

## 2019-11-04 NOTE — CONSULTS
Stroke Consult Note    Patient Name: Rama Goel   MRN: [id number]  Age: 73 y.o.  Sex: female  : 1945    Primary Care Physician: Inna Harris APRN  Referring Physician:  Dutch Venegas MD    TIME STROKE TEAM CALLED: 10:00pm  EST     TIME PATIENT SEEN: 10:40pm EST    Handedness: right  Race:      Chief Complaint/Reason for Consultation: frequent falls    HPI:   She was admitted to [facility] 10/27/19 for dysarthria, dysphagia, falls, worsening leg spasms of the and found to have a new right pontine stroke.  Exam was documented to be mild slurred speech, 5/5 throughout, normal sensation, normal coordination, and hyperreflexic throughout.      CTA head and neck 10/29/19 showed extensive atherosclerosis which was worse intracranially than extracranially and more notable at the vertebral arteries.  MRI brain without contrast 10/28/19 showed a punctate left pontine stroke.      She was seen by speech therapy and recommended to be on a thin liquids diet with medications crushed in puree.      She was discharged home on dual antiplatelet therapy.    She was contact on 19 and was noted to say that she had new right arm weakness and increased falls and thought she had another stroke.  EMS was called to her house.      She notes increased falls since discharge.  She provides minimal history.  Cannot say why she did not go to EMS, why she is here today, or how she got here.     Last Known Normal Date/Time: patient cannot state    Review of Systems     Temp:  [98.7 °F (37.1 °C)] 98.7 °F (37.1 °C)  Heart Rate:  [76] 76  Resp:  [21] 21  BP: (175)/(74) 175/74    Neurological Exam  Mental Status  Awake, alert and oriented to person, place and time.Alert. dysarthria present. Language is fluent with no aphasia. Attention and concentration are normal.  Slow speech, minimal responses to questions, poor historian.    Cranial Nerves  CN II: Visual fields full to confrontation.  CN III, IV, VI: Extraocular  movements intact bilaterally. Extraocular movements intact bilaterally. Normal lids and orbits bilaterally.  CN V:  Right: Facial sensation is normal.  Left: Facial sensation is normal on the left.  CN VII:  Right: There is no facial weakness.  Left: There is no facial weakness.  CN XI:  Right: Sternocleidomastoid strength is normal.  Left: Sternocleidomastoid strength is normal.  CN XII: Tongue midline without atrophy or fasciculations.    Motor  Normal muscle bulk throughout. Right pronator drift.  Mild right drift, arm does not hit harmony  Decreased fine finger movements bilaterally.  Leg raise 3/5 bilaterally, pain limted  .    Sensory  Light touch is normal in upper and lower extremities.     Coordination  Right: Finger-to-nose normal. Heel-to-shin normal.  Left: Finger-to-nose normal. Heel-to-shin normal.    Gait  Deferred due to history of falls.      Physical Exam   Constitutional: She appears well-developed and well-nourished.   HENT:   Head: Normocephalic and atraumatic.   Eyes: EOM and lids are normal.   Neurological: She is alert.   Nursing note and vitals reviewed.      Acute Stroke Data    Alteplase (tPA) Inclusion / Exclusion Criteria    Time: 9:17 PM  Person Administering Scale: Roman Guerrero MD    Inclusion Criteria  []   18 years of age or greater   []   Onset of symptoms < 4.5 hours before beginning treatment (stroke onset = time patient was last seen well or without symptoms).   []   Diagnosis of acute ischemic stroke causing measurable disabling deficit (Complete Hemianopia, Any Aphasia, Visual or Sensory Extinction, Any weakness limiting sustained effort against gravity)   []   Any remaining deficit considered potentially disabling in view of patient and practitioner   Exclusion criteria (Do not proceed with Alteplase if any are checked under exclusion criteria)  []   Onset unknown or GREATER than 4.5 hours   []   ICH on CT/MRI   []   CT demonstrates hypodensity representing acute or subacute  infarct   []   Significant head trauma or prior stroke in the previous 3 months   []   Symptoms suggestive of subarachnoid hemorrhage   []   History of un-ruptured intracranial aneurysm GREATER than 10 mm   []   Recent intracranial or intraspinal surgery within the last 3 months   []   Arterial puncture at a non-compressible site in the previous 7 days   []   Active internal bleeding   []   Acute bleeding tendency   []   Platelet count LESS than 100,000 for known hematological diseases such as leukemia, thrombocytopenia or chronic cirrhosis   []   Current use of anticoagulant with INR GREATER than 1.7 or PT GREATER than 15 seconds, aPTT GREATER than 40 seconds   []   Heparin received within 48 hours, resulting in abnormally elevated aPTT GREATER than upper limit of normal   []   Current use of direct thrombin inhibitors or direct factor Xa inhibitors in the past 48 hours   []   Elevated blood pressure refractory to treatment (systolic GREATER than 185 mm/Hg or diastolic  GREATER than 110 mm/Hg   []   Suspected infective endocarditis and aortic arch dissection   []   Current use of therapeutic treatment dose of low-molecular-weight heparin (LMWH) within the previous 24 hours   []   Structural GI malignancy or bleed   Relative exclusion for all patients  []   Only minor non-disabling symptoms   []   Pregnancy   []   Seizure at onset with postictal residual neurological impairments   []   Major surgery or previous trauma within past 14 days   []   History of previous spontaneous ICH, intracranial neoplasm, or AV malformation   []   Postpartum (within previous 14 days)   []   Recent GI or urinary tract hemorrhage (within previous 21 days)   []   Recent acute MI (within previous 3 months)   []   History of un-ruptured intracranial aneurysm LESS than 10 mm   []   History of ruptured intracranial aneurysm   []   Blood glucose LESS than 50 mg/dL (2.7 mmol/L)   []   Dural puncture within the last 7 days   []   Known GREATER  "than 10 cerebral microbleeds   Additional exclusions for patients with symptoms onset between 3 and 4.5 hours.  []   Age > 80.   []   On any anticoagulants regardless of INR  >>> Warfarin (Coumadin), Heparin, Enoxaparin (Lovenox), fondaparinux (Arixtra), bivalirudin (Angiomax), Argatroban, dabigatran (Pradaxa), rivaroxaban (Xarelto), or apixaban (Eliquis)   []   Severe stroke (NIHSS > 25).   []   History of BOTH diabetes and previous ischemic stroke.   []   The risks and benefits have been discussed with the patient or family related to the administration of IV Alteplase for stroke symptoms.   []   I have discussed and reviewed the patient's case and imaging with the attending prior to IV Alteplase.    Time Alteplase administered       Past Medical History:   Diagnosis Date   • Arrhythmia     intermittent   • Hypertension    • Lupus     drug induced   • Stroke (CMS/HCC)      Past Surgical History:   Procedure Laterality Date   • ANKLE SURGERY Right     \"bone replacement with coral\"   • APPENDECTOMY     •  SECTION     • CHOLECYSTECTOMY     • PARATHYROIDECTOMY     • ROTATOR CUFF REPAIR Left    • SPINAL FUSION  2018    T11-S1   • TYMPANOPLASTY       Family History   Problem Relation Age of Onset   • Hypertension Mother    • Heart disease Father    • Hypertension Brother      Social History     Socioeconomic History   • Marital status:      Spouse name: Not on file   • Number of children: Not on file   • Years of education: Not on file   • Highest education level: Not on file   Tobacco Use   • Smoking status: Never Smoker   • Smokeless tobacco: Never Used   Substance and Sexual Activity   • Alcohol use: Yes     Comment: occasional   • Drug use: Defer   • Sexual activity: Defer     Allergies   Allergen Reactions   • Calcium Channel Blockers Other (See Comments)   • Hydralazine Hcl Other (See Comments)   • Hyzaar [Losartan Potassium-Hctz]    • Lisinopril    • Procardia [Nifedipine]      Prior to " Admission medications    Medication Sig Start Date End Date Taking? Authorizing Provider   atenolol (TENORMIN) 100 MG tablet Take 100 mg by mouth Daily.    Padmini Farnsworth MD   cyclobenzaprine (FLEXERIL) 10 MG tablet Take  by mouth.    Padmini Farnsworth MD   ibuprofen (ADVIL,MOTRIN) 200 MG tablet Take 200 mg by mouth.    Padmini Farnsworth MD   therapeutic multivitamin-minerals (THERAGRAN-M) tablet Take  by mouth.    Padmini Farnsworth MD       Hospital Meds:  Scheduled-    Infusions-   sodium chloride 100 mL/hr      PRNs- •  sodium chloride    Functional Status Prior to Current Stroke/Nicolasa Score: 1    NIH Stroke Scale  Time: 9:17 PM  Person Administering Scale: Roman Guerrero MD    1a  Level of consciousness: 0=alert; keenly responsive   1b. LOC questions:  0=Performs both tasks correctly   1c. LOC commands: 0=Performs both tasks correctly   2.  Best Gaze: 0=normal   3.  Visual: 0=No visual loss   4. Facial Palsy: 0=Normal symmetric movement   5a.  Motor left arm: 0=No drift, limb holds 90 (or 45) degrees for full 10 seconds   5b.  Motor right arm: 1=Drift, limb holds 90 (or 45) degrees but drifts down before full 10 seconds: does not hit bed   6a. motor left le=Drift, limb holds 90 (or 45) degrees but drifts down before full 10 seconds: does not hit bed   6b  Motor right le=Drift, limb holds 90 (or 45) degrees but drifts down before full 10 seconds: does not hit bed   7. Limb Ataxia: 0=Absent   8.  Sensory: 0=Normal; no sensory loss   9. Best Language:  0=No aphasia, normal   10. Dysarthria: 1=Mild to moderate, patient slurs at least some words and at worst, can be understood with some difficulty   11. Extinction and Inattention: 0=No abnormality    Total:   4       Results Reviewed:  I have personally reviewed current lab, radiology, and data and agree with results.  Lab Results (last 24 hours)     Procedure Component Value Units Date/Time    CBC & Differential [[id number]] Collected:   11/03/19 2107    Specimen:  Blood Updated:  11/03/19 2111    Narrative:       The following orders were created for panel order CBC & Differential.  Procedure                               Abnormality         Status                     ---------                               -----------         ------                     CBC Auto Differential[[id number]]        Abnormal            Final result                 Please view results for these tests on the individual orders.    CBC Auto Differential [[id number]]  (Abnormal) Collected:  11/03/19 2107    Specimen:  Blood Updated:  11/03/19 2111     WBC 7.96 10*3/mm3      RBC 3.95 10*6/mm3      Hemoglobin 11.7 g/dL      Hematocrit 35.9 %      MCV 90.9 fL      MCH 29.6 pg      MCHC 32.6 g/dL      RDW 12.9 %      RDW-SD 42.5 fl      MPV 9.9 fL      Platelets 231 10*3/mm3      Neutrophil % 78.6 %      Lymphocyte % 11.7 %      Monocyte % 8.0 %      Eosinophil % 1.0 %      Basophil % 0.3 %      Immature Grans % 0.4 %      Neutrophils, Absolute 6.26 10*3/mm3      Lymphocytes, Absolute 0.93 10*3/mm3      Monocytes, Absolute 0.64 10*3/mm3      Eosinophils, Absolute 0.08 10*3/mm3      Basophils, Absolute 0.02 10*3/mm3      Immature Grans, Absolute 0.03 10*3/mm3      nRBC 0.0 /100 WBC     Extra Tubes [[id number]] Collected:  11/03/19 2107    Specimen:  Blood, Venous Line Updated:  11/03/19 2109    Narrative:       The following orders were created for panel order Extra Tubes.  Procedure                               Abnormality         Status                     ---------                               -----------         ------                     Alvarez Top[[id number]]                                         In process                   Please view results for these tests on the individual orders.    Gray Top [[id number]] Collected:  11/03/19 2107    Specimen:  Blood Updated:  11/03/19 2109    Comprehensive Metabolic Panel [[id number]] Collected:  11/03/19 2107    Specimen:  Blood Updated:   11/03/19 2108    Troponin [[id number]] Collected:  11/03/19 2107    Specimen:  Blood Updated:  11/03/19 2108    Green Top (Gel) [[id number]] Collected:  11/03/19 2107    Specimen:  Blood Updated:  11/03/19 2108    [place] Draw [[id number]] Collected:  11/03/19 2107    Specimen:  Blood Updated:  11/03/19 2108    Narrative:       The following orders were created for panel order [place] Draw.  Procedure                               Abnormality         Status                     ---------                               -----------         ------                     Light Blue Top[[id number]]                                   In process                 Green Top (Gel)[[id number]]                                  In process                 Lavender Top[[id number]]                                     In process                 Gold Top - SST[[id number]]                                   In process                   Please view results for these tests on the individual orders.    Gold Top - SST [[id number]] Collected:  11/03/19 2107    Specimen:  Blood Updated:  11/03/19 2108    Lavender Top [[id number]] Collected:  11/03/19 2107    Specimen:  Blood Updated:  11/03/19 2108    Protime-INR [[id number]] Collected:  11/03/19 2107    Specimen:  Blood Updated:  11/03/19 2108    aPTT [[id number]] Collected:  11/03/19 2107    Specimen:  Blood Updated:  11/03/19 2108    Light Blue Top [[id number]] Collected:  11/03/19 2107    Specimen:  Blood Updated:  11/03/19 2108    POC Glucose Once [[id number]]  (Normal) Collected:  11/03/19 2036    Specimen:  Blood Updated:  11/03/19 2048     Glucose 114 mg/dL      Comment: Result Not ConfirmedOperator: [id number] LEOBARDO ALONAMeter ID: [id number]           Imaging Results (Last 24 Hours)     Procedure Component Value Units Date/Time    XR Chest 1 View [[id number]] Collected:  11/03/19 2054     Updated:  11/03/19 2109    Narrative:         Chest single view on  11/3/2019     CLINICAL INDICATION: Stroke  protocol    COMPARISON: 8/6/2015    FINDINGS: Posterior fusion hardware is noted in the lower  thoracic and upper lumbar spine. Vascular calcification is noted  in the aorta. The lungs are clear. Cardiac, hilar and mediastinal  contours are within normal limits. Pulmonary vascularity is  within normal.      Impression:       No acute disease.    Electronically signed by:  Hesham Hernandez  11/3/2019 9:08 PM  CST Workstation: [id number]    CT Head Without Contrast Stroke Protocol [[id number]] Collected:  11/03/19 2051     Updated:  11/03/19 2108    Narrative:         CT head without contrast on 11/3/2019     CLINICAL INDICATION: Slurred speech, weakness, stroke    TECHNIQUE: Multiple axial images are obtained throughout the head  without the administration of contrast. This exam was performed  according to our departmental dose-optimization program, which  includes automated exposure control, adjustment of the mA and/or  kV according to patient size and/or use of iterative  reconstruction technique.   Total DLP is 908.2 mGy*cm.    COMPARISON: None    FINDINGS: There is no hydrocephalus. There is no hemorrhage.  There are no abnormal extra-axial fluid collections. There is no  mass, mass effect or midline shift. Mucous retention cyst is  noted in the right maxillary sinus. No bony abnormality is noted.  There is a small age-indeterminate left periventricular lacunar  infarct. If clinically indicated MRI could better evaluate. No  other CT evidence of infarct is noted.      Impression:       Small age-indeterminate left periventricular lacunar  infarct. If clinically indicated MRI could better evaluate.    Electronically signed by:  Hesham Hernandez  11/3/2019 9:07 PM  CST Workstation: [id number]             Assessment/Plan:      Rama Goel is a 73 year-old woman with PMH HTN, lumbar laminectomy and fusion in 2018 with subsequent left pontine stroke, new right pontine stroke late 10/2019 presenting for increasing falls  and worsed right arm weakness.      She noted increased falls and presented to [facility] 10/27/19 and was found to have a new punctate right pontine stroke.  She was discharged home on dual antiplatelets.  She was contacted by [facility] neurology and was documented to have per the patient worsened right arm weakness and increased falls.  EMS was called to her house but likely did not take her to hospital.  She is unable to state clearly why she is here today or how she got here.      Exam shows moderate dysarthria, slow speech, mild right arm drift, decreased fine finger movements on the right hand, weak bilateral leg raise limited by pain.    Imaging per report at [facility]: CTA head and neck 10/29/19 showed extensive atherosclerosis which was worse intracranially than extracranially and more notable at the vertebral arteries.  MRI brain without contrast 10/28/19 showed a punctate left pontine stroke.      She may have had extension of her pontine stroke.  Will admit for further workup.  She is not a tPA candidate due to recent stroke and unclear onset of new symptoms.      · MRI brain without contrast  · CTA head and neck with contrast  · TTE  · Permissive hypertension with SBP < 220/105 for 24 hours, then slow normalization  · Continue aspirin 81 mg daily, clopidogrel 75 mg daily, increase atorvastatin to 80 mg daily  · Labs: A1c, lipid panel  · PT/OT/Speech evaluation.  If beside swallow passed, thin liquid diet and meds crushed in puree from previous recs at [facility]  · Will continue to follow      Roman Guerrero MD  November 3, 2019  9:17 PM    Verbal consent taken.  Patient agreeable to be seen via telemedicine.    This was an audio and video enabled telemedicine encounter.

## 2019-11-04 NOTE — THERAPY EVALUATION
"Acute Care - Physical Therapy Initial Evaluation  [facility]     Patient Name: Rama Goel  : 1945  MRN: [id number]  Today's Date: 2019   Onset of Illness/Injury or Date of Surgery: 19     Referring Physician: Yolanda METCALF      Admit Date: 11/3/2019    Visit Dx:     ICD-10-CM ICD-9-CM   1. Altered mental status, unspecified altered mental status type R41.82 780.97   2. Oropharyngeal dysphagia R13.12 787.22   3. Impaired mobility and ADLs Z74.09 799.89   4. Impaired functional mobility, balance, gait, and endurance Z74.09 V49.89     Patient Active Problem List   Diagnosis   • Altered mental status     Past Medical History:   Diagnosis Date   • Arrhythmia     intermittent   • CVA (cerebral vascular accident) (CMS/HCC) 2018    also had one last week 10/2019 in [facility]   • Hypertension    • Lupus (CMS/HCC)     drug induced   • Stroke (CMS/HCC)      Past Surgical History:   Procedure Laterality Date   • ANKLE SURGERY Right     \"bone replacement with coral\"   • APPENDECTOMY     •  SECTION     • CHOLECYSTECTOMY     • PARATHYROIDECTOMY     • ROTATOR CUFF REPAIR Left    • SPINAL FUSION  2018    T11-S1   • TYMPANOPLASTY          PT ASSESSMENT (last 12 hours)      Physical Therapy Evaluation     Row Name 19 1420          PT Evaluation Time/Intention    Document Type  evaluation  -GB     Mode of Treatment  individual therapy;physical therapy  -GB     Comment  c/o of frequent LE spasms which were observed;   -GB     Row Name 19 1420 19 0837       General Information    Patient Profile Reviewed?  yes  -GB  --    Onset of Illness/Injury or Date of Surgery  19  -GB  --    Referring Physician  Yolanda METCALF  -GB  --    Patient Observations  alert;agree to therapy;cooperative  -GB  --    Patient/Family Observations  no visitors   -GB  --    General Observations of Patient  supine, HOB up  -GB  reports hx knees collapsing & she says R sigce collapses & falls R regularly; w/leg " spasms she drops; says 2 types falls, drops vs spasms of LE's; using walker since home p [facility]; limping but no falls prior to that; back sx had not pain;   -GB    Prior Level of Function  independent:;all household mobility;community mobility;gait;ADL's  -GB  --    Equipment Currently Used at Home  walker, rolling;cane, straight;grab bar raised toilet; built in shower seat;using RW after [facility]   -GB  --    Risks Reviewed  patient:;LOB;dizziness  -GB  --    Benefits Reviewed  patient:;improve function;increase independence;increase strength  -GB  --    Row Name 11/04/19 1420 11/04/19 1402       Relationship/Environment    Primary Source of Support/Comfort  -- friend/POA  -GB  --  -GB    Lives With  alone friend staying w/ her  -GB  --    Row Name 11/04/19 1420          Resource/Environmental Concerns    Current Living Arrangements  home/apartment/condo  -GB     Row Name 11/04/19 1420          Living Environment    Home Accessibility  stairs to enter home ramp available  -GB     Row Name 11/04/19 1420          Home Main Entrance    Number of Stairs, Main Entrance  four  -GB     Stair Railings, Main Entrance  railing on right side (ascending)  -GB     Row Name 11/04/19 1420          Cognitive Assessment/Intervention- PT/OT    Affect/Mental Status (Cognitive)  WFL gets a little agitated at times about pain meds   -GB     Orientation Status (Cognition)  oriented x 4  -GB     Follows Commands (Cognition)  WFL  -GB     Safety Deficit (Cognitive)  awareness of need for assistance  -GB     Personal Safety Interventions  fall prevention program maintained;gait belt;muscle strengthening facilitated;nonskid shoes/slippers when out of bed;supervised activity  -GB     Row Name 11/04/19 1420          Safety Issues, Functional Mobility    Impairments Affecting Function (Mobility)  -- spasms sonali LE/s R>L  -GB     Row Name 11/04/19 1420 11/04/19 1402       Bed Mobility Assessment/Treatment    Bed Mobility Assessment/Treatment  bed  mobility (all) activities  -GB  --    Barton Level (Bed Mobility)  minimum assist (75% patient effort)  -GB  --  -GB (r) ME (t)    Assistive Device (Bed Mobility)  bed rails;head of bed elevated  -  --    Row Name 11/04/19 1420          Transfer Assessment/Treatment    Transfer Assessment/Treatment  toilet transfer  -GB     Sit-Stand Barton (Transfers)  minimum assist (75% patient effort)  -GB     Stand-Sit Barton (Transfers)  minimum assist (75% patient effort)  -GB     Barton Level (Toilet Transfer)  minimum assist (75% patient effort);1 person assist;1 person to manage equipment  -GB     Assistive Device (Toilet Transfer)  walker, front-wheeled  -GB     Row Name 11/04/19 1420          Sit-Stand Transfer    Assistive Device (Sit-Stand Transfers)  walker, front-wheeled  -GB     Row Name 11/04/19 1420          Stand-Sit Transfer    Assistive Device (Stand-Sit Transfers)  walker, front-wheeled  -GB     Row Name 11/04/19 1420          Gait/Stairs Assessment/Training    Gait/Stairs Assessment/Training  gait/ambulation independence;gait/ambulation assistive device;distance ambulated;gait pattern;gait deviations  -GB     Barton Level (Gait)  minimum assist (75% patient effort);1 person assist;1 person to manage equipment  -GB     Assistive Device (Gait)  walker, front-wheeled  -GB     Distance in Feet (Gait)  14,100,12,  -GB     Pattern (Gait)  swing-through  -GB     Deviations/Abnormal Patterns (Gait)  john decreased;gait speed decreased;stride length decreased RLE comes forward more in gait than L, short stride length;   -GB     Comment (Gait/Stairs)  states after about 75 ft she felt her LE's were getting tired and could collapse as they do sometimes but did not report specific spasms in these gait distances  -GB     Row Name 11/04/19 1420          General ROM    GENERAL ROM COMMENTS  AROM WFL sonali UE/LEs  -     Row Name 11/04/19 1420          MMT (Manual Muscle Testing)    General  MMT Comments  sonali LEs reji MMT at 4-/5 with care taken to avoid spasms testing in sitting EOB; She did not show synergy motion w/ her testing but does c/o pain in quad when she spasms tho it is hip flexor that is pulling LE up primarily; she states she has less spasms when sitting EOB tho has signficant bruise on glut R that hurts w/ sitting.   -     Row Name 11/04/19 1420          Sensory Assessment/Intervention    Sensory General Assessment  other (see comments) light touch testing   -     Row Name 11/04/19 1420          Hearing Assessment    Hearing Status  WFL  -     Row Name 11/04/19 1420          Light Touch Sensation Assessment    Left Upper Extremity: Light Touch Sensation Assessment  intact  -     Right Upper Extremity: Light Touch Sensation Assessment  intact  -     Row Name 11/04/19 1420          Vision Assessment/Intervention    Visual Impairment/Limitations  WFL  -[facility] Name 11/04/19 1420          Pain Assessment    Additional Documentation  Pain Scale: Numbers Pre/Post-Treatment (Group)  -[facility] Name 11/04/19 1420          Pain Scale: Numbers Pre/Post-Treatment    Pain Scale: Numbers, Pretreatment  10/10  -GB     Pain Location - Side  Right  -GB     Pain Location  other (see comments) states that pain is from the knee up to the coccyx   -GB     Pre/Post Treatment Pain Comment  reported R buttock painful but once sitting it eases in chair  -GB     Pain Intervention(s)  Repositioned;Ambulation/increased activity  -     Row Name 11/04/19 1420          Plan of Care Review    Plan of Care Reviewed With  patient  -[facility] Name 11/04/19 1420          Physical Therapy Clinical Impression    PT Diagnosis (PT Clinical Impression)  impaired functional mobility  -GB     Patient/Family Goals Statement (PT Clinical Impression)  -- wants to go home post d/c  -GB     Criteria for Skilled Interventions Met (PT Clinical Impression)  yes  -GB     Pathology/Pathophysiology Noted (Describe  Specifically for Each System)  musculoskeletal;neuromuscular  -GB     Impairments Found (describe specific impairments)  aerobic capacity/endurance;gait, locomotion, and balance  -GB     Rehab Potential (PT Clinical Summary)  good, to achieve stated therapy goals  -GB     Predicted Duration of Therapy (PT)  till d/c or goals met; possible 1 wk  -GB     Care Plan Review (PT)  patient/other agree to care plan  -GB     Row Name 11/04/19 1420 11/04/19 1402       Vital Signs    Pre Systolic BP Rehab  169  -GB  169  -GB    Pre Treatment Diastolic BP  75  -GB  75  -GB    Post Systolic BP Rehab  165  -GB  --    Post Treatment Diastolic BP  76  -GB  --    Pretreatment Heart Rate (beats/min)  79  -GB  79  -GB    Posttreatment Heart Rate (beats/min)  73  -GB  --    Pre SpO2 (%)  95  -GB  95  -GB    O2 Delivery Pre Treatment  room air  -GB  room air  -GB    Post SpO2 (%)  96  -GB  --    O2 Delivery Post Treatment  room air  -GB  --    Pre Patient Position  Supine  -GB  Supine  -GB    Intra Patient Position  Standing  -GB  --    Post Patient Position  Sitting  -GB  --    Row Name 11/04/19 1420          Physical Therapy Goals    Bed Mobility Goal Selection (PT)  bed mobility, PT goal 1  -GB     Transfer Goal Selection (PT)  transfer, PT goal 1  -GB     Gait Training Goal Selection (PT)  gait training, PT goal 1  -GB     Stairs Goal Selection (PT)  stairs, PT goal 1  -GB     Additional Documentation  Stairs Goal Selection (PT) (Row)  -     Row Name 11/04/19 1420          Bed Mobility Goal 1 (PT)    Activity/Assistive Device (Bed Mobility Goal 1, PT)  bed mobility activities, all;rolling to left;rolling to right;supine to sit  -GB     [place] Level/Cues Needed (Bed Mobility Goal 1, PT)  independent  -GB     Time Frame (Bed Mobility Goal 1, PT)  short term goal (STG);1 week  -GB     Progress/Outcomes (Bed Mobility Goal 1, PT)  continuing progress toward goal;goal not met  -     Row Name 11/04/19 1420          Transfer Goal  1 (PT)    Activity/Assistive Device (Transfer Goal 1, PT)  bed-to-chair/chair-to-bed;toilet  -GB     [place]arding Level/Cues Needed (Transfer Goal 1, PT)  conditional independence  -GB     Time Frame (Transfer Goal 1, PT)  long term goal (LTG);by discharge  -GB     Barriers (Transfers Goal 1, PT)  weakness; falls   -GB     Progress/Outcome (Transfer Goal 1, PT)  goal not met  -GB     Row Name 11/04/19 1420          Gait Training Goal 1 (PT)    Activity/Assistive Device (Gait Training Goal 1, PT)  gait (walking locomotion);assistive device use;decrease fall risk;diminish gait deviation;forward stepping;increase endurance/gait distance;sidestepping;turning, left;turning, right;walker, rolling  -GB     [place] Level (Gait Training Goal 1, PT)  conditional independence  -GB     Distance (Gait Goal 1, PT)  450 or more feet w/out falls  -GB     Time Frame (Gait Training Goal 1, PT)  long term goal (LTG);by discharge  -GB     Barriers (Gait Training Goal 1, PT)  weakness; spasms  -GB     Progress/Outcome (Gait Training Goal 1, PT)  goal not met;goal ongoing  -GB     Row Name 11/04/19 8233          Stairs Goal 1 (PT)    Activity/Assistive Device (Stairs Goal 1, PT)  ascending stairs;descending stairs;decrease fall risk  -GB     [place] Level/Cues Needed (Stairs Goal 1, PT)  conditional independence  -GB     Number of Stairs (Stairs Goal 1, PT)  1 step for curb if in community and if reji  -GB     Time Frame (Stairs Goal 1, PT)  by discharge  -GB     Barriers (Stairs Goal 1, PT)  spasms  -GB     Progress/Outcome (Stairs Goal 1, PT)  continuing progress toward goal;goal not met  -GB     Row Name 11/04/19 142          Positioning and Restraints    Pre-Treatment Position  in bed  -GB     Post Treatment Position  chair  -GB     In Chair  notified nsg;sitting;call light within reach;encouraged to call for assist;exit alarm on  -GB       User Key  (r) = Recorded By, (t) = Taken By, (c) = Cosigned By    Initials Name  Provider Type    Kandy Tolentino, PT Physical Therapist    Che Hinkle, OT Occupational Therapist        Physical Therapy Education     Title: PT OT SLP Therapies (Done)     Topic: Physical Therapy (Done)     Point: Mobility training (Done)     Learning Progress Summary           Patient Acceptance, E,D, VU,NR,DU by MARGARET at 11/4/2019  4:12 PM    Comment:  POC; mobiity w/ FWRW and in/out of bathroom; 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  Also discussed ways to manage spasms via positioning and use of hip extension actively to try to counter hip flx spasms.                   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  Also discussed ways to manage spasms via positioning and use of hip extension actively to try to counter hip flx spasms.                   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  Also discussed ways to manage spasms via positioning and use of hip extension actively to try to counter hip flx spasms.                               User Key     Initials Effective Dates Name Provider Type Discipline     04/03/18 -  Kandy Camara PT Physical Therapist PT              PT Recommendation and Plan  Anticipated Discharge Disposition (PT): home with assist, inpatient rehabilitation facility, transitional care  Planned Therapy Interventions (PT Eval): balance training, bed mobility training, gait training, home exercise program, patient/family education, ROM (range of  motion), stair training, strengthening, transfer training  Therapy Frequency (PT Clinical Impression): daily  Outcome Summary/Treatment Plan (PT)  Anticipated Discharge Disposition (PT): home with assist, inpatient rehabilitation facility, transitional care  Plan of Care Reviewed With: patient  Outcome Summary: PT viki completed bedside. While there we witnessed several repetitive spasms RLE which appear hip flx/psoas w/ overflow tone/spasm to R quads, but her quads didn't extend R knee w/ the spasms. She states she had found sitting can decrease spasms some. Primary safety risk is her reports of 2 types fall, spasms of LE (s), and LE collapsing which she feels is stroke related more than spine, but Dr Monson recommended in rounds today she see her surgeon for follow up as well post d/c.  She was able to move sup to sit w/ min assist of 1 w/ HOB up and use of rail. Sit to stand and bathroom transfer, gait needed min assist of 1 with FWRW and 1 for IV. She walked 100 ft in braden after in room gait of 14 ft. No spasms in gait nor collapse tho she reported after 75 ft feeling she is at risk for collapse of LEs due to fatigue. To help decrease spasms we placed her in recliner which she reji well, exit alarm on & RN aware. She reported no dizziness w/ any activity and VS not signficant change. May benefit from IRF prior to home. She will need 24/7 care for safety til more stable in motor control in mobility.  Outcome Measures     Row Name 11/04/19 1600             How much help from another person do you currently need...    Turning from your back to your side while in flat bed without using bedrails?  4  -GB      Moving from lying on back to sitting on the side of a flat bed without bedrails?  3  -GB      Moving to and from a bed to a chair (including a wheelchair)?  3  -GB      Standing up from a chair using your arms (e.g., wheelchair, bedside chair)?  3  -GB      Climbing 3-5 steps with a railing?  2  -GB      To walk in  hospital room?  3  -GB      AM-PAC 6 Clicks Score (PT)  18  -GB         Functional Assessment    Outcome Measure Options  AM-PAC 6 Clicks Basic Mobility (PT)  -GB        User Key  (r) = Recorded By, (t) = Taken By, (c) = Cosigned By    Initials Name Provider Type    Kandy Tolentino, PT Physical Therapist         Time Calculation:   PT Charges     Row Name 11/04/19 1631             Time Calculation    Start Time  1420  -GB      Stop Time  1528  -GB      Time Calculation (min)  68 min  -GB        User Key  (r) = Recorded By, (t) = Taken By, (c) = Cosigned By    Initials Name Provider Type    Kandy Tolentino, PT Physical Therapist        Therapy Charges for Today     Code Description Service Date Service Provider Modifiers Qty    70990317306 HC PT EVAL MOD COMPLEXITY 4 11/4/2019 Kandy Camara, PT GP 1          PT G-Codes  Outcome Measure Options: AM-PAC 6 Clicks Basic Mobility (PT)  AM-PAC 6 Clicks Score (PT): 18      Kandy Camara, PT  11/4/2019

## 2019-11-04 NOTE — H&P
[facility] Medicine Admission    Date of Admission: 11/3/2019    Primary Care Physician: Inna Harris APRN    Chief Complaint: She is having cramps    HPI:The patient is a 73-year-old  woman who had a stroke a week ago and has been scheduled to begin physical therapy in the a.m.  She was brought to the emergency department because of worsening cramps in extremities.  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  It was further reviewed by her accompanying family members that the patient became confused shortly before she was brought to the emergency department.    The patient was seen in the emergency department and referred to telemetry neurology services, which recommended overnight admission and further work-up to rule out any extension of her underlying stroke.    The patient has no headaches, visual symptoms, nausea, vomiting, or any new weaknesses in any extremities.  Her speech is unchanged from the time she was discharged.    She was able to give her history without much difficulty.      Past Medical History:  has a past medical history of Arrhythmia, CVA (cerebral vascular accident) (CMS/HCC) (2018), Hypertension, Lupus (CMS/[facility]), and Stroke (CMS/[facility]).    Past Surgical History:  has a past surgical history that includes Parathyroidectomy; Rotator cuff repair (Left); Cholecystectomy; Appendectomy; Tympanoplasty; Ankle surgery (Right);  section; and Spinal fusion (2018).    Family History: family history includes Heart disease in her father; Hypertension in her brother and mother.    Social History:  reports that she has never smoked. She has never used smokeless tobacco. She reports that she drinks alcohol. Drug use questions deferred to the physician.    Allergies:   Allergies   Allergen Reactions   • Calcium Channel Blockers Other (See Comments)   • Hydralazine Hcl Other (See  Comments)   • Hyzaar [Losartan Potassium-Hctz]    • Lisinopril    • Procardia [Nifedipine]        Medications:   Prior to Admission medications    Medication Sig Start Date End Date Taking? Authorizing Provider   aspirin 81 MG chewable tablet Chew 81 mg Daily.   Yes Padmini Farnsworth MD   atorvastatin (LIPITOR) 20 MG tablet Take 20 mg by mouth Every Night.   Yes Padmini Farnsworth MD   baclofen (LIORESAL) 10 MG tablet Take 10 mg by mouth 2 (Two) Times a Day.   Yes Padmini Farnsworth MD   carvedilol (COREG) 25 MG tablet Take 25 mg by mouth 2 (Two) Times a Day With Meals.   Yes Padmini Farnsworth MD   clopidogrel (PLAVIX) 75 MG tablet Take 75 mg by mouth Daily.   Yes Padmini Farnsworth MD   diazePAM (VALIUM) 10 MG tablet Take 10 mg by mouth 2 (Two) Times a Day As Needed for Anxiety.   Yes Padmini Farnsworth MD   diazePAM (VALIUM) 2 MG tablet Take 2 mg by mouth 3 (Three) Times a Day As Needed for Anxiety.   Yes Padmini Farnsworth MD   gabapentin (NEURONTIN) 300 MG capsule Take 400 mg by mouth 4 (Four) Times a Day. Takes at 6 12 6 12   Yes Padmini Farnsworth MD   gabapentin (NEURONTIN) 400 MG capsule Take 400 mg by mouth 4 (Four) Times a Day. Takes at 6,12,6,12   Yes Padmini Farnsworth MD   rOPINIRole (REQUIP) 0.5 MG tablet Take 0.5 mg by mouth Every Night. Take 1 hour before bedtime.   Yes Padmini Farnsworth MD   triamterene-hydrochlorothiazide (DYAZIDE) 37.5-25 MG per capsule Take 1 capsule by mouth Every Morning.   Yes Padmini Farnsworth MD   atenolol (TENORMIN) 100 MG tablet Take 100 mg by mouth Daily.    Padmini Farnsworth MD   cyclobenzaprine (FLEXERIL) 10 MG tablet Take  by mouth.    Padmini Farnsworth MD   ibuprofen (ADVIL,MOTRIN) 200 MG tablet Take 200 mg by mouth.    Padmini Farnsworth MD   therapeutic multivitamin-minerals (THERAGRAN-M) tablet Take  by mouth.    Padmini Farnsworth MD     Review of Systems:  Review of Systems   Otherwise complete ROS is negative  except as mentioned above.    Physical Exam:   Temp:  [97.3 °F (36.3 °C)-98.7 °F (37.1 °C)] 97.3 °F (36.3 °C)  Heart Rate:  [64-76] 73  Resp:  [12-21] 18  BP: (141-175)/(67-74) 157/69  Physical exam:  Constitutional:  Well-developed and well-nourished.  No respiratory distress.      HENT:  Head:  Normocephalic and atraumatic.  Mouth:  Moist mucous membranes.    Eyes:    No scleral icterus.    Neck:  Neck supple.  No JVD present.    Cardiovascular:  Normal rate, regular rhythm and normal heart sounds with no murmur.  Pulmonary/Chest:  No respiratory distress, no wheezes, no crackles, with normal breath sounds and good air movement.  Abdominal:  Soft.  Bowel sounds are normal.  No distension and no tenderness.   Musculoskeletal:  No edema, no tenderness, and no deformity.  No red or swollen joints anywhere.    Neurological: Residual speech defect noted.  Alert and oriented to person, place, and time.   No tongue deviation.  No facial droop.  No slurred speech but she does have some difficulty with phonation.   Skin:  Skin is warm and dry. No rash noted. No pallor.   Peripheral vascular: No clubbing, no cyanosis, no edema.    Results Reviewed:  I have personally reviewed current lab, radiology, and data and agree with results.  Lab Results (last 24 hours)     Procedure Component Value Units Date/Time    Crawford Draw [[id number]] Collected:  11/03/19 2107    Specimen:  Blood Updated:  11/03/19 2215    Narrative:       The following orders were created for panel order Crawford Draw.  Procedure                               Abnormality         Status                     ---------                               -----------         ------                     Light Blue Top[[id number]]                                   Final result               Green Top (Gel)[[id number]]                                  Final result               Lavender Top[[id number]]                                     Final result               Gold Top -  SST[[id number]]                                   Final result                 Please view results for these tests on the individual orders.    Light Blue Top [[id number]] Collected:  11/03/19 2107    Specimen:  Blood Updated:  11/03/19 2215     Extra Tube hold for add-on     Comment: Auto resulted       Green Top (Gel) [[id number]] Collected:  11/03/19 2107    Specimen:  Blood Updated:  11/03/19 2215     Extra Tube Hold for add-ons.     Comment: Auto resulted.       Lavender Top [[id number]] Collected:  11/03/19 2107    Specimen:  Blood Updated:  11/03/19 2215     Extra Tube hold for add-on     Comment: Auto resulted       Gold Top - SST [[id number]] Collected:  11/03/19 2107    Specimen:  Blood Updated:  11/03/19 2215     Extra Tube Hold for add-ons.     Comment: Auto resulted.       Comprehensive Metabolic Panel [[id number]]  (Abnormal) Collected:  11/03/19 2107    Specimen:  Blood Updated:  11/03/19 2133     Glucose 122 mg/dL      BUN 19 mg/dL      Creatinine 0.94 mg/dL      Sodium 144 mmol/L      Potassium 4.3 mmol/L      Chloride 103 mmol/L      CO2 27.0 mmol/L      Calcium 9.4 mg/dL      Total Protein 7.2 g/dL      Albumin 4.20 g/dL      ALT (SGPT) 17 U/L      AST (SGOT) 29 U/L      Alkaline Phosphatase 121 U/L      Total Bilirubin 0.6 mg/dL      eGFR Non African Amer 59 mL/min/1.73      Globulin 3.0 gm/dL      A/G Ratio 1.4 g/dL      BUN/Creatinine Ratio 20.2     Anion Gap 14.0 mmol/L     Narrative:       GFR Normal >60  Chronic Kidney Disease <60  Kidney Failure <15    Troponin [[id number]]  (Normal) Collected:  11/03/19 2107    Specimen:  Blood Updated:  11/03/19 2133     Troponin T <0.010 ng/mL     Narrative:       Troponin T Reference Range:  <= 0.03 ng/mL-   Negative for AMI  >0.03 ng/mL-     Abnormal for myocardial necrosis.  Clinicians would have to utilize clinical acumen, EKG, Troponin and serial changes to determine if it is an Acute Myocardial Infarction or myocardial injury due to an underlying chronic  condition.     Protime-INR [[id number]]  (Normal) Collected:  11/03/19 2107    Specimen:  Blood Updated:  11/03/19 2128     Protime 13.6 Seconds      INR 1.06    Narrative:       Therapeutic range for most indications is 2.0-3.0 INR,  or 2.5-3.5 for mechanical heart valves.    aPTT [[id number]]  (Normal) Collected:  11/03/19 2107    Specimen:  Blood Updated:  11/03/19 2128     PTT 27.5 seconds     Narrative:       The recommended Heparin therapeutic range is 68-97 seconds.    CBC & Differential [[id number]] Collected:  11/03/19 2107    Specimen:  Blood Updated:  11/03/19 2111    Narrative:       The following orders were created for panel order CBC & Differential.  Procedure                               Abnormality         Status                     ---------                               -----------         ------                     CBC Auto Differential[[id number]]        Abnormal            Final result                 Please view results for these tests on the individual orders.    CBC Auto Differential [[id number]]  (Abnormal) Collected:  11/03/19 2107    Specimen:  Blood Updated:  11/03/19 2111     WBC 7.96 10*3/mm3      RBC 3.95 10*6/mm3      Hemoglobin 11.7 g/dL      Hematocrit 35.9 %      MCV 90.9 fL      MCH 29.6 pg      MCHC 32.6 g/dL      RDW 12.9 %      RDW-SD 42.5 fl      MPV 9.9 fL      Platelets 231 10*3/mm3      Neutrophil % 78.6 %      Lymphocyte % 11.7 %      Monocyte % 8.0 %      Eosinophil % 1.0 %      Basophil % 0.3 %      Immature Grans % 0.4 %      Neutrophils, Absolute 6.26 10*3/mm3      Lymphocytes, Absolute 0.93 10*3/mm3      Monocytes, Absolute 0.64 10*3/mm3      Eosinophils, Absolute 0.08 10*3/mm3      Basophils, Absolute 0.02 10*3/mm3      Immature Grans, Absolute 0.03 10*3/mm3      nRBC 0.0 /100 WBC     Extra Tubes [[id number]] Collected:  11/03/19 2107    Specimen:  Blood, Venous Line Updated:  11/03/19 2109    Narrative:       The following orders were created for panel order Extra  Tubes.  Procedure                               Abnormality         Status                     ---------                               -----------         ------                     Alvarez Top[[id number]]                                         In process                   Please view results for these tests on the individual orders.    Gray Top [[id number]] Collected:  11/03/19 2107    Specimen:  Blood Updated:  11/03/19 2109    POC Glucose Once [[id number]]  (Normal) Collected:  11/03/19 2036    Specimen:  Blood Updated:  11/03/19 2048     Glucose 114 mg/dL      Comment: Result Not ConfirmedOperator: [id number] LEOBARDO ALONAMeter ID: [id number]           Imaging Results (Last 24 Hours)     Procedure Component Value Units Date/Time    XR Femur 2 View Bilateral [[id number]] Collected:  11/03/19 2123     Updated:  11/03/19 2144    Narrative:       Bilateral femurs two view on 11/3/2019    CLINICAL INDICATION: Bilateral leg pain after fall    COMPARISON: None    FINDINGS:    Left femur: Changes of osteoarthritis are noted in the knee.  There are no fractures. Visualized joints are well aligned.    Right femur: Mild changes of osteoarthritis are noted in the  knee. There are no fractures. Visualized joints are well aligned.      Impression:       No acute abnormality in either femur.    Electronically signed by:  Hesham Hernandez  11/3/2019 9:43 PM  [facility] Workstation: [id number]    XR Pelvis 1 or 2 View [[id number]] Collected:  11/03/19 2123     Updated:  11/03/19 2143    Narrative:       Pelvis single view on 11/3/2019    CLINICAL INDICATION: Pain after fall    COMPARISON: None    FINDINGS: Extensive hardware is partially imaged in the lower  lumbar spine extending into the bilateral iliac bones. The hips  are well located. The SI joints are well aligned. Calcification  in the right pelvis likely represents calcified uterine fibroid.  There are no fractures.      Impression:       No acute abnormality.    Electronically signed  by:  Hesham Hernandez  11/3/2019 9:42 PM  [facility] Workstation: [id number]    XR Chest 1 View [[id number]] Collected:  11/03/19 2054     Updated:  11/03/19 2109    Narrative:         Chest single view on  11/3/2019     CLINICAL INDICATION: Stroke protocol    COMPARISON: 8/6/2015    FINDINGS: Posterior fusion hardware is noted in the lower  thoracic and upper lumbar spine. Vascular calcification is noted  in the aorta. The lungs are clear. Cardiac, hilar and mediastinal  contours are within normal limits. Pulmonary vascularity is  within normal.      Impression:       No acute disease.    Electronically signed by:  Hesham Hernandez  11/3/2019 9:08 PM  [facility] Workstation: [id number]    CT Head Without Contrast Stroke Protocol [[id number]] Collected:  11/03/19 2051     Updated:  11/03/19 2108    Narrative:         CT head without contrast on 11/3/2019     CLINICAL INDICATION: Slurred speech, weakness, stroke    TECHNIQUE: Multiple axial images are obtained throughout the head  without the administration of contrast. This exam was performed  according to our departmental dose-optimization program, which  includes automated exposure control, adjustment of the mA and/or  kV according to patient size and/or use of iterative  reconstruction technique.   Total DLP is 908.2 mGy*cm.    COMPARISON: None    FINDINGS: There is no hydrocephalus. There is no hemorrhage.  There are no abnormal extra-axial fluid collections. There is no  mass, mass effect or midline shift. Mucous retention cyst is  noted in the right maxillary sinus. No bony abnormality is noted.  There is a small age-indeterminate left periventricular lacunar  infarct. If clinically indicated MRI could better evaluate. No  other CT evidence of infarct is noted.      Impression:       Small age-indeterminate left periventricular lacunar  infarct. If clinically indicated MRI could better evaluate.    Electronically signed by:  Hesham Hernandez  11/3/2019 9:07 PM  [facility] Workstation:  [id number]        Assessment:    Active Hospital Problems    Diagnosis   • Altered mental status       Plan:  Patient's altered mental status has apparently resolved by the time she got to the ED.  As stated earlier, she provided her history at her home baseline.  The major complaints that are obtained from the patient was difficulty ambulating which should have been addressed by physical therapy that the patient was meant to initiate in the a.m.  However, since the neurologist recommended admission, the patient will be admitted and the recommended evaluation will be made prior to her discharge.    I discussed the patients findings and my recommendations with the patient and those accompanied the patient to the ER.     Kj Jones MD  11/04/19  12:57 AM

## 2019-11-04 NOTE — ED NOTES
"Pt transported to SDU via stretcher by this nurse and BOLA Dodge. Pt moved from stretcher by this nurse, Echo hardin, Jessica RN, KELSIE Hopson and CNA from floor. This nurse informed KELSIE Hopson regarding NIH stroke scale at bedside handoff asking her \"do you want to do the stroke scale\", Emiliana's reply was \"no, I am not that nurse\". Stroke scale was not performed by the accepting nurse at bedside at this time. ER charge nurse Elena informed of the situation by this nurse upon returning to ER.      Jeferson Cintron RN  11/03/19 7573    "

## 2019-11-04 NOTE — PLAN OF CARE
Problem: Patient Care Overview  Goal: Plan of Care Review  Outcome: Ongoing (interventions implemented as appropriate)   11/04/19 6074   Coping/Psychosocial   Plan of Care Reviewed With patient   OTHER   Outcome Summary PT viki completed bedside. While there we witnessed several repetitive spasms RLE which appear hip flx/psoas w/ overflow tone/spasm to R quads, but her quads didn't extend R knee w/ the spasms. She states she had found sitting can decrease spasms some. Primary safety risk is her reports of 2 types fall, spasms of LE (s), and LE collapsing which she feels is stroke related more than spine, but Dr Monson recommended in rounds today she see her surgeon for follow up as well post d/c. She was able to move sup to sit w/ min assist of 1 w/ HOB up and use of rail. Sit to stand and bathroom transfer, gait needed min assist of 1 with FWRW and 1 for IV. She walked 100 ft in braden after in room gait of 14 ft. No spasms in gait nor collapse tho she reported after 75 ft feeling she is at risk for collapse of LEs due to fatigue. To help decrease spasms we placed her in recliner which she reij well, exit alarm on & RN aware. She reported no dizziness w/ any activity and VS not signficant change. May benefit from IRF prior to home. She will need 24/7 care for safety til more stable in motor control in mobility.

## 2019-11-05 ENCOUNTER — APPOINTMENT (OUTPATIENT)
Dept: MRI IMAGING | Facility: HOSPITAL | Age: 74
End: 2019-11-05

## 2019-11-05 LAB
ANION GAP SERPL CALCULATED.3IONS-SCNC: 10 MMOL/L (ref 5–15)
BASOPHILS # BLD AUTO: 0.02 10*3/MM3 (ref 0–0.2)
BASOPHILS NFR BLD AUTO: 0.4 % (ref 0–1.5)
BUN BLD-MCNC: 12 MG/DL (ref 8–23)
BUN/CREAT SERPL: 18.2 (ref 7–25)
CALCIUM SPEC-SCNC: 8.6 MG/DL (ref 8.6–10.5)
CHLORIDE SERPL-SCNC: 106 MMOL/L (ref 98–107)
CO2 SERPL-SCNC: 26 MMOL/L (ref 22–29)
CREAT BLD-MCNC: 0.66 MG/DL (ref 0.57–1)
DEPRECATED RDW RBC AUTO: 42.8 FL (ref 37–54)
EOSINOPHIL # BLD AUTO: 0.12 10*3/MM3 (ref 0–0.4)
EOSINOPHIL NFR BLD AUTO: 2.1 % (ref 0.3–6.2)
ERYTHROCYTE [DISTWIDTH] IN BLOOD BY AUTOMATED COUNT: 13 % (ref 12.3–15.4)
GFR SERPL CREATININE-BSD FRML MDRD: 88 ML/MIN/1.73
GLUCOSE BLD-MCNC: 134 MG/DL (ref 65–99)
HCT VFR BLD AUTO: 31.8 % (ref 34–46.6)
HGB BLD-MCNC: 10.5 G/DL (ref 12–15.9)
IMM GRANULOCYTES # BLD AUTO: 0.01 10*3/MM3 (ref 0–0.05)
IMM GRANULOCYTES NFR BLD AUTO: 0.2 % (ref 0–0.5)
LYMPHOCYTES # BLD AUTO: 1.2 10*3/MM3 (ref 0.7–3.1)
LYMPHOCYTES NFR BLD AUTO: 21.4 % (ref 19.6–45.3)
MCH RBC QN AUTO: 29.9 PG (ref 26.6–33)
MCHC RBC AUTO-ENTMCNC: 33 G/DL (ref 31.5–35.7)
MCV RBC AUTO: 90.6 FL (ref 79–97)
MONOCYTES # BLD AUTO: 0.51 10*3/MM3 (ref 0.1–0.9)
MONOCYTES NFR BLD AUTO: 9.1 % (ref 5–12)
NEUTROPHILS # BLD AUTO: 3.74 10*3/MM3 (ref 1.7–7)
NEUTROPHILS NFR BLD AUTO: 66.8 % (ref 42.7–76)
NRBC BLD AUTO-RTO: 0 /100 WBC (ref 0–0.2)
PLATELET # BLD AUTO: 204 10*3/MM3 (ref 140–450)
PMV BLD AUTO: 9.7 FL (ref 6–12)
POTASSIUM BLD-SCNC: 3.8 MMOL/L (ref 3.5–5.2)
RBC # BLD AUTO: 3.51 10*6/MM3 (ref 3.77–5.28)
SODIUM BLD-SCNC: 142 MMOL/L (ref 136–145)
WBC NRBC COR # BLD: 5.6 10*3/MM3 (ref 3.4–10.8)

## 2019-11-05 PROCEDURE — 96361 HYDRATE IV INFUSION ADD-ON: CPT

## 2019-11-05 PROCEDURE — 92507 TX SP LANG VOICE COMM INDIV: CPT | Performed by: SPEECH-LANGUAGE PATHOLOGIST

## 2019-11-05 PROCEDURE — 92526 ORAL FUNCTION THERAPY: CPT | Performed by: SPEECH-LANGUAGE PATHOLOGIST

## 2019-11-05 PROCEDURE — 85025 COMPLETE CBC W/AUTO DIFF WBC: CPT | Performed by: HOSPITALIST

## 2019-11-05 PROCEDURE — 25010000002 ENOXAPARIN PER 10 MG: Performed by: PSYCHIATRY & NEUROLOGY

## 2019-11-05 PROCEDURE — 97116 GAIT TRAINING THERAPY: CPT

## 2019-11-05 PROCEDURE — 96372 THER/PROPH/DIAG INJ SC/IM: CPT

## 2019-11-05 PROCEDURE — 72148 MRI LUMBAR SPINE W/O DYE: CPT

## 2019-11-05 PROCEDURE — 80048 BASIC METABOLIC PNL TOTAL CA: CPT | Performed by: HOSPITALIST

## 2019-11-05 PROCEDURE — G0378 HOSPITAL OBSERVATION PER HR: HCPCS

## 2019-11-05 PROCEDURE — 72146 MRI CHEST SPINE W/O DYE: CPT

## 2019-11-05 PROCEDURE — 99213 OFFICE O/P EST LOW 20 MIN: CPT | Performed by: PSYCHIATRY & NEUROLOGY

## 2019-11-05 PROCEDURE — 97530 THERAPEUTIC ACTIVITIES: CPT

## 2019-11-05 RX ADMIN — CARVEDILOL 25 MG: 25 TABLET, FILM COATED ORAL at 10:14

## 2019-11-05 RX ADMIN — THERA TABS 1 TABLET: TAB at 10:14

## 2019-11-05 RX ADMIN — GABAPENTIN 400 MG: 400 CAPSULE ORAL at 17:53

## 2019-11-05 RX ADMIN — SODIUM CHLORIDE, PRESERVATIVE FREE 10 ML: 5 INJECTION INTRAVENOUS at 20:40

## 2019-11-05 RX ADMIN — ASPIRIN 81 MG 81 MG: 81 TABLET ORAL at 10:15

## 2019-11-05 RX ADMIN — ATORVASTATIN CALCIUM 80 MG: 40 TABLET, FILM COATED ORAL at 20:40

## 2019-11-05 RX ADMIN — ENOXAPARIN SODIUM 40 MG: 40 INJECTION SUBCUTANEOUS at 00:26

## 2019-11-05 RX ADMIN — BACLOFEN 10 MG: 10 TABLET ORAL at 10:13

## 2019-11-05 RX ADMIN — ROPINIROLE 0.5 MG: 0.5 TABLET, FILM COATED ORAL at 20:40

## 2019-11-05 RX ADMIN — SODIUM CHLORIDE 100 ML/HR: 9 INJECTION, SOLUTION INTRAVENOUS at 03:43

## 2019-11-05 RX ADMIN — CARVEDILOL 25 MG: 25 TABLET, FILM COATED ORAL at 17:53

## 2019-11-05 RX ADMIN — BACLOFEN 10 MG: 10 TABLET ORAL at 20:39

## 2019-11-05 RX ADMIN — TRIAMTERENE AND HYDROCHLOROTHIAZIDE 1 CAPSULE: 25; 37.5 CAPSULE ORAL at 06:12

## 2019-11-05 RX ADMIN — SODIUM CHLORIDE, PRESERVATIVE FREE 10 ML: 5 INJECTION INTRAVENOUS at 10:14

## 2019-11-05 RX ADMIN — GABAPENTIN 400 MG: 400 CAPSULE ORAL at 12:41

## 2019-11-05 RX ADMIN — GABAPENTIN 400 MG: 400 CAPSULE ORAL at 00:26

## 2019-11-05 RX ADMIN — CLOPIDOGREL BISULFATE 75 MG: 75 TABLET ORAL at 10:14

## 2019-11-05 RX ADMIN — GABAPENTIN 400 MG: 400 CAPSULE ORAL at 06:12

## 2019-11-05 NOTE — CONSULTS
Adult Nutrition  Assessment    Patient Name:  Rama Goel  YOB: 1945  MRN: [id number]  Admit Date:  11/3/2019    Assessment Date:  11/5/2019    Comments:  Pt with hx stroke.  Pt reports decreased appetite.  Indicates she doesn't like ground meat.  Reports coughing with liquids.  Pt is being followed by SLP.  Intake 75% - 1x, 25% - 1x.  Pt willing to receive Yogurt at breakfast and cottage cheese with lunch and supper.  Meds and labs reviewed.    Reason for Assessment     Row Name 11/05/19 1608          Reason for Assessment    Reason For Assessment  identified at risk by screening criteria     Identified At Risk by Screening Criteria  difficulty chewing/swallowing             Labs/Tests/Procedures/Meds     Row Name 11/05/19 1610          Labs/Procedures/Meds    Lab Results Reviewed  reviewed        Medications    Pertinent Medications Reviewed  reviewed           Estimated/Assessed Needs     Row Name 11/05/19 1612          Calculation Measurements    Weight Used For Calculations  71.4 kg (157 lb 6.5 oz)        Estimated/Assessed Needs    Additional Documentation  Calorie Requirements (Group);Fluid Requirements (Group);Pope-St. Jeor Equation (Group);Protein Requirements (Group)        Calorie Requirements    Estimated Calorie Requirement (kcal/day)  1619     Estimated Calorie Need Method  Pope-St Jeor        Pope-St. Jeor Equation    RMR (Pope-St. Jeor Equation)  1245.75        Protein Requirements    Weight Used For Protein Calculations  71.4 kg (157 lb 6.5 oz)     Est Protein Requirement Amount (gms/kg)  1.2 gm protein     Estimated Protein Requirements (gms/day)  85.68        Fluid Requirements    Estimated Fluid Requirements (mL/day)  1785     RDA Method (mL)  1785     Ajay-Segar Method (over 20 kg)  2928         Nutrition Prescription Ordered     Row Name 11/05/19 1614          Nutrition Prescription PO    Current PO Diet  Soft Texture     Texture  Ground         Evaluation  of Received Nutrient/Fluid Intake     Row Name 11/05/19 1615          PO Evaluation    Number of Meals  2     % PO Intake  75% - 1x, 25% - 1x               Electronically signed by:  Kimberly Ashley RD  11/05/19 4:15 PM

## 2019-11-05 NOTE — THERAPY TREATMENT NOTE
Acute Care - Physical Therapy Treatment Note  [facility]     Patient Name: Rama Goel  : 1945  MRN: [id number]  Today's Date: 2019  Onset of Illness/Injury or Date of Surgery: 19     Referring Physician: Yolanda METCALF    Admit Date: 11/3/2019    Visit Dx:    ICD-10-CM ICD-9-CM   1. Altered mental status, unspecified altered mental status type R41.82 780.97   2. Oropharyngeal dysphagia R13.12 787.22   3. Impaired mobility and ADLs Z74.09 799.89   4. Impaired functional mobility, balance, gait, and endurance Z74.09 V49.89   5. Dysarthria R47.1 784.51     Patient Active Problem List   Diagnosis   • Altered mental status       Therapy Treatment    Rehabilitation Treatment Summary     Row Name 19 1550 19 1435 19 1256       Treatment Time/Intention    Discipline  physical therapy assistant  -LISA  speech language pathologist  -CK  occupational therapy assistant  -BB    Document Type  therapy note (daily note)  -JA  therapy note (daily note)  -CK  --    Subjective Information  no complaints  -LISA  complains of;pain  -CK  --    Mode of Treatment  individual therapy;physical therapy  -LISA  speech-language pathology;individual therapy  -CK  --    Patient/Family Observations  Pt. sitting up in recliner   -LISA  Pt sitting in recliner at bedside  -CK  --    Care Plan Review  --  evaluation/treatment results reviewed;care plan/treatment goals reviewed;risks/benefits reviewed;current/potential barriers reviewed;patient/other agree to care plan  -CK  --    Patient Effort  excellent  -JA  good  -CK2  --    Reason Treatment Not Performed  --  --  unavailable for treatment pt wiith MRI of spine today,no results available yet  -BB    Existing Precautions/Restrictions  fall LE spasms/collapsing; reports incontinence & bowel not worki  -LISA  --  --    Recorded by [JA] Ulysses Gill, PTA 19 1623 [CK] Romina Garcia, MS CCC-SLP 19 1440  [CK2] Romina Garcia, MS CCC-SLP 19  1511 [BB] Dionne Tang, MITCHELL/L 11/05/19 1257    Row Name 11/05/19 0951             Treatment Time/Intention    Existing Precautions/Restrictions  fall LE spasms/collapsing; reports incontinence & bowel not worki  -GB      Recorded by [GB] Kandy Camara, PT 11/05/19 1452      Row Name 11/05/19 1550 11/05/19 0951          Vital Signs    Pre Systolic BP Rehab  165  -JA  176  -GB     Pre Treatment Diastolic BP  71  -JA  79  -GB     Post Systolic BP Rehab  175  -JA  164  -GB2     Post Treatment Diastolic BP  74  -JA  71  -GB2     Pretreatment Heart Rate (beats/min)  70  -JA  80  -GB     Posttreatment Heart Rate (beats/min)  68  -JA  88  -GB2     Pre SpO2 (%)  --  96  -GB     O2 Delivery Pre Treatment  --  room air  -GB     Post SpO2 (%)  --  96  -GB2     O2 Delivery Post Treatment  --  room air  -GB2     Pre Patient Position  Sitting  -JA  Sitting  -GB     Intra Patient Position  Standing  -JA  Standing  -GB2     Post Patient Position  Sitting  -JA  Sitting  -GB2     Recorded by [JA] Ulysses Gill, PTA 11/05/19 1623 [GB] Kandy Camara, PT 11/05/19 0958  [GB2] Kandy Camara, PT 11/05/19 1447     Row Name 11/05/19 1550 11/05/19 0951          Cognitive Assessment/Intervention- PT/OT    Affect/Mental Status (Cognitive)  WFL  -JA  WFL  -GB     Orientation Status (Cognition)  oriented x 4  -JA  oriented x 4  -GB     Follows Commands (Cognition)  follows multi-step commands;over 90% accuracy  -JA  follows multi-step commands;over 90% accuracy  -GB     Personal Safety Interventions  --  fall prevention program maintained;gait belt;muscle strengthening facilitated;nonskid shoes/slippers when out of bed;supervised activity  -GB     Recorded by [JA] Ulysses Gill, PTA 11/05/19 1623 [GB] Kandy Camara, PT 11/05/19 1447     Row Name 11/05/19 0951             Bed Mobility Assessment/Treatment    Bed Mobility Assessment/Treatment  bed mobility (all) activities  -GB       Industry Level (Bed Mobility)  independent  -GB      Scooting/Bridging Industry (Bed Mobility)  independent  -GB      Supine-Sit Industry (Bed Mobility)  independent  -GB      Assistive Device (Bed Mobility)  bed rails;head of bed elevated  -GB      Recorded by [GB] Kandy Camara, PT 11/05/19 1447      Row Name 11/05/19 1550 11/05/19 0951          Toilet Transfer    Type (Toilet Transfer)  sit-stand;stand-sit  -  -- x2  -GB     Industry Level (Toilet Transfer)  supervision  -JA  supervision  -GB     Assistive Device (Toilet Transfer)  walker, front-wheeled  -LISA  walker, front-wheeled  -GB     Recorded by [JA] Ulysses Gill, PTA 11/05/19 1623 [GB] Kandy Camara, PT 11/05/19 1447     Row Name 11/05/19 1550 11/05/19 0951          Gait/Stairs Assessment/Training    Gait/Stairs Assessment/Training  gait/ambulation independence;gait/ambulation assistive device;distance ambulated;gait pattern;gait deviations  -  gait/ambulation independence;gait/ambulation assistive device;distance ambulated;gait pattern;gait deviations  -     Industry Level (Gait)  contact guard;1 person assist  -  contact guard;1 person assist  -     Assistive Device (Gait)  walker, front-wheeled  -JA  walker, front-wheeled  -GB     Distance in Feet (Gait)  200'  -  8,14,204,12  -GB     Pattern (Gait)  step-through  -  swing-through  -     Industry Level (Stairs)  supervision  -  --     Handrail Location (Stairs)  right side (ascending)  -  --     Number of Steps (Stairs)  3x2  -JA  --     Ascending Technique (Stairs)  step-to-step  -JA  --     Descending Technique (Stairs)  step-over-step  -  --     Comment (Gait/Stairs)  --  improved gait speed and pattern; no collapsing or feeling she would collapse per her report. Spasms better per pt; she wants to be indep of bed alarm which she discussed w/ RN; concern for collapse of LE's and fall still present and discussed w/ her and RN  present  -GB     Recorded by [JA] Ulysses Gill, PTA 11/05/19 1623 [GB] Kandy Camara, PT 11/05/19 1447     Row Name 11/05/19 1435 11/05/19 0951          Positioning and Restraints    Pre-Treatment Position  sitting in chair/recliner  -CK  in bed  -GB     Post Treatment Position  --  chair  -GB     In Chair  sitting;call light within reach;encouraged to call for assist  -CK2  notified nsg;sitting;with nsg;call light within reach;encouraged to call for assist  -GB     Recorded by [CK] Romina Garcia, MS CCC-SLP 11/05/19 1440  [CK2] Romina Garcia, MS CCC-SLP 11/05/19 1511 [GB] Kandy Camara, PT 11/05/19 1452     Row Name 11/05/19 1550 11/05/19 1435 11/05/19 0951       Pain Scale: Numbers Pre/Post-Treatment    Pain Scale: Numbers, Pretreatment  0/10 - no pain  -JA  2/10  -CK  2/10  -GB    Pain Scale: Numbers, Post-Treatment  0/10 - no pain  -JA  2/10  -CK2  --    Pain Location - Side  --  Right  -CK  Right  -GB    Pain Location  --  hip  -CK  hip  -GB    Pre/Post Treatment Pain Comment  --  declines meds  -CK  no complaints post gait;   -GB2    Pain Intervention(s)  --  --  Repositioned;Ambulation/increased activity  -GB2    Recorded by [JA] Ulysses Gill, PTA 11/05/19 1623 [CK] Romina Garcia, MS CCC-SLP 11/05/19 1440  [CK2] Romina Garcia, MS CCC-SLP 11/05/19 1511 [GB] Kandy Camara, PT 11/05/19 0958  [GB2] Kandy Camara, PT 11/05/19 1447    Row Name 11/05/19 1550 11/05/19 0951          Coping    Observed Emotional State  accepting;calm;cooperative  -JA  accepting;calm;cooperative  -GB     Recorded by [JA] Ulysses Gill, PTA 11/05/19 1623 [GB] Kandy Camara, PT 11/05/19 1452     Row Name 11/05/19 0951             Plan of Care Review    Plan of Care Reviewed With  patient  -GB      Recorded by [GB] Kandy Camara, PT 11/05/19 1452      Row Name 11/05/19 1550 11/05/19 0951          Outcome Summary/Treatment Plan  (PT)    Daily Summary of Progress (PT)  progress toward functional goals as expected  -JA  progress toward functional goals as expected;progress toward functional goals is good  -GB     Barriers to Overall Progress (PT)  --  spasms but better today  -GB     Anticipated Discharge Disposition (PT)  anticipate therapy at next level of care  -JA  --     Recorded by [JA] Ulysses Gill, PTA 11/05/19 1623 [GB] Kandy Camara, PT 11/05/19 1452     Row Name 11/05/19 1435             Outcome Summary/Treatment Plan (SLP)    Daily Summary of Progress (SLP)  progress toward functional goals is good  -CK      Barriers to Overall Progress (SLP)  compliance w/strategies  -CK      Plan for Continued Treatment (SLP)  Continue POC  -CK      Anticipated Dischage Disposition  anticipate therapy at next level of care  -CK      Recorded by [CK] Romina Garcia, MS CCC-SLP 11/05/19 1511        User Key  (r) = Recorded By, (t) = Taken By, (c) = Cosigned By    Initials Name Effective Dates Discipline    GB Kandy Camara, PT 04/03/18 -  PT    Romina Griffin, MS CCC-SLP 04/03/18 -  SLP    JA Ulysses Gill, PTA 03/07/18 -  PT    BB Dionne Tang COTA/L 03/07/18 -  OT               Rehab Goal Summary     Row Name 11/05/19 1435 11/05/19 0951          Physical Therapy Goals    Bed Mobility Goal Selection (PT)  --  bed mobility, PT goal 1  -GB     Transfer Goal Selection (PT)  --  transfer, PT goal 1  -GB     Gait Training Goal Selection (PT)  --  gait training, PT goal 1  -GB     Stairs Goal Selection (PT)  --  stairs, PT goal 1  -GB        Bed Mobility Goal 1 (PT)    Activity/Assistive Device (Bed Mobility Goal 1, PT)  --  bed mobility activities, all;rolling to left;rolling to right;supine to sit  -GB     [place] Level/Cues Needed (Bed Mobility Goal 1, PT)  --  independent  -GB     Time Frame (Bed Mobility Goal 1, PT)  --  short term goal (STG);1 week  -GB     Progress/Outcomes (Bed  Mobility Goal 1, PT)  --  goal met  -GB        Transfer Goal 1 (PT)    Activity/Assistive Device (Transfer Goal 1, PT)  --  bed-to-chair/chair-to-bed;toilet  -GB     [place] Level/Cues Needed (Transfer Goal 1, PT)  --  conditional independence  -GB     Time Frame (Transfer Goal 1, PT)  --  long term goal (LTG);by discharge  -GB     Barriers (Transfers Goal 1, PT)  --  weakness; falls   -GB     Progress/Outcome (Transfer Goal 1, PT)  --  goal partially met;continuing progress toward goal  -GB        Gait Training Goal 1 (PT)    Activity/Assistive Device (Gait Training Goal 1, PT)  --  gait (walking locomotion);assistive device use;decrease fall risk;diminish gait deviation;forward stepping;increase endurance/gait distance;sidestepping;turning, left;turning, right;walker, rolling  -GB     [place] Level (Gait Training Goal 1, PT)  --  conditional independence  -GB     Distance (Gait Goal 1, PT)  --  450 or more feet w/out falls  -GB     Time Frame (Gait Training Goal 1, PT)  --  long term goal (LTG);by discharge  -GB     Barriers (Gait Training Goal 1, PT)  --  weakness; spasms  -GB     Progress/Outcome (Gait Training Goal 1, PT)  --  goal ongoing;goal partially met;continuing progress toward goal  -GB        Stairs Goal 1 (PT)    Activity/Assistive Device (Stairs Goal 1, PT)  --  ascending stairs;descending stairs;decrease fall risk  -GB     [place] Level/Cues Needed (Stairs Goal 1, PT)  --  conditional independence  -GB     Number of Stairs (Stairs Goal 1, PT)  --  1 step for curb if in community and if reji  -GB     Time Frame (Stairs Goal 1, PT)  --  by discharge  -GB     Barriers (Stairs Goal 1, PT)  --  spasms  -GB     Progress/Outcome (Stairs Goal 1, PT)  --  continuing progress toward goal;goal not met  -GB        Swallow Goals (SLP)    Oral Nutrition/Hydration Goal Selection (SLP)  oral nutrition/hydration, SLP goal 1  -CK  --     Swallow Compensatory Strategies Goal Selection (SLP)  swallow  compensatory strategies, SLP goal 1  -CK  --        Oral Nutrition/Hydration Goal 1 (SLP)    Oral Nutrition/Hydration Goal 1, SLP  Pt to tolerate least restrictive diet with no s/s of aspiration for adequate nutirtion and hydration.   -CK  --     Time Frame (Oral Nutrition/Hydration Goal 1, SLP)  by discharge  -CK  --     Barriers (Oral Nutrition/Hydration Goal 1, SLP)  compliance w/swallowing strategies  -CK  --     Progress/Outcomes (Oral Nutrition/Hydration Goal 1, SLP)  goal ongoing  -CK  --        Swallow Compensatory Strategies Goal 1 (SLP)    Activity (Swallow Compensatory Strategies/Techniques Goal 1, SLP)  compensatory strategies;small bites;chin tuck posture;alternate food/liquid intake;postural techniques;during p.o. trials;liquids by teaspoon only  -CK  --     Elim/Accuracy (Swallow Compensatory Strategies/Techniques Goal 1, SLP)  independently (over 90% accuracy)  -CK  --     Time Frame (Swallow Compensatory Strategies/Techniques Goal 1, SLP)  by discharge  -CK  --     Barriers (Swallow Compensatory Strategies/Techniques Goal 1, SLP)  compliance  -CK  --     Progress/Outcomes (Swallow Compensatory Strategies/Techniques Goal 1, SLP)  goal ongoing  -CK  --        Communication Treatment Objective and Progress Goals (SLP)    Motor Speech/Dysarthria Treatment Objectives  Motor Speech/Dysarthria Treatment Objectives (Group)  -CK  --        Motor Speech/Dysarthria Treatment Objectives    Articulation Selection  articulation, SLP goal 1  -CK  --        Articulation Goal 1 (SLP)    Improve Articulation Goal 1 (SLP)  by over-articulating at phrase level;by over-articulating in connected speech;90%  -CK  --     Time Frame (Articulation Goal 1, SLP)  by discharge  -CK  --     Barriers (Articulation Goal 1, SLP)  right sided weakness  -CK  --     Progress (Articulation Goal 1, SLP)  60%  -CK  --     Progress/Outcomes (Articulation Goal 1, SLP)  goal ongoing  -CK  --       User Key  (r) = Recorded By, (t) =  Taken By, (c) = Cosigned By    Initials Name Provider Type Discipline    Kandy Tolentino, PT Physical Therapist PT    Romina Griffin, MS CCC-SLP Speech and Language Pathologist SLP          Physical Therapy Education     Title: PT OT SLP Therapies (Done)     Topic: Physical Therapy (Done)     Point: Mobility training (Done)     Learning Progress Summary           Patient Acceptance, E,D, VU,NR,DU by MARGARET at 11/4/2019  4:12 PM    Comment:  POC; mobiity w/ FWRW and in/out of bathroom; 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  Also discussed ways to manage spasms via positioning and use of hip extension actively to try to counter hip flx spasms.                   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  Also discussed ways to manage spasms via positioning and use of hip extension actively to try to counter hip flx spasms.                   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  Also discussed ways to manage spasms via positioning and use of hip extension actively to try to counter hip flx spasms.                               User Key     Initials Effective Dates Name Provider Type Discipline     04/03/18 -  Kandy Camara, PT Physical Therapist PT                PT Recommendation and Plan  Anticipated Discharge Disposition (PT): anticipate therapy at next level of care  Outcome Summary/Treatment Plan (PT)  Daily Summary of Progress (PT): progress toward functional goals as  expected  Anticipated Discharge Disposition (PT): anticipate therapy at next level of care  Plan of Care Reviewed With: patient  Progress: improving  Outcome Summary: Pt. able to amb. 200' with RW CGA no LOB slightly unsteady, pt. transferred SBA, amb. up & down 3 stepx2 HR at R.   Outcome Measures     Row Name 11/05/19 1550 11/04/19 1600 11/04/19 1402       9 Hole Peg    9-Hole Peg Left  --  --  33 seconds   -ME    9-Hole Peg Right  --  --  40 seconds  pt is right handed   -ME       How much help from another person do you currently need...    Turning from your back to your side while in flat bed without using bedrails?  4  -JA  4  -GB  --    Moving from lying on back to sitting on the side of a flat bed without bedrails?  3  -JA  3  -GB  --    Moving to and from a bed to a chair (including a wheelchair)?  3  -JA  3  -GB  --    Standing up from a chair using your arms (e.g., wheelchair, bedside chair)?  3  -JA  3  -GB  --    Climbing 3-5 steps with a railing?  3  -JA  2  -GB  --    To walk in hospital room?  3  -JA  3  -GB  --    AM-PAC 6 Clicks Score (PT)  19  -JA  18  -GB  --       How much help from another is currently needed...    Putting on and taking off regular lower body clothing?  --  --  3  -ME    Bathing (including washing, rinsing, and drying)  --  --  2  -ME    Toileting (which includes using toilet bed pan or urinal)  --  --  3  -ME    Putting on and taking off regular upper body clothing  --  --  3  -ME    Taking care of personal grooming (such as brushing teeth)  --  --  3  -ME    Eating meals  --  --  4  -ME    AM-PAC 6 Clicks Score (OT)  --  --  18  -ME       Modified Barthel    Modified Barthel Comments  --  --  feeding 5, bathing 0, grooming 0, dressing 5, bowels 10, bladder 10, toilet use 5, transfers 5, mobility 10, stairs 5, total 45/100  -ME       Modified [place] Scale    Modified Tom [place] Scale  --  --  4 - Moderately severe disability.  Unable to walk without assistance, and unable to  attend to own bodily needs without assistance.  -ME       Functional Assessment    Outcome Measure Options  AM-PAC 6 Clicks Basic Mobility (PT)  -LISA  AM-PAC 6 Clicks Basic Mobility (PT)  -MARGARET  AM-PAC 6 Clicks Daily Activity (OT);9 Hole Peg;Modified Barthel Index;Modified Moca  -ME      User Key  (r) = Recorded By, (t) = Taken By, (c) = Cosigned By    Initials Name Provider Type    Kandy Tolentino, PT Physical Therapist    Ulysses Delarosa, PTA Physical Therapy Assistant    Che Hinkle, OT Occupational Therapist         Time Calculation:   PT Charges     Row Name 11/05/19 1625             Time Calculation    Start Time  1550  -JA      Stop Time  1615  -JA      Time Calculation (min)  25 min  -JA         Time Calculation- PT    Total Timed Code Minutes- PT  25 minute(s)  -LISA         Timed Charges    61102 - Gait Training Minutes   15  -JA      68636 - PT Therapeutic Activity Minutes  10  -        User Key  (r) = Recorded By, (t) = Taken By, (c) = Cosigned By    Initials Name Provider Type    Ulysses Delarosa PTA Physical Therapy Assistant        Therapy Charges for Today     Code Description Service Date Service Provider Modifiers Qty    [id number] HC GAIT TRAINING EA 15 MIN 11/5/2019 Ulysses Gill, TOAN GP 1    19224284020 HC PT THERAPEUTIC ACT EA 15 MIN 11/5/2019 Ulysses Gill, TOAN GP 1          PT G-Codes  Outcome Measure Options: AM-PAC 6 Clicks Basic Mobility (PT)  AM-PAC 6 Clicks Score (PT): 19  AM-PAC 6 Clicks Score (OT): 18  Modified [place] Scale: 4 - Moderately severe disability.  Unable to walk without assistance, and unable to attend to own bodily needs without assistance.    Ulysses Gill PTA  11/5/2019

## 2019-11-05 NOTE — PROGRESS NOTES
[facility] Medicine Services  INPATIENT PROGRESS NOTE    Length of Stay: 0  Date of Admission: 11/3/2019  Primary Care Physician: Inna Harris APRN    Subjective   Chief Complaint: Confusion and leg cramps  HPI: Patient states her leg cramps feel much better.  She does state that she is having difficulty with urinary incontinence.    Review of Systems   Constitutional: Negative for appetite change, chills, fatigue, fever and unexpected weight change.   Respiratory: Negative for cough, choking, chest tightness, shortness of breath and wheezing.    Cardiovascular: Negative for chest pain, palpitations and leg swelling.   Gastrointestinal: Negative for abdominal pain, blood in stool, constipation, diarrhea, nausea and vomiting.   Genitourinary: Negative for dysuria, flank pain and hematuria.   Musculoskeletal: Positive for myalgias.        Right lower extremity cramps   Neurological: Negative for dizziness, seizures, syncope, speech difficulty, weakness, light-headedness, numbness and headaches.   Hematological: Does not bruise/bleed easily.        All pertinent negatives and positives are as above. All other systems have been reviewed and are negative unless otherwise stated.     Objective    Temp:  [96.1 °F (35.6 °C)-98 °F (36.7 °C)] 98 °F (36.7 °C)  Heart Rate:  [64-86] 71  Resp:  [16-18] 16  BP: (134-176)/(67-79) 158/67    Physical Exam   Constitutional: She appears well-developed and well-nourished.   HENT:   Head: Normocephalic and atraumatic.   Eyes: EOM are normal. Pupils are equal, round, and reactive to light.   Neck: Normal range of motion. Neck supple.   Cardiovascular: Normal rate, regular rhythm and normal heart sounds. Exam reveals no gallop and no friction rub.   No murmur heard.  Pulmonary/Chest: Effort normal and breath sounds normal. No respiratory distress. She has no wheezes. She has no rales. She exhibits no tenderness.   Abdominal: Soft. Bowel sounds  are normal. She exhibits no distension. There is no tenderness. There is no guarding.   Musculoskeletal: She exhibits no edema.   Skin: Skin is warm and dry.   Psychiatric: She has a normal mood and affect. Her behavior is normal. Thought content normal.   Vitals reviewed.      Results Review:  I have reviewed the labs, radiology results, and diagnostic studies.    Laboratory Data:   Results from last 7 days   Lab Units 11/05/19  1042 11/03/19 2107   SODIUM mmol/L 142 144   POTASSIUM mmol/L 3.8 4.3   CHLORIDE mmol/L 106 103   CO2 mmol/L 26.0 27.0   BUN mg/dL 12 19   CREATININE mg/dL 0.66 0.94   GLUCOSE mg/dL 134* 122*   CALCIUM mg/dL 8.6 9.4   BILIRUBIN mg/dL  --  0.6   ALK PHOS U/L  --  121*   ALT (SGPT) U/L  --  17   AST (SGOT) U/L  --  29   ANION GAP mmol/L 10.0 14.0     Estimated Creatinine Clearance: 65.3 mL/min (by C-G formula based on SCr of 0.66 mg/dL).          Results from last 7 days   Lab Units 11/05/19  1042 11/03/19 2107   WBC 10*3/mm3 5.60 7.96   HEMOGLOBIN g/dL 10.5* 11.7*   HEMATOCRIT % 31.8* 35.9   PLATELETS 10*3/mm3 204 231     Results from last 7 days   Lab Units 11/03/19  2107   INR  1.06       Culture Data:   No results found for: BLOODCX  No results found for: URINECX  No results found for: RESPCX  No results found for: WOUNDCX  No results found for: STOOLCX  No components found for: BODYFLD    Radiology Data:   Imaging Results (Last 24 Hours)     Procedure Component Value Units Date/Time    MRI Thoracic Spine Without Contrast [[id number]] Collected:  11/05/19 1056     Updated:  11/05/19 1419    Narrative:       PROCEDURE: MRI THORACIC SPINE WO CONTRAST    HISTORY: Bilateral lower extremity spasms, urinary incontinence,  altered mental status    PROCEDURE:  MRI of the thoracic spine without gadolinium utilizing  multiplanar and multisequential imaging.    COMPARISON: None    FINDINGS: There is some limitation of evaluation of the lower  thoracic spine as T11 and T12 as well as visualized  portion of  the lumbar spine contain pedicle screw and nelda fixation devices.  There is a small hemangioma in the T6 vertebral body. Bone marrow  signal is otherwise normal  There is normal thoracic vertebral height. The disc spaces are  mildly diffusely narrowed. The bony spinal canal is  developmentally normal in size. The spinal cord signal is normal  on all pulse sequences.    There is mild bilateral neural foraminal narrowing at T10-T11. No  significant central canal stenosis is identified at any level,  though the spinal cord appears likely posteriorly displaced at  the level of T10-T11.        Impression:       1.Mild degenerative changes most prominently at T10-11, as above.  There is mild diffuse disc space narrowing. Evaluation of the  lower thoracic spine is limited by artifact induced by pedicle  screw and nelda fixation.  2. Small hemangioma in the T6 vertebral body.    Electronically signed by:  Camila Ortiz MD  11/5/2019 2:18 PM [facility]  Workstation: [id number]    MRI Lumbar Spine Without Contrast [[id number]] Collected:  11/05/19 1056     Updated:  11/05/19 1332    Narrative:       PROCEDURE: MRI LUMBAR SPINE WO CONTRAST    HISTORY: Bilateral lower extremity spasms, urinary incontinence,  altered mental status    TECHNIQUE:   MRI of the lumbar spine without gadolinium utilizing multiplanar   and multisequential imaging.    COMPARISON: None    FINDINGS:  Examination is limited by extensive pedicle screw and nelda  fixation of T11-S1. There is normal lumbar vertebral height.  There is minimal, grade 1 anterolisthesis of L4 on L5. The bone  marrow signal is normal. The disc spaces are narrowed at multiple  levels, with apparent fusion at L3-L4. There is severe narrowing  at L4-L5, and mild narrowing at remaining levels.. There is disc  dessication noted at The bony spinal canal is developmentally  normal in size. The conus medullaris terminus at a normal level.    There is very limited evaluation of neural foramina  due to  artifact induced by    L1-L2: There is probable mild bilateral neural foraminal  narrowing at this level. The central spinal canal remains widely  patent     L2-L3: Spinal canal and the bilateral neural foramina remain  patent.    L3-L4: Bilateral neural foramina are not well evaluated due to  artifact induced by spinal hardware. There is probably at least  moderate central canal narrowing.    L4-L5: Bilateral neural foramina are not well evaluated due to  artifact. There is probably at least mild-to-moderate bilateral  narrowing. The central spinal canal is mildly narrowed. There may  be laminectomy defect at L4.    L5-S1: There is mild bilateral neural foraminal narrowing. The  central spinal canal remains patent.      Impression:       Very limited evaluation due to artifact induced by  metallic fusion hardware from T11 through S1. There appears to be  at least some neural foraminal and central canal narrowing at  multiple levels, as above.    Electronically signed by:  Camila Ortiz MD  11/5/2019 1:38 PM CST  Workstation: [id number]          I have reviewed the patient's current medications.     Assessment/Plan     Active Hospital Problems    Diagnosis   • Altered mental status       Plan:    1.  Inferior left medullary lacunar infarct: Will continue aspirin, Plavix, and statins.  Physical therapy, Occupational Therapy are working with the patient.  Neurology following.  2.  Right lower extremity muscle spasm: Continue home baclofen.  Much better today.  3.  Dysphagia: Speech therapy following.  Modified barium swallow today.  4.  Hypertension: Continue home medications.  5.  Bilateral lower extremity weakness: Patient also complains of some urinary incontinence.  MRI of thoracic and lumbar spine have been ordered.    Discharge Planning: I expect patient to be discharged to home vs ARU in 1-2 days.        This document has been electronically signed by Blayne Barrett MD on November 5, 2019 4:14 PM

## 2019-11-05 NOTE — PLAN OF CARE
Problem: Patient Care Overview  Goal: Plan of Care Review  Outcome: Ongoing (interventions implemented as appropriate)   11/05/19 1527   Coping/Psychosocial   Plan of Care Reviewed With patient   Plan of Care Review   Progress improving     Goal: Individualization and Mutuality  Outcome: Ongoing (interventions implemented as appropriate)    Goal: Discharge Needs Assessment  Outcome: Ongoing (interventions implemented as appropriate)    Goal: Interprofessional Rounds/Family Conf  Outcome: Ongoing (interventions implemented as appropriate)      Problem: Fall Risk (Adult)  Goal: Identify Related Risk Factors and Signs and Symptoms  Outcome: Ongoing (interventions implemented as appropriate)    Goal: Absence of Fall  Outcome: Ongoing (interventions implemented as appropriate)      Problem: Pain, Chronic (Adult)  Goal: Identify Related Risk Factors and Signs and Symptoms  Outcome: Ongoing (interventions implemented as appropriate)    Goal: Acceptable Pain/Comfort Level and Functional Ability  Outcome: Ongoing (interventions implemented as appropriate)      Problem: Stroke (Ischemic) (Adult)  Goal: Signs and Symptoms of Listed Potential Problems Will be Absent, Minimized or Managed (Stroke)  Outcome: Ongoing (interventions implemented as appropriate)

## 2019-11-05 NOTE — PLAN OF CARE
Problem: Patient Care Overview  Goal: Plan of Care Review  Outcome: Ongoing (interventions implemented as appropriate)  Encourage intake.   11/05/19 5196   Coping/Psychosocial   Plan of Care Reviewed With patient   Plan of Care Review   Progress no change   OTHER   Outcome Summary initial assessment

## 2019-11-05 NOTE — SIGNIFICANT NOTE
Pt with outstanding MRI results, OT will check back tomorrow.     11/05/19 2351   Rehab Treatment   Discipline occupational therapy assistant   Reason Treatment Not Performed other (see comments)

## 2019-11-05 NOTE — PLAN OF CARE
Problem: Patient Care Overview  Goal: Plan of Care Review  Outcome: Ongoing (interventions implemented as appropriate)   11/05/19 9816   Coping/Psychosocial   Plan of Care Reviewed With patient   Plan of Care Review   Progress improving   OTHER   Outcome Summary Pt seen for cognitive and speech therapy this date. Pt had sprite can w/straw in can and stated she had been using a straw w/a chin tuck and left head turn. SLP had pt attempt chin tuck w/oral prep set and breath hold. Pt completed w/head turn included and coughed x1. SLP instructed pt not use head turn w/other strategies. Pt was able to compete successfully w/no overt s/s of aspiration. SLP educated pt on michael and had pt demontrate. SLP instructed pt to complete exercise to strengthen BOT and to complete during commercials when watching tv. Pt educated and given demonstration of over-articulation. Pt was able to demontrate; however, when case management came to the door, pt did not independently use over-articulation and speech was slurred. SLP provided pt w/additional way to practice over-articulation outside of tx session.

## 2019-11-05 NOTE — THERAPY DISCHARGE NOTE
Acute Care - Speech Language Pathology /Discharge  [facility]     Patient Name: Rama Goel  : 1945  MRN: [id number]  Today's Date: 2019  Referring Physician: Yolanda METCALF      Admit Date: 11/3/2019       Pt seen for cognitive and speech therapy this date. Pt had sprite can w/straw in can and stated she had been using a straw w/a chin tuck and left head turn. SLP had pt attempt chin tuck w/oral prep set and breath hold. Pt completed w/head turn included and coughed x1. SLP instructed pt not use head turn w/other strategies. Pt was able to compete successfully w/no overt s/s of aspiration. SLP educated pt on michael and had pt demontrate. SLP instructed pt to complete exercise to strengthen BOT and to complete during commercials when watching tv. Pt educated and given demonstration of over-articulation. Pt was able to demontrate; however, when case management came to the door, pt did not independently use over-articulation and speech was slurred. SLP provided pt w/additional way to practice over-articulation outside of tx session.     Visit Dx:     ICD-10-CM ICD-9-CM   1. Altered mental status, unspecified altered mental status type R41.82 780.97   2. Oropharyngeal dysphagia R13.12 787.22   3. Impaired mobility and ADLs Z74.09 799.89   4. Impaired functional mobility, balance, gait, and endurance Z74.09 V49.89   5. Dysarthria R47.1 784.51     Patient Active Problem List   Diagnosis   • Altered mental status        Therapy Treatment    Rehabilitation Treatment Summary     Row Name 19 1435 19 1256 19 0951       Treatment Time/Intention    Discipline  speech language pathologist  -CK  occupational therapy assistant  -BB  --    Document Type  therapy note (daily note)  -CK  --  --    Subjective Information  complains of;pain  -CK  --  --    Mode of Treatment  speech-language pathology;individual therapy  -CK  --  --    Patient/Family Observations  Pt sitting in recliner at bedside  -CK   --  --    Care Plan Review  evaluation/treatment results reviewed;care plan/treatment goals reviewed;risks/benefits reviewed;current/potential barriers reviewed;patient/other agree to care plan  -CK  --  --    Patient Effort  good  -CK2  --  --    Reason Treatment Not Performed  --  unavailable for treatment pt balaji MRI of spine today,no results available yet  -BB  --    Existing Precautions/Restrictions  --  --  fall LE spasms/collapsing; reports incontinence & bowel not worki  -GB    Recorded by [CK] Romina Garcia, MS CCC-SLP 11/05/19 1440  [CK2] Romina Garcia, MS CCC-SLP 11/05/19 1511 [BB] Dionne Tang COTA/L 11/05/19 1257 [GB] Kandy Camara, PT 11/05/19 1452    Row Name 11/05/19 0951             Vital Signs    Pre Systolic BP Rehab  176  -GB      Pre Treatment Diastolic BP  79  -GB      Post Systolic BP Rehab  164  -GB2      Post Treatment Diastolic BP  71  -GB2      Pretreatment Heart Rate (beats/min)  80  -GB      Posttreatment Heart Rate (beats/min)  88  -GB2      Pre SpO2 (%)  96  -GB      O2 Delivery Pre Treatment  room air  -GB      Post SpO2 (%)  96  -GB2      O2 Delivery Post Treatment  room air  -GB2      Pre Patient Position  Sitting  -GB      Intra Patient Position  Standing  -GB2      Post Patient Position  Sitting  -GB2      Recorded by [GB] Kandy Camara, PT 11/05/19 0958  [GB2] Kandy Camara, PT 11/05/19 1447      Row Name 11/05/19 0951             Cognitive Assessment/Intervention- PT/OT    Affect/Mental Status (Cognitive)  WFL  -GB      Orientation Status (Cognition)  oriented x 4  -GB      Follows Commands (Cognition)  follows multi-step commands;over 90% accuracy  -GB      Personal Safety Interventions  fall prevention program maintained;gait belt;muscle strengthening facilitated;nonskid shoes/slippers when out of bed;supervised activity  -GB      Recorded by [GB] Kandy Camara, PT 11/05/19 1447      Row Name 11/05/19 0916              Bed Mobility Assessment/Treatment    Bed Mobility Assessment/Treatment  bed mobility (all) activities  -GB      [place] Level (Bed Mobility)  independent  -GB      Scooting/Bridging [place] (Bed Mobility)  independent  -GB      Supine-Sit [place] (Bed Mobility)  independent  -GB      Assistive Device (Bed Mobility)  bed rails;head of bed elevated  -GB      Recorded by [GB] Kandy Camara, PT 11/05/19 1447      Row Name 11/05/19 0951             Toilet Transfer    Type (Toilet Transfer)  -- x2  -GB      [place] Level (Toilet Transfer)  supervision  -GB      Assistive Device (Toilet Transfer)  walker, front-wheeled  -GB      Recorded by [GB] Kandy Camara, PT 11/05/19 1447      Row Name 11/05/19 0917             Gait/Stairs Assessment/Training    Gait/Stairs Assessment/Training  gait/ambulation independence;gait/ambulation assistive device;distance ambulated;gait pattern;gait deviations  -GB      [place] Level (Gait)  contact guard;1 person assist  -GB      Assistive Device (Gait)  walker, front-wheeled  -GB      Distance in Feet (Gait)  8,14,204,12  -GB      Pattern (Gait)  swing-through  -GB      Comment (Gait/Stairs)  improved gait speed and pattern; no collapsing or feeling she would collapse per her report. Spasms better per pt; she wants to be indep of bed alarm which she discussed w/ RN; concern for collapse of LE's and fall still present and discussed w/ her and RN present  -GB      Recorded by [GB] Kandy Camara, PT 11/05/19 1447      Row Name 11/05/19 1435 11/05/19 0951          Positioning and Restraints    Pre-Treatment Position  sitting in chair/recliner  -CK  in bed  -GB     Post Treatment Position  --  chair  -GB     In Chair  sitting;call light within reach;encouraged to call for assist  -CK2  notified nsg;sitting;with nsg;call light within reach;encouraged to call for assist  -GB     Recorded by [CK] Romina Garcia, MS CCC-SLP  11/05/19 1440  [CK2] Romina Garcia, MS CCC-SLP 11/05/19 1511 [GB] Kandy Camara, PT 11/05/19 1452     Row Name 11/05/19 1435 11/05/19 0951          Pain Scale: Numbers Pre/Post-Treatment    Pain Scale: Numbers, Pretreatment  2/10  -CK  2/10  -GB     Pain Scale: Numbers, Post-Treatment  2/10  -CK2  --     Pain Location - Side  Right  -CK  Right  -GB     Pain Location  hip  -CK  hip  -GB     Pre/Post Treatment Pain Comment  declines meds  -CK  no complaints post gait;   -GB2     Pain Intervention(s)  --  Repositioned;Ambulation/increased activity  -GB2     Recorded by [CK] Romina Garcia, MS CCC-SLP 11/05/19 1440  [CK2] Romina Garcia, MS CCC-SLP 11/05/19 1511 [GB] Kandy Camara, PT 11/05/19 0958  [GB2] Kandy Camara, PT 11/05/19 1447     Row Name 11/05/19 0951             Coping    Observed Emotional State  accepting;calm;cooperative  -GB      Recorded by [GB] Kandy Camara, PT 11/05/19 1452      Row Name 11/05/19 0951             Plan of Care Review    Plan of Care Reviewed With  patient  -GB      Recorded by [GB] Kandy Camara, PT 11/05/19 1452      Row Name 11/05/19 0951             Outcome Summary/Treatment Plan (PT)    Daily Summary of Progress (PT)  progress toward functional goals as expected;progress toward functional goals is good  -GB      Barriers to Overall Progress (PT)  spasms but better today  -GB      Recorded by [GB] Kandy Camara, PT 11/05/19 1452      Row Name 11/05/19 1435             Outcome Summary/Treatment Plan (SLP)    Daily Summary of Progress (SLP)  progress toward functional goals is good  -CK      Barriers to Overall Progress (SLP)  compliance w/strategies  -CK      Plan for Continued Treatment (SLP)  Continue POC  -CK      Anticipated Dischage Disposition  anticipate therapy at next level of care  -CK      Recorded by [CK] Romina Garcia, MS CCC-SLP 11/05/19 1491        User Key  (r) = Recorded By,  (t) = Taken By, (c) = Cosigned By    Initials Name Effective Dates Discipline    GB Kandy Camara, PT 04/03/18 -  PT    CK Romina Garcia, MS CCC-SLP 04/03/18 -  SLP    BB Dionne Tang, MITCHELL/L 03/07/18 -  OT          Outcome Summary  Outcome Summary/Treatment Plan (SLP)  Daily Summary of Progress (SLP): progress toward functional goals is good (11/05/19 1435 : Romina Garcia, MS CCC-SLP)  Barriers to Overall Progress (SLP): compliance w/strategies (11/05/19 1435 : Romina Garcia, MS CCC-SLP)  Plan for Continued Treatment (SLP): Continue POC (11/05/19 1435 : Romina Garcia, MS CCC-SLP)  Anticipated Dischage Disposition: anticipate therapy at next level of care (11/05/19 1435 : Romina Garcia, MS CCC-SLP)  SLP GOALS     Row Name 11/05/19 1435 11/04/19 1315 11/04/19 0738       Oral Nutrition/Hydration Goal 1 (SLP)    Oral Nutrition/Hydration Goal 1, SLP  Pt to tolerate least restrictive diet with no s/s of aspiration for adequate nutirtion and hydration.   -CK  Pt to tolerate least restrictive diet with no s/s of aspiration for adequate nutirtion and hydration.   -EK  --    Time Frame (Oral Nutrition/Hydration Goal 1, SLP)  by discharge  -CK  by discharge  -EK  --    Barriers (Oral Nutrition/Hydration Goal 1, SLP)  compliance w/swallowing strategies  -CK  --  --    Progress/Outcomes (Oral Nutrition/Hydration Goal 1, SLP)  goal ongoing  -CK  other (see comments) new goal  -EK  --       Swallow Compensatory Strategies Goal 1 (SLP)    Activity (Swallow Compensatory Strategies/Techniques Goal 1, SLP)  compensatory strategies;small bites;chin tuck posture;alternate food/liquid intake;postural techniques;during p.o. trials;liquids by teaspoon only  -CK  compensatory strategies;small bites;chin tuck posture;alternate food/liquid intake;postural techniques;during p.o. trials;liquids by teaspoon only  -EK  --    [place]/Accuracy (Swallow Compensatory Strategies/Techniques Goal 1, SLP)   independently (over 90% accuracy)  -CK  independently (over 90% accuracy)  -EK  --    Time Frame (Swallow Compensatory Strategies/Techniques Goal 1, SLP)  by discharge  -CK  --  --    Barriers (Swallow Compensatory Strategies/Techniques Goal 1, SLP)  compliance  -CK  --  --    Progress/Outcomes (Swallow Compensatory Strategies/Techniques Goal 1, SLP)  goal ongoing  -CK  other (see comments) new goal  -EK  --       Articulation Goal 1 (SLP)    Improve Articulation Goal 1 (SLP)  by over-articulating at phrase level;by over-articulating in connected speech;90%  -CK  --  by over-articulating at phrase level;by over-articulating in connected speech;90%  -CK    Time Frame (Articulation Goal 1, SLP)  by discharge  -CK  --  by discharge  -CK    Barriers (Articulation Goal 1, SLP)  right sided weakness  -CK  --  right sided weakness  -CK    Progress (Articulation Goal 1, SLP)  60%  -CK  --  other (comment) new goal  -CK    Progress/Outcomes (Articulation Goal 1, SLP)  goal ongoing  -CK  --  other (see comments) new goal  -CK      User Key  (r) = Recorded By, (t) = Taken By, (c) = Cosigned By    Initials Name Provider Type    Megan Wells, CCC-SLP Speech and Language Pathologist    Romina Griffin MS CCC-SLP Speech and Language Pathologist          EDUCATION  The patient has been educated in the following areas:   Communication Impairment.    SLP Recommendation and Plan  Daily Summary of Progress (SLP): progress toward functional goals is good  Barriers to Overall Progress (SLP): compliance w/strategies  Plan for Continued Treatment (SLP): Continue POC  Anticipated Dischage Disposition: anticipate therapy at next level of care                     Time Calculation:   Time Calculation- SLP     Row Name 11/05/19 1505             Time Calculation- SLP    SLP Start Time  1435  -CK      SLP Stop Time  1459  -CK      SLP Time Calculation (min)  24 min  -CK      Total Timed Code Minutes- SLP  24 minute(s)   -CK      SLP Received On  19  -CK      SLP Goal Re-Cert Due Date  19  -EUGENIE        User Key  (r) = Recorded By, (t) = Taken By, (c) = Cosigned By    Initials Name Provider Type    Romina Griffin MS CCC-SLP Speech and Language Pathologist          Therapy Charges for Today     Code Description Service Date Service Provider Modifiers Qty    24840621899 HC ST EVAL SPEECH AND PROD W LANG  2 2019 Romina Garcia, MS CCC-SLP GN 1    [id number] HC ST EVAL ORAL PHARYNG SWALLOW 2 2019 Romina Garcia, MS CCC-SLP GN 1    [id number] HC ST TREATMENT SWALLOW 1 2019 Romina Garcia, MS CCC-SLP GN 1    [id number] HC ST TREATMENT SPEECH 1 2019 Romina Garcia, MS NEWTON-SLP GN 1               SLP Discharge Summary  Anticipated Dischage Disposition: anticipate therapy at next level of care    Romina Garcia MS CCC-SLP  2019 and Acute Care - Speech Language Pathology   Swallow Treatment Note/Discharge   [facility]     Patient Name: Rama Goel  : 1945  MRN: [id number]  Today's Date: 2019  Onset of Illness/Injury or Date of Surgery: 19     Referring Physician: Yolanda METCALF      Admit Date: 11/3/2019    Visit Dx:      ICD-10-CM ICD-9-CM   1. Altered mental status, unspecified altered mental status type R41.82 780.97   2. Oropharyngeal dysphagia R13.12 787.22   3. Impaired mobility and ADLs Z74.09 799.89   4. Impaired functional mobility, balance, gait, and endurance Z74.09 V49.89   5. Dysarthria R47.1 784.51     Patient Active Problem List   Diagnosis   • Altered mental status       Therapy Treatment  Rehabilitation Treatment Summary     Row Name 19 1435 19 1256 19 0951       Treatment Time/Intention    Discipline  speech language pathologist  -CK  occupational therapy assistant  -BB  --    Document Type  therapy note (daily note)  -CK  --  --    Subjective Information  complains of;pain  -CK  --  --    Mode of Treatment   speech-language pathology;individual therapy  -CK  --  --    Patient/Family Observations  Pt sitting in recliner at bedside  -CK  --  --    Care Plan Review  evaluation/treatment results reviewed;care plan/treatment goals reviewed;risks/benefits reviewed;current/potential barriers reviewed;patient/other agree to care plan  -CK  --  --    Patient Effort  good  -CK2  --  --    Reason Treatment Not Performed  --  unavailable for treatment pt wiith MRI of spine today,no results available yet  -BB  --    Existing Precautions/Restrictions  --  --  fall LE spasms/collapsing; reports incontinence & bowel not worki  -GB    Recorded by [CK] Romina Garcia, MS CCC-SLP 11/05/19 1440  [CK2] Romina Garcia, MS CCC-SLP 11/05/19 1511 [BB] Dionne aTng COTA/L 11/05/19 1257 [GB] Kandy Camara, PT 11/05/19 1452    Row Name 11/05/19 0951             Vital Signs    Pre Systolic BP Rehab  176  -GB      Pre Treatment Diastolic BP  79  -GB      Post Systolic BP Rehab  164  -GB2      Post Treatment Diastolic BP  71  -GB2      Pretreatment Heart Rate (beats/min)  80  -GB      Posttreatment Heart Rate (beats/min)  88  -GB2      Pre SpO2 (%)  96  -GB      O2 Delivery Pre Treatment  room air  -GB      Post SpO2 (%)  96  -GB2      O2 Delivery Post Treatment  room air  -GB2      Pre Patient Position  Sitting  -GB      Intra Patient Position  Standing  -GB2      Post Patient Position  Sitting  -GB2      Recorded by [GB] Kandy Camara, PT 11/05/19 0958  [GB2] Kandy Camara, PT 11/05/19 1447      Row Name 11/05/19 0951             Cognitive Assessment/Intervention- PT/OT    Affect/Mental Status (Cognitive)  WFL  -GB      Orientation Status (Cognition)  oriented x 4  -GB      Follows Commands (Cognition)  follows multi-step commands;over 90% accuracy  -GB      Personal Safety Interventions  fall prevention program maintained;gait belt;muscle strengthening facilitated;nonskid shoes/slippers when out  of bed;supervised activity  -GB      Recorded by [GB] Kandy Camara, PT 11/05/19 1447      Row Name 11/05/19 0951             Bed Mobility Assessment/Treatment    Bed Mobility Assessment/Treatment  bed mobility (all) activities  -GB      [place] Level (Bed Mobility)  independent  -GB      Scooting/Bridging [place] (Bed Mobility)  independent  -GB      Supine-Sit [place] (Bed Mobility)  independent  -GB      Assistive Device (Bed Mobility)  bed rails;head of bed elevated  -GB      Recorded by [GB] Kandy Camara, PT 11/05/19 1447      Row Name 11/05/19 0951             Toilet Transfer    Type (Toilet Transfer)  -- x2  -GB      [place] Level (Toilet Transfer)  supervision  -GB      Assistive Device (Toilet Transfer)  walker, front-wheeled  -GB      Recorded by [GB] Kandy Camara, PT 11/05/19 1447      Row Name 11/05/19 0975             Gait/Stairs Assessment/Training    Gait/Stairs Assessment/Training  gait/ambulation independence;gait/ambulation assistive device;distance ambulated;gait pattern;gait deviations  -GB      [place] Level (Gait)  contact guard;1 person assist  -GB      Assistive Device (Gait)  walker, front-wheeled  -GB      Distance in Feet (Gait)  8,14,204,12  -GB      Pattern (Gait)  swing-through  -GB      Comment (Gait/Stairs)  improved gait speed and pattern; no collapsing or feeling she would collapse per her report. Spasms better per pt; she wants to be indep of bed alarm which she discussed w/ RN; concern for collapse of LE's and fall still present and discussed w/ her and RN present  -GB      Recorded by [GB] Kandy Camara, PT 11/05/19 1447      Row Name 11/05/19 1435 11/05/19 0951          Positioning and Restraints    Pre-Treatment Position  sitting in chair/recliner  -CK  in bed  -GB     Post Treatment Position  --  chair  -GB     In Chair  sitting;call light within reach;encouraged to call for assist  -CK2  notified  nsg;sitting;with nsg;call light within reach;encouraged to call for assist  -GB     Recorded by [CK] Romina Garcia, MS CCC-SLP 11/05/19 1440  [CK2] Romina Garcia, MS CCC-SLP 11/05/19 1511 [GB] Kandy Camara, PT 11/05/19 1452     Row Name 11/05/19 1435 11/05/19 0951          Pain Scale: Numbers Pre/Post-Treatment    Pain Scale: Numbers, Pretreatment  2/10  -CK  2/10  -GB     Pain Scale: Numbers, Post-Treatment  2/10  -CK2  --     Pain Location - Side  Right  -CK  Right  -GB     Pain Location  hip  -CK  hip  -GB     Pre/Post Treatment Pain Comment  declines meds  -CK  no complaints post gait;   -GB2     Pain Intervention(s)  --  Repositioned;Ambulation/increased activity  -GB2     Recorded by [CK] Romina Garcia, MS CCC-SLP 11/05/19 1440  [CK2] Romina Garcia, MS CCC-SLP 11/05/19 1511 [GB] Kandy Camara, PT 11/05/19 0958  [GB2] Kandy Camara, PT 11/05/19 1447     Row Name 11/05/19 0951             Coping    Observed Emotional State  accepting;calm;cooperative  -GB      Recorded by [GB] Kandy Camara, PT 11/05/19 1452      Row Name 11/05/19 0951             Plan of Care Review    Plan of Care Reviewed With  patient  -GB      Recorded by [GB] Kandy Camara, PT 11/05/19 1452      Row Name 11/05/19 0951             Outcome Summary/Treatment Plan (PT)    Daily Summary of Progress (PT)  progress toward functional goals as expected;progress toward functional goals is good  -GB      Barriers to Overall Progress (PT)  spasms but better today  -GB      Recorded by [GB] Kandy Camara, PT 11/05/19 1452      Row Name 11/05/19 1435             Outcome Summary/Treatment Plan (SLP)    Daily Summary of Progress (SLP)  progress toward functional goals is good  -CK      Barriers to Overall Progress (SLP)  compliance w/strategies  -CK      Plan for Continued Treatment (SLP)  Continue POC  -CK      Anticipated Dischage Disposition  anticipate therapy  at next level of care  -CK      Recorded by [CK] Romina Garcia, MS CCC-SLP 11/05/19 1511        User Key  (r) = Recorded By, (t) = Taken By, (c) = Cosigned By    Initials Name Effective Dates Discipline    GB Jose M-Kandy Diaz, PT 04/03/18 -  PT    CK Romina Garcia, MS CCC-SLP 04/03/18 -  SLP    BB Dionne Tang, MITCHELL/L 03/07/18 -  OT        Outcome Summary  Outcome Summary/Treatment Plan (SLP)  Daily Summary of Progress (SLP): progress toward functional goals is good (11/05/19 1435 : Romina Garcia, MS CCC-SLP)  Barriers to Overall Progress (SLP): compliance w/strategies (11/05/19 1435 : Romina Garcia, MS CCC-SLP)  Plan for Continued Treatment (SLP): Continue POC (11/05/19 1435 : Romina Garcia, MS CCC-SLP)  Anticipated Dischage Disposition: anticipate therapy at next level of care (11/05/19 1435 : Romina Garcia, MS CCC-SLP)  SLP GOALS     Row Name 11/05/19 1435 11/04/19 1315 11/04/19 0738       Oral Nutrition/Hydration Goal 1 (SLP)    Oral Nutrition/Hydration Goal 1, SLP  Pt to tolerate least restrictive diet with no s/s of aspiration for adequate nutirtion and hydration.   -CK  Pt to tolerate least restrictive diet with no s/s of aspiration for adequate nutirtion and hydration.   -EK  --    Time Frame (Oral Nutrition/Hydration Goal 1, SLP)  by discharge  -CK  by discharge  -EK  --    Barriers (Oral Nutrition/Hydration Goal 1, SLP)  compliance w/swallowing strategies  -CK  --  --    Progress/Outcomes (Oral Nutrition/Hydration Goal 1, SLP)  goal ongoing  -CK  other (see comments) new goal  -EK  --       Swallow Compensatory Strategies Goal 1 (SLP)    Activity (Swallow Compensatory Strategies/Techniques Goal 1, SLP)  compensatory strategies;small bites;chin tuck posture;alternate food/liquid intake;postural techniques;during p.o. trials;liquids by teaspoon only  -CK  compensatory strategies;small bites;chin tuck posture;alternate food/liquid intake;postural techniques;during  p.o. trials;liquids by teaspoon only  -EK  --    [place]/Accuracy (Swallow Compensatory Strategies/Techniques Goal 1, SLP)  independently (over 90% accuracy)  -CK  independently (over 90% accuracy)  -EK  --    Time Frame (Swallow Compensatory Strategies/Techniques Goal 1, SLP)  by discharge  -CK  --  --    Barriers (Swallow Compensatory Strategies/Techniques Goal 1, SLP)  compliance  -CK  --  --    Progress/Outcomes (Swallow Compensatory Strategies/Techniques Goal 1, SLP)  goal ongoing  -CK  other (see comments) new goal  -EK  --       Articulation Goal 1 (SLP)    Improve Articulation Goal 1 (SLP)  by over-articulating at phrase level;by over-articulating in connected speech;90%  -CK  --  by over-articulating at phrase level;by over-articulating in connected speech;90%  -CK    Time Frame (Articulation Goal 1, SLP)  by discharge  -CK  --  by discharge  -CK    Barriers (Articulation Goal 1, SLP)  right sided weakness  -CK  --  right sided weakness  -CK    Progress (Articulation Goal 1, SLP)  60%  -CK  --  other (comment) new goal  -CK    Progress/Outcomes (Articulation Goal 1, SLP)  goal ongoing  -CK  --  other (see comments) new goal  -CK      User Key  (r) = Recorded By, (t) = Taken By, (c) = Cosigned By    Initials Name Provider Type    Megan Wells, CCC-SLP Speech and Language Pathologist    Romina Griffin MS CCC-SLP Speech and Language Pathologist          EDUCATION  The patient has been educated in the following areas:   Dysphagia (Swallowing Impairment) Modified Diet Instruction.    SLP Recommendation and Plan  Daily Summary of Progress (SLP): progress toward functional goals is good  Barriers to Overall Progress (SLP): compliance w/strategies  Plan for Continued Treatment (SLP): Continue POC  Anticipated Dischage Disposition: anticipate therapy at next level of care                    Time Calculation:   Time Calculation- SLP     Row Name 11/05/19 7208             Time  Calculation- SLP    SLP Start Time  1435  -CK      SLP Stop Time  1459  -CK      SLP Time Calculation (min)  24 min  -CK      Total Timed Code Minutes- SLP  24 minute(s)  -CK      SLP Received On  11/05/19  -CK      SLP Goal Re-Cert Due Date  11/18/19  -CK        User Key  (r) = Recorded By, (t) = Taken By, (c) = Cosigned By    Initials Name Provider Type    Romina Griffin, MS CCC-SLP Speech and Language Pathologist          Therapy Charges for Today     Code Description Service Date Service Provider Modifiers Qty    58512538507 HC ST EVAL SPEECH AND PROD W LANG  2 11/4/2019 Romina Garcia, MS CCC-SLP GN 1    47735741390 HC ST EVAL ORAL PHARYNG SWALLOW 2 11/4/2019 Romina Garcia, MS CCC-SLP GN 1    [id number] HC ST TREATMENT SWALLOW 1 11/5/2019 Romina Garcia, MS CCC-SLP GN 1    [id number] HC ST TREATMENT SPEECH 1 11/5/2019 Romina Garcia, MS CCC-SLP GN 1               SLP Discharge Summary  Anticipated Dischage Disposition: anticipate therapy at next level of care    Romina Garcia MS CCC-SLP  11/5/2019

## 2019-11-05 NOTE — PLAN OF CARE
Problem: Patient Care Overview  Goal: Plan of Care Review  Outcome: Ongoing (interventions implemented as appropriate)   11/05/19 5656   Coping/Psychosocial   Plan of Care Reviewed With patient   Plan of Care Review   Progress improving   OTHER   Outcome Summary VSS, Stroke scale-0, Fall precautions in place, No S&S of stroke        Problem: Fall Risk (Adult)  Goal: Absence of Fall  Outcome: Ongoing (interventions implemented as appropriate)      Problem: Pain, Chronic (Adult)  Goal: Acceptable Pain/Comfort Level and Functional Ability  Outcome: Ongoing (interventions implemented as appropriate)      Problem: Stroke (Ischemic) (Adult)  Goal: Signs and Symptoms of Listed Potential Problems Will be Absent, Minimized or Managed (Stroke)  Outcome: Ongoing (interventions implemented as appropriate)

## 2019-11-05 NOTE — PROGRESS NOTES
Stroke Progress Note       Chief Complaint:  Spasms in legs    Subjective     Subjective:  Feeling better than yest, and back to her baseline. Does have spasms more on Rt leg compared to L. Today, she also says that since Sunday, she is having incontinence of urine. No stool incontinence. Pt has h/o back surgery, and has been feeling well since the surgery. She is denying any back pain at this time.      Review of Systems   Constitutional: Negative.    HENT: Negative.    Eyes: Negative.    Respiratory: Negative.    Cardiovascular: Negative.    Gastrointestinal: Negative.    Endocrine: Negative.    Genitourinary: Positive for difficulty urinating. Negative for dysuria, enuresis, flank pain, frequency, genital sores, hematuria and menstrual problem.   Musculoskeletal:        Denies any neck pain or back pain. Does have spasms in BLE, janet in thighs.    Hematological: Negative.    Psychiatric/Behavioral: Negative.         Objective      Temp:  [96.1 °F (35.6 °C)-97.9 °F (36.6 °C)] 97.9 °F (36.6 °C)  Heart Rate:  [64-81] 81  Resp:  [18] 18  BP: (134-175)/(67-79) 134/68    Neurological Exam  Mental Status  Awake, alert and oriented to person, place and time. Mild dysarthria present. Language is fluent with no aphasia. Attention and concentration are normal.    Cranial Nerves  CN II: Visual fields full to confrontation.  CN III, IV, VI: Extraocular movements intact bilaterally.  CN V: Facial sensation is normal.  CN VII: Full and symmetric facial movement.  CN VIII: Hearing is normal.  CN IX, X:  Right: Gag is intact.  Left: Gag is reduced.  CN XI: Shoulder shrug strength is normal.  CN XII: Tongue midline without atrophy or fasciculations.    Motor  Normal muscle bulk throughout. No fasciculations present. Normal muscle tone.  Mild decreased FFM on Rt side. RUE/LUE- 5/5  RLE- some drift, maybe 4/5, 2/2 spasms. LLE- okay.   .    Sensory  Light touch is normal in upper and lower extremities.     Reflexes                                            Right                      Left  Brachioradialis                    3+                         3+  Biceps                                 3+                         3+  Triceps                                3+                         3+  Patellar                                2+                         2+  Achilles                                2+                         2+  Plantar                           Mute                Mute    Right pathological reflexes: Crossed adductor absent.  Left pathological reflexes: Crossed adductor absent.    Coordination  Right: Finger-to-nose normal.  Left: Finger-to-nose normal.    Gait  Not assessed.      Physical Exam   Constitutional: She appears well-developed and well-nourished.   Neck: Normal range of motion.   Cardiovascular: Normal rate.   Pulmonary/Chest: Effort normal.   Neurological:   Reflex Scores:       Tricep reflexes are 3+ on the right side and 3+ on the left side.       Bicep reflexes are 3+ on the right side and 3+ on the left side.       Brachioradialis reflexes are 3+ on the right side and 3+ on the left side.       Patellar reflexes are 2+ on the right side and 2+ on the left side.       Achilles reflexes are 2+ on the right side and 2+ on the left side.      Results Review:    I reviewed the patient's new clinical results.    Lab Results (last 24 hours)     ** No results found for the last 24 hours. **        Ct Angiogram Neck    Result Date: 11/4/2019  1. The apex of the aortic arch is not included in the study, somewhat limiting evaluation as the origins of the great vessels are not evaluated. 2. Irregular narrowing of both distal vertebral arteries, right left greater than right, with approximately 50% narrowing of the distal left vertebral artery. 3. No evidence of aneurysm or major vascular occlusion 4. Chronic appearing ethmoid and maxillary sinusitis as above 5. Please see findings section above for further details.  Electronically signed by:  Camila Ortiz MD  11/4/2019 12:02 PM CST Workstation: [id number]    Mri Brain Without Contrast    Addendum Date: 11/4/2019     ADDENDUM ADDENDUM #1 Addendum: Referring clinician notified of findings by phone at 10:57 AM on 11/4/2019. Electronically signed by:  Carmine De Luna MD  11/4/2019 10:57 AM CST Workstation: [id number]     Result Date: 11/4/2019  1.  Diffusion weighted anterior inferior left medullary small oval focus of increased signal which is lower signal on the apparent coefficient map. This focus measures 3.5 mm in greatest diameter. This lesion would be suspicious for acute lacunar infarct in this region. 2.  Mild cerebral involutional changes. 3.  Right frontal lobe subcortical deep white matter and left frontal lobe periventricular deep white matter small foci of abnormal signal as described above suspicious for chronic ischemic gliosis secondary to microvascular disease. 4.  Remainder of MR brain exam unremarkable. Electronically signed by:  Carmine De Luna MD  11/4/2019 10:43 AM CST Workstation: [id number]    Fl Video Swallow With Speech    Result Date: 11/4/2019  1.  Please see speech pathology report for findings as well as recommendations. Electronically signed by:  Carmine De Luna MD  11/4/2019 2:39 PM CST Workstation: [id number]    Xr Femur 2 View Bilateral    Result Date: 11/3/2019  No acute abnormality in either femur. Electronically signed by:  Hesham Hernandez  11/3/2019 9:43 PM CST Workstation: [id number]    Xr Chest 1 View    Result Date: 11/3/2019  No acute disease. Electronically signed by:  Hesham Hernandez  11/3/2019 9:08 PM CST Workstation: [id number]    Xr Pelvis 1 Or 2 View    Result Date: 11/3/2019  No acute abnormality. Electronically signed by:  Hesham Hernandez  11/3/2019 9:42 PM CST Workstation: [id number]    Ct Angiogram Head    Result Date: 11/4/2019  1. The apex of the aortic arch is not included in the study, somewhat limiting evaluation as the origins of the great vessels are  not evaluated. 2. Irregular narrowing of both distal vertebral arteries, right left greater than right, with approximately 50% narrowing of the distal left vertebral artery. 3. No evidence of aneurysm or major vascular occlusion 4. Chronic appearing ethmoid and maxillary sinusitis as above 5. Please see findings section above for further details. Electronically signed by:  Camila Ortiz MD  11/4/2019 12:02 PM CST Workstation: [id number]    Ct Head Without Contrast Stroke Protocol    Result Date: 11/3/2019  Small age-indeterminate left periventricular lacunar infarct. If clinically indicated MRI could better evaluate. Electronically signed by:  Hesham Hernandez  11/3/2019 9:07 PM CST Workstation: [id number]           Assessment/Plan     Assessment/Plan:  72 yo RHWF with h/o HTN, HLD, chronic back pain s/p back surgery in 2018, when post-op she had small Rt pontine stroke, with complete recovery- who was recently admitted to [facility] with R>L body spasms, worse in legs, when she was found to have a small L pontine stroke- was discharged from [facility], and yest had worsening of her spasms, her legs gave away, and had colorful spots in front of her vision with clsoed eyes. She was eval by Stroke team in ER, and given she still had some dysphagia, and worsening symptoms- to make sure no new events or worsening of recent stroke.       1. 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 Cont ASA, plavix and statins. Cont therapy, likely needs acute rehab. Pt current symptoms of spasms and incontinence less likely from stroke.  2. BLE weakness- with spasms- Unusual symptoms for stroke. Pt has had back surgery done, and she had MRI C-spine at [facility] which shows significant degenerative changes. Given she also has some incontinence, we will also get MRI of T/L spine  to make sure no spinal emergency. Although, her clinical exam is less concerning except brisk reflexes in BUE. Concern is that her symptoms could be 2/2 degenerative spine issues rather than stroke. She will likely need outpt f/u with spine surgeon.   3. HTN- Normal BP goals. Okay to start BP medications.   4. HLD- Cont statins.   5. Activity- Increase activity with PT/OT. May need acute rehab, f/u on recommendations  6. Diet- Cleared MBS yest with mechanical soft diet. Encourage healthy eating.     Case was d/w pt, nursing, and Dr Barrett. All questions answered.           Anirudh Marquez MD  11/05/19  9:38 AM    This was an audio and video enabled telemedicine encounter.

## 2019-11-05 NOTE — PLAN OF CARE
Problem: Patient Care Overview  Goal: Plan of Care Review  Outcome: Ongoing (interventions implemented as appropriate)   11/05/19 1550   Coping/Psychosocial   Plan of Care Reviewed With patient   Plan of Care Review   Progress improving   OTHER   Outcome Summary Pt. able to amb. 200' with RW CGA no LOB slightly unsteady, pt. transferred SBA, amb. up & down 3 stepx2 HR at R.      Goal: Discharge Needs Assessment  Outcome: Ongoing (interventions implemented as appropriate)   11/04/19 1125   Discharge Needs Assessment   Concerns to be Addressed adjustment to diagnosis/illness   Patient/Family Anticipates Transition to inpatient rehabilitation facility;home   Patient/Family Anticipated Services at Transition rehabilitation services   Transportation Anticipated family or friend will provide   Equipment Needed After Discharge (Awaiting PT/OT recomendations. )   Discharge Facility/Level of Care Needs acute rehab   Current Discharge Risk chronically ill   Discharge Coordination/Progress Pt refuses home health follow up.    Disability   Equipment Currently Used at Home cane, straight;walker, rolling

## 2019-11-06 ENCOUNTER — HOSPITAL ENCOUNTER (INPATIENT)
Facility: HOSPITAL | Age: 74
LOS: 9 days | Discharge: NURSING, REHAB OR LONG-TERM CARE | End: 2019-11-15
Attending: FAMILY MEDICINE | Admitting: FAMILY MEDICINE

## 2019-11-06 VITALS
OXYGEN SATURATION: 97 % | HEART RATE: 68 BPM | HEIGHT: 66 IN | TEMPERATURE: 98.1 F | SYSTOLIC BLOOD PRESSURE: 158 MMHG | DIASTOLIC BLOOD PRESSURE: 73 MMHG | WEIGHT: 153.6 LBS | BODY MASS INDEX: 24.68 KG/M2 | RESPIRATION RATE: 16 BRPM

## 2019-11-06 DIAGNOSIS — Z78.9 IMPAIRED MOBILITY AND ADLS: ICD-10-CM

## 2019-11-06 DIAGNOSIS — R13.12 OROPHARYNGEAL DYSPHAGIA: ICD-10-CM

## 2019-11-06 DIAGNOSIS — Z74.09 IMPAIRED PHYSICAL MOBILITY: ICD-10-CM

## 2019-11-06 DIAGNOSIS — R47.1 DYSARTHRIA: ICD-10-CM

## 2019-11-06 DIAGNOSIS — Z74.09 IMPAIRED MOBILITY AND ADLS: ICD-10-CM

## 2019-11-06 DIAGNOSIS — I63.9 LEFT PONTINE STROKE (HCC): Primary | ICD-10-CM

## 2019-11-06 PROBLEM — M62.838 MUSCLE SPASM OF BOTH LOWER LEGS: Status: ACTIVE | Noted: 2019-11-06

## 2019-11-06 PROBLEM — R29.6 RECURRENT FALLS WHILE WALKING: Status: ACTIVE | Noted: 2019-11-06

## 2019-11-06 PROBLEM — R39.11 URINARY HESITANCY: Status: ACTIVE | Noted: 2019-11-06

## 2019-11-06 PROCEDURE — 96372 THER/PROPH/DIAG INJ SC/IM: CPT

## 2019-11-06 PROCEDURE — 94760 N-INVAS EAR/PLS OXIMETRY 1: CPT

## 2019-11-06 PROCEDURE — 92507 TX SP LANG VOICE COMM INDIV: CPT | Performed by: SPEECH-LANGUAGE PATHOLOGIST

## 2019-11-06 PROCEDURE — 25010000002 ENOXAPARIN PER 10 MG: Performed by: PSYCHIATRY & NEUROLOGY

## 2019-11-06 PROCEDURE — 92526 ORAL FUNCTION THERAPY: CPT | Performed by: SPEECH-LANGUAGE PATHOLOGIST

## 2019-11-06 PROCEDURE — 97116 GAIT TRAINING THERAPY: CPT

## 2019-11-06 PROCEDURE — 99222 1ST HOSP IP/OBS MODERATE 55: CPT | Performed by: FAMILY MEDICINE

## 2019-11-06 PROCEDURE — G0378 HOSPITAL OBSERVATION PER HR: HCPCS

## 2019-11-06 PROCEDURE — 97530 THERAPEUTIC ACTIVITIES: CPT

## 2019-11-06 PROCEDURE — 99213 OFFICE O/P EST LOW 20 MIN: CPT | Performed by: PSYCHIATRY & NEUROLOGY

## 2019-11-06 PROCEDURE — 25010000002 ENOXAPARIN PER 10 MG: Performed by: FAMILY MEDICINE

## 2019-11-06 PROCEDURE — 97535 SELF CARE MNGMENT TRAINING: CPT

## 2019-11-06 RX ORDER — SODIUM CHLORIDE 9 MG/ML
100 INJECTION, SOLUTION INTRAVENOUS CONTINUOUS
Status: DISCONTINUED | OUTPATIENT
Start: 2019-11-06 | End: 2019-11-06

## 2019-11-06 RX ORDER — CYCLOBENZAPRINE HCL 10 MG
10 TABLET ORAL 3 TIMES DAILY PRN
Status: DISCONTINUED | OUTPATIENT
Start: 2019-11-06 | End: 2019-11-15 | Stop reason: HOSPADM

## 2019-11-06 RX ORDER — ATORVASTATIN CALCIUM 40 MG/1
80 TABLET, FILM COATED ORAL NIGHTLY
Status: DISCONTINUED | OUTPATIENT
Start: 2019-11-06 | End: 2019-11-15 | Stop reason: HOSPADM

## 2019-11-06 RX ORDER — ONDANSETRON 4 MG/1
4 TABLET, FILM COATED ORAL EVERY 6 HOURS PRN
Status: DISCONTINUED | OUTPATIENT
Start: 2019-11-06 | End: 2019-11-15 | Stop reason: HOSPADM

## 2019-11-06 RX ORDER — MULTIVITAMIN,THERAPEUTIC
1 TABLET ORAL DAILY
Status: DISCONTINUED | OUTPATIENT
Start: 2019-11-07 | End: 2019-11-15 | Stop reason: HOSPADM

## 2019-11-06 RX ORDER — BISACODYL 10 MG
10 SUPPOSITORY, RECTAL RECTAL DAILY PRN
Status: DISCONTINUED | OUTPATIENT
Start: 2019-11-06 | End: 2019-11-15 | Stop reason: HOSPADM

## 2019-11-06 RX ORDER — DIAZEPAM 5 MG/1
10 TABLET ORAL 2 TIMES DAILY PRN
Status: DISCONTINUED | OUTPATIENT
Start: 2019-11-06 | End: 2019-11-08

## 2019-11-06 RX ORDER — GABAPENTIN 400 MG/1
400 CAPSULE ORAL EVERY 6 HOURS
Status: DISCONTINUED | OUTPATIENT
Start: 2019-11-06 | End: 2019-11-15 | Stop reason: HOSPADM

## 2019-11-06 RX ORDER — BACLOFEN 10 MG/1
10 TABLET ORAL 2 TIMES DAILY
Status: DISCONTINUED | OUTPATIENT
Start: 2019-11-06 | End: 2019-11-08

## 2019-11-06 RX ORDER — CARVEDILOL 25 MG/1
25 TABLET ORAL 2 TIMES DAILY WITH MEALS
Status: DISCONTINUED | OUTPATIENT
Start: 2019-11-06 | End: 2019-11-15 | Stop reason: HOSPADM

## 2019-11-06 RX ORDER — VITAMIN B COMPLEX
CAPSULE ORAL DAILY
COMMUNITY
End: 2019-11-15 | Stop reason: HOSPADM

## 2019-11-06 RX ORDER — TRIAMTERENE AND HYDROCHLOROTHIAZIDE 37.5; 25 MG/1; MG/1
1 CAPSULE ORAL EVERY MORNING
Status: DISCONTINUED | OUTPATIENT
Start: 2019-11-07 | End: 2019-11-07

## 2019-11-06 RX ORDER — IBUPROFEN 200 MG
200 TABLET ORAL EVERY 6 HOURS PRN
Status: DISCONTINUED | OUTPATIENT
Start: 2019-11-06 | End: 2019-11-08

## 2019-11-06 RX ORDER — SODIUM CHLORIDE 0.9 % (FLUSH) 0.9 %
10 SYRINGE (ML) INJECTION AS NEEDED
Status: DISCONTINUED | OUTPATIENT
Start: 2019-11-06 | End: 2019-11-06

## 2019-11-06 RX ORDER — ROPINIROLE 0.5 MG/1
0.5 TABLET, FILM COATED ORAL NIGHTLY
Status: DISCONTINUED | OUTPATIENT
Start: 2019-11-06 | End: 2019-11-08

## 2019-11-06 RX ORDER — ASPIRIN 81 MG/1
81 TABLET, CHEWABLE ORAL DAILY
Status: DISCONTINUED | OUTPATIENT
Start: 2019-11-07 | End: 2019-11-15 | Stop reason: HOSPADM

## 2019-11-06 RX ORDER — LANOLIN ALCOHOL/MO/W.PET/CERES
5 CREAM (GRAM) TOPICAL NIGHTLY PRN
Status: DISCONTINUED | OUTPATIENT
Start: 2019-11-06 | End: 2019-11-15 | Stop reason: HOSPADM

## 2019-11-06 RX ORDER — SODIUM CHLORIDE 0.9 % (FLUSH) 0.9 %
10 SYRINGE (ML) INJECTION EVERY 12 HOURS SCHEDULED
Status: DISCONTINUED | OUTPATIENT
Start: 2019-11-06 | End: 2019-11-06

## 2019-11-06 RX ORDER — CLOPIDOGREL BISULFATE 75 MG/1
75 TABLET ORAL DAILY
Status: DISCONTINUED | OUTPATIENT
Start: 2019-11-07 | End: 2019-11-15 | Stop reason: HOSPADM

## 2019-11-06 RX ORDER — CALCIUM CARBONATE 200(500)MG
2 TABLET,CHEWABLE ORAL 2 TIMES DAILY PRN
Status: DISCONTINUED | OUTPATIENT
Start: 2019-11-06 | End: 2019-11-15 | Stop reason: HOSPADM

## 2019-11-06 RX ADMIN — CARVEDILOL 25 MG: 25 TABLET, FILM COATED ORAL at 09:06

## 2019-11-06 RX ADMIN — ASPIRIN 81 MG 81 MG: 81 TABLET ORAL at 09:05

## 2019-11-06 RX ADMIN — BACLOFEN 10 MG: 10 TABLET ORAL at 09:05

## 2019-11-06 RX ADMIN — GABAPENTIN 400 MG: 400 CAPSULE ORAL at 06:17

## 2019-11-06 RX ADMIN — ENOXAPARIN SODIUM 40 MG: 40 INJECTION SUBCUTANEOUS at 00:44

## 2019-11-06 RX ADMIN — GABAPENTIN 400 MG: 400 CAPSULE ORAL at 17:08

## 2019-11-06 RX ADMIN — ROPINIROLE HYDROCHLORIDE 0.5 MG: 0.5 TABLET, FILM COATED ORAL at 20:44

## 2019-11-06 RX ADMIN — GABAPENTIN 400 MG: 400 CAPSULE ORAL at 11:17

## 2019-11-06 RX ADMIN — THERA TABS 1 TABLET: TAB at 09:05

## 2019-11-06 RX ADMIN — BACLOFEN 10 MG: 10 TABLET ORAL at 20:44

## 2019-11-06 RX ADMIN — CLOPIDOGREL BISULFATE 75 MG: 75 TABLET ORAL at 09:05

## 2019-11-06 RX ADMIN — ATORVASTATIN CALCIUM 80 MG: 40 TABLET, FILM COATED ORAL at 20:44

## 2019-11-06 RX ADMIN — GABAPENTIN 400 MG: 400 CAPSULE ORAL at 00:44

## 2019-11-06 RX ADMIN — CARVEDILOL 25 MG: 25 TABLET, FILM COATED ORAL at 17:07

## 2019-11-06 RX ADMIN — TRIAMTERENE AND HYDROCHLOROTHIAZIDE 1 CAPSULE: 25; 37.5 CAPSULE ORAL at 06:17

## 2019-11-06 RX ADMIN — ENOXAPARIN SODIUM 40 MG: 40 INJECTION SUBCUTANEOUS at 20:44

## 2019-11-06 RX ADMIN — SODIUM CHLORIDE, PRESERVATIVE FREE 10 ML: 5 INJECTION INTRAVENOUS at 09:06

## 2019-11-06 NOTE — THERAPY TREATMENT NOTE
"Acute Care - Occupational Therapy Treatment Note  [facility]     Patient Name: Rama Goel  : 1945  MRN: [id number]  Today's Date: 2019  Onset of Illness/Injury or Date of Surgery: 19  Date of Referral to OT: 19  Referring Physician: Yolanda METCALF    Admit Date: 11/3/2019       ICD-10-CM ICD-9-CM   1. Altered mental status, unspecified altered mental status type R41.82 780.97   2. Oropharyngeal dysphagia R13.12 787.22   3. Impaired mobility and ADLs Z74.09 799.89   4. Impaired functional mobility, balance, gait, and endurance Z74.09 V49.89   5. Dysarthria R47.1 784.51     Patient Active Problem List   Diagnosis   • Altered mental status   • DJD (degenerative joint disease), multiple sites   • Hypertension   • Left pontine stroke (CMS/HCC)   • CVA (cerebral vascular accident) (CMS/HCC)   • Urinary hesitancy   • Muscle spasm of both lower legs   • Recurrent falls while walking   • Dysarthria     Past Medical History:   Diagnosis Date   • Arrhythmia     intermittent   • CVA (cerebral vascular accident) (CMS/HCC) 2018    also had one last week 10/2019 in [facility]   • DJD (degenerative joint disease), multiple sites     thoracic and lumbar spine   • Hypertension    • Lupus (CMS/HCC)     drug induced   • Stroke (CMS/HCC)      Past Surgical History:   Procedure Laterality Date   • ANKLE SURGERY Right     \"bone replacement with coral\"   • APPENDECTOMY     •  SECTION     • CHOLECYSTECTOMY     • PARATHYROIDECTOMY     • ROTATOR CUFF REPAIR Left    • SPINAL FUSION  2018    T11-S1   • TYMPANOPLASTY         Therapy Treatment    Rehabilitation Treatment Summary     Row Name 19 1053 19 0859 19 0826       Treatment Time/Intention    Discipline  occupational therapist  -ME  speech language pathologist  -CK  physical therapy assistant  -CA    Document Type  therapy note (daily note)  -ME  therapy note (daily note)  -CK  therapy note (daily note)  -CA    Subjective " Information  no complaints  -ME  no complaints  -CK  no complaints  -CA    Mode of Treatment  individual therapy;occupational therapy  -ME  speech-language pathology;individual therapy  -CK  individual therapy;physical therapy  -CA    Patient/Family Observations  pt sitting up in recliner   -ME  Pt sitting in recliner at bedside practicing overarticulation  -CK  pt. up in recliner upon entry  -CA    Care Plan Review  evaluation/treatment results reviewed;care plan/treatment goals reviewed;risks/benefits reviewed;current/potential barriers reviewed;patient/other agree to care plan  -ME  evaluation/treatment results reviewed;care plan/treatment goals reviewed;risks/benefits reviewed;current/potential barriers reviewed;patient/other agree to care plan  -CK  --    Therapy Frequency (PT Clinical Impression)  --  --  daily  -CA    Total Minutes, Occupational Therapy Treatment  53  -ME  --  --    Therapy Frequency (OT Eval)  daily  -ME  --  --    Patient Effort  excellent  -ME  --  --    Comment  pt asking to complete shower for last couple of days. talked to RN, and asked for approval to complete. RN and MD avlia'ed removal of tele, and nursing pulled IVs. pt getting ready to transfer to ARU.   -ME  --  --    Existing Precautions/Restrictions  fall pt still reporting incontinence   -ME  --  --    Recorded by [ME] Che Oh, OT 11/06/19 1330 [CK] Romina Garcia, MS CCC-SLP 11/06/19 0903 [CA] Blue Weston, PTA 11/06/19 0834    Row Name 11/06/19 1053 11/06/19 0826          Vital Signs    Pre Systolic BP Rehab  144  -ME  158  -CA     Pre Treatment Diastolic BP  68  -ME  73  -CA     Post Systolic BP Rehab  117  -ME  --     Post Treatment Diastolic BP  57  -ME  --     Pretreatment Heart Rate (beats/min)  68  -ME  68  -CA     Posttreatment Heart Rate (beats/min)  72  -ME  --     Pre SpO2 (%)  96  -ME  96  -CA     O2 Delivery Pre Treatment  room air  -ME  room air  -CA     Post SpO2 (%)  98  -ME  --     O2 Delivery Post  Treatment  room air  -ME  --     Pre Patient Position  Sitting  -ME  Sitting  -CA2     Intra Patient Position  --  Standing  -CA     Post Patient Position  Sitting  -ME  Sitting  -CA     Recorded by [ME] Che Oh, OT 11/06/19 1330 [CA] Blue Weston, PTA 11/06/19 0854  [CA2] Blue Weston, PTA 11/06/19 0834     Row Name 11/06/19 1053 11/06/19 0826          Cognitive Assessment/Intervention- PT/OT    Affect/Mental Status (Cognitive)  WFL  -ME  WFL  -CA     Orientation Status (Cognition)  oriented x 4  -ME  oriented x 4  -CA     Follows Commands (Cognition)  WFL  -ME  follows multi-step commands  -CA2     Safety Deficit (Cognitive)  awareness of need for assistance;impulsivity;insight into deficits/self awareness;safety precautions awareness;safety precautions follow-through/compliance  -ME  awareness of need for assistance  -CA2     Personal Safety Interventions  fall prevention program maintained;gait belt;nonskid shoes/slippers when out of bed  -ME  fall prevention program maintained;gait belt;muscle strengthening facilitated;nonskid shoes/slippers when out of bed  -CA2     Recorded by [ME] Che Oh, OT 11/06/19 1330 [CA] Blue Weston, PTA 11/06/19 0834  [CA2] Blue Weston, PTA 11/06/19 0854     Row Name 11/06/19 1053             Safety Issues, Functional Mobility    Safety Issues Affecting Function (Mobility)  awareness of need for assistance;impulsivity;insight into deficits/self awareness;safety precaution awareness;safety precautions follow-through/compliance  -ME      Impairments Affecting Function (Mobility)  balance;coordination;motor planning LE spasms;which have decreased;non present this session  -ME      Recorded by [ME] Che Oh, OT 11/06/19 1330      Row Name 11/06/19 1053 11/06/19 0826          Bed Mobility Assessment/Treatment    Comment (Bed Mobility)  pt already sitting up in chair when OT arrived   -ME  pt. sitting up in recliner  -CA     Recorded by [ME] Che Oh, OT  11/06/19 1330 [CA] Blue Weston, PTA 11/06/19 0834     Row Name 11/06/19 1053             Functional Mobility    Functional Mobility- Ind. Level  contact guard assist;verbal cues required  -ME      Functional Mobility- Device  rolling walker  -ME      Functional Mobility- Safety Issues  balance decreased during turns;step length decreased;other (see comments) RLE spasms & knee buckling;less noted this date   -ME      Functional Mobility- Comment  pt ambulated from recliner chair to restroom for completion of shower, and then from restroom back to chair at end of session. completed with CGA this date. pt continues looking at the floor, cues required.   -ME      Recorded by [ME] Che Oh, OT 11/06/19 1347      Row Name 11/06/19 1053 11/06/19 0826          Transfer Assessment/Treatment    Transfer Assessment/Treatment  sit-stand transfer;stand-sit transfer;shower transfer  -ME  sit-stand transfer;stand-sit transfer;toilet transfer  -CA     Recorded by [ME] Che Oh, OT 11/06/19 1347 [CA] Blue Weston, PTA 11/06/19 0854     Row Name 11/06/19 1053 11/06/19 0826          Sit-Stand Transfer    Sit-Stand [place] (Transfers)  supervision;contact guard  -ME  supervision  -CA     Assistive Device (Sit-Stand Transfers)  walker, front-wheeled  -ME  walker, front-wheeled  -CA2     Recorded by [ME] Che Oh, OT 11/06/19 1347 [CA] Blue Weston, PTA 11/06/19 0925  [CA2] Blue Weston, PTA 11/06/19 0854     Row Name 11/06/19 1053 11/06/19 0826          Stand-Sit Transfer    Stand-Sit [place] (Transfers)  supervision;contact guard  -ME  supervision  -CA     Assistive Device (Stand-Sit Transfers)  walker, front-wheeled  -ME  walker, front-wheeled  -CA2     Recorded by [ME] Che Oh, OT 11/06/19 1347 [CA] Blue Weston, PTA 11/06/19 0925  [CA2] Blue Weston, PTA 11/06/19 0854     Row Name 11/06/19 0826             Toilet Transfer    Type (Toilet Transfer)  sit-stand;stand-sit  -CA      [place]  Level (Toilet Transfer)  supervision  -CA      Assistive Device (Toilet Transfer)  walker, front-wheeled  -CA      Recorded by [CA] Blue Weston, PTA 11/06/19 0925      Row Name 11/06/19 1053             Shower Transfer    Type (Shower Transfer)  sit-stand;stand-sit  -ME      [place] Level (Shower Transfer)  contact guard;verbal cues  -ME      Assistive Device (Shower Transfer)  walker, front-wheeled;other (see comments) BSC in shower   -ME      Recorded by [ME] Che Oh, OT 11/06/19 1347      Row Name 11/06/19 0826             Gait/Stairs Assessment/Training    Gait/Stairs Assessment/Training  gait/ambulation assistive device  -CA      [place] Level (Gait)  stand by assist  -CA      Assistive Device (Gait)  walker, front-wheeled  -CA      Distance in Feet (Gait)  250'  -CA      Pattern (Gait)  step-through  -CA      Deviations/Abnormal Patterns (Gait)  john decreased  -CA      Recorded by [CA] Blue Weston, PTA 11/06/19 0925      Row Name 11/06/19 1053             ADL Assessment/Intervention    BADL Assessment/Intervention  bathing;upper body dressing;grooming;lower body dressing  -ME      Recorded by [ME] Che Oh OT 11/06/19 1347      Row Name 11/06/19 1053             Bathing Assessment/Intervention    Assistive Devices (Bathing)  other (see comments) sitting on BSC in shower   -ME      Comment (Bathing)  UB bathing completed with supervision, besides washing back, which pt is dependent for without use of long handled sponge which we did not have this session. LB bathing completed in both sitting and standing, done with supervision while in sitting and CGA while in standing for steadying assistance. pt was impulsive at times throughout, not waiting for OT to be ready for standing. pt is able to wash feet, but requires additional time to reach. increased time required for shower.   -ME      Recorded by [ME] Che Oh OT 11/06/19 1342      Row Name 11/06/19 105             Upper  Body Dressing Assessment/Training    Upper Body Dressing [place] Level  doff;don;set up;other (see comments) SBA; hospital gown   -ME      Upper Body Dressing Position  other (see comments) sitting up in recliner   -ME      Recorded by [ME] Che Oh, OT 11/06/19 1347      Row Name 11/06/19 1053             Lower Body Dressing Assessment/Training    Lower Body Dressing [place] Level  doff;don;socks;set up;other (see comments) SBA for safety when pt leaning forward   -ME      Lower Body Dressing Position  other (see comments) sitting up in recliner   -ME      Recorded by [ME] Che Oh, OT 11/06/19 1347      Row Name 11/06/19 1053             Grooming Assessment/Training    [place] Level (Grooming)  hair care, combing/brushing;set up;independent  -ME      Grooming Position  other (see comments) sitting up in recliner   -ME      Recorded by [ME] Che Oh, OT 11/06/19 1347      Row Name 11/06/19 1053             BADL Safety/Performance    Impairments, BADL Safety/Performance  balance;trunk/postural control;coordination;motor planning  -ME      Recorded by [ME] Che Oh, OT 11/06/19 1347      Row Name 11/06/19 0826             Therapeutic Exercise    Therapeutic Exercise  seated, lower extremities;standing, lower extremities  -CA      Recorded by [CA] Blue Weston, PTA 11/06/19 0925      Row Name 11/06/19 0826             Lower Extremity Standing Therapeutic Exercise    Performed, Standing Lower Extremity (Therapeutic Exercise)  hip flexion/extension  -CA      Exercise Type, Standing Lower Extremity (Therapeutic Exercise)  AROM (active range of motion)  -CA      Sets/Reps Detail, Standing Lower Extremity (Therapeutic Exercise)  10x1  -CA      Recorded by [CA] Blue Weston, PTA 11/06/19 0925      Row Name 11/06/19 0826             Lower Extremity Seated Therapeutic Exercise    Performed, Seated Lower Extremity (Therapeutic Exercise)  LAQ (long arc quad), knee extension;ankle  dorsiflexion/plantarflexion  -CA      Exercise Type, Seated Lower Extremity (Therapeutic Exercise)  AROM (active range of motion)  -CA      Sets/Reps Detail, Seated Lower Extremity (Therapeutic Exercise)  10x1  -CA      Recorded by [CA] Blue Weston, PTA 11/06/19 0925      Row Name 11/06/19 1053 11/06/19 0859 11/06/19 0826       Positioning and Restraints    Pre-Treatment Position  sitting in chair/recliner  -ME  sitting in chair/recliner  -CK  sitting in chair/recliner  -CA    Post Treatment Position  chair  -ME  --  chair  -CA    In Chair  notified nsg;call light within reach;encouraged to call for assist;with family/caregiver  -ME  --  sitting;call light within reach;encouraged to call for assist  -CA    Recorded by [ME] Che Oh, OT 11/06/19 1347 [CK] Romina Garcia, MS CCC-SLP 11/06/19 0903 [CA] Blue Weston, PTA 11/06/19 0925    Row Name 11/06/19 1053             Pain Assessment    Additional Documentation  Pain Scale: Numbers Pre/Post-Treatment (Group)  -ME      Recorded by [ME] Che Oh, OT 11/06/19 1347      Row Name 11/06/19 1053 11/06/19 0859 11/06/19 0826       Pain Scale: Numbers Pre/Post-Treatment    Pain Scale: Numbers, Pretreatment  0/10 - no pain  -ME  1/10  -CK  1/10  -CA    Pain Scale: Numbers, Post-Treatment  0/10 - no pain  -ME  --  0/10 - no pain  -CA    Pain Location - Side  --  Right  -CK  Right  -CA    Pain Location  --  hip  -CK  hip  -CA    Pre/Post Treatment Pain Comment  --  declines meds  -CK  --    Pain Intervention(s)  Repositioned;Ambulation/increased activity;Distraction  -ME  --  --    Recorded by [ME] Che Oh, OT 11/06/19 1347 [CK] Romina Garcia, MS CCC-SLP 11/06/19 0903 [CA] Blue Weston, PTA 11/06/19 0925    Row Name 11/06/19 1053             Plan of Care Review    Plan of Care Reviewed With  patient  -ME      Recorded by [ME] Che Oh OT 11/06/19 1347      Row Name 11/06/19 1052             Outcome Summary/Treatment Plan (OT)    Daily  Summary of Progress (OT)  progress toward functional goals as expected  -ME      Barriers to Overall Progress (OT)  RLE spasms (non noted this date), decreased independence with functional mobility.   -ME      Plan for Continued Treatment (OT)  continue per OT POC   -ME      Anticipated Discharge Disposition (OT)  anticipate therapy at next level of care;inpatient rehabilitation facility  -ME      Recorded by [ME] Che Oh, ALISON 11/06/19 3197        User Key  (r) = Recorded By, (t) = Taken By, (c) = Cosigned By    Initials Name Effective Dates Discipline    CK Romina Garcia, MS CCC-SLP 04/03/18 -  SLP    Blue Mccrary, PTA 03/07/18 -  PT    ME Che Oh, ALISON 09/10/19 -  OT           Rehab Goal Summary     Row Name 11/06/19 1053 11/06/19 0925 11/06/19 0859       Physical Therapy Goals    Bed Mobility Goal Selection (PT)  --  bed mobility, PT goal 1  -CA  --    Transfer Goal Selection (PT)  --  transfer, PT goal 1  -CA  --    Gait Training Goal Selection (PT)  --  gait training, PT goal 1  -CA  --    Stairs Goal Selection (PT)  --  stairs, PT goal 1  -CA  --       Bed Mobility Goal 1 (PT)    Activity/Assistive Device (Bed Mobility Goal 1, PT)  --  bed mobility activities, all;rolling to left;rolling to right;supine to sit  -CA  --    [place] Level/Cues Needed (Bed Mobility Goal 1, PT)  --  independent  -CA  --    Time Frame (Bed Mobility Goal 1, PT)  --  short term goal (STG);1 week  -CA  --    Progress/Outcomes (Bed Mobility Goal 1, PT)  --  goal met  (Significant)   -CA  --       Transfer Goal 1 (PT)    Activity/Assistive Device (Transfer Goal 1, PT)  --  bed-to-chair/chair-to-bed;toilet  -CA  --    [place] Level/Cues Needed (Transfer Goal 1, PT)  --  conditional independence  -CA  --    Time Frame (Transfer Goal 1, PT)  --  long term goal (LTG);by discharge  -CA  --    Barriers (Transfers Goal 1, PT)  --  weakness; falls   -CA  --    Progress/Outcome (Transfer Goal 1, PT)  --  goal  partially met;continuing progress toward goal  -CA  --       Gait Training Goal 1 (PT)    Activity/Assistive Device (Gait Training Goal 1, PT)  --  gait (walking locomotion);assistive device use;decrease fall risk;diminish gait deviation;forward stepping;increase endurance/gait distance;sidestepping;turning, left;turning, right;walker, rolling  -CA  --    [place] Level (Gait Training Goal 1, PT)  --  conditional independence  -CA  --    Distance (Gait Goal 1, PT)  --  450 or more feet w/out falls  -CA  --    Time Frame (Gait Training Goal 1, PT)  --  long term goal (LTG);by discharge  -CA  --    Barriers (Gait Training Goal 1, PT)  --  weakness; spasms  -CA  --    Progress/Outcome (Gait Training Goal 1, PT)  --  goal ongoing;goal partially met;continuing progress toward goal  -CA  --       Stairs Goal 1 (PT)    Activity/Assistive Device (Stairs Goal 1, PT)  --  ascending stairs;descending stairs;decrease fall risk  -CA  --    [place] Level/Cues Needed (Stairs Goal 1, PT)  --  conditional independence  -CA  --    Number of Stairs (Stairs Goal 1, PT)  --  1 step for curb if in community and if reji  -CA  --    Time Frame (Stairs Goal 1, PT)  --  by discharge  -CA  --    Barriers (Stairs Goal 1, PT)  --  spasms  -CA  --    Progress/Outcome (Stairs Goal 1, PT)  --  continuing progress toward goal;goal not met  -CA  --       Occupational Therapy Goals    Transfer Goal Selection (OT)  transfer, OT goal 1  -ME  --  --    Bathing Goal Selection (OT)  bathing, OT goal 1  -ME  --  --    Dressing Goal Selection (OT)  dressing, OT goal 1  -ME  --  --    Toileting Goal Selection (OT)  toileting, OT goal 1  -ME  --  --    Functional Mobility Goal Selection (OT)  functional mobility, OT goal 1  -ME  --  --    Problem Specific Goal Selection (OT)  problem specific goal 1, OT  -ME  --  --       Transfer Goal 1 (OT)    Activity/Assistive Device (Transfer Goal 1, OT)  toilet  -ME  --  --    [place] Level/Cues Needed  (Transfer Goal 1, OT)  supervision required  -ME  --  --    Time Frame (Transfer Goal 1, OT)  long term goal (LTG);by discharge  -ME  --  --    Progress/Outcome (Transfer Goal 1, OT)  goal not met  -ME  --  --       Bathing Goal 1 (OT)    Activity/Assistive Device (Bathing Goal 1, OT)  bathing skills, all;shower chair  -ME  --  --    [place] Level/Cues Needed (Bathing Goal 1, OT)  supervision required  -ME  --  --    Time Frame (Bathing Goal 1, OT)  long term goal (LTG);by discharge  -ME  --  --    Progress/Outcomes (Bathing Goal 1, OT)  goal not met  -ME  --  --       Dressing Goal 1 (OT)    Activity/Assistive Device (Dressing Goal 1, OT)  dressing skills, all  -ME  --  --    [place]/Cues Needed (Dressing Goal 1, OT)  supervision required  -ME  --  --    Time Frame (Dressing Goal 1, OT)  long term goal (LTG);by discharge  -ME  --  --    Progress/Outcome (Dressing Goal 1, OT)  goal not met  -ME  --  --       Toileting Goal 1 (OT)    Activity/Device (Toileting Goal 1, OT)  toileting skills, all  -ME  --  --    [place] Level/Cues Needed (Toileting Goal 1, OT)  supervision required  -ME  --  --    Time Frame (Toileting Goal 1, OT)  long term goal (LTG);by discharge  -ME  --  --    Progress/Outcome (Toileting Goal 1, OT)  goal not met  -ME  --  --       Functional Mobility Goal 1 (OT)    Activity/Assistive Device (Functional Mobility Goal 1, OT)  walker, rolling  -ME  --  --    [place] Level/Cues Needed (Functional Mobility Goal 1, OT)  standby assist  -ME  --  --    Distance Goal 1 (Functional Mobility, OT)  for ADLs and community mobility with no LOB and good safety.  -ME  --  --    Time Frame (Functional Mobility Goal 1, OT)  long term goal (LTG);by discharge  -ME  --  --    Progress/Outcome (Functional Mobility Goal 1, OT)  goal not met  -ME  --  --       Problem Specific Goal 1 (OT)    Problem Specific Goal 1 (OT)  pt will complete 9 hole peg using R hand in 35 seconds or less, and without  dropping a peg.   -ME  --  --    Time Frame (Problem Specific Goal 1, OT)  long term goal (LTG);by discharge  -ME  --  --    Progress/Outcome (Problem Specific Goal 1, OT)  goal not met  -ME  --  --       Swallow Goals (SLP)    Oral Nutrition/Hydration Goal Selection (SLP)  --  --  oral nutrition/hydration, SLP goal 1  -CK    Swallow Compensatory Strategies Goal Selection (SLP)  --  --  swallow compensatory strategies, SLP goal 1  -CK       Oral Nutrition/Hydration Goal 1 (SLP)    Oral Nutrition/Hydration Goal 1, SLP  --  --  Pt to tolerate least restrictive diet with no s/s of aspiration for adequate nutirtion and hydration.   -CK    Time Frame (Oral Nutrition/Hydration Goal 1, SLP)  --  --  by discharge  -CK    Barriers (Oral Nutrition/Hydration Goal 1, SLP)  --  --  compliance w/swallowing strategies  -CK    Progress/Outcomes (Oral Nutrition/Hydration Goal 1, SLP)  --  --  goal ongoing  -CK       Swallow Compensatory Strategies Goal 1 (SLP)    Activity (Swallow Compensatory Strategies/Techniques Goal 1, SLP)  --  --  compensatory strategies;small bites;chin tuck posture;alternate food/liquid intake;postural techniques;during p.o. trials;liquids by teaspoon only  -    [place]/Accuracy (Swallow Compensatory Strategies/Techniques Goal 1, SLP)  --  --  independently (over 90% accuracy)  -CK    Time Frame (Swallow Compensatory Strategies/Techniques Goal 1, SLP)  --  --  by discharge  -CK    Barriers (Swallow Compensatory Strategies/Techniques Goal 1, SLP)  --  --  compliance  -CK    Progress/Outcomes (Swallow Compensatory Strategies/Techniques Goal 1, SLP)  --  --  goal ongoing  -CK       Communication Treatment Objective and Progress Goals (SLP)    Motor Speech/Dysarthria Treatment Objectives  --  --  Motor Speech/Dysarthria Treatment Objectives (Group)  -CK       Motor Speech/Dysarthria Treatment Objectives    Articulation Selection  --  --  articulation, SLP goal 1  -CK       Articulation Goal 1 (SLP)     Improve Articulation Goal 1 (SLP)  --  --  by over-articulating at phrase level;by over-articulating in connected speech;90%  -CK    Time Frame (Articulation Goal 1, SLP)  --  --  by discharge  -CK    Barriers (Articulation Goal 1, SLP)  --  --  right sided weakness  -CK    Progress/Outcomes (Articulation Goal 1, SLP)  --  --  goal ongoing  -CK      User Key  (r) = Recorded By, (t) = Taken By, (c) = Cosigned By    Initials Name Provider Type Discipline    CK Romina Garcia, MS CCC-SLP Speech and Language Pathologist SLP    Blue Mccrary, PTA Physical Therapy Assistant PT    Che Hinkle, OT Occupational Therapist OT        Occupational Therapy Education     Title: PT OT SLP Therapies (Done)     Topic: Occupational Therapy (Done)     Point: ADL training (Done)     Description: Instruct learner(s) on proper safety adaptation and remediation techniques during self care or transfers.   Instruct in proper use of assistive devices.    Learning Progress Summary           Patient Acceptance, E, DU,VU by ME at 11/6/2019  1:51 PM    Comment:  Educated on OT and POC. Educated on transfer to ARU. Educated on safe completion of ADLs, functional mobility, and transfers. Educated on correct usage of AE and AD for ADL completion. Educated to slow down when completing tasks for safety.    Acceptance, E, VU,DU,NR by ME at 11/4/2019  4:40 PM    Comment:  Educated on OT and POC. Educated to call for assist. Educated on fine motor coordination/translation exercises. Educated on proper hand placement for mobility and transfers. Educated on good/relieving positions for spasms. Educated on possible AE needs.                   Point: Home exercise program (Done)     Description: Instruct learner(s) on appropriate technique for monitoring, assisting and/or progressing therapeutic exercises/activities.    Learning Progress Summary           Patient Acceptance, E, VU,DU,NR by ME at 11/4/2019  4:40 PM    Comment:  Educated on OT and  POC. Educated to call for assist. Educated on fine motor coordination/translation exercises. Educated on proper hand placement for mobility and transfers. Educated on good/relieving positions for spasms. Educated on possible AE needs.                   Point: Precautions (Done)     Description: Instruct learner(s) on prescribed precautions during self-care and functional transfers.    Learning Progress Summary           Patient Acceptance, E, DU,VU by ME at 11/6/2019  1:51 PM    Comment:  Educated on OT and POC. Educated on transfer to ARU. Educated on safe completion of ADLs, functional mobility, and transfers. Educated on correct usage of AE and AD for ADL completion. Educated to slow down when completing tasks for safety.    Acceptance, E, VU,DU,NR by ME at 11/4/2019  4:40 PM    Comment:  Educated on OT and POC. Educated to call for assist. Educated on fine motor coordination/translation exercises. Educated on proper hand placement for mobility and transfers. Educated on good/relieving positions for spasms. Educated on possible AE needs.                   Point: Body mechanics (Done)     Description: Instruct learner(s) on proper positioning and spine alignment during self-care, functional mobility activities and/or exercises.    Learning Progress Summary           Patient Acceptance, E, VU,DU,NR by ME at 11/4/2019  4:40 PM    Comment:  Educated on OT and POC. Educated to call for assist. Educated on fine motor coordination/translation exercises. Educated on proper hand placement for mobility and transfers. Educated on good/relieving positions for spasms. Educated on possible AE needs.                               User Key     Initials Effective Dates Name Provider Type Discipline    ME 09/10/19 -  Che Oh OT Occupational Therapist OT                OT Recommendation and Plan  Outcome Summary/Treatment Plan (OT)  Daily Summary of Progress (OT): progress toward functional goals as expected  Barriers to  Overall Progress (OT): RLE spasms (non noted this date), decreased independence with functional mobility.   Plan for Continued Treatment (OT): continue per OT POC   Anticipated Equipment Needs at Discharge (OT): other (see comments)(unsure at this time;will continue to assess with progress )  Anticipated Discharge Disposition (OT): anticipate therapy at next level of care, inpatient rehabilitation facility  Planned Therapy Interventions (OT Eval): activity tolerance training, BADL retraining, adaptive equipment training, functional balance retraining, IADL retraining, neuromuscular control/coordination retraining, occupation/activity based interventions, patient/caregiver education/training, ROM/therapeutic exercise, strengthening exercise, transfer/mobility retraining  Therapy Frequency (OT Eval): daily  Daily Summary of Progress (OT): progress toward functional goals as expected  Plan of Care Review  Plan of Care Reviewed With: patient  Plan of Care Reviewed With: patient  Outcome Summary: pt pleasant and cooperative for treatment. asking to complete shower. OT ok'ed with RN and MD. getting ready to transfer for ARU. supervision-CGA for sit-stand/stand-sit transfers. CGA for shower transfer, with BSC in shower. CGA for mobility, vc's to look up when ambulating. RW for transfers and functional mobility. pt is SBA for UB bathing, dependent for back. CGA to min A for LB bathing, standing to bathe periarea and buttocks. set-up and SBA for donning of hospital gown. supervision for donning/doffing of socks. combed hair while in sitting with set-up.   Outcome Measures     Row Name 11/06/19 1053 11/06/19 0826 11/05/19 9158       How much help from another person do you currently need...    Turning from your back to your side while in flat bed without using bedrails?  --  4  -CA  4  -JA    Moving from lying on back to sitting on the side of a flat bed without bedrails?  --  4  -CA  3  -JA    Moving to and from a bed to a  chair (including a wheelchair)?  --  3  -CA  3  -JA    Standing up from a chair using your arms (e.g., wheelchair, bedside chair)?  --  3  -CA  3  -JA    Climbing 3-5 steps with a railing?  --  3  -CA  3  -JA    To walk in hospital room?  --  3  -CA  3  -JA    AM-PAC 6 Clicks Score (PT)  --  20  -CA  19  -JA       How much help from another is currently needed...    Putting on and taking off regular lower body clothing?  3  -ME  --  --    Bathing (including washing, rinsing, and drying)  3  -ME  --  --    Toileting (which includes using toilet bed pan or urinal)  3  -ME  --  --    Putting on and taking off regular upper body clothing  3  -ME  --  --    Taking care of personal grooming (such as brushing teeth)  3  -ME  --  --    Eating meals  4  -ME  --  --    AM-PAC 6 Clicks Score (OT)  19  -ME  --  --       Functional Assessment    Outcome Measure Options  AM-PAC 6 Clicks Daily Activity (OT)  -ME  AM-[facility] 6 Clicks Basic Mobility (PT)  -[facility] 6 Clicks Basic Mobility (PT)  -    Row Name 11/04/19 1600 11/04/19 1402          9 Hole Peg    9-Hole Peg Left  --  33 seconds   -ME     9-Hole Peg Right  --  40 seconds  pt is right handed   -ME        How much help from another person do you currently need...    Turning from your back to your side while in flat bed without using bedrails?  4  -GB  --     Moving from lying on back to sitting on the side of a flat bed without bedrails?  3  -GB  --     Moving to and from a bed to a chair (including a wheelchair)?  3  -GB  --     Standing up from a chair using your arms (e.g., wheelchair, bedside chair)?  3  -GB  --     Climbing 3-5 steps with a railing?  2  -GB  --     To walk in hospital room?  3  -GB  --     AM-PAC 6 Clicks Score (PT)  18  -GB  --        How much help from another is currently needed...    Putting on and taking off regular lower body clothing?  --  3  -ME     Bathing (including washing, rinsing, and drying)  --  2  -ME     Toileting (which includes using  toilet bed pan or urinal)  --  3  -ME     Putting on and taking off regular upper body clothing  --  3  -ME     Taking care of personal grooming (such as brushing teeth)  --  3  -ME     Eating meals  --  4  -ME     AM-PAC 6 Clicks Score (OT)  --  18  -ME        Modified Barthel    Modified Barthel Comments  --  feeding 5, bathing 0, grooming 0, dressing 5, bowels 10, bladder 10, toilet use 5, transfers 5, mobility 10, stairs 5, total 45/100  -ME        Modified Peach Scale    Modified Nicolasa Scale  --  4 - Moderately severe disability.  Unable to walk without assistance, and unable to attend to own bodily needs without assistance.  -ME        Functional Assessment    Outcome Measure Options  AM-PAC 6 Clicks Basic Mobility (PT)  -GB  AM-PAC 6 Clicks Daily Activity (OT);9 Hole Peg;Modified Barthel Index;Modified Peach  -ME       User Key  (r) = Recorded By, (t) = Taken By, (c) = Cosigned By    Initials Name Provider Type     Kandy Camara, PT Physical Therapist    Ulysses Delarosa, PTA Physical Therapy Assistant    Blue Mccrary, PTA Physical Therapy Assistant    ME Che Oh, OT Occupational Therapist           Time Calculation:   Time Calculation- OT     Row Name 11/06/19 1349             Time Calculation- OT    OT Start Time  1053  -ME      OT Stop Time  1146  -ME      OT Time Calculation (min)  53 min  -ME      Total Timed Code Minutes- OT  53 minute(s)  -ME      OT Received On  11/06/19  -ME        User Key  (r) = Recorded By, (t) = Taken By, (c) = Cosigned By    Initials Name Provider Type    ME Che Oh, OT Occupational Therapist        Therapy Charges for Today     Code Description Service Date Service Provider Modifiers Qty    [id number]  OT SELF CARE/MGMT/TRAIN EA 15 MIN 11/6/2019 Che Oh OT GO 4               Che Oh OT  11/6/2019

## 2019-11-06 NOTE — PLAN OF CARE
Problem: Patient Care Overview  Goal: Plan of Care Review  Outcome: Ongoing (interventions implemented as appropriate)   11/05/19 6271   Coping/Psychosocial   Plan of Care Reviewed With patient   OTHER   Outcome Summary Pt reported less spasms this am and feeling better;Gait improved w/ more regular and increased gait speed, increased distance to 204 ft, and no spasms noted during tx. SHe reports incontinence and inability to push for bowel movement since Sunday. Dr Monson aware and pt pending review of [facility]. Rehab for her will need to incorporate both aspects for d/c planning

## 2019-11-06 NOTE — THERAPY DISCHARGE NOTE
SDU - Speech Language Pathology /Discharge  [facility]     Patient Name: Rama Goel  : 1945  MRN: [id number]  Today's Date: 2019         Admit Date: 2019       Pt seen for dysphagia and speech therapies this date.  Pt was noted to be completing over-articulation homework when SLP entered the room.  Pt also reported that she had completed the michael exercise multiple times.  Pt was seen to have straw in drink once again.  Pt is to be moved to ARU for continued rehab care.  SLP informed Dr. West of current diet, mbs and process.    Goals:  1.  Pt to tolerate least restrictive diet with no s/s of aspiration for adequate nutirtion and hydration:  Pt consumed thin liquids w/small sip from straw w/oral prep set, breath hold and chin tuck.  Pt demonstrated wet vocal quality x1 over multiple trials.  Pt consumed regular solids w/cough x1, but stated it wasn't from solids.  SLP felt that the coughing episode was from PO consumed.  SLP educated pt that her difficulty w/solids w/premature spillage into the pharynx down to the pyriform sinuses and not her ability to chew her foods.       2.  Pt will implement compensatory strategies of small bites, chin tuck posture, alternate food/liquid intake, postural techniques during PO trials w/liquids by teaspoon only:  Pt continues to be inconsistent w/chin tuck w/solids, but more consistent w/thin liquids.  Pt continues to use straw at bedside.  Pt is independently taking smaller bites w/regular solid trials.     3.  Pt will complete over-articulation at phrase level and connected speech w/90% intelligibility:  Pt complete newpaper reading task w/90% intelligibility.  Pt was 80% intelligible at conversational level.  SLP has noted an improvement in intelligibility and clarity in pt's overall speech production.         Visit Dx:     ICD-10-CM ICD-9-CM   1. Dysarthria R47.1 784.51   2. Oropharyngeal dysphagia R13.12 787.22     Patient Active Problem List    Diagnosis   • Altered mental status   • DJD (degenerative joint disease), multiple sites   • Hypertension   • Left pontine stroke (CMS/HCC)   • CVA (cerebral vascular accident) (CMS/[facility])   • Urinary hesitancy   • Muscle spasm of both lower legs   • Recurrent falls while walking   • Dysarthria        Therapy Treatment    Rehabilitation Treatment Summary     Row Name 11/06/19 1053 11/06/19 0859 11/06/19 0826       Treatment Time/Intention    Discipline  occupational therapist  -ME  speech language pathologist  -CK  physical therapy assistant  -CA    Document Type  therapy note (daily note)  -ME  therapy note (daily note)  -CK  therapy note (daily note)  -CA    Subjective Information  no complaints  -ME  no complaints  -CK  no complaints  -CA    Mode of Treatment  individual therapy;occupational therapy  -ME  speech-language pathology;individual therapy  -CK  individual therapy;physical therapy  -CA    Patient/Family Observations  pt sitting up in recliner   -ME  Pt sitting in recliner at bedside practicing overarticulation  -CK  pt. up in recliner upon entry  -CA    Care Plan Review  evaluation/treatment results reviewed;care plan/treatment goals reviewed;risks/benefits reviewed;current/potential barriers reviewed;patient/other agree to care plan  -ME  evaluation/treatment results reviewed;care plan/treatment goals reviewed;risks/benefits reviewed;current/potential barriers reviewed;patient/other agree to care plan  -CK  --    Therapy Frequency (PT Clinical Impression)  --  --  daily  -CA    Total Minutes, Occupational Therapy Treatment  53  -ME  --  --    Therapy Frequency (OT Eval)  daily  -ME  --  --    Patient Effort  excellent  -ME  --  --    Comment  pt asking to complete shower for last couple of days. talked to RN, and asked for approval to complete. RN and MD avila'ed removal of tele, and nursing pulled IVs. pt getting ready to transfer to ARU.   -ME  --  --    Existing Precautions/Restrictions  fall pt still  reporting incontinence   -ME  --  --    Recorded by [ME] Che Oh, OT 11/06/19 1330 [CK] Romina Garcia MS CCC-SLP 11/06/19 0903 [CA] Blue Weston, PTA 11/06/19 0834    Row Name 11/06/19 1053 11/06/19 0826          Vital Signs    Pre Systolic BP Rehab  144  -ME  158  -CA     Pre Treatment Diastolic BP  68  -ME  73  -CA     Post Systolic BP Rehab  117  -ME  --     Post Treatment Diastolic BP  57  -ME  --     Pretreatment Heart Rate (beats/min)  68  -ME  68  -CA     Posttreatment Heart Rate (beats/min)  72  -ME  --     Pre SpO2 (%)  96  -ME  96  -CA     O2 Delivery Pre Treatment  room air  -ME  room air  -CA     Post SpO2 (%)  98  -ME  --     O2 Delivery Post Treatment  room air  -ME  --     Pre Patient Position  Sitting  -ME  Sitting  -CA2     Intra Patient Position  --  Standing  -CA     Post Patient Position  Sitting  -ME  Sitting  -CA     Recorded by [ME] Che Oh, OT 11/06/19 1330 [CA] Blue Weston, PTA 11/06/19 0854  [CA2] Blue Weston, PTA 11/06/19 0834     Row Name 11/06/19 1053 11/06/19 0826          Cognitive Assessment/Intervention- PT/OT    Affect/Mental Status (Cognitive)  WFL  -ME  WFL  -CA     Orientation Status (Cognition)  oriented x 4  -ME  oriented x 4  -CA     Follows Commands (Cognition)  WFL  -ME  follows multi-step commands  -CA2     Safety Deficit (Cognitive)  awareness of need for assistance;impulsivity;insight into deficits/self awareness;safety precautions awareness;safety precautions follow-through/compliance  -ME  awareness of need for assistance  -CA2     Personal Safety Interventions  fall prevention program maintained;gait belt;nonskid shoes/slippers when out of bed  -ME  fall prevention program maintained;gait belt;muscle strengthening facilitated;nonskid shoes/slippers when out of bed  -CA2     Recorded by [ME] Che Oh, OT 11/06/19 1330 [CA] Blue Weston, PTA 11/06/19 0834  [CA2] Blue Weston, PTA 11/06/19 0854     Row Name 11/06/19 1053             Safety  Issues, Functional Mobility    Safety Issues Affecting Function (Mobility)  awareness of need for assistance;impulsivity;insight into deficits/self awareness;safety precaution awareness;safety precautions follow-through/compliance  -ME      Impairments Affecting Function (Mobility)  balance;coordination;motor planning LE spasms;which have decreased;non present this session  -ME      Recorded by [ME] Che Oh, OT 11/06/19 1330      Row Name 11/06/19 1053 11/06/19 0826          Bed Mobility Assessment/Treatment    Comment (Bed Mobility)  pt already sitting up in chair when OT arrived   -ME  pt. sitting up in recliner  -CA     Recorded by [ME] Che Oh, OT 11/06/19 1330 [CA] Blue Weston, PTA 11/06/19 0834     Row Name 11/06/19 1053             Functional Mobility    Functional Mobility- Ind. Level  contact guard assist;verbal cues required  -ME      Functional Mobility- Device  rolling walker  -ME      Functional Mobility- Safety Issues  balance decreased during turns;step length decreased;other (see comments) RLE spasms & knee buckling;less noted this date   -ME      Functional Mobility- Comment  pt ambulated from recliner chair to restroom for completion of shower, and then from restroom back to chair at end of session. completed with CGA this date. pt continues looking at the floor, cues required.   -ME      Recorded by [ME] Che Oh, OT 11/06/19 1347      Row Name 11/06/19 1053 11/06/19 0826          Transfer Assessment/Treatment    Transfer Assessment/Treatment  sit-stand transfer;stand-sit transfer;shower transfer  -ME  sit-stand transfer;stand-sit transfer;toilet transfer  -CA     Recorded by [ME] Che Oh, OT 11/06/19 1347 [CA] Blue Weston, PTA 11/06/19 0854     Row Name 11/06/19 1053 11/06/19 0826          Sit-Stand Transfer    Sit-Stand [place] (Transfers)  supervision;contact guard  -ME  supervision  -CA     Assistive Device (Sit-Stand Transfers)  walker, front-wheeled  -ME   walker, front-wheeled  -CA2     Recorded by [ME] Che Oh, OT 11/06/19 1347 [CA] Blue Weston, PTA 11/06/19 0925  [CA2] Blue Weston, PTA 11/06/19 0854     Row Name 11/06/19 1053 11/06/19 0826          Stand-Sit Transfer    Stand-Sit [place] (Transfers)  supervision;contact guard  -ME  supervision  -CA     Assistive Device (Stand-Sit Transfers)  walker, front-wheeled  -ME  walker, front-wheeled  -CA2     Recorded by [ME] Che Oh, OT 11/06/19 1347 [CA] Blue Weston, PTA 11/06/19 0925  [CA2] Blue Weston, PTA 11/06/19 0854     Row Name 11/06/19 0826             Toilet Transfer    Type (Toilet Transfer)  sit-stand;stand-sit  -CA      [place] Level (Toilet Transfer)  supervision  -CA      Assistive Device (Toilet Transfer)  walker, front-wheeled  -CA      Recorded by [CA] Blue Weston, PTA 11/06/19 0925      Row Name 11/06/19 1053             Shower Transfer    Type (Shower Transfer)  sit-stand;stand-sit  -ME      [place] Level (Shower Transfer)  contact guard;verbal cues  -ME      Assistive Device (Shower Transfer)  walker, front-wheeled;other (see comments) BSC in shower   -ME      Recorded by [ME] Che Oh, OT 11/06/19 1347      Row Name 11/06/19 0826             Gait/Stairs Assessment/Training    Gait/Stairs Assessment/Training  gait/ambulation assistive device  -CA      [place] Level (Gait)  stand by assist  -CA      Assistive Device (Gait)  walker, front-wheeled  -CA      Distance in Feet (Gait)  250'  -CA      Pattern (Gait)  step-through  -CA      Deviations/Abnormal Patterns (Gait)  john decreased  -CA      Recorded by [CA] Blue Weston, PTA 11/06/19 0925      Row Name 11/06/19 1053             ADL Assessment/Intervention    BADL Assessment/Intervention  bathing;upper body dressing;grooming;lower body dressing  -ME      Recorded by [ME] Che Oh, OT 11/06/19 1347      Row Name 11/06/19 1053             Bathing Assessment/Intervention    Assistive Devices  (Bathing)  other (see comments) sitting on BSC in shower   -ME      Comment (Bathing)  UB bathing completed with supervision, besides washing back, which pt is dependent for without use of long handled sponge which we did not have this session. LB bathing completed in both sitting and standing, done with supervision while in sitting and CGA while in standing for steadying assistance. pt was impulsive at times throughout, not waiting for OT to be ready for standing. pt is able to wash feet, but requires additional time to reach. increased time required for shower.   -ME      Recorded by [ME] Che Oh, OT 11/06/19 1347      Row Name 11/06/19 1053             Upper Body Dressing Assessment/Training    Upper Body Dressing [place] Level  doff;don;set up;other (see comments) SBA; hospital gown   -ME      Upper Body Dressing Position  other (see comments) sitting up in recliner   -ME      Recorded by [ME] Che Oh, OT 11/06/19 1347      Row Name 11/06/19 1053             Lower Body Dressing Assessment/Training    Lower Body Dressing [place] Level  doff;don;socks;set up;other (see comments) SBA for safety when pt leaning forward   -ME      Lower Body Dressing Position  other (see comments) sitting up in recliner   -ME      Recorded by [ME] Che Oh, OT 11/06/19 1347      Row Name 11/06/19 1053             Grooming Assessment/Training    [place] Level (Grooming)  hair care, combing/brushing;set up;independent  -ME      Grooming Position  other (see comments) sitting up in recliner   -ME      Recorded by [ME] Che Oh, OT 11/06/19 1347      Row Name 11/06/19 1053             BADL Safety/Performance    Impairments, BADL Safety/Performance  balance;trunk/postural control;coordination;motor planning  -ME      Recorded by [ME] Che Oh, OT 11/06/19 1347      Row Name 11/06/19 0826             Therapeutic Exercise    Therapeutic Exercise  seated, lower extremities;standing, lower  extremities  -CA      Recorded by [CA] Blue Weston, PTA 11/06/19 0925      Row Name 11/06/19 0826             Lower Extremity Standing Therapeutic Exercise    Performed, Standing Lower Extremity (Therapeutic Exercise)  hip flexion/extension  -CA      Exercise Type, Standing Lower Extremity (Therapeutic Exercise)  AROM (active range of motion)  -CA      Sets/Reps Detail, Standing Lower Extremity (Therapeutic Exercise)  10x1  -CA      Recorded by [CA] Blue Weston, PTA 11/06/19 0925      Row Name 11/06/19 0826             Lower Extremity Seated Therapeutic Exercise    Performed, Seated Lower Extremity (Therapeutic Exercise)  LAQ (long arc quad), knee extension;ankle dorsiflexion/plantarflexion  -CA      Exercise Type, Seated Lower Extremity (Therapeutic Exercise)  AROM (active range of motion)  -CA      Sets/Reps Detail, Seated Lower Extremity (Therapeutic Exercise)  10x1  -CA      Recorded by [CA] Blue Weston, PTA 11/06/19 0925      Row Name 11/06/19 1053 11/06/19 0859 11/06/19 0826       Positioning and Restraints    Pre-Treatment Position  sitting in chair/recliner  -ME  sitting in chair/recliner  -CK  sitting in chair/recliner  -CA    Post Treatment Position  chair  -ME  --  chair  -CA    In Chair  notified nsg;call light within reach;encouraged to call for assist;with family/caregiver  -ME  --  sitting;call light within reach;encouraged to call for assist  -CA    Recorded by [ME] Che Oh, OT 11/06/19 1347 [CK] Romina Garcia MS CCC-SLP 11/06/19 0903 [CA] Blue Weston, PTA 11/06/19 0925    Row Name 11/06/19 1053             Pain Assessment    Additional Documentation  Pain Scale: Numbers Pre/Post-Treatment (Group)  -ME      Recorded by [ME] Che Oh, OT 11/06/19 1347      Row Name 11/06/19 1053 11/06/19 0859 11/06/19 0826       Pain Scale: Numbers Pre/Post-Treatment    Pain Scale: Numbers, Pretreatment  0/10 - no pain  -ME  1/10  -CK  1/10  -CA    Pain Scale: Numbers, Post-Treatment  0/10 -  no pain  -ME  --  0/10 - no pain  -CA    Pain Location - Side  --  Right  -CK  Right  -CA    Pain Location  --  hip  -CK  hip  -CA    Pre/Post Treatment Pain Comment  --  declines meds  -CK  --    Pain Intervention(s)  Repositioned;Ambulation/increased activity;Distraction  -ME  --  --    Recorded by [ME] Che Oh, OT 11/06/19 1347 [CK] Romina Garcia, MS CCC-SLP 11/06/19 0903 [CA] Blue Weston, PTA 11/06/19 0925    Row Name 11/06/19 1053             Plan of Care Review    Plan of Care Reviewed With  patient  -ME      Recorded by [ME] Che Oh, OT 11/06/19 1347      Row Name 11/06/19 1053             Outcome Summary/Treatment Plan (OT)    Daily Summary of Progress (OT)  progress toward functional goals as expected  -ME      Barriers to Overall Progress (OT)  RLE spasms (non noted this date), decreased independence with functional mobility.   -ME      Plan for Continued Treatment (OT)  continue per OT POC   -ME      Anticipated Discharge Disposition (OT)  anticipate therapy at next level of care;inpatient rehabilitation facility  -ME      Recorded by [ME] Che Oh, OT 11/06/19 1347        User Key  (r) = Recorded By, (t) = Taken By, (c) = Cosigned By    Initials Name Effective Dates Discipline    CK Romina Garcia, MS CCC-SLP 04/03/18 -  SLP    CA Blue Weston, PTA 03/07/18 -  PT    ME Che Oh OT 09/10/19 -  OT          Outcome Summary     SLP GOALS     Row Name 11/06/19 0859 11/05/19 1435 11/04/19 1315       Oral Nutrition/Hydration Goal 1 (SLP)    Oral Nutrition/Hydration Goal 1, SLP  Pt to tolerate least restrictive diet with no s/s of aspiration for adequate nutirtion and hydration.   -CK  Pt to tolerate least restrictive diet with no s/s of aspiration for adequate nutirtion and hydration.   -CK  Pt to tolerate least restrictive diet with no s/s of aspiration for adequate nutirtion and hydration.   -EK    Time Frame (Oral Nutrition/Hydration Goal 1, SLP)  by discharge  -CK  by  discharge  -CK  by discharge  -EK    Barriers (Oral Nutrition/Hydration Goal 1, SLP)  compliance w/swallowing strategies  -CK  compliance w/swallowing strategies  -CK  --    Progress/Outcomes (Oral Nutrition/Hydration Goal 1, SLP)  goal ongoing  -CK  goal ongoing  -CK  other (see comments) new goal  -EK       Swallow Compensatory Strategies Goal 1 (SLP)    Activity (Swallow Compensatory Strategies/Techniques Goal 1, SLP)  compensatory strategies;small bites;chin tuck posture;alternate food/liquid intake;postural techniques;during p.o. trials;liquids by teaspoon only  -CK  compensatory strategies;small bites;chin tuck posture;alternate food/liquid intake;postural techniques;during p.o. trials;liquids by teaspoon only  -CK  compensatory strategies;small bites;chin tuck posture;alternate food/liquid intake;postural techniques;during p.o. trials;liquids by teaspoon only  -EK    [place]/Accuracy (Swallow Compensatory Strategies/Techniques Goal 1, SLP)  independently (over 90% accuracy)  -CK  independently (over 90% accuracy)  -CK  independently (over 90% accuracy)  -EK    Time Frame (Swallow Compensatory Strategies/Techniques Goal 1, SLP)  by discharge  -CK  by discharge  -CK  --    Barriers (Swallow Compensatory Strategies/Techniques Goal 1, SLP)  compliance  -CK  compliance  -CK  --    Progress/Outcomes (Swallow Compensatory Strategies/Techniques Goal 1, SLP)  goal ongoing  -CK  goal ongoing  -CK  other (see comments) new goal  -EK       Articulation Goal 1 (SLP)    Improve Articulation Goal 1 (SLP)  by over-articulating at phrase level;by over-articulating in connected speech;90%  -CK  by over-articulating at phrase level;by over-articulating in connected speech;90%  -CK  --    Time Frame (Articulation Goal 1, SLP)  by discharge  -CK  by discharge  -CK  --    Barriers (Articulation Goal 1, SLP)  right sided weakness  -CK  right sided weakness  -CK  --    Progress (Articulation Goal 1, SLP)  --  60%  -CK  --     Progress/Outcomes (Articulation Goal 1, SLP)  goal ongoing  -CK  goal ongoing  -CK  --    Row Name 19 0738             Articulation Goal 1 (SLP)    Improve Articulation Goal 1 (SLP)  by over-articulating at phrase level;by over-articulating in connected speech;90%  -CK      Time Frame (Articulation Goal 1, SLP)  by discharge  -CK      Barriers (Articulation Goal 1, SLP)  right sided weakness  -CK      Progress (Articulation Goal 1, SLP)  other (comment) new goal  -CK      Progress/Outcomes (Articulation Goal 1, SLP)  other (see comments) new goal  -CK        User Key  (r) = Recorded By, (t) = Taken By, (c) = Cosigned By    Initials Name Provider Type    Megan Wells CCC-SLP Speech and Language Pathologist    Romina Griffin MS CCC-SLP Speech and Language Pathologist          EDUCATION  The patient has been educated in the following areas:   Communication Impairment.    SLP Recommendation and Plan                                 Time Calculation:   Time Calculation- SLP     Row Name 19 1333             Time Calculation- SLP    SLP Start Time  0859  -CK      SLP Stop Time  0941  -CK      SLP Time Calculation (min)  42 min  -CK      Total Timed Code Minutes- SLP  42 minute(s)  -CK      SLP Received On  19  -CK        User Key  (r) = Recorded By, (t) = Taken By, (c) = Cosigned By    Initials Name Provider Type    Romina Griffin MS CCC-SLP Speech and Language Pathologist          Therapy Charges for Today     Code Description Service Date Service Provider Modifiers Qty    56344081894 HC ST TREATMENT SPEECH 2 2019 Romina Garcia MS CCC-SLP GN 1    [id number] HC ST TREATMENT SWALLOW 1 2019 Romina Garcia MS CCC-SLP GN 1                    Romina Garcia MS CCC-SLP  2019 and SDU - Speech Language Pathology   Swallow Treatment Note/Discharge   [facility]     Patient Name: Rama Goel  : 1945  MRN: [id number]  Today's Date:  11/6/2019               Admit Date: 11/6/2019    Visit Dx:      ICD-10-CM ICD-9-CM   1. Dysarthria R47.1 784.51   2. Oropharyngeal dysphagia R13.12 787.22     Patient Active Problem List   Diagnosis   • Altered mental status   • DJD (degenerative joint disease), multiple sites   • Hypertension   • Left pontine stroke (CMS/HCC)   • CVA (cerebral vascular accident) (CMS/HCC)   • Urinary hesitancy   • Muscle spasm of both lower legs   • Recurrent falls while walking   • Dysarthria       Therapy Treatment  Rehabilitation Treatment Summary     Row Name 11/06/19 1053 11/06/19 0859 11/06/19 0826       Treatment Time/Intention    Discipline  occupational therapist  -ME  speech language pathologist  -CK  physical therapy assistant  -CA    Document Type  therapy note (daily note)  -ME  therapy note (daily note)  -CK  therapy note (daily note)  -CA    Subjective Information  no complaints  -ME  no complaints  -CK  no complaints  -CA    Mode of Treatment  individual therapy;occupational therapy  -ME  speech-language pathology;individual therapy  -CK  individual therapy;physical therapy  -CA    Patient/Family Observations  pt sitting up in recliner   -ME  Pt sitting in recliner at bedside practicing overarticulation  -CK  pt. up in recliner upon entry  -CA    Care Plan Review  evaluation/treatment results reviewed;care plan/treatment goals reviewed;risks/benefits reviewed;current/potential barriers reviewed;patient/other agree to care plan  -ME  evaluation/treatment results reviewed;care plan/treatment goals reviewed;risks/benefits reviewed;current/potential barriers reviewed;patient/other agree to care plan  -CK  --    Therapy Frequency (PT Clinical Impression)  --  --  daily  -CA    Total Minutes, Occupational Therapy Treatment  53  -ME  --  --    Therapy Frequency (OT Eval)  daily  -ME  --  --    Patient Effort  excellent  -ME  --  --    Comment  pt asking to complete shower for last couple of days. talked to RN, and asked  for approval to complete. RN and MD avila'ed removal of tele, and nursing pulled IVs. pt getting ready to transfer to ARU.   -ME  --  --    Existing Precautions/Restrictions  fall pt still reporting incontinence   -ME  --  --    Recorded by [ME] Che Oh, OT 11/06/19 1330 [CK] Romina Garcia, MS CCC-SLP 11/06/19 0903 [CA] Blue Weston, PTA 11/06/19 0834    Row Name 11/06/19 1053 11/06/19 0826          Vital Signs    Pre Systolic BP Rehab  144  -ME  158  -CA     Pre Treatment Diastolic BP  68  -ME  73  -CA     Post Systolic BP Rehab  117  -ME  --     Post Treatment Diastolic BP  57  -ME  --     Pretreatment Heart Rate (beats/min)  68  -ME  68  -CA     Posttreatment Heart Rate (beats/min)  72  -ME  --     Pre SpO2 (%)  96  -ME  96  -CA     O2 Delivery Pre Treatment  room air  -ME  room air  -CA     Post SpO2 (%)  98  -ME  --     O2 Delivery Post Treatment  room air  -ME  --     Pre Patient Position  Sitting  -ME  Sitting  -CA2     Intra Patient Position  --  Standing  -CA     Post Patient Position  Sitting  -ME  Sitting  -CA     Recorded by [ME] Che Oh, OT 11/06/19 1330 [CA] Blue Weston, PTA 11/06/19 0854  [CA2] Blue Weston, PTA 11/06/19 0834     Row Name 11/06/19 1053 11/06/19 0826          Cognitive Assessment/Intervention- PT/OT    Affect/Mental Status (Cognitive)  WFL  -ME  WFL  -CA     Orientation Status (Cognition)  oriented x 4  -ME  oriented x 4  -CA     Follows Commands (Cognition)  WFL  -ME  follows multi-step commands  -CA2     Safety Deficit (Cognitive)  awareness of need for assistance;impulsivity;insight into deficits/self awareness;safety precautions awareness;safety precautions follow-through/compliance  -ME  awareness of need for assistance  -CA2     Personal Safety Interventions  fall prevention program maintained;gait belt;nonskid shoes/slippers when out of bed  -ME  fall prevention program maintained;gait belt;muscle strengthening facilitated;nonskid shoes/slippers when out of  bed  -CA2     Recorded by [ME] Che Oh, OT 11/06/19 1330 [CA] Blue Weston, PTA 11/06/19 0834  [CA2] Blue Weston, PTA 11/06/19 0854     Row Name 11/06/19 1053             Safety Issues, Functional Mobility    Safety Issues Affecting Function (Mobility)  awareness of need for assistance;impulsivity;insight into deficits/self awareness;safety precaution awareness;safety precautions follow-through/compliance  -ME      Impairments Affecting Function (Mobility)  balance;coordination;motor planning LE spasms;which have decreased;non present this session  -ME      Recorded by [ME] Che Oh, OT 11/06/19 1330      Row Name 11/06/19 1053 11/06/19 0826          Bed Mobility Assessment/Treatment    Comment (Bed Mobility)  pt already sitting up in chair when OT arrived   -ME  pt. sitting up in recliner  -CA     Recorded by [ME] Che Oh, OT 11/06/19 1330 [CA] Blue Weston, PTA 11/06/19 0834     Row Name 11/06/19 1053             Functional Mobility    Functional Mobility- Ind. Level  contact guard assist;verbal cues required  -ME      Functional Mobility- Device  rolling walker  -ME      Functional Mobility- Safety Issues  balance decreased during turns;step length decreased;other (see comments) RLE spasms & knee buckling;less noted this date   -ME      Functional Mobility- Comment  pt ambulated from recliner chair to restroom for completion of shower, and then from restroom back to chair at end of session. completed with CGA this date. pt continues looking at the floor, cues required.   -ME      Recorded by [ME] Che Oh, OT 11/06/19 1347      Row Name 11/06/19 1053 11/06/19 0826          Transfer Assessment/Treatment    Transfer Assessment/Treatment  sit-stand transfer;stand-sit transfer;shower transfer  -ME  sit-stand transfer;stand-sit transfer;toilet transfer  -CA     Recorded by [ME] Che Oh, OT 11/06/19 1347 [CA] Blue Weston, PTA 11/06/19 0854     Row Name 11/06/19 1053 11/06/19 0826           Sit-Stand Transfer    Sit-Stand [place] (Transfers)  supervision;contact guard  -ME  supervision  -CA     Assistive Device (Sit-Stand Transfers)  walker, front-wheeled  -ME  walker, front-wheeled  -CA2     Recorded by [ME] Che Oh, OT 11/06/19 1347 [CA] Blue Weston, PTA 11/06/19 0925  [CA2] Blue Weston, PTA 11/06/19 0854     Row Name 11/06/19 1053 11/06/19 0826          Stand-Sit Transfer    Stand-Sit [place] (Transfers)  supervision;contact guard  -ME  supervision  -CA     Assistive Device (Stand-Sit Transfers)  walker, front-wheeled  -ME  walker, front-wheeled  -CA2     Recorded by [ME] Che Oh, OT 11/06/19 1347 [CA] Blue Weston, PTA 11/06/19 0925  [CA2] Blue Weston, PTA 11/06/19 0854     Row Name 11/06/19 0826             Toilet Transfer    Type (Toilet Transfer)  sit-stand;stand-sit  -CA      [place] Level (Toilet Transfer)  supervision  -CA      Assistive Device (Toilet Transfer)  walker, front-wheeled  -CA      Recorded by [CA] Blue Weston, PTA 11/06/19 0925      Row Name 11/06/19 1053             Shower Transfer    Type (Shower Transfer)  sit-stand;stand-sit  -ME      [place] Level (Shower Transfer)  contact guard;verbal cues  -ME      Assistive Device (Shower Transfer)  walker, front-wheeled;other (see comments) BSC in shower   -ME      Recorded by [ME] Che Oh, OT 11/06/19 1347      Row Name 11/06/19 0826             Gait/Stairs Assessment/Training    Gait/Stairs Assessment/Training  gait/ambulation assistive device  -CA      [place] Level (Gait)  stand by assist  -CA      Assistive Device (Gait)  walker, front-wheeled  -CA      Distance in Feet (Gait)  250'  -CA      Pattern (Gait)  step-through  -CA      Deviations/Abnormal Patterns (Gait)  john decreased  -CA      Recorded by [CA] Blue Weston, PTA 11/06/19 0925      Row Name 11/06/19 1053             ADL Assessment/Intervention    BADL Assessment/Intervention  bathing;upper body  dressing;grooming;lower body dressing  -ME      Recorded by [ME] Che Oh, OT 11/06/19 1347      Row Name 11/06/19 1053             Bathing Assessment/Intervention    Assistive Devices (Bathing)  other (see comments) sitting on BSC in shower   -ME      Comment (Bathing)  UB bathing completed with supervision, besides washing back, which pt is dependent for without use of long handled sponge which we did not have this session. LB bathing completed in both sitting and standing, done with supervision while in sitting and CGA while in standing for steadying assistance. pt was impulsive at times throughout, not waiting for OT to be ready for standing. pt is able to wash feet, but requires additional time to reach. increased time required for shower.   -ME      Recorded by [ME] Che Oh, OT 11/06/19 1347      Row Name 11/06/19 1053             Upper Body Dressing Assessment/Training    Upper Body Dressing [place] Level  doff;don;set up;other (see comments) SBA; hospital gown   -ME      Upper Body Dressing Position  other (see comments) sitting up in recliner   -ME      Recorded by [ME] Che Oh, OT 11/06/19 1347      Row Name 11/06/19 1053             Lower Body Dressing Assessment/Training    Lower Body Dressing [place] Level  doff;don;socks;set up;other (see comments) SBA for safety when pt leaning forward   -ME      Lower Body Dressing Position  other (see comments) sitting up in recliner   -ME      Recorded by [ME] Che Oh, OT 11/06/19 1347      Row Name 11/06/19 1053             Grooming Assessment/Training    [place] Level (Grooming)  hair care, combing/brushing;set up;independent  -ME      Grooming Position  other (see comments) sitting up in recliner   -ME      Recorded by [ME] Che Oh, OT 11/06/19 1347      Row Name 11/06/19 1053             BADL Safety/Performance    Impairments, BADL Safety/Performance  balance;trunk/postural control;coordination;motor planning   -ME      Recorded by [ME] Che Oh, OT 11/06/19 1347      Row Name 11/06/19 0826             Therapeutic Exercise    Therapeutic Exercise  seated, lower extremities;standing, lower extremities  -CA      Recorded by [CA] Blue Weston, PTA 11/06/19 0925      Row Name 11/06/19 0826             Lower Extremity Standing Therapeutic Exercise    Performed, Standing Lower Extremity (Therapeutic Exercise)  hip flexion/extension  -CA      Exercise Type, Standing Lower Extremity (Therapeutic Exercise)  AROM (active range of motion)  -CA      Sets/Reps Detail, Standing Lower Extremity (Therapeutic Exercise)  10x1  -CA      Recorded by [CA] Blue Weston, PTA 11/06/19 0925      Row Name 11/06/19 0826             Lower Extremity Seated Therapeutic Exercise    Performed, Seated Lower Extremity (Therapeutic Exercise)  LAQ (long arc quad), knee extension;ankle dorsiflexion/plantarflexion  -CA      Exercise Type, Seated Lower Extremity (Therapeutic Exercise)  AROM (active range of motion)  -CA      Sets/Reps Detail, Seated Lower Extremity (Therapeutic Exercise)  10x1  -CA      Recorded by [CA] Blue Weston, PTA 11/06/19 0925      Row Name 11/06/19 1053 11/06/19 0859 11/06/19 0826       Positioning and Restraints    Pre-Treatment Position  sitting in chair/recliner  -ME  sitting in chair/recliner  -CK  sitting in chair/recliner  -CA    Post Treatment Position  chair  -ME  --  chair  -CA    In Chair  notified nsg;call light within reach;encouraged to call for assist;with family/caregiver  -ME  --  sitting;call light within reach;encouraged to call for assist  -CA    Recorded by [ME] Che Oh, OT 11/06/19 1347 [CK] Romina Garcia MS CCC-SLP 11/06/19 0903 [CA] Blue Weston, PTA 11/06/19 0925    Row Name 11/06/19 1053             Pain Assessment    Additional Documentation  Pain Scale: Numbers Pre/Post-Treatment (Group)  -ME      Recorded by [ME] Che Oh, OT 11/06/19 1347      Row Name 11/06/19 1053 11/06/19  0859 11/06/19 0826       Pain Scale: Numbers Pre/Post-Treatment    Pain Scale: Numbers, Pretreatment  0/10 - no pain  -ME  1/10  -CK  1/10  -CA    Pain Scale: Numbers, Post-Treatment  0/10 - no pain  -ME  --  0/10 - no pain  -CA    Pain Location - Side  --  Right  -CK  Right  -CA    Pain Location  --  hip  -CK  hip  -CA    Pre/Post Treatment Pain Comment  --  declines meds  -CK  --    Pain Intervention(s)  Repositioned;Ambulation/increased activity;Distraction  -ME  --  --    Recorded by [ME] Che Oh, OT 11/06/19 1347 [CK] Romina Garcia, MS CCC-SLP 11/06/19 0903 [CA] Blue Weston, PTA 11/06/19 0925    Row Name 11/06/19 1053             Plan of Care Review    Plan of Care Reviewed With  patient  -ME      Recorded by [ME] Che Oh, OT 11/06/19 1347      Row Name 11/06/19 1053             Outcome Summary/Treatment Plan (OT)    Daily Summary of Progress (OT)  progress toward functional goals as expected  -ME      Barriers to Overall Progress (OT)  RLE spasms (non noted this date), decreased independence with functional mobility.   -ME      Plan for Continued Treatment (OT)  continue per OT POC   -ME      Anticipated Discharge Disposition (OT)  anticipate therapy at next level of care;inpatient rehabilitation facility  -ME      Recorded by [ME] Che Oh, OT 11/06/19 1347        User Key  (r) = Recorded By, (t) = Taken By, (c) = Cosigned By    Initials Name Effective Dates Discipline    CK Romina Garcia, MS CCC-SLP 04/03/18 -  SLP    Blue Mccrary, PTA 03/07/18 -  PT    ME Che Oh, OT 09/10/19 -  OT        Outcome Summary     SLP GOALS     Row Name 11/06/19 0859 11/05/19 1435 11/04/19 1315       Oral Nutrition/Hydration Goal 1 (SLP)    Oral Nutrition/Hydration Goal 1, SLP  Pt to tolerate least restrictive diet with no s/s of aspiration for adequate nutirtion and hydration.   -CK  Pt to tolerate least restrictive diet with no s/s of aspiration for adequate nutirtion and hydration.    -CK  Pt to tolerate least restrictive diet with no s/s of aspiration for adequate nutirtion and hydration.   -EK    Time Frame (Oral Nutrition/Hydration Goal 1, SLP)  by discharge  -CK  by discharge  -CK  by discharge  -EK    Barriers (Oral Nutrition/Hydration Goal 1, SLP)  compliance w/swallowing strategies  -CK  compliance w/swallowing strategies  -CK  --    Progress/Outcomes (Oral Nutrition/Hydration Goal 1, SLP)  goal ongoing  -CK  goal ongoing  -CK  other (see comments) new goal  -EK       Swallow Compensatory Strategies Goal 1 (SLP)    Activity (Swallow Compensatory Strategies/Techniques Goal 1, SLP)  compensatory strategies;small bites;chin tuck posture;alternate food/liquid intake;postural techniques;during p.o. trials;liquids by teaspoon only  -CK  compensatory strategies;small bites;chin tuck posture;alternate food/liquid intake;postural techniques;during p.o. trials;liquids by teaspoon only  -CK  compensatory strategies;small bites;chin tuck posture;alternate food/liquid intake;postural techniques;during p.o. trials;liquids by teaspoon only  -EK    [place]/Accuracy (Swallow Compensatory Strategies/Techniques Goal 1, SLP)  independently (over 90% accuracy)  -CK  independently (over 90% accuracy)  -CK  independently (over 90% accuracy)  -EK    Time Frame (Swallow Compensatory Strategies/Techniques Goal 1, SLP)  by discharge  -CK  by discharge  -CK  --    Barriers (Swallow Compensatory Strategies/Techniques Goal 1, SLP)  compliance  -CK  compliance  -CK  --    Progress/Outcomes (Swallow Compensatory Strategies/Techniques Goal 1, SLP)  goal ongoing  -CK  goal ongoing  -CK  other (see comments) new goal  -EK       Articulation Goal 1 (SLP)    Improve Articulation Goal 1 (SLP)  by over-articulating at phrase level;by over-articulating in connected speech;90%  -CK  by over-articulating at phrase level;by over-articulating in connected speech;90%  -CK  --    Time Frame (Articulation Goal 1, SLP)  by  discharge  -CK  by discharge  -CK  --    Barriers (Articulation Goal 1, SLP)  right sided weakness  -CK  right sided weakness  -CK  --    Progress (Articulation Goal 1, SLP)  --  60%  -CK  --    Progress/Outcomes (Articulation Goal 1, SLP)  goal ongoing  -CK  goal ongoing  -CK  --    Row Name 11/04/19 0738             Articulation Goal 1 (SLP)    Improve Articulation Goal 1 (SLP)  by over-articulating at phrase level;by over-articulating in connected speech;90%  -CK      Time Frame (Articulation Goal 1, SLP)  by discharge  -CK      Barriers (Articulation Goal 1, SLP)  right sided weakness  -CK      Progress (Articulation Goal 1, SLP)  other (comment) new goal  -CK      Progress/Outcomes (Articulation Goal 1, SLP)  other (see comments) new goal  -CK        User Key  (r) = Recorded By, (t) = Taken By, (c) = Cosigned By    Initials Name Provider Type    Megan Wells, CCC-SLP Speech and Language Pathologist    Romina Griffin, MS CCC-SLP Speech and Language Pathologist          EDUCATION  The patient has been educated in the following areas:   Dysphagia (Swallowing Impairment) Modified Diet Instruction.    SLP Recommendation and Plan                                Time Calculation:   Time Calculation- SLP     Row Name 11/06/19 1333             Time Calculation- SLP    SLP Start Time  0859  -CK      SLP Stop Time  0941  -CK      SLP Time Calculation (min)  42 min  -CK      Total Timed Code Minutes- SLP  42 minute(s)  -CK      SLP Received On  11/06/19  -CK        User Key  (r) = Recorded By, (t) = Taken By, (c) = Cosigned By    Initials Name Provider Type    Romina Griffin MS CCC-SLP Speech and Language Pathologist          Therapy Charges for Today     Code Description Service Date Service Provider Modifiers Qty    36986679755 HC ST TREATMENT SPEECH 2 11/6/2019 Romina Garcia MS CCC-SLP GN 1    [id number] HC ST TREATMENT SWALLOW 1 11/6/2019 Romina Garcia MS CCC-SLP GN 1                     Romina Garcia, MS CCC-SLP  11/6/2019

## 2019-11-06 NOTE — NURSING NOTE
ARU Brochure and Data Collection Information Summary for patients in Inpatient Rehab Facilities given to patient. Patient agreeable for ARU admission. CM notified.

## 2019-11-06 NOTE — PLAN OF CARE
Problem: Patient Care Overview  Goal: Plan of Care Review  Outcome: Ongoing (interventions implemented as appropriate)   11/06/19 1053   Coping/Psychosocial   Plan of Care Reviewed With patient   OTHER   Outcome Summary pt pleasant and cooperative for treatment. asking to complete shower. OT ok'ed with RN and MD. getting ready to transfer for ARU. supervision-CGA for sit-stand/stand-sit transfers. CGA for shower transfer, with BSC in shower. CGA for mobility, vc's to look up when ambulating. RW for transfers and functional mobility. pt is SBA for UB bathing, dependent for back. CGA to min A for LB bathing, standing to bathe periarea and buttocks. set-up and SBA for donning of hospital gown. supervision for donning/doffing of socks. combed hair while in sitting with set-up.

## 2019-11-06 NOTE — THERAPY TREATMENT NOTE
Acute Care - Physical Therapy Treatment Note  [facility]     Patient Name: Rama Goel  : 1945  MRN: [id number]  Today's Date: 2019  Onset of Illness/Injury or Date of Surgery: 19     Referring Physician: Yolanda METCALF    Admit Date: 11/3/2019    Visit Dx:    ICD-10-CM ICD-9-CM   1. Altered mental status, unspecified altered mental status type R41.82 780.97   2. Oropharyngeal dysphagia R13.12 787.22   3. Impaired mobility and ADLs Z74.09 799.89   4. Impaired functional mobility, balance, gait, and endurance Z74.09 V49.89   5. Dysarthria R47.1 784.51     Patient Active Problem List   Diagnosis   • Altered mental status   • DJD (degenerative joint disease), multiple sites   • Hypertension   • Left pontine stroke (CMS/HCC)   • CVA (cerebral vascular accident) (CMS/HCC)   • Urinary hesitancy   • Muscle spasm of both lower legs   • Recurrent falls while walking   • Dysarthria       Therapy Treatment    Rehabilitation Treatment Summary     Row Name 19 0859 19          Treatment Time/Intention    Discipline  speech language pathologist  -CK  physical therapy assistant  -CA     Document Type  therapy note (daily note)  -CK  therapy note (daily note)  -CA     Subjective Information  no complaints  -CK  no complaints  -CA     Mode of Treatment  speech-language pathology;individual therapy  -CK  individual therapy;physical therapy  -CA     Patient/Family Observations  Pt sitting in recliner at bedside practicing overarticulation  -CK  pt. up in recliner upon entry  -CA     Care Plan Review  evaluation/treatment results reviewed;care plan/treatment goals reviewed;risks/benefits reviewed;current/potential barriers reviewed;patient/other agree to care plan  -CK  --     Therapy Frequency (PT Clinical Impression)  --  daily  -CA     Recorded by [CK] Romina Garcia MS CCC-SLP 19 [CA] Blue Weston PTA 19 0834     Row Name 19 08             Vital Signs    Pre  Systolic BP Rehab  158  -CA      Pre Treatment Diastolic BP  73  -CA      Pretreatment Heart Rate (beats/min)  68  -CA      Pre SpO2 (%)  96  -CA      O2 Delivery Pre Treatment  room air  -CA      Pre Patient Position  Sitting  -CA2      Intra Patient Position  Standing  -CA      Post Patient Position  Sitting  -CA      Recorded by [CA] Blue Weston, PTA 11/06/19 0854  [CA2] Blue Weston, PTA 11/06/19 0834      Row Name 11/06/19 0826             Cognitive Assessment/Intervention- PT/OT    Affect/Mental Status (Cognitive)  WFL  -CA      Orientation Status (Cognition)  oriented x 4  -CA      Follows Commands (Cognition)  follows multi-step commands  -CA2      Safety Deficit (Cognitive)  awareness of need for assistance  -CA2      Personal Safety Interventions  fall prevention program maintained;gait belt;muscle strengthening facilitated;nonskid shoes/slippers when out of bed  -CA2      Recorded by [CA] Blue Weston, PTA 11/06/19 0834  [CA2] Blue Weston, PTA 11/06/19 0854      Row Name 11/06/19 0826             Bed Mobility Assessment/Treatment    Comment (Bed Mobility)  pt. sitting up in recliner  -CA      Recorded by [CA] Blue Weston, PTA 11/06/19 0834      Row Name 11/06/19 0826             Transfer Assessment/Treatment    Transfer Assessment/Treatment  sit-stand transfer;stand-sit transfer;toilet transfer  -CA      Recorded by [CA] Blue Weston, PTA 11/06/19 0854      Row Name 11/06/19 0826             Sit-Stand Transfer    Sit-Stand [place] (Transfers)  supervision  -CA      Assistive Device (Sit-Stand Transfers)  walker, front-wheeled  -CA2      Recorded by [CA] Blue Weston, PTA 11/06/19 0925  [CA2] Blue Weston, PTA 11/06/19 0854      Row Name 11/06/19 0826             Stand-Sit Transfer    Stand-Sit [place] (Transfers)  supervision  -CA      Assistive Device (Stand-Sit Transfers)  walker, front-wheeled  -CA2      Recorded by [CA] Blue Weston, PTA 11/06/19 0925  [CA2] Blue Weston, PTA  11/06/19 0854      Row Name 11/06/19 0826             Toilet Transfer    Type (Toilet Transfer)  sit-stand;stand-sit  -CA      [place] Level (Toilet Transfer)  supervision  -CA      Assistive Device (Toilet Transfer)  walker, front-wheeled  -CA      Recorded by [CA] Blue Weston, PTA 11/06/19 0925      Row Name 11/06/19 0826             Gait/Stairs Assessment/Training    Gait/Stairs Assessment/Training  gait/ambulation assistive device  -CA      [place] Level (Gait)  stand by assist  -CA      Assistive Device (Gait)  walker, front-wheeled  -CA      Distance in Feet (Gait)  250'  -CA      Pattern (Gait)  step-through  -CA      Deviations/Abnormal Patterns (Gait)  john decreased  -CA      Recorded by [CA] Blue Weston, PTA 11/06/19 0925      Row Name 11/06/19 0826             Therapeutic Exercise    Therapeutic Exercise  seated, lower extremities;standing, lower extremities  -CA      Recorded by [CA] Blue Weston, PTA 11/06/19 0925      Row Name 11/06/19 0826             Lower Extremity Standing Therapeutic Exercise    Performed, Standing Lower Extremity (Therapeutic Exercise)  hip flexion/extension  -CA      Exercise Type, Standing Lower Extremity (Therapeutic Exercise)  AROM (active range of motion)  -CA      Sets/Reps Detail, Standing Lower Extremity (Therapeutic Exercise)  10x1  -CA      Recorded by [CA] Blue Weston, PTA 11/06/19 0925      Row Name 11/06/19 0826             Lower Extremity Seated Therapeutic Exercise    Performed, Seated Lower Extremity (Therapeutic Exercise)  LAQ (long arc quad), knee extension;ankle dorsiflexion/plantarflexion  -CA      Exercise Type, Seated Lower Extremity (Therapeutic Exercise)  AROM (active range of motion)  -CA      Sets/Reps Detail, Seated Lower Extremity (Therapeutic Exercise)  10x1  -CA      Recorded by [CA] Blue Weston, PTA 11/06/19 0925      Row Name 11/06/19 0859 11/06/19 0826          Positioning and Restraints    Pre-Treatment Position  sitting in  chair/recliner  -CK  sitting in chair/recliner  -CA     Post Treatment Position  --  chair  -CA     In Chair  --  sitting;call light within reach;encouraged to call for assist  -CA     Recorded by [CK] Romina Garcia, MS CCC-SLP 11/06/19 0903 [CA] Blue Weston, PTA 11/06/19 0925     Row Name 11/06/19 0859 11/06/19 0826          Pain Scale: Numbers Pre/Post-Treatment    Pain Scale: Numbers, Pretreatment  1/10  -CK  1/10  -CA     Pain Scale: Numbers, Post-Treatment  --  0/10 - no pain  -CA     Pain Location - Side  Right  -CK  Right  -CA     Pain Location  hip  -CK  hip  -CA     Pre/Post Treatment Pain Comment  declines meds  -CK  --     Recorded by [CK] Romina Garcia MS CCC-SLP 11/06/19 0903 [CA] Blue Weston, PTA 11/06/19 0925       User Key  (r) = Recorded By, (t) = Taken By, (c) = Cosigned By    Initials Name Effective Dates Discipline    CK Romina Garcia, MS CCC-SLP 04/03/18 -  SLP    CA Blue Weston, PTA 03/07/18 -  PT               Rehab Goal Summary     Row Name 11/06/19 0925 11/06/19 0859          Physical Therapy Goals    Bed Mobility Goal Selection (PT)  bed mobility, PT goal 1  -CA  --     Transfer Goal Selection (PT)  transfer, PT goal 1  -CA  --     Gait Training Goal Selection (PT)  gait training, PT goal 1  -CA  --     Stairs Goal Selection (PT)  stairs, PT goal 1  -CA  --        Bed Mobility Goal 1 (PT)    Activity/Assistive Device (Bed Mobility Goal 1, PT)  bed mobility activities, all;rolling to left;rolling to right;supine to sit  -CA  --     [place] Level/Cues Needed (Bed Mobility Goal 1, PT)  independent  -CA  --     Time Frame (Bed Mobility Goal 1, PT)  short term goal (STG);1 week  -CA  --     Progress/Outcomes (Bed Mobility Goal 1, PT)  goal met  (Significant)   -CA  --        Transfer Goal 1 (PT)    Activity/Assistive Device (Transfer Goal 1, PT)  bed-to-chair/chair-to-bed;toilet  -CA  --     [place] Level/Cues Needed (Transfer Goal 1, PT)  conditional independence   -CA  --     Time Frame (Transfer Goal 1, PT)  long term goal (LTG);by discharge  -CA  --     Barriers (Transfers Goal 1, PT)  weakness; falls   -CA  --     Progress/Outcome (Transfer Goal 1, PT)  goal partially met;continuing progress toward goal  -CA  --        Gait Training Goal 1 (PT)    Activity/Assistive Device (Gait Training Goal 1, PT)  gait (walking locomotion);assistive device use;decrease fall risk;diminish gait deviation;forward stepping;increase endurance/gait distance;sidestepping;turning, left;turning, right;walker, rolling  -CA  --     [place] Level (Gait Training Goal 1, PT)  conditional independence  -CA  --     Distance (Gait Goal 1, PT)  450 or more feet w/out falls  -CA  --     Time Frame (Gait Training Goal 1, PT)  long term goal (LTG);by discharge  -CA  --     Barriers (Gait Training Goal 1, PT)  weakness; spasms  -CA  --     Progress/Outcome (Gait Training Goal 1, PT)  goal ongoing;goal partially met;continuing progress toward goal  -CA  --        Stairs Goal 1 (PT)    Activity/Assistive Device (Stairs Goal 1, PT)  ascending stairs;descending stairs;decrease fall risk  -CA  --     [place] Level/Cues Needed (Stairs Goal 1, PT)  conditional independence  -CA  --     Number of Stairs (Stairs Goal 1, PT)  1 step for curb if in community and if reji  -CA  --     Time Frame (Stairs Goal 1, PT)  by discharge  -CA  --     Barriers (Stairs Goal 1, PT)  spasms  -CA  --     Progress/Outcome (Stairs Goal 1, PT)  continuing progress toward goal;goal not met  -CA  --        Swallow Goals (SLP)    Oral Nutrition/Hydration Goal Selection (SLP)  --  oral nutrition/hydration, SLP goal 1  -CK     Swallow Compensatory Strategies Goal Selection (SLP)  --  swallow compensatory strategies, SLP goal 1  -CK        Oral Nutrition/Hydration Goal 1 (SLP)    Oral Nutrition/Hydration Goal 1, SLP  --  Pt to tolerate least restrictive diet with no s/s of aspiration for adequate nutirtion and hydration.   -CK      Time Frame (Oral Nutrition/Hydration Goal 1, SLP)  --  by discharge  -CK     Barriers (Oral Nutrition/Hydration Goal 1, SLP)  --  compliance w/swallowing strategies  -CK     Progress/Outcomes (Oral Nutrition/Hydration Goal 1, SLP)  --  goal ongoing  -CK        Swallow Compensatory Strategies Goal 1 (SLP)    Activity (Swallow Compensatory Strategies/Techniques Goal 1, SLP)  --  compensatory strategies;small bites;chin tuck posture;alternate food/liquid intake;postural techniques;during p.o. trials;liquids by teaspoon only  -CK     [place]/Accuracy (Swallow Compensatory Strategies/Techniques Goal 1, SLP)  --  independently (over 90% accuracy)  -CK     Time Frame (Swallow Compensatory Strategies/Techniques Goal 1, SLP)  --  by discharge  -CK     Barriers (Swallow Compensatory Strategies/Techniques Goal 1, SLP)  --  compliance  -CK     Progress/Outcomes (Swallow Compensatory Strategies/Techniques Goal 1, SLP)  --  goal ongoing  -CK        Communication Treatment Objective and Progress Goals (SLP)    Motor Speech/Dysarthria Treatment Objectives  --  Motor Speech/Dysarthria Treatment Objectives (Group)  -CK        Motor Speech/Dysarthria Treatment Objectives    Articulation Selection  --  articulation, SLP goal 1  -CK        Articulation Goal 1 (SLP)    Improve Articulation Goal 1 (SLP)  --  by over-articulating at phrase level;by over-articulating in connected speech;90%  -CK     Time Frame (Articulation Goal 1, SLP)  --  by discharge  -CK     Barriers (Articulation Goal 1, SLP)  --  right sided weakness  -CK     Progress/Outcomes (Articulation Goal 1, SLP)  --  goal ongoing  -CK       User Key  (r) = Recorded By, (t) = Taken By, (c) = Cosigned By    Initials Name Provider Type Discipline    CK Romina Garcia, MS CCC-SLP Speech and Language Pathologist SLP    Blue Mccrary, TOAN Physical Therapy Assistant PT          Physical Therapy Education     Title: PT OT SLP Therapies (Done)     Topic: Physical Therapy  (Done)     Point: Mobility training (Done)     Learning Progress Summary           Patient Acceptance, E,D, VU,NR,DU by MARGARET at 11/4/2019  4:12 PM    Comment:  POC; mobiity w/ FWRW and in/out of bathroom; 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  Also discussed ways to manage spasms via positioning and use of hip extension actively to try to counter hip flx spasms.                   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  Also discussed ways to manage spasms via positioning and use of hip extension actively to try to counter hip flx spasms.                   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  Also discussed ways to manage spasms via positioning and use of hip extension actively to try to counter hip flx spasms.                               User Key     Initials Effective Dates Name Provider Type Discipline     04/03/18 -  Kandy Camara, PT Physical Therapist PT                PT Recommendation and Plan  Therapy Frequency (PT Clinical Impression): daily  Plan of Care Reviewed With: patient  Progress: improving  Outcome Summary: pt. t/f with Spv. and ambulated 250' with RW and SBA/CGA.  pt. had no lob and was a little antalgic with gait due to soreness on LLE.  pt. t/f sit to stand with spv.    Outcome Measures     Row Name 11/06/19 0826 11/05/19 1550 11/04/19 1600       How much help from another person do you currently need...    Turning from your back to your side while in flat bed  without using bedrails?  4  -CA  4  -JA  4  -GB    Moving from lying on back to sitting on the side of a flat bed without bedrails?  4  -CA  3  -JA  3  -GB    Moving to and from a bed to a chair (including a wheelchair)?  3  -CA  3  -JA  3  -GB    Standing up from a chair using your arms (e.g., wheelchair, bedside chair)?  3  -CA  3  -JA  3  -GB    Climbing 3-5 steps with a railing?  3  -CA  3  -JA  2  -GB    To walk in hospital room?  3  -CA  3  -JA  3  -GB    AM-PAC 6 Clicks Score (PT)  20  -CA  19  -JA  18  -GB       Functional Assessment    Outcome Measure Options  AM-PAC 6 Clicks Basic Mobility (PT)  -CA  AM-PAC 6 Clicks Basic Mobility (PT)  -JA  AM-PAC 6 Clicks Basic Mobility (PT)  -GB    Row Name 11/04/19 1402             9 Hole Peg    9-Hole Peg Left  33 seconds   -ME      9-Hole Peg Right  40 seconds  pt is right handed   -ME         How much help from another is currently needed...    Putting on and taking off regular lower body clothing?  3  -ME      Bathing (including washing, rinsing, and drying)  2  -ME      Toileting (which includes using toilet bed pan or urinal)  3  -ME      Putting on and taking off regular upper body clothing  3  -ME      Taking care of personal grooming (such as brushing teeth)  3  -ME      Eating meals  4  -ME      AM-PAC 6 Clicks Score (OT)  18  -ME         Modified [place]    Modified [place] Comments  feeding 5, bathing 0, grooming 0, dressing 5, bowels 10, bladder 10, toilet use 5, transfers 5, mobility 10, stairs 5, total 45/100  -ME         Modified [place] Scale    Modified [place] Scale  4 - Moderately severe disability.  Unable to walk without assistance, and unable to attend to own bodily needs without assistance.  -ME         Functional Assessment    Outcome Measure Options  AM-PAC 6 Clicks Daily Activity (OT);9 Hole Peg;Modified [place] Index;Modified [place]  -ME        User Key  (r) = Recorded By, (t) = Taken By, (c) = Cosigned By    Initials Name Provider Type    GB  Kandy Camara, PT Physical Therapist    Ulysses Delarosa, PTA Physical Therapy Assistant    Blue Mccrary, TOAN Physical Therapy Assistant    Che Hinkle, OT Occupational Therapist         Time Calculation:   PT Charges     Row Name 11/06/19 1152             Time Calculation    Start Time  0826  -CA      Stop Time  0852  -CA      Time Calculation (min)  26 min  -CA      PT Received On  11/06/19  -CA         Time Calculation- PT    Total Timed Code Minutes- PT  26 minute(s)  -CA        User Key  (r) = Recorded By, (t) = Taken By, (c) = Cosigned By    Initials Name Provider Type    Blue Mccrary PTA Physical Therapy Assistant        Therapy Charges for Today     Code Description Service Date Service Provider Modifiers Qty    21589514549 HC GAIT TRAINING EA 15 MIN 11/6/2019 Blue Weston PTA GP 1    [id number] HC PT THERAPEUTIC ACT EA 15 MIN 11/6/2019 Blue Weston, PTA GP 1          PT G-Codes  Outcome Measure Options: AM-PAC 6 Clicks Basic Mobility (PT)  AM-PAC 6 Clicks Score (PT): 20  AM-PAC 6 Clicks Score (OT): 18  Modified [place] Scale: 4 - Moderately severe disability.  Unable to walk without assistance, and unable to attend to own bodily needs without assistance.    Blue Weston PTA  11/6/2019

## 2019-11-06 NOTE — THERAPY TREATMENT NOTE
Acute Care - Physical Therapy Treatment Note  [facility]     Patient Name: Rama Goel  : 1945  MRN: [id number]  Today's Date: 2019  Onset of Illness/Injury or Date of Surgery: 19     Referring Physician: Yolanda METCALF    Admit Date: 11/3/2019    Visit Dx:    ICD-10-CM ICD-9-CM   1. Altered mental status, unspecified altered mental status type R41.82 780.97   2. Oropharyngeal dysphagia R13.12 787.22   3. Impaired mobility and ADLs Z74.09 799.89   4. Impaired functional mobility, balance, gait, and endurance Z74.09 V49.89   5. Dysarthria R47.1 784.51     Patient Active Problem List   Diagnosis   • Altered mental status       Therapy Treatment    Rehabilitation Treatment Summary     Row Name 19 1550 19 1435 19 1256       Treatment Time/Intention    Discipline  physical therapy assistant  -LISA  speech language pathologist  -CK  occupational therapy assistant  -BB    Document Type  therapy note (daily note)  -JA  therapy note (daily note)  -CK  --    Subjective Information  no complaints  -LISA  complains of;pain  -CK  --    Mode of Treatment  individual therapy;physical therapy  -LISA  speech-language pathology;individual therapy  -CK  --    Patient/Family Observations  Pt. sitting up in recliner   -LISA  Pt sitting in recliner at bedside  -CK  --    Care Plan Review  --  evaluation/treatment results reviewed;care plan/treatment goals reviewed;risks/benefits reviewed;current/potential barriers reviewed;patient/other agree to care plan  -CK  --    Patient Effort  excellent  -JA  good  -CK2  --    Reason Treatment Not Performed  --  --  unavailable for treatment pt wiith MRI of spine today,no results available yet  -BB    Existing Precautions/Restrictions  fall LE spasms/collapsing; reports incontinence & bowel not worki  -LISA  --  --    Recorded by [JA] Ulysses Gill, PTA 19 1623 [CK] Romina Garcia, MS CCC-SLP 19 1440  [CK2] Romina Garcia, MS CCC-SLP 19  1511 [BB] Dionne Tang, MITCHELL/L 11/05/19 1257    Row Name 11/05/19 0951             Treatment Time/Intention    Existing Precautions/Restrictions  fall LE spasms/collapsing; reports incontinence & bowel not worki  -GB      Recorded by [GB] Kadny Camara, PT 11/05/19 1452      Row Name 11/05/19 1550 11/05/19 0951          Vital Signs    Pre Systolic BP Rehab  165  -JA  176  -GB     Pre Treatment Diastolic BP  71  -JA  79  -GB     Post Systolic BP Rehab  175  -JA  164  -GB2     Post Treatment Diastolic BP  74  -JA  71  -GB2     Pretreatment Heart Rate (beats/min)  70  -JA  80  -GB     Posttreatment Heart Rate (beats/min)  68  -JA  88  -GB2     Pre SpO2 (%)  --  96  -GB     O2 Delivery Pre Treatment  --  room air  -GB     Post SpO2 (%)  --  96  -GB2     O2 Delivery Post Treatment  --  room air  -GB2     Pre Patient Position  Sitting  -JA  Sitting  -GB     Intra Patient Position  Standing  -JA  Standing  -GB2     Post Patient Position  Sitting  -JA  Sitting  -GB2     Recorded by [JA] Ulysses Gill, PTA 11/05/19 1623 [GB] Kandy Camara, PT 11/05/19 0958  [GB2] Kandy Camara, PT 11/05/19 1447     Row Name 11/05/19 1550 11/05/19 0951          Cognitive Assessment/Intervention- PT/OT    Affect/Mental Status (Cognitive)  WFL  -JA  WFL  -GB     Orientation Status (Cognition)  oriented x 4  -JA  oriented x 4  -GB     Follows Commands (Cognition)  follows multi-step commands;over 90% accuracy  -JA  follows multi-step commands;over 90% accuracy  -GB     Personal Safety Interventions  --  fall prevention program maintained;gait belt;muscle strengthening facilitated;nonskid shoes/slippers when out of bed;supervised activity  -GB     Recorded by [JA] Ulysses Gill, PTA 11/05/19 1623 [GB] Kandy Camara, PT 11/05/19 1447     Row Name 11/05/19 0951             Bed Mobility Assessment/Treatment    Bed Mobility Assessment/Treatment  bed mobility (all) activities  -GB       [place] Level (Bed Mobility)  independent  -GB      Scooting/Bridging [place] (Bed Mobility)  independent  -GB      Supine-Sit [place] (Bed Mobility)  independent  -GB      Assistive Device (Bed Mobility)  bed rails;head of bed elevated  -GB      Recorded by [GB] Kandy Camara, PT 11/05/19 1447      Row Name 11/05/19 1550 11/05/19 0951          Toilet Transfer    Type (Toilet Transfer)  sit-stand;stand-sit  -  -- x2  -GB     [place] Level (Toilet Transfer)  supervision  -JA  supervision  -GB     Assistive Device (Toilet Transfer)  walker, front-wheeled  -LISA  walker, front-wheeled  -GB     Recorded by [JA] Ulysses Gill, PTA 11/05/19 1623 [GB] Kandy Camara, PT 11/05/19 1447     Row Name 11/05/19 1550 11/05/19 0951          Gait/Stairs Assessment/Training    Gait/Stairs Assessment/Training  gait/ambulation independence;gait/ambulation assistive device;distance ambulated;gait pattern;gait deviations  -  gait/ambulation independence;gait/ambulation assistive device;distance ambulated;gait pattern;gait deviations  -     [place] Level (Gait)  contact guard;1 person assist  -  contact guard;1 person assist  -     Assistive Device (Gait)  walker, front-wheeled  -JA  walker, front-wheeled  -GB     Distance in Feet (Gait)  200'  -  8,14,204,12  -GB     Pattern (Gait)  step-through  -  swing-through  -     [place] Level (Stairs)  supervision  -  --     Handrail Location (Stairs)  right side (ascending)  -  --     Number of Steps (Stairs)  3x2  -JA  --     Ascending Technique (Stairs)  step-to-step  -JA  --     Descending Technique (Stairs)  step-over-step  -  --     Comment (Gait/Stairs)  --  improved gait speed and pattern; no collapsing or feeling she would collapse per her report. Spasms better per pt; she wants to be indep of bed alarm which she discussed w/ RN; concern for collapse of LE's and fall still present and discussed w/ her and RN  present  -GB     Recorded by [JA] Ulysses Gill, PTA 11/05/19 1623 [GB] Kandy Camara, PT 11/05/19 1447     Row Name 11/05/19 1435 11/05/19 0951          Positioning and Restraints    Pre-Treatment Position  sitting in chair/recliner  -CK  in bed  -GB     Post Treatment Position  --  chair  -GB     In Chair  sitting;call light within reach;encouraged to call for assist  -CK2  notified nsg;sitting;with nsg;call light within reach;encouraged to call for assist  -GB     Recorded by [CK] Romina Garcia, MS CCC-SLP 11/05/19 1440  [CK2] Romina Garcia, MS CCC-SLP 11/05/19 1511 [GB] Kandy Camara, PT 11/05/19 1452     Row Name 11/05/19 1550 11/05/19 1435 11/05/19 0951       Pain Scale: Numbers Pre/Post-Treatment    Pain Scale: Numbers, Pretreatment  0/10 - no pain  -JA  2/10  -CK  2/10  -GB    Pain Scale: Numbers, Post-Treatment  0/10 - no pain  -JA  2/10  -CK2  --    Pain Location - Side  --  Right  -CK  Right  -GB    Pain Location  --  hip  -CK  hip  -GB    Pre/Post Treatment Pain Comment  --  declines meds  -CK  no complaints post gait;   -GB2    Pain Intervention(s)  --  --  Repositioned;Ambulation/increased activity  -GB2    Recorded by [JA] Ulysses Gill, PTA 11/05/19 1623 [CK] Romina Garcia, MS CCC-SLP 11/05/19 1440  [CK2] Romina Garcia, MS CCC-SLP 11/05/19 1511 [GB] Kandy Camara, PT 11/05/19 0958  [GB2] Kandy Camara, PT 11/05/19 1447    Row Name 11/05/19 1550 11/05/19 0951          Coping    Observed Emotional State  accepting;calm;cooperative  -JA  accepting;calm;cooperative  -GB     Recorded by [JA] Ulysses Gill, PTA 11/05/19 1623 [GB] Kandy Camara, PT 11/05/19 1452     Row Name 11/05/19 0951             Plan of Care Review    Plan of Care Reviewed With  patient  -GB      Recorded by [GB] Kandy Camara, PT 11/05/19 1452      Row Name 11/05/19 1550 11/05/19 0951          Outcome Summary/Treatment Plan  (PT)    Daily Summary of Progress (PT)  progress toward functional goals as expected  -JA  progress toward functional goals as expected;progress toward functional goals is good  -GB     Barriers to Overall Progress (PT)  --  spasms but better today  -GB     Anticipated Discharge Disposition (PT)  anticipate therapy at next level of care  -JA  --     Recorded by [JA] Ulysses Gill, PTA 11/05/19 1623 [GB] Kadny Camara, PT 11/05/19 1452     Row Name 11/05/19 1435             Outcome Summary/Treatment Plan (SLP)    Daily Summary of Progress (SLP)  progress toward functional goals is good  -CK      Barriers to Overall Progress (SLP)  compliance w/strategies  -CK      Plan for Continued Treatment (SLP)  Continue POC  -CK      Anticipated Dischage Disposition  anticipate therapy at next level of care  -CK      Recorded by [CK] Romina Garcia, MS CCC-SLP 11/05/19 1511        User Key  (r) = Recorded By, (t) = Taken By, (c) = Cosigned By    Initials Name Effective Dates Discipline    GB Kandy Camara, PT 04/03/18 -  PT    Romina Griffin, MS CCC-SLP 04/03/18 -  SLP    JA Ulysses Gill, PTA 03/07/18 -  PT    BB Dionne Tang COTA/L 03/07/18 -  OT               Rehab Goal Summary     Row Name 11/05/19 1435 11/05/19 0951          Physical Therapy Goals    Bed Mobility Goal Selection (PT)  --  bed mobility, PT goal 1  -GB     Transfer Goal Selection (PT)  --  transfer, PT goal 1  -GB     Gait Training Goal Selection (PT)  --  gait training, PT goal 1  -GB     Stairs Goal Selection (PT)  --  stairs, PT goal 1  -GB        Bed Mobility Goal 1 (PT)    Activity/Assistive Device (Bed Mobility Goal 1, PT)  --  bed mobility activities, all;rolling to left;rolling to right;supine to sit  -GB     [place] Level/Cues Needed (Bed Mobility Goal 1, PT)  --  independent  -GB     Time Frame (Bed Mobility Goal 1, PT)  --  short term goal (STG);1 week  -GB     Progress/Outcomes (Bed  Mobility Goal 1, PT)  --  goal met  -GB        Transfer Goal 1 (PT)    Activity/Assistive Device (Transfer Goal 1, PT)  --  bed-to-chair/chair-to-bed;toilet  -GB     [place] Level/Cues Needed (Transfer Goal 1, PT)  --  conditional independence  -GB     Time Frame (Transfer Goal 1, PT)  --  long term goal (LTG);by discharge  -GB     Barriers (Transfers Goal 1, PT)  --  weakness; falls   -GB     Progress/Outcome (Transfer Goal 1, PT)  --  goal partially met;continuing progress toward goal  -GB        Gait Training Goal 1 (PT)    Activity/Assistive Device (Gait Training Goal 1, PT)  --  gait (walking locomotion);assistive device use;decrease fall risk;diminish gait deviation;forward stepping;increase endurance/gait distance;sidestepping;turning, left;turning, right;walker, rolling  -GB     [place] Level (Gait Training Goal 1, PT)  --  conditional independence  -GB     Distance (Gait Goal 1, PT)  --  450 or more feet w/out falls  -GB     Time Frame (Gait Training Goal 1, PT)  --  long term goal (LTG);by discharge  -GB     Barriers (Gait Training Goal 1, PT)  --  weakness; spasms  -GB     Progress/Outcome (Gait Training Goal 1, PT)  --  goal ongoing;goal partially met;continuing progress toward goal  -GB        Stairs Goal 1 (PT)    Activity/Assistive Device (Stairs Goal 1, PT)  --  ascending stairs;descending stairs;decrease fall risk  -GB     [place] Level/Cues Needed (Stairs Goal 1, PT)  --  conditional independence  -GB     Number of Stairs (Stairs Goal 1, PT)  --  1 step for curb if in community and if reji  -GB     Time Frame (Stairs Goal 1, PT)  --  by discharge  -GB     Barriers (Stairs Goal 1, PT)  --  spasms  -GB     Progress/Outcome (Stairs Goal 1, PT)  --  continuing progress toward goal;goal not met  -GB        Swallow Goals (SLP)    Oral Nutrition/Hydration Goal Selection (SLP)  oral nutrition/hydration, SLP goal 1  -CK  --     Swallow Compensatory Strategies Goal Selection (SLP)  swallow  compensatory strategies, SLP goal 1  -CK  --        Oral Nutrition/Hydration Goal 1 (SLP)    Oral Nutrition/Hydration Goal 1, SLP  Pt to tolerate least restrictive diet with no s/s of aspiration for adequate nutirtion and hydration.   -CK  --     Time Frame (Oral Nutrition/Hydration Goal 1, SLP)  by discharge  -CK  --     Barriers (Oral Nutrition/Hydration Goal 1, SLP)  compliance w/swallowing strategies  -CK  --     Progress/Outcomes (Oral Nutrition/Hydration Goal 1, SLP)  goal ongoing  -CK  --        Swallow Compensatory Strategies Goal 1 (SLP)    Activity (Swallow Compensatory Strategies/Techniques Goal 1, SLP)  compensatory strategies;small bites;chin tuck posture;alternate food/liquid intake;postural techniques;during p.o. trials;liquids by teaspoon only  -CK  --     [place]/Accuracy (Swallow Compensatory Strategies/Techniques Goal 1, SLP)  independently (over 90% accuracy)  -CK  --     Time Frame (Swallow Compensatory Strategies/Techniques Goal 1, SLP)  by discharge  -CK  --     Barriers (Swallow Compensatory Strategies/Techniques Goal 1, SLP)  compliance  -CK  --     Progress/Outcomes (Swallow Compensatory Strategies/Techniques Goal 1, SLP)  goal ongoing  -CK  --        Communication Treatment Objective and Progress Goals (SLP)    Motor Speech/Dysarthria Treatment Objectives  Motor Speech/Dysarthria Treatment Objectives (Group)  -CK  --        Motor Speech/Dysarthria Treatment Objectives    Articulation Selection  articulation, SLP goal 1  -CK  --        Articulation Goal 1 (SLP)    Improve Articulation Goal 1 (SLP)  by over-articulating at phrase level;by over-articulating in connected speech;90%  -CK  --     Time Frame (Articulation Goal 1, SLP)  by discharge  -CK  --     Barriers (Articulation Goal 1, SLP)  right sided weakness  -CK  --     Progress (Articulation Goal 1, SLP)  60%  -CK  --     Progress/Outcomes (Articulation Goal 1, SLP)  goal ongoing  -CK  --       User Key  (r) = Recorded By, (t) =  Taken By, (c) = Cosigned By    Initials Name Provider Type Discipline    Kandy Tolentino, PT Physical Therapist PT    Romina Griffin, MS CCC-SLP Speech and Language Pathologist SLP          Physical Therapy Education     Title: PT OT SLP Therapies (Done)     Topic: Physical Therapy (Done)     Point: Mobility training (Done)     Learning Progress Summary           Patient Acceptance, E,D, VU,NR,DU by MARGARET at 11/4/2019  4:12 PM    Comment:  POC; mobiity w/ FWRW and in/out of bathroom; 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  Also discussed ways to manage spasms via positioning and use of hip extension actively to try to counter hip flx spasms.                   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  Also discussed ways to manage spasms via positioning and use of hip extension actively to try to counter hip flx spasms.                   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  Also discussed ways to manage spasms via positioning and use of hip extension actively to try to counter hip flx spasms.                               User Key     Initials Effective Dates Name Provider Type Discipline     04/03/18 -  Kandy Camara, PT Physical Therapist PT                PT Recommendation and Plan  Anticipated Discharge Disposition (PT): home with assist, inpatient rehabilitation facility, transitional care  Planned Therapy Interventions (PT Eval): balance training, bed mobility  training, gait training, home exercise program, patient/family education, ROM (range of motion), stair training, strengthening, transfer training  Therapy Frequency (PT Clinical Impression): daily  Outcome Summary/Treatment Plan (PT)  Daily Summary of Progress (PT): progress toward functional goals as expected, progress toward functional goals is good  Barriers to Overall Progress (PT): spasms but better today  Anticipated Discharge Disposition (PT): home with assist, inpatient rehabilitation facility, transitional care  Plan of Care Reviewed With: patient  Outcome Summary: Pt reported less spasms this am and feeling better;Gait improved w/ more regular and increased gait speed, increased distance to 204 ft, and no spasms noted during tx. SHe reports incontinence and inability to push for bowel movement since  Sunday.  Dr Monson aware and pt pending review of MR Spine. Rehab for her can best benefit her if both CVA and spine issues are  addressed.   Outcome Measures     Row Name 11/05/19 1550 11/04/19 1600 11/04/19 1402       9 Hole Peg    9-Hole Peg Left  --  --  33 seconds   -ME    9-Hole Peg Right  --  --  40 seconds  pt is right handed   -ME       How much help from another person do you currently need...    Turning from your back to your side while in flat bed without using bedrails?  4  -JA  4  -GB  --    Moving from lying on back to sitting on the side of a flat bed without bedrails?  3  -JA  3  -GB  --    Moving to and from a bed to a chair (including a wheelchair)?  3  -JA  3  -GB  --    Standing up from a chair using your arms (e.g., wheelchair, bedside chair)?  3  -JA  3  -GB  --    Climbing 3-5 steps with a railing?  3  -JA  2  -GB  --    To walk in hospital room?  3  -JA  3  -GB  --    AM-PAC 6 Clicks Score (PT)  19  -JA  18  -GB  --       How much help from another is currently needed...    Putting on and taking off regular lower body clothing?  --  --  3  -ME    Bathing (including washing, rinsing, and  drying)  --  --  2  -ME    Toileting (which includes using toilet bed pan or urinal)  --  --  3  -ME    Putting on and taking off regular upper body clothing  --  --  3  -ME    Taking care of personal grooming (such as brushing teeth)  --  --  3  -ME    Eating meals  --  --  4  -ME    AM-PAC 6 Clicks Score (OT)  --  --  18  -ME       Modified Barthel    Modified Barthel Comments  --  --  feeding 5, bathing 0, grooming 0, dressing 5, bowels 10, bladder 10, toilet use 5, transfers 5, mobility 10, stairs 5, total 45/100  -ME       Modified Nicolasa Scale    Modified Graves Scale  --  --  4 - Moderately severe disability.  Unable to walk without assistance, and unable to attend to own bodily needs without assistance.  -ME       Functional Assessment    Outcome Measure Options  AM-PAC 6 Clicks Basic Mobility (PT)  -JA  AM-PAC 6 Clicks Basic Mobility (PT)  -GB  AM-PAC 6 Clicks Daily Activity (OT);9 Hole Peg;Modified Barthel Index;Modified Nicolasa  -ME      User Key  (r) = Recorded By, (t) = Taken By, (c) = Cosigned By    Initials Name Provider Type    Kandy Tolentino, PT Physical Therapist    Ulysses Delarosa, TOAN Physical Therapy Assistant    Che Hinkle, OT Occupational Therapist         Time Calculation:   PT Charges     Row Name 11/05/19 1625 11/05/19 0951          Time Calculation    Start Time  1550  -JA  0951  -GB     Stop Time  1615  -JA  1029  -GB     Time Calculation (min)  25 min  -JA  38 min  -GB     PT Received On  --  11/05/19  -GB        Time Calculation- PT    Total Timed Code Minutes- PT  25 minute(s)  -JA  --        Timed Charges    32205 - PT Therapeutic Exercise Minutes  --  38  -GB     12034 - Gait Training Minutes   15  -JA  --     99566 - PT Therapeutic Activity Minutes  10  -JA  --       User Key  (r) = Recorded By, (t) = Taken By, (c) = Cosigned By    Initials Name Provider Type    Kandy Tolentino, PT Physical Therapist    Ulysses Delarosa, PTA Physical  Therapy Assistant        Therapy Charges for Today     Code Description Service Date Service Provider Modifiers Qty    [id number]  PT EVAL MOD COMPLEXITY 4 11/4/2019 Kandy Camara, PT GP 1    [id number]  PT THERAPEUTIC ACT EA 15 MIN 11/5/2019 Kandy Camara, PT GP 3          PT G-Codes  Outcome Measure Options: AM-PAC 6 Clicks Basic Mobility (PT)  AM-PAC 6 Clicks Score (PT): 19  AM-PAC 6 Clicks Score (OT): 18  Modified Nicolasa Scale: 4 - Moderately severe disability.  Unable to walk without assistance, and unable to attend to own bodily needs without assistance.    Kandy Camara, PT  11/5/2019

## 2019-11-06 NOTE — DISCHARGE SUMMARY
[facility] HOSPITALIST MEDICINE DISCHARGE SUMMARY    Patient Identification:  Name:  Rama Goel  Age:  73 y.o.  Sex:  female  :  1945  MRN:  [id number]  Visit Number:  [id number]    Date of Admission: 11/3/2019  Date of Discharge:  2019     PCP: Inna Harris APRN  ADMITTING DIAGNOSIS  Altered mental status.    Bilateral lower extremity cramps.   Ambulatory dysfunction    DISCHARGE DIAGNOSIS  Bilateral lower extremity cramps  Ambulatory dysfunction  Acute lacunar infarct  Altered mental status resolved prior to admission    CONSULTS   Teleneurology    PROCEDURES PERFORMED  None    HOSPITAL COURSE  Patient is a 73 y.o. female who had a stroke recently and has been scheduled to begin physical therapy  was brought to the emergency department because of worsening cramps in extremities.  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  Accompanying family members stated that the patient became confused shortly before she was brought to the emergency department.  The patient was seen in the emergency department and referred to telemetry neurology services, who recommended overnight admission and further work-up to rule out any extension of her underlying stroke.  The patient has no headaches, visual symptoms, nausea, vomiting, or any new weaknesses in any extremities.  Her speech is unchanged from the time she was discharged.  She also complained of urinary incontinence which is new.  MRI of the thoracic and lumbar spine were done.  Her leg cramps has improved significantly, she has been assessed by the acute rehab team and will be accepted to continue with her rehabilitation.  Her condition is stable for discharge.      VITAL SIGNS:      19  0506 19  0634 19  0525   Weight: 70.4 kg (155 lb 1.6 oz) 71.4 kg (157 lb 4.8 oz) 69.7 kg (153 lb 9.6 oz)     Vital Signs (last 24 hours)        0700  -   0659  11/06 0700  -  11/06 1108   Most Recent    Temp (°F) 97.4 -  98.6      98.1     98.1 (36.7)    Heart Rate 64 -  86      68     68    Resp 15 -  16      16     16    /65 -  176/79      158/73     158/73    SpO2 (%) 94 -  98      97     97          Body mass index is 24.79 kg/m².    PHYSICAL EXAM:  Constitutional:  Well-developed and well-nourished.  No respiratory distress.      HENT:  Head: Normocephalic and atraumatic.  Mouth:  Moist mucous membranes.    Eyes:  Conjunctivae and EOM are normal.  Pupils are equal, round, and reactive to light.  No scleral icterus.  Neck:  Neck supple.  No JVD present.    Cardiovascular:  Normal rate, regular rhythm and normal heart sounds with no murmur.  Pulmonary/Chest:  No respiratory distress, no wheezes, no crackles, with normal breath sounds and good air movement.  Abdominal:  Soft.  Bowel sounds are normal.  No distension and no tenderness.   Musculoskeletal:  No edema, no tenderness, and no deformity.  No red or swollen joints anywhere.    Neurological:  Alert and oriented to person, place, and time.  No cranial nerve deficit.  No tongue deviation.  No facial droop.  No slurred speech.   Skin:  Skin is warm and dry.  No rash noted.  No pallor.   Psychiatric:  Normal mood and affect.  Behavior is normal.  Judgment and thought content normal.   Peripheral vascular:  No edema and strong pulses on all 4 extremities.    Lab Results (last 48 hours)     Procedure Component Value Units Date/Time    Basic Metabolic Panel [[id number]]  (Abnormal) Collected:  11/05/19 1042    Specimen:  Blood Updated:  11/05/19 1116     Glucose 134 mg/dL      BUN 12 mg/dL      Creatinine 0.66 mg/dL      Sodium 142 mmol/L      Potassium 3.8 mmol/L      Chloride 106 mmol/L      CO2 26.0 mmol/L      Calcium 8.6 mg/dL      eGFR Non African Amer 88 mL/min/1.73      BUN/Creatinine Ratio 18.2     Anion Gap 10.0 mmol/L     Narrative:       GFR Normal >60  Chronic Kidney Disease <60  Kidney Failure <15     CBC & Differential [[id number]] Collected:  11/05/19 1042    Specimen:  Blood Updated:  11/05/19 1056    Narrative:       The following orders were created for panel order CBC & Differential.  Procedure                               Abnormality         Status                     ---------                               -----------         ------                     CBC Auto Differential[[id number]]        Abnormal            Final result                 Please view results for these tests on the individual orders.    CBC Auto Differential [[id number]]  (Abnormal) Collected:  11/05/19 1042    Specimen:  Blood Updated:  11/05/19 1056     WBC 5.60 10*3/mm3      RBC 3.51 10*6/mm3      Hemoglobin 10.5 g/dL      Hematocrit 31.8 %      MCV 90.6 fL      MCH 29.9 pg      MCHC 33.0 g/dL      RDW 13.0 %      RDW-SD 42.8 fl      MPV 9.7 fL      Platelets 204 10*3/mm3      Neutrophil % 66.8 %      Lymphocyte % 21.4 %      Monocyte % 9.1 %      Eosinophil % 2.1 %      Basophil % 0.4 %      Immature Grans % 0.2 %      Neutrophils, Absolute 3.74 10*3/mm3      Lymphocytes, Absolute 1.20 10*3/mm3      Monocytes, Absolute 0.51 10*3/mm3      Eosinophils, Absolute 0.12 10*3/mm3      Basophils, Absolute 0.02 10*3/mm3      Immature Grans, Absolute 0.01 10*3/mm3      nRBC 0.0 /100 WBC         Imaging Results (All)     Procedure Component Value Units Date/Time    MRI Thoracic Spine Without Contrast [[id number]] Collected:  11/05/19 1056     Updated:  11/05/19 1419    Narrative:       PROCEDURE: MRI THORACIC SPINE WO CONTRAST    HISTORY: Bilateral lower extremity spasms, urinary incontinence,  altered mental status    PROCEDURE:  MRI of the thoracic spine without gadolinium utilizing  multiplanar and multisequential imaging.    COMPARISON: None    FINDINGS: There is some limitation of evaluation of the lower  thoracic spine as T11 and T12 as well as visualized portion of  the lumbar spine contain pedicle screw and nelda fixation devices.  There is  a small hemangioma in the T6 vertebral body. Bone marrow  signal is otherwise normal  There is normal thoracic vertebral height. The disc spaces are  mildly diffusely narrowed. The bony spinal canal is  developmentally normal in size. The spinal cord signal is normal  on all pulse sequences.    There is mild bilateral neural foraminal narrowing at T10-T11. No  significant central canal stenosis is identified at any level,  though the spinal cord appears likely posteriorly displaced at  the level of T10-T11.        Impression:       1.Mild degenerative changes most prominently at T10-11, as above.  There is mild diffuse disc space narrowing. Evaluation of the  lower thoracic spine is limited by artifact induced by pedicle  screw and nelda fixation.  2. Small hemangioma in the T6 vertebral body.    Electronically signed by:  Camila Ortiz MD  11/5/2019 2:18 PM [facility]  Workstation: [id number]    MRI Lumbar Spine Without Contrast [[id number]] Collected:  11/05/19 1056     Updated:  11/05/19 1339    Narrative:       PROCEDURE: MRI LUMBAR SPINE WO CONTRAST    HISTORY: Bilateral lower extremity spasms, urinary incontinence,  altered mental status    TECHNIQUE:   MRI of the lumbar spine without gadolinium utilizing multiplanar   and multisequential imaging.    COMPARISON: None    FINDINGS:  Examination is limited by extensive pedicle screw and nelda  fixation of T11-S1. There is normal lumbar vertebral height.  There is minimal, grade 1 anterolisthesis of L4 on L5. The bone  marrow signal is normal. The disc spaces are narrowed at multiple  levels, with apparent fusion at L3-L4. There is severe narrowing  at L4-L5, and mild narrowing at remaining levels.. There is disc  dessication noted at The bony spinal canal is developmentally  normal in size. The conus medullaris terminus at a normal level.    There is very limited evaluation of neural foramina due to  artifact induced by    L1-L2: There is probable mild bilateral neural  foraminal  narrowing at this level. The central spinal canal remains widely  patent     L2-L3: Spinal canal and the bilateral neural foramina remain  patent.    L3-L4: Bilateral neural foramina are not well evaluated due to  artifact induced by spinal hardware. There is probably at least  moderate central canal narrowing.    L4-L5: Bilateral neural foramina are not well evaluated due to  artifact. There is probably at least mild-to-moderate bilateral  narrowing. The central spinal canal is mildly narrowed. There may  be laminectomy defect at L4.    L5-S1: There is mild bilateral neural foraminal narrowing. The  central spinal canal remains patent.      Impression:       Very limited evaluation due to artifact induced by  metallic fusion hardware from T11 through S1. There appears to be  at least some neural foraminal and central canal narrowing at  multiple levels, as above.    Electronically signed by:  Camila Ortiz MD  11/5/2019 1:38 PM CST  Workstation: [id number]    FL Video Swallow With Speech [[id number]] Collected:  11/04/19 1307     Updated:  11/04/19 1441    Narrative:       Procedure: Modified barium swallow with speech pathology    Reason for exam: CVA. Silent aspiration.    FINDINGS: Modified barium swallow was performed in the presence  of speech pathology. Total fluoroscopic time 1 minute 6 seconds.  One image obtained. Please see speech pathology report for  findings as well as recommendations.      Impression:       1.  Please see speech pathology report for findings as well as  recommendations.    Electronically signed by:  Carmine De Luna MD  11/4/2019 2:39 PM CST  Workstation: [id number]    CT Angiogram Head [[id number]] Collected:  11/04/19 1004     Updated:  11/04/19 1204    Narrative:       PROCEDURE: CT ANGIOGRAM HEAD (accession [id number]), CT  ANGIOGRAM NECK (accession [id number])    CLINICAL INDICATION: Stroke, altered mental status    COMPARISON STUDY: CT of head without contrast dated 11/3/2019  and  MRI brain dated 11/4/2019    TECHNIQUE: 5 mm axial images were acquired without the use of  intravenous contrast. Following this, 3 mm axial images from the  aortic arch through the brain were acquired after the intravenous  administration of 90 mL of Isovue-370. Sagittal and coronal  reformatted images were also provided, as well as 3-D  reconstructions performed on separate workstation of major  vascular structures.    NASCET criteria were utilized to calculate degree of vascular  stenosis    DLP is 525.0    FINDINGS:  Precontrast images demonstrate no mass, midline shift,  intracranial mass or extra-axial collection. Patient's known  focal acute subacute medullary infarction can only be faintly  visualized. There is no mass, midline shift, intracranial  hemorrhage or extra-axial collection identified.    Mucosal thickening and a right maxillary retention cyst are seen  as well as mucosal thickening in the ethmoid sinuses. Mastoids  are clear    FINDINGS: The apex of the aortic arch is excluded from the study.  There appears to be three-vessel aortic arch. There is mild  narrowing of the origin of the left subclavian artery estimated  at approximately 40%. It is otherwise normal course and caliber.  The visualized portion of the left common carotid artery is  normal in course and caliber to the bifurcation, where there is  also a 60% stenosis of the origin of the right internal carotid  artery which is otherwise normal in course and caliber to the  skull base. It is mildly tortuous proximally. The brachiocephalic  artery is patent. The right subclavian artery originates a normal  fashion and is normal in course and caliber. The right common  carotid artery originates a normal fashion. There is no  significant stenosis at the bifurcation, and the right internal  carotid artery is patent to the skull base and normal in caliber.  The right external carotid artery is patent and normal in course  and  caliber.    The bilateral vertebral arteries originate in normal fashion from  the subclavian arteries bilaterally. There are tortuous at their  origin bilaterally. Bilateral vertebral arteries are normal in  course and caliber and appear codominant. Distal vertebral  arteries are moderately irregularly narrowed, left greater than  right, with approximately 50% stenosis of the distal left  vertebral artery. The vertebral junction and the basilar artery  are normal in course and caliber. The bilateral posterior  cerebral arteries are patent, but there is moderate stenosis of  the P1 segments bilaterally. A tiny right posterior communicating  artery and is somewhat more prominent left posterior  communicating artery are present, and are patent. The cavernous  portion of the carotid arteries demonstrate atherosclerotic  calcification moderate irregular narrowing, but no evidence of  occlusion. The carotid bifurcations, and bilateral middle  cerebral arteries are patent as well as there proximal branches.  Mild irregular narrowing of the right M1 segment is present,  though it is somewhat larger in caliber than the left M1 segment.  The left A1 segment is atretic. The right A1 segment is normal in  caliber. The anterior to the cava artery and A2 segments are  patent bilaterally.    There is no abnormal enhancement of brain parenchyma.    No cervical masses or lymphadenopathy is present. The parotid and  submandibular glands are symmetric. There is a low-attenuation  lesion in the left thyroid lobe measuring 1.0 cm in greatest  dimension. Additional smaller hypoattenuating lesions are seen in  the thyroid lobes bilaterally. No further follow-up of these is  recommended based on size criteria, according to best practice  recommendations.    Emphysema is noted in visualized portion of lung apices.    It still note is made of prominence of venous structures draining  to the jugular veins in the posterior neck bilaterally,  right  greater than left.    Degenerative changes of the cervical spinal present, which appear  age congruent      Impression:       1. The apex of the aortic arch is not included in the study,  somewhat limiting evaluation as the origins of the great vessels  are not evaluated.  2. Irregular narrowing of both distal vertebral arteries, right  left greater than right, with approximately 50% narrowing of the  distal left vertebral artery.  3. No evidence of aneurysm or major vascular occlusion  4. Chronic appearing ethmoid and maxillary sinusitis as above  5. Please see findings section above for further details.      Electronically signed by:  Camila Ortiz MD  11/4/2019 12:02 PM  [facility] Workstation: [id number]    CT Angiogram Neck [[id number]] Collected:  11/04/19 1004     Updated:  11/04/19 1204    Narrative:       PROCEDURE: CT ANGIOGRAM HEAD (accession [id number]), CT  ANGIOGRAM NECK (accession [id number])    CLINICAL INDICATION: Stroke, altered mental status    COMPARISON STUDY: CT of head without contrast dated 11/3/2019 and  MRI brain dated 11/4/2019    TECHNIQUE: 5 mm axial images were acquired without the use of  intravenous contrast. Following this, 3 mm axial images from the  aortic arch through the brain were acquired after the intravenous  administration of 90 mL of Isovue-370. Sagittal and coronal  reformatted images were also provided, as well as 3-D  reconstructions performed on separate workstation of major  vascular structures.    NASCET criteria were utilized to calculate degree of vascular  stenosis    DLP is 525.0    FINDINGS:  Precontrast images demonstrate no mass, midline shift,  intracranial mass or extra-axial collection. Patient's known  focal acute subacute medullary infarction can only be faintly  visualized. There is no mass, midline shift, intracranial  hemorrhage or extra-axial collection identified.    Mucosal thickening and a right maxillary retention cyst are seen  as well as mucosal  thickening in the ethmoid sinuses. Mastoids  are clear    FINDINGS: The apex of the aortic arch is excluded from the study.  There appears to be three-vessel aortic arch. There is mild  narrowing of the origin of the left subclavian artery estimated  at approximately 40%. It is otherwise normal course and caliber.  The visualized portion of the left common carotid artery is  normal in course and caliber to the bifurcation, where there is  also a 60% stenosis of the origin of the right internal carotid  artery which is otherwise normal in course and caliber to the  skull base. It is mildly tortuous proximally. The brachiocephalic  artery is patent. The right subclavian artery originates a normal  fashion and is normal in course and caliber. The right common  carotid artery originates a normal fashion. There is no  significant stenosis at the bifurcation, and the right internal  carotid artery is patent to the skull base and normal in caliber.  The right external carotid artery is patent and normal in course  and caliber.    The bilateral vertebral arteries originate in normal fashion from  the subclavian arteries bilaterally. There are tortuous at their  origin bilaterally. Bilateral vertebral arteries are normal in  course and caliber and appear codominant. Distal vertebral  arteries are moderately irregularly narrowed, left greater than  right, with approximately 50% stenosis of the distal left  vertebral artery. The vertebral junction and the basilar artery  are normal in course and caliber. The bilateral posterior  cerebral arteries are patent, but there is moderate stenosis of  the P1 segments bilaterally. A tiny right posterior communicating  artery and is somewhat more prominent left posterior  communicating artery are present, and are patent. The cavernous  portion of the carotid arteries demonstrate atherosclerotic  calcification moderate irregular narrowing, but no evidence of  occlusion. The carotid  bifurcations, and bilateral middle  cerebral arteries are patent as well as there proximal branches.  Mild irregular narrowing of the right M1 segment is present,  though it is somewhat larger in caliber than the left M1 segment.  The left A1 segment is atretic. The right A1 segment is normal in  caliber. The anterior to the cava artery and A2 segments are  patent bilaterally.    There is no abnormal enhancement of brain parenchyma.    No cervical masses or lymphadenopathy is present. The parotid and  submandibular glands are symmetric. There is a low-attenuation  lesion in the left thyroid lobe measuring 1.0 cm in greatest  dimension. Additional smaller hypoattenuating lesions are seen in  the thyroid lobes bilaterally. No further follow-up of these is  recommended based on size criteria, according to best practice  recommendations.    Emphysema is noted in visualized portion of lung apices.    It still note is made of prominence of venous structures draining  to the jugular veins in the posterior neck bilaterally, right  greater than left.    Degenerative changes of the cervical spinal present, which appear  age congruent      Impression:       1. The apex of the aortic arch is not included in the study,  somewhat limiting evaluation as the origins of the great vessels  are not evaluated.  2. Irregular narrowing of both distal vertebral arteries, right  left greater than right, with approximately 50% narrowing of the  distal left vertebral artery.  3. No evidence of aneurysm or major vascular occlusion  4. Chronic appearing ethmoid and maxillary sinusitis as above  5. Please see findings section above for further details.      Electronically signed by:  Camila Ortiz MD  11/4/2019 12:02 PM  [facility] Workstation: [id number]    MRI Brain Without Contrast [[id number]] Collected:  11/04/19 0949     Updated:  11/04/19 1058    Addenda:         ADDENDUM   ADDENDUM #1       Addendum: Referring clinician notified of findings by  phone at  10:57 AM on 11/4/2019.    Electronically signed by:  Carmine De Luna MD  11/4/2019 10:57 AM CST  Workstation: [id number]    Signed:  11/04/19 1057 by Jn De Luna MD    Narrative:       Procedure: Limited MR brain stroke protocol    Reason for exam: Altered mental status. Stroke.    FINDINGS: Multisequence multiplanar MR imaging of the brain was  performed stroke protocol. Diffusion weighted anterior inferior  left medullary small oval focus of increased signal which is  lower signal on the apparent coefficient map. This focus measures  3.5 mm in greatest diameter. This lesion would be suspicious for  acute lacunar infarct in this region. Otherwise the diffusion  weighted series is normal. Mild cerebral involutional changes.  Scattered right frontal lobe subcortical deep white matter very  small foci of increased signal on FLAIR sequence. Left frontal  lobe periventricular deep white matter small irregular shaped  focus of abnormal increased signal on FLAIR sequence. Otherwise  cerebral and cerebellar parenchymal are normal. Ventricular  system and subarachnoid spaces are normal.      Impression:       1.  Diffusion weighted anterior inferior left medullary small  oval focus of increased signal which is lower signal on the  apparent coefficient map. This focus measures 3.5 mm in greatest  diameter. This lesion would be suspicious for acute lacunar  infarct in this region.   2.  Mild cerebral involutional changes.  3.  Right frontal lobe subcortical deep white matter and left  frontal lobe periventricular deep white matter small foci of  abnormal signal as described above suspicious for chronic  ischemic gliosis secondary to microvascular disease.  4.  Remainder of MR brain exam unremarkable.    Electronically signed by:  Carmine De Luna MD  11/4/2019 10:43 AM CST  Workstation: [id number]    XR Femur 2 View Bilateral [[id number]] Collected:  11/03/19 2123     Updated:  11/03/19 2144    Narrative:       Bilateral  femurs two view on 11/3/2019    CLINICAL INDICATION: Bilateral leg pain after fall    COMPARISON: None    FINDINGS:    Left femur: Changes of osteoarthritis are noted in the knee.  There are no fractures. Visualized joints are well aligned.    Right femur: Mild changes of osteoarthritis are noted in the  knee. There are no fractures. Visualized joints are well aligned.      Impression:       No acute abnormality in either femur.    Electronically signed by:  Hesham Hernandez  11/3/2019 9:43 PM  CST Workstation: [id number]    XR Pelvis 1 or 2 View [[id number]] Collected:  11/03/19 2123     Updated:  11/03/19 2143    Narrative:       Pelvis single view on 11/3/2019    CLINICAL INDICATION: Pain after fall    COMPARISON: None    FINDINGS: Extensive hardware is partially imaged in the lower  lumbar spine extending into the bilateral iliac bones. The hips  are well located. The SI joints are well aligned. Calcification  in the right pelvis likely represents calcified uterine fibroid.  There are no fractures.      Impression:       No acute abnormality.    Electronically signed by:  Hesham Hernandez  11/3/2019 9:42 PM  CST Workstation: [id number]    XR Chest 1 View [[id number]] Collected:  11/03/19 2054     Updated:  11/03/19 2109    Narrative:         Chest single view on  11/3/2019     CLINICAL INDICATION: Stroke protocol    COMPARISON: 8/6/2015    FINDINGS: Posterior fusion hardware is noted in the lower  thoracic and upper lumbar spine. Vascular calcification is noted  in the aorta. The lungs are clear. Cardiac, hilar and mediastinal  contours are within normal limits. Pulmonary vascularity is  within normal.      Impression:       No acute disease.    Electronically signed by:  Hesham Hernandez  11/3/2019 9:08 PM  CST Workstation: [id number]    CT Head Without Contrast Stroke Protocol [[id number]] Collected:  11/03/19 2051     Updated:  11/03/19 2108    Narrative:         CT head without contrast on 11/3/2019     CLINICAL  INDICATION: Slurred speech, weakness, stroke    TECHNIQUE: Multiple axial images are obtained throughout the head  without the administration of contrast. This exam was performed  according to our departmental dose-optimization program, which  includes automated exposure control, adjustment of the mA and/or  kV according to patient size and/or use of iterative  reconstruction technique.   Total DLP is 908.2 mGy*cm.    COMPARISON: None    FINDINGS: There is no hydrocephalus. There is no hemorrhage.  There are no abnormal extra-axial fluid collections. There is no  mass, mass effect or midline shift. Mucous retention cyst is  noted in the right maxillary sinus. No bony abnormality is noted.  There is a small age-indeterminate left periventricular lacunar  infarct. If clinically indicated MRI could better evaluate. No  other CT evidence of infarct is noted.      Impression:       Small age-indeterminate left periventricular lacunar  infarct. If clinically indicated MRI could better evaluate.    Electronically signed by:  Hesham Hernandez  11/3/2019 9:07 PM  CST Workstation: [id number]          DISCHARGE DISPOSITION   Patient condition at time of discharge is stable.    Patient will be discharged to the acute rehab unit.    DISCHARGE MEDICATIONS:     Discharge Medications      Changes to Medications      Instructions Start Date   gabapentin 300 MG capsule  Commonly known as:  NEURONTIN  What changed:  Another medication with the same name was removed. Continue taking this medication, and follow the directions you see here.   400 mg, Oral, 4 Times Daily, Takes at 6 12 6 12         Continue These Medications      Instructions Start Date   aspirin 81 MG chewable tablet   81 mg, Oral, Daily      atenolol 100 MG tablet  Commonly known as:  TENORMIN   100 mg, Oral, Daily      atorvastatin 20 MG tablet  Commonly known as:  LIPITOR   20 mg, Oral, Nightly      baclofen 10 MG tablet  Commonly known as:  LIORESAL   10 mg, Oral, 2  Times Daily      carvedilol 25 MG tablet  Commonly known as:  COREG   25 mg, Oral, 2 Times Daily With Meals      clopidogrel 75 MG tablet  Commonly known as:  PLAVIX   75 mg, Oral, Daily      cyclobenzaprine 10 MG tablet  Commonly known as:  FLEXERIL   Oral      diazePAM 10 MG tablet  Commonly known as:  VALIUM   10 mg, Oral, 2 Times Daily PRN      diazePAM 2 MG tablet  Commonly known as:  VALIUM   2 mg, Oral, 3 Times Daily PRN      ibuprofen 200 MG tablet  Commonly known as:  ADVIL,MOTRIN   200 mg, Oral      rOPINIRole 0.5 MG tablet  Commonly known as:  REQUIP   0.5 mg, Oral, Nightly, Take 1 hour before bedtime.       therapeutic multivitamin-minerals tablet   Oral      triamterene-hydrochlorothiazide 37.5-25 MG per capsule  Commonly known as:  DYAZIDE   1 capsule, Oral, Every Morning             Diet Instructions     Diet: Cardiac      Discharge Diet:  Cardiac          Activity Instructions     Activity as Tolerated          Additional Instructions for the Follow-ups that You Need to Schedule     Discharge Follow-up with PCP   As directed       Currently Documented PCP:    Inna Harris APRN    PCP Phone Number:    [phone]     Follow Up Details:  F/up on discharge from Rehab            Contact information for follow-up providers     Inna Harris APRN .    Specialty:  Family Medicine  Why:  F/up on discharge from Rehab  Contact information:  [address] [place], [place] [id number] [phone]                   Contact information for after-discharge care     Destination     [facility] .    Service:  Inpatient Rehabilitation  Contact information:  [address] [place] [id number] [phone]                             TEST  RESULTS PENDING AT DISCHARGE       Chester Lopez MD  11/06/19  11:08 AM    Please note that this discharge summary required more than 30 minutes to complete.    Please send a copy of this dictation to the following  providers:  Inna Harris APRN Dragon disclaimer:  Much of this encounter note is an electronic transcription/translation of spoken language to printed text. The electronic translation of spoken language may permit erroneous, or at times, nonsensical words or phrases to be inadvertently transcribed; Although I have reviewed the note for such errors, some may still exist.

## 2019-11-06 NOTE — PLAN OF CARE
Problem: Patient Care Overview  Goal: Plan of Care Review  Outcome: Ongoing (interventions implemented as appropriate)   11/06/19 1150   Coping/Psychosocial   Plan of Care Reviewed With patient   Plan of Care Review   Progress improving   OTHER   Outcome Summary pt. t/f with Spv. and ambulated 250' with RW and SBA/CGA. pt. had no lob and was a little antalgic with gait due to soreness on LLE. pt. t/f sit to stand with spv.

## 2019-11-06 NOTE — H&P (VIEW-ONLY)
[address] [address]  T - [phone]     H/P NOTE         SUBJECTIVE:   Patient Care Team:  Inna Harris APRN as PCP - General (Family Medicine)  Declan Friedman MD as PCP - Claims Attributed  Maykel Marinelli DPM as Consulting Physician (Podiatry)    Chief Complaint:   Left pontine stroke      Subjective     Patient is 73 y.o. female presents with complaints of altered mental status and localized weakness and recurrent falls.  Recently diagnosed with a pontine stroke on the left admitted to [facility] work-up showed the left pontine stroke.  She was discharged home but she has not done well.  She has had significant spasms in the lower extremities with pain and recurrent falls.  She says she is fallen about 20 times.  She came into the emergency room because of the persistent pain and spasm.  She was admitted to the hospital for further evaluation to rule out recurrent and or expansion of the stroke.   Work-up showed no acute changes but she did have a subacute left pontine stroke.  Muscle spasms have been treated with muscle relaxers and she has improved.  Also she has known degenerative changes of her thoracic and lumbar spine with an extensive surgery approximately 1 year ago in 2018.  At that time she had a right pontine stroke.  She had some left-sided weakness and urinary retention requiring in and out catheterization for several months.  Over time her symptoms have resolved and she is done much better until this episode.    At this time she is able to void with some hesitancy, she has had some weakness in the lower extremities, she has some weakness in the right upper extremity.  Also had some mild dysarthria and oropharyngeal dysphasia.  She has been working with therapy and we have been asked to admit her to the acute rehab unit     ROS/HISTORY/ CURRENT MEDICATIONS/OBJECTIVE/VS/PE:       History:     Past Medical History:   Diagnosis  "Date   • Arrhythmia     intermittent   • CVA (cerebral vascular accident) (CMS/HCC) 2018    also had one last week 10/2019 in [facility]   • DJD (degenerative joint disease), multiple sites     thoracic and lumbar spine   • Hypertension    • Lupus (CMS/HCC)     drug induced   • Stroke (CMS/HCC)      Past Surgical History:   Procedure Laterality Date   • ANKLE SURGERY Right     \"bone replacement with coral\"   • APPENDECTOMY     •  SECTION     • CHOLECYSTECTOMY     • PARATHYROIDECTOMY     • ROTATOR CUFF REPAIR Left    • SPINAL FUSION  2018    T11-S1   • TYMPANOPLASTY       Family History   Problem Relation Age of Onset   • Hypertension Mother    • Heart disease Father    • Hypertension Brother      Social History     Tobacco Use   • Smoking status: Never Smoker   • Smokeless tobacco: Never Used   Substance Use Topics   • Alcohol use: Yes     Comment: occasional   • Drug use: Defer     Medications Prior to Admission   Medication Sig Dispense Refill Last Dose   • aspirin 81 MG chewable tablet Chew 81 mg Daily.      • atorvastatin (LIPITOR) 20 MG tablet Take 20 mg by mouth Every Night.      • baclofen (LIORESAL) 10 MG tablet Take 10 mg by mouth 2 (Two) Times a Day.      • carvedilol (COREG) 25 MG tablet Take 25 mg by mouth 2 (Two) Times a Day With Meals.      • clopidogrel (PLAVIX) 75 MG tablet Take 75 mg by mouth Daily.      • diazePAM (VALIUM) 10 MG tablet Take 10 mg by mouth 2 (Two) Times a Day As Needed for Anxiety.      • diazePAM (VALIUM) 2 MG tablet Take 2 mg by mouth 3 (Three) Times a Day As Needed for Anxiety.      • gabapentin (NEURONTIN) 300 MG capsule Take 400 mg by mouth 4 (Four) Times a Day. Takes at 6 12 6 12      • gabapentin (NEURONTIN) 400 MG capsule Take 400 mg by mouth 4 (Four) Times a Day. Takes at 6,12,6,12      • rOPINIRole (REQUIP) 0.5 MG tablet Take 0.5 mg by mouth Every Night. Take 1 hour before bedtime.      • triamterene-hydrochlorothiazide (DYAZIDE) 37.5-25 MG per capsule Take 1 " capsule by mouth Every Morning.      • atenolol (TENORMIN) 100 MG tablet Take 100 mg by mouth Daily.   Taking   • cyclobenzaprine (FLEXERIL) 10 MG tablet Take  by mouth.   Taking   • ibuprofen (ADVIL,MOTRIN) 200 MG tablet Take 200 mg by mouth.   Taking   • therapeutic multivitamin-minerals (THERAGRAN-M) tablet Take  by mouth.   Taking     Allergies:  Aspartame and phenylalanine; Calcium channel blockers; Hydralazine hcl; Hyzaar [losartan potassium-hctz]; Lisinopril; and Procardia [nifedipine]    Current Medications:     Current Facility-Administered Medications:   •  aspirin chewable tablet 81 mg, 81 mg, Oral, Daily, Roman Guerrero MD, 81 mg at 11/06/19 0905  •  atorvastatin (LIPITOR) tablet 80 mg, 80 mg, Oral, Nightly, Roman Guerrero MD, 80 mg at 11/05/19 2040  •  baclofen (LIORESAL) tablet 10 mg, 10 mg, Oral, BID, Blayne Barrett MD, 10 mg at 11/06/19 0905  •  carvedilol (COREG) tablet 25 mg, 25 mg, Oral, BID With Meals, Blayne Barrett MD, 25 mg at 11/06/19 0906  •  clopidogrel (PLAVIX) tablet 75 mg, 75 mg, Oral, Daily, Roman Guerrero MD, 75 mg at 11/06/19 0905  •  cyclobenzaprine (FLEXERIL) tablet 10 mg, 10 mg, Oral, TID PRN, Blayne Barrett MD  •  diazePAM (VALIUM) tablet 10 mg, 10 mg, Oral, BID PRN, Blayne Barrett MD  •  enoxaparin (LOVENOX) syringe 40 mg, 40 mg, Subcutaneous, Q24H, Roman Guerrero MD, 40 mg at 11/06/19 0044  •  gabapentin (NEURONTIN) capsule 400 mg, 400 mg, Oral, Q6H, Kj Jones MD, 400 mg at 11/06/19 0617  •  ibuprofen (ADVIL,MOTRIN) tablet 200 mg, 200 mg, Oral, Q6H PRN, Blayne Barrett MD  •  rOPINIRole (REQUIP) tablet 0.5 mg, 0.5 mg, Oral, Nightly, Blayne Barrett MD, 0.5 mg at 11/05/19 2040  •  sodium chloride 0.9 % flush 10 mL, 10 mL, Intravenous, PRN, Dutch Venegas MD  •  sodium chloride 0.9 % flush 10 mL, 10 mL, Intravenous, Q12H, Roman Guerrero MD, 10 mL at 11/06/19 0906  •  sodium chloride 0.9 % flush 10 mL, 10 mL, Intravenous, PRN, Roman Guerrero MD  •   sodium chloride 0.9 % infusion, 100 mL/hr, Intravenous, Continuous, Dutch Venegas MD, Last Rate: 100 mL/hr at 11/05/19 0550, 100 mL/hr at 11/05/19 0550  •  THERA tablet 1 tablet, 1 tablet, Oral, Daily, Blayne Barrett MD, 1 tablet at 11/06/19 0905  •  triamterene-hydrochlorothiazide (DYAZIDE) 37.5-25 MG per capsule 1 capsule, 1 capsule, Oral, QAM, Blayne Barrett MD, 1 capsule at 11/06/19 0617      REVIEW OF SYSTEMS:  Review of Systems   Constitutional: Positive for activity change. Negative for appetite change, chills, diaphoresis, fatigue, fever and unexpected weight change.   HENT: Positive for trouble swallowing.         Since her admission with stroke she has had some difficulty with swallowing   Respiratory: Negative.    Cardiovascular: Negative.    Gastrointestinal: Negative.    Endocrine: Negative.    Genitourinary: Positive for difficulty urinating and urgency. Negative for decreased urine volume, dysuria and hematuria.        After her stroke and spinal surgery she had a history of of urinary retention requiring in and out catheterizations after her prior surgery and right pontine stroke in 2018.  Those symptoms had improved until the current stroke.  And now she is having urgency and some hesitancy but she has been able to void   Musculoskeletal: Positive for arthralgias and back pain.        She has had back problems and pain for some time but it has been better since her surgery in 2018   Skin: Negative.    Allergic/Immunologic: Negative.    Neurological: Positive for speech difficulty and weakness. Negative for light-headedness and numbness.        She has had difficulty with swallowing since the stroke.  She has had some right lower extremity weakness.  Speech is different as well   Hematological: Negative.    Psychiatric/Behavioral: Negative.        Objective     Physical Exam:   Temp:  [97.5 °F (36.4 °C)-98.6 °F (37 °C)] 98.1 °F (36.7 °C)  Heart Rate:  [64-86] 68  Resp:  [15-16] 16  BP:  (142-168)/(65-73) 158/73    Physical Exam:    Physical Exam   Constitutional: She is oriented to person, place, and time. She appears well-developed and well-nourished. No distress.   HENT:   Head: Normocephalic and atraumatic.   Eyes: Conjunctivae and EOM are normal. Pupils are equal, round, and reactive to light. Right eye exhibits no discharge. Left eye exhibits no discharge. No scleral icterus.   Neck: Normal range of motion. Neck supple. No JVD present. No tracheal deviation present. No thyromegaly present.   Prior parathyroid surgery scar noted in the right anterior neck   Cardiovascular: Normal rate, regular rhythm and normal heart sounds. Exam reveals no gallop and no friction rub.   No murmur heard.  Pulmonary/Chest: Effort normal and breath sounds normal. No stridor. No respiratory distress. She has no wheezes. She has no rales.   Abdominal: Soft. Bowel sounds are normal. She exhibits no distension. There is no tenderness.   Musculoskeletal: Normal range of motion. She exhibits no edema, tenderness or deformity.   Lymphadenopathy:     She has no cervical adenopathy.   Neurological: She is alert and oriented to person, place, and time. No cranial nerve deficit or sensory deficit. She exhibits abnormal muscle tone.   Mild dysarthria and difficulty with swallowing.  She has mild right lower extremity weakness   Skin: Skin is warm and dry. No rash noted. She is not diaphoretic. No erythema. No pallor.   Psychiatric: She has a normal mood and affect. Her behavior is normal. Thought content normal.   Nursing note and vitals reviewed.           Results Review:      Lab Results (last 24 hours)     Procedure Component Value Units Date/Time    Basic Metabolic Panel [[id number]]  (Abnormal) Collected:  11/05/19 1042    Specimen:  Blood Updated:  11/05/19 1116     Glucose 134 mg/dL      BUN 12 mg/dL      Creatinine 0.66 mg/dL      Sodium 142 mmol/L      Potassium 3.8 mmol/L      Chloride 106 mmol/L      CO2 26.0 mmol/L       Calcium 8.6 mg/dL      eGFR Non African Amer 88 mL/min/1.73      BUN/Creatinine Ratio 18.2     Anion Gap 10.0 mmol/L     Narrative:       GFR Normal >60  Chronic Kidney Disease <60  Kidney Failure <15    CBC & Differential [[id number]] Collected:  11/05/19 1042    Specimen:  Blood Updated:  11/05/19 1056    Narrative:       The following orders were created for panel order CBC & Differential.  Procedure                               Abnormality         Status                     ---------                               -----------         ------                     CBC Auto Differential[[id number]]        Abnormal            Final result                 Please view results for these tests on the individual orders.    CBC Auto Differential [[id number]]  (Abnormal) Collected:  11/05/19 1042    Specimen:  Blood Updated:  11/05/19 1056     WBC 5.60 10*3/mm3      RBC 3.51 10*6/mm3      Hemoglobin 10.5 g/dL      Hematocrit 31.8 %      MCV 90.6 fL      MCH 29.9 pg      MCHC 33.0 g/dL      RDW 13.0 %      RDW-SD 42.8 fl      MPV 9.7 fL      Platelets 204 10*3/mm3      Neutrophil % 66.8 %      Lymphocyte % 21.4 %      Monocyte % 9.1 %      Eosinophil % 2.1 %      Basophil % 0.4 %      Immature Grans % 0.2 %      Neutrophils, Absolute 3.74 10*3/mm3      Lymphocytes, Absolute 1.20 10*3/mm3      Monocytes, Absolute 0.51 10*3/mm3      Eosinophils, Absolute 0.12 10*3/mm3      Basophils, Absolute 0.02 10*3/mm3      Immature Grans, Absolute 0.01 10*3/mm3      nRBC 0.0 /100 WBC                           Imaging Results (Last 24 Hours)     Procedure Component Value Units Date/Time    MRI Thoracic Spine Without Contrast [[id number]] Collected:  11/05/19 1056     Updated:  11/05/19 1419    Narrative:       PROCEDURE: MRI THORACIC SPINE WO CONTRAST    HISTORY: Bilateral lower extremity spasms, urinary incontinence,  altered mental status    PROCEDURE:  MRI of the thoracic spine without gadolinium utilizing  multiplanar and  multisequential imaging.    COMPARISON: None    FINDINGS: There is some limitation of evaluation of the lower  thoracic spine as T11 and T12 as well as visualized portion of  the lumbar spine contain pedicle screw and nelda fixation devices.  There is a small hemangioma in the T6 vertebral body. Bone marrow  signal is otherwise normal  There is normal thoracic vertebral height. The disc spaces are  mildly diffusely narrowed. The bony spinal canal is  developmentally normal in size. The spinal cord signal is normal  on all pulse sequences.    There is mild bilateral neural foraminal narrowing at T10-T11. No  significant central canal stenosis is identified at any level,  though the spinal cord appears likely posteriorly displaced at  the level of T10-T11.        Impression:       1.Mild degenerative changes most prominently at T10-11, as above.  There is mild diffuse disc space narrowing. Evaluation of the  lower thoracic spine is limited by artifact induced by pedicle  screw and nelda fixation.  2. Small hemangioma in the T6 vertebral body.    Electronically signed by:  Camila Ortiz MD  11/5/2019 2:18 PM CST  Workstation: [id number]    MRI Lumbar Spine Without Contrast [[id number]] Collected:  11/05/19 1056     Updated:  11/05/19 1339    Narrative:       PROCEDURE: MRI LUMBAR SPINE WO CONTRAST    HISTORY: Bilateral lower extremity spasms, urinary incontinence,  altered mental status    TECHNIQUE:   MRI of the lumbar spine without gadolinium utilizing multiplanar   and multisequential imaging.    COMPARISON: None    FINDINGS:  Examination is limited by extensive pedicle screw and nelda  fixation of T11-S1. There is normal lumbar vertebral height.  There is minimal, grade 1 anterolisthesis of L4 on L5. The bone  marrow signal is normal. The disc spaces are narrowed at multiple  levels, with apparent fusion at L3-L4. There is severe narrowing  at L4-L5, and mild narrowing at remaining levels.. There is disc  dessication noted  at The bony spinal canal is developmentally  normal in size. The conus medullaris terminus at a normal level.    There is very limited evaluation of neural foramina due to  artifact induced by    L1-L2: There is probable mild bilateral neural foraminal  narrowing at this level. The central spinal canal remains widely  patent     L2-L3: Spinal canal and the bilateral neural foramina remain  patent.    L3-L4: Bilateral neural foramina are not well evaluated due to  artifact induced by spinal hardware. There is probably at least  moderate central canal narrowing.    L4-L5: Bilateral neural foramina are not well evaluated due to  artifact. There is probably at least mild-to-moderate bilateral  narrowing. The central spinal canal is mildly narrowed. There may  be laminectomy defect at L4.    L5-S1: There is mild bilateral neural foraminal narrowing. The  central spinal canal remains patent.      Impression:       Very limited evaluation due to artifact induced by  metallic fusion hardware from T11 through S1. There appears to be  at least some neural foraminal and central canal narrowing at  multiple levels, as above.    Electronically signed by:  Camila Ortiz MD  11/5/2019 1:38 PM CST  Workstation: [id number]      She needs us she needs assistance with transfers ambulation and activities of daily living and she is currently getting speech therapy.  Diet has been advanced per speech therapy recommendations    I reviewed the patient's  clinical results.  I reviewed the patient's  imaging results and agree with the interpretation.   I reviewed the therapy notes   ASSESSMENT/PLAN:   Assessment/Plan   Active Hospital Problems    Diagnosis   • **Left pontine stroke (CMS/HCC)   • Urinary hesitancy     History of in and out self caths after initial surgery 2018     • Muscle spasm of both lower legs     Worse since this stroke     • Recurrent falls while walking     Due to muscle spasm     • Dysarthria   • DJD (degenerative joint  disease), multiple sites     thoracic and lumbar spine     • Hypertension   • Altered mental status   • CVA (cerebral vascular accident) (CMS/HCC)     History of right pontine stroke 2018 with left weakness  Minimal residual  also had one last week 10/2019 in [facility]         She will be admitted to the acute rehab unit for close medical supervision and intensive therapy.  She is a fall risk and currently spasms fairly well controlled with the muscle relaxers.  Her strength is improving, speech and swallowing improving.  It is expected that she will participate 3 hours a day make measurable improvement and be able to return home at discharge.  Urinary retention will be monitored closely and if difficulty with voiding consider urology evaluation at this time she defers that.  She says she will be able to void.    I discussed the patients findings and my recommendations with patient and nursing staff.          This document has been electronically signed by Castro West MD on November 6, 2019 10:39 AM

## 2019-11-06 NOTE — NURSING NOTE
[facility] Brochure and Data Collection Information Summary for patients in Inpatient Rehab Facilities given to patient.

## 2019-11-06 NOTE — PROGRESS NOTES
Stroke Progress Note       Chief Complaint:  Spasms in legs    Subjective     Subjective:  Feeling much better today. Denies any new symptoms.     Review of Systems   Constitutional: Negative.    HENT: Negative.    Eyes: Negative.    Respiratory: Negative.    Cardiovascular: Negative.    Gastrointestinal: Negative.    Endocrine: Negative.    Genitourinary: Negative for difficulty urinating, dysuria, enuresis, flank pain, frequency, genital sores, hematuria and menstrual problem.   Musculoskeletal:        Denies any neck pain or back pain. Does have spasms in BLE, janet in thighs.    Hematological: Negative.    Psychiatric/Behavioral: Negative.         Objective      Temp:  [97.4 °F (36.3 °C)-98.6 °F (37 °C)] 97.5 °F (36.4 °C)  Heart Rate:  [64-86] 64  Resp:  [15-16] 16  BP: (142-176)/(65-79) 142/65    Neurological Exam  Mental Status  Awake, alert and oriented to person, place and time. Mild dysarthria present. Language is fluent with no aphasia. Attention and concentration are normal.    Cranial Nerves  CN II: Visual fields full to confrontation.  CN III, IV, VI: Extraocular movements intact bilaterally.  CN V: Facial sensation is normal.  CN VII: Full and symmetric facial movement.  CN VIII: Hearing is normal.  CN IX, X:  Right: Gag is intact.  Left: Gag is reduced.  CN XI: Shoulder shrug strength is normal.  CN XII: Tongue midline without atrophy or fasciculations.    Motor  Normal muscle bulk throughout. No fasciculations present. Normal muscle tone.  Mild decreased FFM on Rt side. RUE/LUE- 5/5  RLE- some drift, maybe 4/5, 2/2 spasms. LLE- okay.   .    Sensory  Light touch is normal in upper and lower extremities.     Reflexes                                           Right                      Left  Brachioradialis                    3+                         3+  Biceps                                 3+                         3+  Triceps                                3+                         3+  Patellar                                 2+                         2+  Achilles                                2+                         2+  Plantar                           Mute                Mute    Right pathological reflexes: Crossed adductor absent.  Left pathological reflexes: Crossed adductor absent.    Coordination  Right: Finger-to-nose normal.  Left: Finger-to-nose normal.    Gait  Not assessed.      Physical Exam   Constitutional: She appears well-developed and well-nourished.   Neck: Normal range of motion.   Cardiovascular: Normal rate.   Pulmonary/Chest: Effort normal.   Neurological:   Reflex Scores:       Tricep reflexes are 3+ on the right side and 3+ on the left side.       Bicep reflexes are 3+ on the right side and 3+ on the left side.       Brachioradialis reflexes are 3+ on the right side and 3+ on the left side.       Patellar reflexes are 2+ on the right side and 2+ on the left side.       Achilles reflexes are 2+ on the right side and 2+ on the left side.      Results Review:    I reviewed the patient's new clinical results.    Lab Results (last 24 hours)     Procedure Component Value Units Date/Time    Basic Metabolic Panel [[id number]]  (Abnormal) Collected:  11/05/19 1042    Specimen:  Blood Updated:  11/05/19 1116     Glucose 134 mg/dL      BUN 12 mg/dL      Creatinine 0.66 mg/dL      Sodium 142 mmol/L      Potassium 3.8 mmol/L      Chloride 106 mmol/L      CO2 26.0 mmol/L      Calcium 8.6 mg/dL      eGFR Non African Amer 88 mL/min/1.73      BUN/Creatinine Ratio 18.2     Anion Gap 10.0 mmol/L     Narrative:       GFR Normal >60  Chronic Kidney Disease <60  Kidney Failure <15    CBC & Differential [[id number]] Collected:  11/05/19 1042    Specimen:  Blood Updated:  11/05/19 1056    Narrative:       The following orders were created for panel order CBC & Differential.  Procedure                               Abnormality         Status                     ---------                                -----------         ------                     CBC Auto Differential[[id number]]        Abnormal            Final result                 Please view results for these tests on the individual orders.    CBC Auto Differential [[id number]]  (Abnormal) Collected:  11/05/19 1042    Specimen:  Blood Updated:  11/05/19 1056     WBC 5.60 10*3/mm3      RBC 3.51 10*6/mm3      Hemoglobin 10.5 g/dL      Hematocrit 31.8 %      MCV 90.6 fL      MCH 29.9 pg      MCHC 33.0 g/dL      RDW 13.0 %      RDW-SD 42.8 fl      MPV 9.7 fL      Platelets 204 10*3/mm3      Neutrophil % 66.8 %      Lymphocyte % 21.4 %      Monocyte % 9.1 %      Eosinophil % 2.1 %      Basophil % 0.4 %      Immature Grans % 0.2 %      Neutrophils, Absolute 3.74 10*3/mm3      Lymphocytes, Absolute 1.20 10*3/mm3      Monocytes, Absolute 0.51 10*3/mm3      Eosinophils, Absolute 0.12 10*3/mm3      Basophils, Absolute 0.02 10*3/mm3      Immature Grans, Absolute 0.01 10*3/mm3      nRBC 0.0 /100 WBC         Ct Angiogram Neck    Result Date: 11/4/2019  1. The apex of the aortic arch is not included in the study, somewhat limiting evaluation as the origins of the great vessels are not evaluated. 2. Irregular narrowing of both distal vertebral arteries, right left greater than right, with approximately 50% narrowing of the distal left vertebral artery. 3. No evidence of aneurysm or major vascular occlusion 4. Chronic appearing ethmoid and maxillary sinusitis as above 5. Please see findings section above for further details. Electronically signed by:  Camila Ortiz MD  11/4/2019 12:02 PM CST Workstation: [id number]    Mri Brain Without Contrast    Addendum Date: 11/4/2019     ADDENDUM ADDENDUM #1 Addendum: Referring clinician notified of findings by phone at 10:57 AM on 11/4/2019. Electronically signed by:  Carmine De Luna MD  11/4/2019 10:57 AM CST Workstation: [id number]     Result Date: 11/4/2019  1.  Diffusion weighted anterior inferior left medullary small oval focus of increased  signal which is lower signal on the apparent coefficient map. This focus measures 3.5 mm in greatest diameter. This lesion would be suspicious for acute lacunar infarct in this region. 2.  Mild cerebral involutional changes. 3.  Right frontal lobe subcortical deep white matter and left frontal lobe periventricular deep white matter small foci of abnormal signal as described above suspicious for chronic ischemic gliosis secondary to microvascular disease. 4.  Remainder of MR brain exam unremarkable. Electronically signed by:  Carmine De Luna MD  11/4/2019 10:43 AM CST Workstation: [id number]    Mri Thoracic Spine Without Contrast    Result Date: 11/5/2019  1.Mild degenerative changes most prominently at T10-11, as above. There is mild diffuse disc space narrowing. Evaluation of the lower thoracic spine is limited by artifact induced by pedicle screw and nelda fixation. 2. Small hemangioma in the T6 vertebral body. Electronically signed by:  Camila Ortiz MD  11/5/2019 2:18 PM CST Workstation: [id number]    Mri Lumbar Spine Without Contrast    Result Date: 11/5/2019  Very limited evaluation due to artifact induced by metallic fusion hardware from T11 through S1. There appears to be at least some neural foraminal and central canal narrowing at multiple levels, as above. Electronically signed by:  Camila Ortiz MD  11/5/2019 1:38 PM CST Workstation: [id number]    Fl Video Swallow With Speech    Result Date: 11/4/2019  1.  Please see speech pathology report for findings as well as recommendations. Electronically signed by:  Carmine De Luna MD  11/4/2019 2:39 PM CST Workstation: [id number]    Ct Angiogram Head    Result Date: 11/4/2019  1. The apex of the aortic arch is not included in the study, somewhat limiting evaluation as the origins of the great vessels are not evaluated. 2. Irregular narrowing of both distal vertebral arteries, right left greater than right, with approximately 50% narrowing of the distal left vertebral artery. 3. No  evidence of aneurysm or major vascular occlusion 4. Chronic appearing ethmoid and maxillary sinusitis as above 5. Please see findings section above for further details. Electronically signed by:  Camila Ortiz MD  11/4/2019 12:02 PM CST Workstation: [id number]           Assessment/Plan     Assessment/Plan:  74 yo RHWF with h/o HTN, HLD, chronic back pain s/p back surgery in 2018, when post-op she had small Rt pontine stroke, with complete recovery- who was recently admitted to [facility] with R>L body spasms, worse in legs, when she was found to have a small L pontine stroke- was discharged from [facility], and 11/3/19 had worsening of her spasms, her legs gave away, and had colorful spots in front of her vision with closed eyes. She was eval by Stroke team in ER, and given she still had some dysphagia, and worsening symptoms- to make sure no new events or worsening of recent stroke.       1. 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 Cont ASA, plavix and statins. Cont therapy, likely needs acute rehab. Pt current symptoms of spasms and incontinence less likely from stroke.  2. BLE weakness- with spasms- Unusual symptoms for stroke. Pt has had back surgery done, and she had MRI C-spine at [facility] which shows significant degenerative changes. Given she also has some incontinence, we got MRI of T/L spine to make sure no spinal emergency, which seems to be okay. T-spine looks okay with mild degenerative changes, and L spine shows significant post-op changes, but no major abnormalities. Her clinical exam is less concerning except brisk reflexes in BUE. She will need outpt f/u with spine surgeon.   3. HTN- Normal BP goals. Adjust medications as necessary  4. HLD- Cont statins.   5. Activity- Increase activity with PT/OT. Awaiting acute rehab.  6. Diet-  Cleared MBS yest with mechanical soft diet. Encourage healthy eating.     Case was d/w pt, nursing, and Dr Barrett. All questions answered. Will sign off for now, please call with questions.           Anirudh Marquez MD  11/06/19  8:12 AM    This was an audio and video enabled telemedicine encounter.

## 2019-11-06 NOTE — H&P
[address] [address]  T - [phone]     H/P NOTE         SUBJECTIVE:   Patient Care Team:  Inna Harris APRN as PCP - General (Family Medicine)  Declan Friedman MD as PCP - Claims Attributed  Maykel Marinelli DPM as Consulting Physician (Podiatry)    Chief Complaint:   Left pontine stroke      Subjective     Patient is 73 y.o. female presents with complaints of altered mental status and localized weakness and recurrent falls.  Recently diagnosed with a pontine stroke on the left admitted to [facility] work-up showed the left pontine stroke.  She was discharged home but she has not done well.  She has had significant spasms in the lower extremities with pain and recurrent falls.  She says she is fallen about 20 times.  She came into the emergency room because of the persistent pain and spasm.  She was admitted to the hospital for further evaluation to rule out recurrent and or expansion of the stroke.   Work-up showed no acute changes but she did have a subacute left pontine stroke.  Muscle spasms have been treated with muscle relaxers and she has improved.  Also she has known degenerative changes of her thoracic and lumbar spine with an extensive surgery approximately 1 year ago in 2018.  At that time she had a right pontine stroke.  She had some left-sided weakness and urinary retention requiring in and out catheterization for several months.  Over time her symptoms have resolved and she is done much better until this episode.    At this time she is able to void with some hesitancy, she has had some weakness in the lower extremities, she has some weakness in the right upper extremity.  Also had some mild dysarthria and oropharyngeal dysphasia.  She has been working with therapy and we have been asked to admit her to the acute rehab unit     ROS/HISTORY/ CURRENT MEDICATIONS/OBJECTIVE/VS/PE:       History:     Past Medical History:   Diagnosis  "Date   • Arrhythmia     intermittent   • CVA (cerebral vascular accident) (CMS/HCC) 2018    also had one last week 10/2019 in [facility]   • DJD (degenerative joint disease), multiple sites     thoracic and lumbar spine   • Hypertension    • Lupus (CMS/HCC)     drug induced   • Stroke (CMS/HCC)      Past Surgical History:   Procedure Laterality Date   • ANKLE SURGERY Right     \"bone replacement with coral\"   • APPENDECTOMY     •  SECTION     • CHOLECYSTECTOMY     • PARATHYROIDECTOMY     • ROTATOR CUFF REPAIR Left    • SPINAL FUSION  2018    T11-S1   • TYMPANOPLASTY       Family History   Problem Relation Age of Onset   • Hypertension Mother    • Heart disease Father    • Hypertension Brother      Social History     Tobacco Use   • Smoking status: Never Smoker   • Smokeless tobacco: Never Used   Substance Use Topics   • Alcohol use: Yes     Comment: occasional   • Drug use: Defer     Medications Prior to Admission   Medication Sig Dispense Refill Last Dose   • aspirin 81 MG chewable tablet Chew 81 mg Daily.      • atorvastatin (LIPITOR) 20 MG tablet Take 20 mg by mouth Every Night.      • baclofen (LIORESAL) 10 MG tablet Take 10 mg by mouth 2 (Two) Times a Day.      • carvedilol (COREG) 25 MG tablet Take 25 mg by mouth 2 (Two) Times a Day With Meals.      • clopidogrel (PLAVIX) 75 MG tablet Take 75 mg by mouth Daily.      • diazePAM (VALIUM) 10 MG tablet Take 10 mg by mouth 2 (Two) Times a Day As Needed for Anxiety.      • diazePAM (VALIUM) 2 MG tablet Take 2 mg by mouth 3 (Three) Times a Day As Needed for Anxiety.      • gabapentin (NEURONTIN) 300 MG capsule Take 400 mg by mouth 4 (Four) Times a Day. Takes at 6 12 6 12      • gabapentin (NEURONTIN) 400 MG capsule Take 400 mg by mouth 4 (Four) Times a Day. Takes at 6,12,6,12      • rOPINIRole (REQUIP) 0.5 MG tablet Take 0.5 mg by mouth Every Night. Take 1 hour before bedtime.      • triamterene-hydrochlorothiazide (DYAZIDE) 37.5-25 MG per capsule Take 1 " capsule by mouth Every Morning.      • atenolol (TENORMIN) 100 MG tablet Take 100 mg by mouth Daily.   Taking   • cyclobenzaprine (FLEXERIL) 10 MG tablet Take  by mouth.   Taking   • ibuprofen (ADVIL,MOTRIN) 200 MG tablet Take 200 mg by mouth.   Taking   • therapeutic multivitamin-minerals (THERAGRAN-M) tablet Take  by mouth.   Taking     Allergies:  Aspartame and phenylalanine; Calcium channel blockers; Hydralazine hcl; Hyzaar [losartan potassium-hctz]; Lisinopril; and Procardia [nifedipine]    Current Medications:     Current Facility-Administered Medications:   •  aspirin chewable tablet 81 mg, 81 mg, Oral, Daily, Roman Guerrero MD, 81 mg at 11/06/19 0905  •  atorvastatin (LIPITOR) tablet 80 mg, 80 mg, Oral, Nightly, Roman Guerrero MD, 80 mg at 11/05/19 2040  •  baclofen (LIORESAL) tablet 10 mg, 10 mg, Oral, BID, Blayne Barrett MD, 10 mg at 11/06/19 0905  •  carvedilol (COREG) tablet 25 mg, 25 mg, Oral, BID With Meals, Blayne Barrett MD, 25 mg at 11/06/19 0906  •  clopidogrel (PLAVIX) tablet 75 mg, 75 mg, Oral, Daily, Roman Guerrero MD, 75 mg at 11/06/19 0905  •  cyclobenzaprine (FLEXERIL) tablet 10 mg, 10 mg, Oral, TID PRN, Blayne Barrett MD  •  diazePAM (VALIUM) tablet 10 mg, 10 mg, Oral, BID PRN, Blayne Barrett MD  •  enoxaparin (LOVENOX) syringe 40 mg, 40 mg, Subcutaneous, Q24H, Roman Guerrero MD, 40 mg at 11/06/19 0044  •  gabapentin (NEURONTIN) capsule 400 mg, 400 mg, Oral, Q6H, Kj Jones MD, 400 mg at 11/06/19 0617  •  ibuprofen (ADVIL,MOTRIN) tablet 200 mg, 200 mg, Oral, Q6H PRN, Blayne Barrett MD  •  rOPINIRole (REQUIP) tablet 0.5 mg, 0.5 mg, Oral, Nightly, Blayne Barrett MD, 0.5 mg at 11/05/19 2040  •  sodium chloride 0.9 % flush 10 mL, 10 mL, Intravenous, PRN, Dutch Venegas MD  •  sodium chloride 0.9 % flush 10 mL, 10 mL, Intravenous, Q12H, Roman Guerrero MD, 10 mL at 11/06/19 0906  •  sodium chloride 0.9 % flush 10 mL, 10 mL, Intravenous, PRN, Roman Guerrero MD  •   sodium chloride 0.9 % infusion, 100 mL/hr, Intravenous, Continuous, Dutch Venegas MD, Last Rate: 100 mL/hr at 11/05/19 0550, 100 mL/hr at 11/05/19 0550  •  THERA tablet 1 tablet, 1 tablet, Oral, Daily, Blayne Barrett MD, 1 tablet at 11/06/19 0905  •  triamterene-hydrochlorothiazide (DYAZIDE) 37.5-25 MG per capsule 1 capsule, 1 capsule, Oral, QAM, Blayne Barrett MD, 1 capsule at 11/06/19 0617      REVIEW OF SYSTEMS:  Review of Systems   Constitutional: Positive for activity change. Negative for appetite change, chills, diaphoresis, fatigue, fever and unexpected weight change.   HENT: Positive for trouble swallowing.         Since her admission with stroke she has had some difficulty with swallowing   Respiratory: Negative.    Cardiovascular: Negative.    Gastrointestinal: Negative.    Endocrine: Negative.    Genitourinary: Positive for difficulty urinating and urgency. Negative for decreased urine volume, dysuria and hematuria.        After her stroke and spinal surgery she had a history of of urinary retention requiring in and out catheterizations after her prior surgery and right pontine stroke in 2018.  Those symptoms had improved until the current stroke.  And now she is having urgency and some hesitancy but she has been able to void   Musculoskeletal: Positive for arthralgias and back pain.        She has had back problems and pain for some time but it has been better since her surgery in 2018   Skin: Negative.    Allergic/Immunologic: Negative.    Neurological: Positive for speech difficulty and weakness. Negative for light-headedness and numbness.        She has had difficulty with swallowing since the stroke.  She has had some right lower extremity weakness.  Speech is different as well   Hematological: Negative.    Psychiatric/Behavioral: Negative.        Objective     Physical Exam:   Temp:  [97.5 °F (36.4 °C)-98.6 °F (37 °C)] 98.1 °F (36.7 °C)  Heart Rate:  [64-86] 68  Resp:  [15-16] 16  BP:  (142-168)/(65-73) 158/73    Physical Exam:    Physical Exam   Constitutional: She is oriented to person, place, and time. She appears well-developed and well-nourished. No distress.   HENT:   Head: Normocephalic and atraumatic.   Eyes: Conjunctivae and EOM are normal. Pupils are equal, round, and reactive to light. Right eye exhibits no discharge. Left eye exhibits no discharge. No scleral icterus.   Neck: Normal range of motion. Neck supple. No JVD present. No tracheal deviation present. No thyromegaly present.   Prior parathyroid surgery scar noted in the right anterior neck   Cardiovascular: Normal rate, regular rhythm and normal heart sounds. Exam reveals no gallop and no friction rub.   No murmur heard.  Pulmonary/Chest: Effort normal and breath sounds normal. No stridor. No respiratory distress. She has no wheezes. She has no rales.   Abdominal: Soft. Bowel sounds are normal. She exhibits no distension. There is no tenderness.   Musculoskeletal: Normal range of motion. She exhibits no edema, tenderness or deformity.   Lymphadenopathy:     She has no cervical adenopathy.   Neurological: She is alert and oriented to person, place, and time. No cranial nerve deficit or sensory deficit. She exhibits abnormal muscle tone.   Mild dysarthria and difficulty with swallowing.  She has mild right lower extremity weakness   Skin: Skin is warm and dry. No rash noted. She is not diaphoretic. No erythema. No pallor.   Psychiatric: She has a normal mood and affect. Her behavior is normal. Thought content normal.   Nursing note and vitals reviewed.           Results Review:      Lab Results (last 24 hours)     Procedure Component Value Units Date/Time    Basic Metabolic Panel [[id number]]  (Abnormal) Collected:  11/05/19 1042    Specimen:  Blood Updated:  11/05/19 1116     Glucose 134 mg/dL      BUN 12 mg/dL      Creatinine 0.66 mg/dL      Sodium 142 mmol/L      Potassium 3.8 mmol/L      Chloride 106 mmol/L      CO2 26.0 mmol/L       Calcium 8.6 mg/dL      eGFR Non African Amer 88 mL/min/1.73      BUN/Creatinine Ratio 18.2     Anion Gap 10.0 mmol/L     Narrative:       GFR Normal >60  Chronic Kidney Disease <60  Kidney Failure <15    CBC & Differential [[id number]] Collected:  11/05/19 1042    Specimen:  Blood Updated:  11/05/19 1056    Narrative:       The following orders were created for panel order CBC & Differential.  Procedure                               Abnormality         Status                     ---------                               -----------         ------                     CBC Auto Differential[[id number]]        Abnormal            Final result                 Please view results for these tests on the individual orders.    CBC Auto Differential [[id number]]  (Abnormal) Collected:  11/05/19 1042    Specimen:  Blood Updated:  11/05/19 1056     WBC 5.60 10*3/mm3      RBC 3.51 10*6/mm3      Hemoglobin 10.5 g/dL      Hematocrit 31.8 %      MCV 90.6 fL      MCH 29.9 pg      MCHC 33.0 g/dL      RDW 13.0 %      RDW-SD 42.8 fl      MPV 9.7 fL      Platelets 204 10*3/mm3      Neutrophil % 66.8 %      Lymphocyte % 21.4 %      Monocyte % 9.1 %      Eosinophil % 2.1 %      Basophil % 0.4 %      Immature Grans % 0.2 %      Neutrophils, Absolute 3.74 10*3/mm3      Lymphocytes, Absolute 1.20 10*3/mm3      Monocytes, Absolute 0.51 10*3/mm3      Eosinophils, Absolute 0.12 10*3/mm3      Basophils, Absolute 0.02 10*3/mm3      Immature Grans, Absolute 0.01 10*3/mm3      nRBC 0.0 /100 WBC                           Imaging Results (Last 24 Hours)     Procedure Component Value Units Date/Time    MRI Thoracic Spine Without Contrast [[id number]] Collected:  11/05/19 1056     Updated:  11/05/19 1419    Narrative:       PROCEDURE: MRI THORACIC SPINE WO CONTRAST    HISTORY: Bilateral lower extremity spasms, urinary incontinence,  altered mental status    PROCEDURE:  MRI of the thoracic spine without gadolinium utilizing  multiplanar and  multisequential imaging.    COMPARISON: None    FINDINGS: There is some limitation of evaluation of the lower  thoracic spine as T11 and T12 as well as visualized portion of  the lumbar spine contain pedicle screw and nelda fixation devices.  There is a small hemangioma in the T6 vertebral body. Bone marrow  signal is otherwise normal  There is normal thoracic vertebral height. The disc spaces are  mildly diffusely narrowed. The bony spinal canal is  developmentally normal in size. The spinal cord signal is normal  on all pulse sequences.    There is mild bilateral neural foraminal narrowing at T10-T11. No  significant central canal stenosis is identified at any level,  though the spinal cord appears likely posteriorly displaced at  the level of T10-T11.        Impression:       1.Mild degenerative changes most prominently at T10-11, as above.  There is mild diffuse disc space narrowing. Evaluation of the  lower thoracic spine is limited by artifact induced by pedicle  screw and nelda fixation.  2. Small hemangioma in the T6 vertebral body.    Electronically signed by:  Camila Ortiz MD  11/5/2019 2:18 PM CST  Workstation: [id number]    MRI Lumbar Spine Without Contrast [[id number]] Collected:  11/05/19 1056     Updated:  11/05/19 1339    Narrative:       PROCEDURE: MRI LUMBAR SPINE WO CONTRAST    HISTORY: Bilateral lower extremity spasms, urinary incontinence,  altered mental status    TECHNIQUE:   MRI of the lumbar spine without gadolinium utilizing multiplanar   and multisequential imaging.    COMPARISON: None    FINDINGS:  Examination is limited by extensive pedicle screw and nelda  fixation of T11-S1. There is normal lumbar vertebral height.  There is minimal, grade 1 anterolisthesis of L4 on L5. The bone  marrow signal is normal. The disc spaces are narrowed at multiple  levels, with apparent fusion at L3-L4. There is severe narrowing  at L4-L5, and mild narrowing at remaining levels.. There is disc  dessication noted  at The bony spinal canal is developmentally  normal in size. The conus medullaris terminus at a normal level.    There is very limited evaluation of neural foramina due to  artifact induced by    L1-L2: There is probable mild bilateral neural foraminal  narrowing at this level. The central spinal canal remains widely  patent     L2-L3: Spinal canal and the bilateral neural foramina remain  patent.    L3-L4: Bilateral neural foramina are not well evaluated due to  artifact induced by spinal hardware. There is probably at least  moderate central canal narrowing.    L4-L5: Bilateral neural foramina are not well evaluated due to  artifact. There is probably at least mild-to-moderate bilateral  narrowing. The central spinal canal is mildly narrowed. There may  be laminectomy defect at L4.    L5-S1: There is mild bilateral neural foraminal narrowing. The  central spinal canal remains patent.      Impression:       Very limited evaluation due to artifact induced by  metallic fusion hardware from T11 through S1. There appears to be  at least some neural foraminal and central canal narrowing at  multiple levels, as above.    Electronically signed by:  Camila Ortiz MD  11/5/2019 1:38 PM CST  Workstation: [id number]      She needs us she needs assistance with transfers ambulation and activities of daily living and she is currently getting speech therapy.  Diet has been advanced per speech therapy recommendations    I reviewed the patient's  clinical results.  I reviewed the patient's  imaging results and agree with the interpretation.   I reviewed the therapy notes   ASSESSMENT/PLAN:   Assessment/Plan   Active Hospital Problems    Diagnosis   • **Left pontine stroke (CMS/HCC)   • Urinary hesitancy     History of in and out self caths after initial surgery 2018     • Muscle spasm of both lower legs     Worse since this stroke     • Recurrent falls while walking     Due to muscle spasm     • Dysarthria   • DJD (degenerative joint  disease), multiple sites     thoracic and lumbar spine     • Hypertension   • Altered mental status   • CVA (cerebral vascular accident) (CMS/HCC)     History of right pontine stroke 2018 with left weakness  Minimal residual  also had one last week 10/2019 in [facility]         She will be admitted to the acute rehab unit for close medical supervision and intensive therapy.  She is a fall risk and currently spasms fairly well controlled with the muscle relaxers.  Her strength is improving, speech and swallowing improving.  It is expected that she will participate 3 hours a day make measurable improvement and be able to return home at discharge.  Urinary retention will be monitored closely and if difficulty with voiding consider urology evaluation at this time she defers that.  She says she will be able to void.    I discussed the patients findings and my recommendations with patient and nursing staff.          This document has been electronically signed by Castro West MD on November 6, 2019 10:39 AM

## 2019-11-07 LAB
ALBUMIN SERPL-MCNC: 3.9 G/DL (ref 3.5–5.2)
ALBUMIN/GLOB SERPL: 1.2 G/DL
ALP SERPL-CCNC: 102 U/L (ref 39–117)
ALT SERPL W P-5'-P-CCNC: 13 U/L (ref 1–33)
ANION GAP SERPL CALCULATED.3IONS-SCNC: 10 MMOL/L (ref 5–15)
AST SERPL-CCNC: 19 U/L (ref 1–32)
BASOPHILS # BLD AUTO: 0.02 10*3/MM3 (ref 0–0.2)
BASOPHILS NFR BLD AUTO: 0.4 % (ref 0–1.5)
BILIRUB SERPL-MCNC: 0.6 MG/DL (ref 0.2–1.2)
BUN BLD-MCNC: 15 MG/DL (ref 8–23)
BUN/CREAT SERPL: 21.4 (ref 7–25)
CALCIUM SPEC-SCNC: 9.4 MG/DL (ref 8.6–10.5)
CHLORIDE SERPL-SCNC: 101 MMOL/L (ref 98–107)
CO2 SERPL-SCNC: 31 MMOL/L (ref 22–29)
CREAT BLD-MCNC: 0.7 MG/DL (ref 0.57–1)
DEPRECATED RDW RBC AUTO: 41.3 FL (ref 37–54)
EOSINOPHIL # BLD AUTO: 0.15 10*3/MM3 (ref 0–0.4)
EOSINOPHIL NFR BLD AUTO: 2.7 % (ref 0.3–6.2)
ERYTHROCYTE [DISTWIDTH] IN BLOOD BY AUTOMATED COUNT: 12.7 % (ref 12.3–15.4)
GFR SERPL CREATININE-BSD FRML MDRD: 82 ML/MIN/1.73
GLOBULIN UR ELPH-MCNC: 3.2 GM/DL
GLUCOSE BLD-MCNC: 100 MG/DL (ref 65–99)
HCT VFR BLD AUTO: 36.4 % (ref 34–46.6)
HGB BLD-MCNC: 12 G/DL (ref 12–15.9)
IMM GRANULOCYTES # BLD AUTO: 0.02 10*3/MM3 (ref 0–0.05)
IMM GRANULOCYTES NFR BLD AUTO: 0.4 % (ref 0–0.5)
IRON 24H UR-MRATE: 59 MCG/DL (ref 37–145)
IRON SATN MFR SERPL: 17 % (ref 20–50)
LYMPHOCYTES # BLD AUTO: 1.38 10*3/MM3 (ref 0.7–3.1)
LYMPHOCYTES NFR BLD AUTO: 24.9 % (ref 19.6–45.3)
MCH RBC QN AUTO: 29.3 PG (ref 26.6–33)
MCHC RBC AUTO-ENTMCNC: 33 G/DL (ref 31.5–35.7)
MCV RBC AUTO: 89 FL (ref 79–97)
MONOCYTES # BLD AUTO: 0.58 10*3/MM3 (ref 0.1–0.9)
MONOCYTES NFR BLD AUTO: 10.5 % (ref 5–12)
NEUTROPHILS # BLD AUTO: 3.39 10*3/MM3 (ref 1.7–7)
NEUTROPHILS NFR BLD AUTO: 61.1 % (ref 42.7–76)
NRBC BLD AUTO-RTO: 0 /100 WBC (ref 0–0.2)
PLATELET # BLD AUTO: 247 10*3/MM3 (ref 140–450)
PMV BLD AUTO: 9.8 FL (ref 6–12)
POTASSIUM BLD-SCNC: 4 MMOL/L (ref 3.5–5.2)
PROT SERPL-MCNC: 7.1 G/DL (ref 6–8.5)
RBC # BLD AUTO: 4.09 10*6/MM3 (ref 3.77–5.28)
SODIUM BLD-SCNC: 142 MMOL/L (ref 136–145)
TIBC SERPL-MCNC: 350 MCG/DL (ref 298–536)
TRANSFERRIN SERPL-MCNC: 235 MG/DL (ref 200–360)
TSH SERPL DL<=0.05 MIU/L-ACNC: 4.81 UIU/ML (ref 0.27–4.2)
WBC NRBC COR # BLD: 5.54 10*3/MM3 (ref 3.4–10.8)

## 2019-11-07 PROCEDURE — 25010000002 ENOXAPARIN PER 10 MG: Performed by: FAMILY MEDICINE

## 2019-11-07 PROCEDURE — 97535 SELF CARE MNGMENT TRAINING: CPT

## 2019-11-07 PROCEDURE — 84443 ASSAY THYROID STIM HORMONE: CPT | Performed by: FAMILY MEDICINE

## 2019-11-07 PROCEDURE — 84550 ASSAY OF BLOOD/URIC ACID: CPT | Performed by: FAMILY MEDICINE

## 2019-11-07 PROCEDURE — 85025 COMPLETE CBC W/AUTO DIFF WBC: CPT | Performed by: FAMILY MEDICINE

## 2019-11-07 PROCEDURE — 97166 OT EVAL MOD COMPLEX 45 MIN: CPT

## 2019-11-07 PROCEDURE — 97530 THERAPEUTIC ACTIVITIES: CPT

## 2019-11-07 PROCEDURE — 84466 ASSAY OF TRANSFERRIN: CPT | Performed by: FAMILY MEDICINE

## 2019-11-07 PROCEDURE — 92523 SPEECH SOUND LANG COMPREHEN: CPT | Performed by: SPEECH-LANGUAGE PATHOLOGIST

## 2019-11-07 PROCEDURE — 97116 GAIT TRAINING THERAPY: CPT

## 2019-11-07 PROCEDURE — 92610 EVALUATE SWALLOWING FUNCTION: CPT | Performed by: SPEECH-LANGUAGE PATHOLOGIST

## 2019-11-07 PROCEDURE — 97162 PT EVAL MOD COMPLEX 30 MIN: CPT

## 2019-11-07 PROCEDURE — 80053 COMPREHEN METABOLIC PANEL: CPT | Performed by: FAMILY MEDICINE

## 2019-11-07 PROCEDURE — 99232 SBSQ HOSP IP/OBS MODERATE 35: CPT | Performed by: FAMILY MEDICINE

## 2019-11-07 PROCEDURE — 83540 ASSAY OF IRON: CPT | Performed by: FAMILY MEDICINE

## 2019-11-07 RX ADMIN — ROPINIROLE HYDROCHLORIDE 0.5 MG: 0.5 TABLET, FILM COATED ORAL at 20:54

## 2019-11-07 RX ADMIN — ATORVASTATIN CALCIUM 80 MG: 40 TABLET, FILM COATED ORAL at 20:55

## 2019-11-07 RX ADMIN — CLOPIDOGREL BISULFATE 75 MG: 75 TABLET ORAL at 09:53

## 2019-11-07 RX ADMIN — DIAZEPAM 10 MG: 5 TABLET ORAL at 21:29

## 2019-11-07 RX ADMIN — GABAPENTIN 400 MG: 400 CAPSULE ORAL at 18:00

## 2019-11-07 RX ADMIN — THERA TABS 1 TABLET: TAB at 09:53

## 2019-11-07 RX ADMIN — GABAPENTIN 400 MG: 400 CAPSULE ORAL at 00:16

## 2019-11-07 RX ADMIN — GABAPENTIN 400 MG: 400 CAPSULE ORAL at 06:00

## 2019-11-07 RX ADMIN — BACLOFEN 10 MG: 10 TABLET ORAL at 20:55

## 2019-11-07 RX ADMIN — ENOXAPARIN SODIUM 40 MG: 40 INJECTION SUBCUTANEOUS at 20:55

## 2019-11-07 RX ADMIN — CARVEDILOL 25 MG: 25 TABLET, FILM COATED ORAL at 09:53

## 2019-11-07 RX ADMIN — ASPIRIN 81 MG 81 MG: 81 TABLET ORAL at 09:53

## 2019-11-07 RX ADMIN — CARVEDILOL 25 MG: 25 TABLET, FILM COATED ORAL at 18:00

## 2019-11-07 RX ADMIN — BACLOFEN 10 MG: 10 TABLET ORAL at 09:53

## 2019-11-07 RX ADMIN — GABAPENTIN 400 MG: 400 CAPSULE ORAL at 11:56

## 2019-11-07 RX ADMIN — CYCLOBENZAPRINE HYDROCHLORIDE 10 MG: 10 TABLET, FILM COATED ORAL at 21:29

## 2019-11-07 NOTE — PLAN OF CARE
Problem: Patient Care Overview  Goal: Plan of Care Review  Outcome: Ongoing (interventions implemented as appropriate)   11/07/19 1051   OTHER   Outcome Summary OT evaluation completed this date. Pt is pleasant and cooperative, states that she feels she is doing much better. Continues to be concerned about the fine motor function of her R hand; 9 hole peg test indicates R: 36 seconds and L: 31 seconds (pt is R handed). Some decreased coordination and translation demonstrated, no dropping of pegs. shower transfer completed with CGA-supervision, using RW. bathing completed with set-up. UB bathing completed with stand-by to supervision, unable to wash back. LB bathing in sitting completed with stand-by, in standing completed with CGA and use of grab bars for steadying assitance. sitting on shower chair in shower for bathing. UB dressing completed in sitting, and with set-up and stand-by assist. LB dressing in sitting completed with supervision, while in standing completed with CGA and grab bars for steadying assist. donned/doffed socks and shoes with supervision and set-up as pt leans very far forward when completing this task. grooming at sink completed with set-up assistance, and completed while sitting in wheelchair. ROM and strength are WFL. deficits noted in the areas of ADL/IADL independence, functional mobility, transfers, and fine motor coordination. following inpatient rehab stay pt should return home with self-care and assist from family as needed. possible outpatient or home health OT depending on pt progress, which OT will monitor closely.    Patient Care Overview   IRF Plan of Care Review progress ongoing, continue   Coping/Psychosocial   Plan of Care Reviewed With patient

## 2019-11-07 NOTE — THERAPY TREATMENT NOTE
Inpatient Rehabilitation - Physical Therapy Treatment Note  [facility]     Patient Name: Rama Goel  : 1945  MRN: [id number]    Today's Date: 2019                 Admit Date: 2019      Visit Dx:      ICD-10-CM ICD-9-CM   1. Dysarthria R47.1 784.51   2. Oropharyngeal dysphagia R13.12 787.22   3. Impaired physical mobility Z74.09 781.99       Patient Active Problem List   Diagnosis   • Altered mental status   • DJD (degenerative joint disease), multiple sites   • Hypertension   • Left pontine stroke (CMS/HCC)   • CVA (cerebral vascular accident) (CMS/HCC)   • Urinary hesitancy   • Muscle spasm of both lower legs   • Recurrent falls while walking   • Dysarthria       Therapy Treatment    IRF Treatment Summary     Row Name 19 1255             Evaluation/Treatment Time and Intent    Subjective Information  no complaints  -JA      Existing Precautions/Restrictions  fall  -JA      Document Type  therapy note (daily note)  -JA      Mode of Treatment  individual therapy;physical therapy  -JA      Patient/Family Observations  sitting up in w/c at [facility]. station  -JA      Start Time (Evaluation/Treatment)  1255  -JA      Stop Time (Evaluation/Treatment)  1325  -JA      Recorded by [JA] Ulysses Gill PTA      Row Name 19 1255             Cognition/Psychosocial- PT/OT    Affect/Mental Status (Cognitive)  WFL  -JA      Orientation Status (Cognition)  oriented x 4  -JA      Recorded by [JA] Ulysses Gill PTA      Row Name 19 1255             Bed Mobility Assessment/Treatment    Bed Mobility Assessment/Treatment  --  -JA      Supine-Sit [place] (Bed Mobility)  --  -JA      Recorded by [LISA] Ulysses Gill PTA      Row Name 19 1255             Transfer Assessment/Treatment    Transfer Assessment/Treatment  sit-stand transfer;stand-sit transfer;toilet transfer  -JA      Recorded by [LISA] Ulysses Gill PTA      Row Name 19 1255             Sit-Stand  Transfer    Sit-Stand [place] (Transfers)  contact guard  -JA      Assistive Device (Sit-Stand Transfers)  walker, front-wheeled  -JA      Recorded by [JA] Ulysses Gill, TOAN      Row Name 11/07/19 1255             Stand-Sit Transfer    Stand-Sit [place] (Transfers)  supervision  -JA      Assistive Device (Stand-Sit Transfers)  walker, front-wheeled  -JA      Recorded by [JA] Ulysses Gill, TOAN      Row Name 11/07/19 1255             Toilet Transfer    Type (Toilet Transfer)  stand pivot/stand step  -JA      [place] Level (Toilet Transfer)  supervision  -JA      Assistive Device (Toilet Transfer)  grab bars/safety frame;raised toilet seat  -JA      Recorded by [JA] Ulysses Gill, TOAN      Row Name 11/07/19 1255             Gait/Stairs Assessment/Training    [place] Level (Gait)  contact guard  -JA      Assistive Device (Gait)  walker, front-wheeled Provided more appropriately sized walker.   -JA      Distance in Feet (Gait)  100', 150'  -JA      Pattern (Gait)  step-through  -JA      Deviations/Abnormal Patterns (Gait)  base of support, narrow  -JA      Left Sided Gait Deviations  weight shift ability decreased  -JA      [place] Level (Stairs)  --  -JA      Handrail Location (Stairs)  --  -JA      Ascending Technique (Stairs)  --  -JA      Descending Technique (Stairs)  --  -JA      Recorded by [JA] Ulysses Gill, TOAN      Row Name 11/07/19 1253             Wheelchair Mobility/Management    Method of Wheelchair Locomotion (Mobility)  bimanual (upper extremity) propulsion;bipedal (lower extremity) propulsion  -JA      Mobility Activities (Wheelchair)  forward propulsion;turning  -JA      Forward Propulsion [place] (Wheelchair)  supervision  -JA      Turning [place] (Wheelchair)  supervision  -JA      Distance Propelled in Feet (Wheelchair)  100  -JA      Recorded by [JA] Ulysses Gill, TOAN      Row Name 11/07/19 1255             Sensory    Hearing  Status  hearing impairment, right  -JA      Sensory General Assessment  --  -JA      Recorded by [LISA] Ulysses Gill, TOAN      Row Name 11/07/19 1253             Light Touch Sensation Assessment    Left Lower Extremity: Light Touch Sensation Assessment  --  -JA      Right Lower Extremity: Light Touch Sensation Assessment  --  -JA      Recorded by [LISA] Ulysses Gill, TOAN      Row Name 11/07/19 1255             Pain Scale: Numbers Pre/Post-Treatment    Pain Scale: Numbers, Pretreatment  0/10 - no pain  -JA      Pain Scale: Numbers, Post-Treatment  0/10 - no pain  -JA      Pain Location - Side  --  -JA      Pain Location  --  -JA      Recorded by [LISA] Ulysses Gill, TOAN      Row Name 11/07/19 1255             Lower Extremity Seated Therapeutic Exercise    Performed, Seated Lower Extremity (Therapeutic Exercise)  LAQ (long arc quad), knee extension;hip flexion/extension  -JA      Exercise Type, Seated Lower Extremity (Therapeutic Exercise)  AROM (active range of motion)  -JA      Sets/Reps Detail, Seated Lower Extremity (Therapeutic Exercise)  x15  -JA      Recorded by [LISA] Ulysses Gill, TOAN      Row Name 11/07/19 1255             Vital Signs    Pre Patient Position  Sitting  -JA      Intra Patient Position  Standing  -JA      Post Patient Position  Sitting  -JA      Recorded by [LISA] Ulysses Gill, TOAN      Row Name 11/07/19 1255             Positioning and Restraints    Pre-Treatment Position  sitting in chair/recliner  -JA      Post Treatment Position  wheelchair  -JA      In Wheelchair  sitting;call light within reach;encouraged to call for assist  -JA      Recorded by [LISA] Ulysses Gill, TOAN      Row Name 11/07/19 1259             Daily Summary of Progress (PT)    Functional Goal Overall Progress: Physical Therapy  progressing toward functional goals as expected  -JA      Recorded by [LISA] Ulysses Gill PTA        User Key  (r) = Recorded By, (t) = Taken By, (c) = Cosigned  By    Initials Name Effective Dates    Ulysses Delarosa, PTA 03/07/18 -            Physical Therapy Education     Title: PT OT SLP Therapies (In Progress)     Topic: Physical Therapy (In Progress)     Point: Mobility training (Done)     Learning Progress Summary           Patient Acceptance, EBARRETT,NR by  at 11/7/2019  1:02 PM    Comment:  Mellisa assessed: 23/28 or moderate fall risk.  Recommend continued use of FWW.  Tag alarm applied.                               User Key     Initials Effective Dates Name Provider Type Discipline     04/03/18 -  Hermilo Coronel, PT Physical Therapist PT                  PT Recommendation and Plan     Plan of Care Reviewed With: patient  Daily Summary of Progress (PT)  Functional Goal Overall Progress: Physical Therapy: progressing toward functional goals as expected  IRF Plan of Care Review: progress ongoing, continue  Progress, Functional Goals: demonstrating adequate progress  Outcome Summary: Pt. transfers SBA, amb. 100' + 150' with RW CGA, perform x15 reps seated therex. Balance & gt. next treatment       PT IRF GOALS     Row Name 11/07/19 0830             Bed Mobility Goal 1 (PT-IRF)    Activity/Assistive Device (Bed Mobility Goal 1, PT-IRF)  sit to supine/supine to sit  -CZ      [place] Level (Bed Mobility Goal 1, PT-IRF)  independent  -CZ      Time Frame (Bed Mobility Goal 1, PT-IRF)  short term goal (STG);5 - 7 days  -CZ      Progress/Outcomes (Bed Mobility Goal 1, PT-IRF)  goal not met  -CZ         Transfer Goal 1 (PT-IRF)    Activity/Assistive Device (Transfer Goal 1, PT-IRF)  sit-to-stand/stand-to-sit;bed-to-chair/chair-to-bed  -CZ      [place] Level (Transfer Goal 1, PT-IRF)  conditional independence  -CZ      Time Frame (Transfer Goal 1, PT-IRF)  short term goal (STG);5 - 7 days  -CZ      Barriers (Transfer Goal 1, PT-IRF)  Impulsive at times.   -CZ      Progress/Outcomes (Transfer Goal 1, PT-IRF)  goal not met  -CZ         Gait/Walking  Locomotion Goal 1 (PT-IRF)    Activity/Assistive Device (Gait/Walking Locomotion Goal 1, PT-IRF)  walker, rolling  -CZ      Gait/Walking Locomotion Distance Goal 1 (PT-IRF)  150'x2  -CZ      [place] Level (Gait/Walking Locomotion Goal 1, PT-IRF)  conditional independence  -CZ      Time Frame (Gait/Walking Locomotion Goal 1, PT-IRF)  long term goal (LTG);by discharge  -CZ      Barriers (Gait/Walking Locomotion Goal 1, PT-IRF)  Stooped posture.   -CZ      Progress/Outcomes (Gait/Walking Locomotion Goal 1, PT-IRF)  goal not met  -CZ         Gait/Walking Locomotion Goal (PT-IRF)    Gait/Walking Locomotion Goal (PT-IRF)  Tinetti fall risk, score: 25/28  -CZ      Time Frame (Gait/Walking Locomotion Goal, PT-IRF)  long term goal (LTG);by discharge  -CZ      Progress/Outcomes (Gait/Walking Locomotion Goal, PT-IRF)  goal not met  -CZ         Stairs Goal 1 (PT-IRF)    Activity/Assistive Device (Stairs Goal 1, PT-IRF)  using handrail, right  -CZ      Number of Stairs (Stairs Goal 1, PT-IRF)  4 steps, R rail.   -CZ      [place] Level (Stairs Goal 1, PT-IRF)  conditional independence  -CZ      Time Frame (Stairs Goal 1, PT-IRF)  long term goal (LTG);by discharge  -CZ      Progress/Outcomes (Stairs Goal 1, PT-IRF)  goal not met  -CZ        User Key  (r) = Recorded By, (t) = Taken By, (c) = Cosigned By    Initials Name Provider Type    CZ Hermilo Coronel, PT Physical Therapist        Outcome Measures     Row Name 11/07/19 0830 11/06/19 1053 11/06/19 0826       How much help from another person do you currently need...    Turning from your back to your side while in flat bed without using bedrails?  3  -CZ  --  4  -CA    Moving from lying on back to sitting on the side of a flat bed without bedrails?  3  -CZ  --  4  -CA    Moving to and from a bed to a chair (including a wheelchair)?  3  -CZ  --  3  -CA    Standing up from a chair using your arms (e.g., wheelchair, bedside chair)?  3  -CZ  --  3  -CA    Climbing 3-5  "steps with a railing?  3  -CZ  --  3  -CA    To walk in hospital room?  3  -CZ  --  3  -CA    AM-[facility] 6 Clicks Score (PT)  18  -CZ  --  20  -CA       How much help from another is currently needed...    Putting on and taking off regular lower body clothing?  --  3  -ME  --    Bathing (including washing, rinsing, and drying)  --  3  -ME  --    Toileting (which includes using toilet bed pan or urinal)  --  3  -ME  --    Putting on and taking off regular upper body clothing  --  3  -ME  --    Taking care of personal grooming (such as brushing teeth)  --  3  -ME  --    Eating meals  --  4  -ME  --    AM-[facility] 6 Clicks Score (OT)  --  19  -ME  --       Tinetti Assessment    Tinetti Assessment  yes  -CZ  --  --    Sitting Balance  1  -CZ  --  --    Arises  2  -CZ  --  --    Attempts to Rise  2  -CZ  --  --    Immediate Standing Balance (first 5 sec)  2  -CZ  --  --    Standing Balance  1  -CZ  --  --    Sternal Nudge (feet close together)  2  -CZ  --  --    Eyes Closed (feet close together)  1  -CZ  --  --    Turning 360 Degrees- Steps  0  -CZ  --  --    Turning 360 Degrees- Steadiness  1  -CZ  --  --    Sitting Down  2  -CZ  --  --    Tinetti Balance Score  14  -CZ  --  --    Gait Initiation (immediate after told \"go\")  1  -CZ  --  --    Step Length- Right Swing  1  -CZ  --  --    Step Length- Left Swing  1  -CZ  --  --    Foot Clearance- Right Foot  1  -CZ  --  --    Foot Clearance- Left Foot  1  -CZ  --  --    Step Symmetry  1  -CZ  --  --    Step Continuity  1  -CZ  --  --    Path (excursion)  1  -CZ  --  --    Trunk  0  -CZ  --  --    Base of Support  1  -CZ  --  --    Gait Score  9  -CZ  --  --    Tinetti Total Score  23  -CZ  --  --    Tinetti Assistive Device  rolling walker  -CZ  --  --       Functional Assessment    Outcome Measure Options  AM-PAC 6 Clicks Basic Mobility (PT);Tinetti  -CZ  AM-[facility] 6 Clicks Daily Activity (OT)  -ME  AM-PAC 6 Clicks Basic Mobility (PT)  -CA    Row Name 11/05/19 1550 11/04/19 1600 " 11/04/19 1402       9 Hole Peg    9-Hole Peg Left  --  --  33 seconds   -ME    9-Hole Peg Right  --  --  40 seconds  pt is right handed   -ME       How much help from another person do you currently need...    Turning from your back to your side while in flat bed without using bedrails?  4  -JA  4  -GB  --    Moving from lying on back to sitting on the side of a flat bed without bedrails?  3  -JA  3  -GB  --    Moving to and from a bed to a chair (including a wheelchair)?  3  -JA  3  -GB  --    Standing up from a chair using your arms (e.g., wheelchair, bedside chair)?  3  -  3  -GB  --    Climbing 3-5 steps with a railing?  3  -JA  2  -GB  --    To walk in hospital room?  3  -  3  -GB  --    AM-[facility] 6 Clicks Score (PT)  19  -JA  18  -GB  --       How much help from another is currently needed...    Putting on and taking off regular lower body clothing?  --  --  3  -ME    Bathing (including washing, rinsing, and drying)  --  --  2  -ME    Toileting (which includes using toilet bed pan or urinal)  --  --  3  -ME    Putting on and taking off regular upper body clothing  --  --  3  -ME    Taking care of personal grooming (such as brushing teeth)  --  --  3  -ME    Eating meals  --  --  4  -ME    AM-[facility] 6 Clicks Score (OT)  --  --  18  -ME       Modified Barthel    Modified Barthel Comments  --  --  feeding 5, bathing 0, grooming 0, dressing 5, bowels 10, bladder 10, toilet use 5, transfers 5, mobility 10, stairs 5, total 45/100  -ME       Modified Nicolasa Scale    Modified [place] Scale  --  --  4 - Moderately severe disability.  Unable to walk without assistance, and unable to attend to own bodily needs without assistance.  -ME       Functional Assessment    Outcome Measure Options  AM-PAC 6 Clicks Basic Mobility (PT)  -  AM-PAC 6 Clicks Basic Mobility (PT)  -  AM-PAC 6 Clicks Daily Activity (OT);9 Hole Peg;Modified Barthel Index;Modified Callender  -ME      User Key  (r) = Recorded By, (t) = Taken By, (c) =  Cosigned By    Initials Name Provider Type    Kandy Tolentino, PT Physical Therapist    Ulysses Delarosa, PTA Physical Therapy Assistant    Blue Mccrary, PTA Physical Therapy Assistant    CZ Hermilo Coronel, PT Physical Therapist    Che Hinkle, OT Occupational Therapist             Time Calculation:     PT Charges     Row Name 11/07/19 1348 11/07/19 1308          Time Calculation    Start Time  1255  -  0830  -     Stop Time  1325  -JA  0939  -     Time Calculation (min)  30 min  -JA  69 min  -CZ     PT Received On  --  11/07/19  -     PT Goal Re-Cert Due Date  --  11/20/19  -        Time Calculation- PT    Total Timed Code Minutes- PT  30 minute(s)  -  25 minute(s)  -CZ        Timed Charges    14624 - Gait Training Minutes   10  -JA  25  -CZ     65373 - PT Therapeutic Activity Minutes  20  -JA  --       User Key  (r) = Recorded By, (t) = Taken By, (c) = Cosigned By    Initials Name Provider Type    Ulysses Delarosa, PTA Physical Therapy Assistant    CZ Hermilo Coronel, PT Physical Therapist          Therapy Charges for Today     Code Description Service Date Service Provider Modifiers Qty    25891140251 HC GAIT TRAINING EA 15 MIN 11/7/2019 Ulysses Gill, PTA GP 1    12100389595 HC PT THERAPEUTIC ACT EA 15 MIN 11/7/2019 Ulysses Gill, PTA GP 1            PT G-Codes  Outcome Measure Options: AM-PAC 6 Clicks Basic Mobility (PT), Tinetti  AM-PAC 6 Clicks Score (PT): 18  Tinetti Total Score: 23      Ulysses Gill PTA  11/7/2019

## 2019-11-07 NOTE — THERAPY EVALUATION
"Inpatient Rehabilitation - Speech Language Pathology   Swallow Initial Evaluation [facility]     Patient Name: Rama Goel  : 1945  MRN: [id number]  Today's Date: 2019               Admit Date: 2019   Pt has mild throat clear with liquid intake.  Pt educated for no straw.  Small sips using oral prep set.  Pt demonstrated.  Pt also states her speech and voice as well as her tongue are back to normal.  She scored 30/30 on cognitive testing.  Pt is completing dysarthria HEP from a few days ago.  SLP stated to continue to work on homework 1x a day.  No f/u needed at this time.       Cognitive Patterms Admission   Should Brief Interview for Mental Status (-) be Conducted?    0.No (patient is rarely/never understood) Skip to , Memory/Recall Ability     1.Yes Continue to , Repetition of Three Words    BRIEF INTERVIEW FOR MENTAL STATUS    Repetition of Three Words    Ask patient: “I am going to say three words for you to remember. Please repeat the words after I have said all three. The words are: sock, blue and bed. Now tell me the three words.”    Number of words repeated after first attempt     3.Three 2. Two  1.One  0.None    After the patient's first attempt, repeat the words using cues (\"sock, something to wear; blue, a color; bed, a piece of furniture\"). You may repeat the words up to two more times.    Temporal Orientation (orientation to year, month, and day)    Ask patient: “Please tell me what year it is right now.”    Able to report correct year  3. Correct    2.Missed by 1 year    1.Missed by 2 - 5 years 0.Missed by > 5 years or no answer    Ask patient: “What month are we in right now?”   Able to report correct month 2. Accurate within 5 days 1. Missed by 6 days to 1 month 0.Missed by > 1 month or no answer    Ask patient: “What day of the week is today?”    Able to report correct day of the week 1. Correct     0. Incorrect or no answer    Recall     Ask patient: " "“Let's go back to an earlier question. What were those three words that I asked you to repeat?” If unable to remember a word, give cue (something to wear; a color; a piece of furniture) for that word. Able to recall “sock” 2.Yes, no cue required  1.Yes, after cueing (\"something to wear\")  0.No - could not recall    Able to recall “blue” 2. Yes, no cue required 1.Yes, after cueing (\"a color\") 0.No - could not recall    Able to recall “bed” 2. Yes, no cue required1.Yes, after cueing (\"a piece of furniture\") 0.No - could not recall    BIMS TOTAL SCORE 0-15  15   Enter 99 IF Patient unable to complete the interview    Should the Staff Assessment for Mental Status () be Conducted? (Yes or No)    Staff Assessment for Mental Status    (Do not conduct if Brief Inerview for Mental Status was completed)    Memory Recall/Ability Check all that Apply:    A.Current season.    B.Location of own room.    C.Staff names and faces.    E.That he or she is in a hospital/hospital unit.    Z.None of the above were recalled.            Hearing, Speech and Vision Admission Goal  Date Date Date Date Date Discharge   Expression of Ideas and Wants (consider both verbal and non-verbal expression and excluding language barriers) 4 4         4.Expresses complex messages without difficulty and with speech that is clear and easy to understand.           3.Exhibits some difficulty with expressing needs and ideas (e.g., some words or finishing thoughts) or speech is not clear.           2.Frequently exhibits difficulty with expressing needs and ideas.           1.Rarely/Never expresses self or speech is very difficult to understand.           Understanding Verbal and Non-Verbal Content (with hearing aid or device, if used, and excluding language barriers) 4 4         4.Understands: Clear comprehension without cues or repetitions.           3.Usually Understands: Understands most conversations, but misses some part/intent of message. Requires " "cues at times to understand           2.Sometimes Understands: Understands only basic conversations or simple, direct phrases. Frequently requires cues to understand           1.Rarely/Never Understands               Visit Dx:     ICD-10-CM ICD-9-CM   1. Dysarthria R47.1 784.51   2. Oropharyngeal dysphagia R13.12 787.22     Patient Active Problem List   Diagnosis   • Altered mental status   • DJD (degenerative joint disease), multiple sites   • Hypertension   • Left pontine stroke (CMS/HCC)   • CVA (cerebral vascular accident) (CMS/HCC)   • Urinary hesitancy   • Muscle spasm of both lower legs   • Recurrent falls while walking   • Dysarthria     Past Medical History:   Diagnosis Date   • Arrhythmia     intermittent   • CVA (cerebral vascular accident) (CMS/HCC) 2018    also had one last week 10/2019 in [facility]   • DJD (degenerative joint disease), multiple sites     thoracic and lumbar spine   • Hypertension    • Lupus (CMS/HCC)     drug induced   • Stroke (CMS/HCC)      Past Surgical History:   Procedure Laterality Date   • ANKLE SURGERY Right     \"bone replacement with coral\"   • APPENDECTOMY     •  SECTION     • CHOLECYSTECTOMY     • PARATHYROIDECTOMY     • ROTATOR CUFF REPAIR Left    • SPINAL FUSION  2018    T11-S1   • TYMPANOPLASTY          SWALLOW EVALUATION (last 72 hours)      SLP Adult Swallow Evaluation     Row Name 19 1000 19 1315                Rehab Evaluation    Document Type  evaluation  -EC  evaluation  -EK       Subjective Information  no complaints  -EC  no complaints  -EK       Patient Observations  alert;cooperative;agree to therapy  -EC  alert;agree to therapy;cooperative  -EK       Patient/Family Observations  sitting in wc in room  -EC  MBS completed in radiology.   -EK       Patient Effort  excellent  -EC  good  -EK          General Information    Patient Profile Reviewed  yes  -EC  --       Pertinent History Of Current Problem  new admission to ARU  -EC  --       " Current Method of Nutrition  soft textures;ground  -EC  --          Pain Scale: Numbers Pre/Post-Treatment    Pain Scale: Numbers, Pretreatment  0/10 - no pain  -EC  --       Pain Scale: Numbers, Post-Treatment  0/10 - no pain  -EC  --       Pain Location - Side  --  Right  -EK          General Eating/Swallowing Observations    Respiratory Support Currently in Use  room air  -EC  --       Eating/Swallowing Skills  self-fed  -EC  --       Positioning During Eating  upright in chair  -EC  --       Utensils Used  straw;cup;spoon  -EC  --          MBS/VFSS    Utensils Used  --  spoon;cup  -EK       Consistencies Trialed  --  thin liquids;pureed;soft textures;regular textures  -EK          MBS/VFSS Interpretation    Oral Prep Phase  --  WFL  -EK       Oral Transit Phase  --  WFL  -EK       Oral Residue  --  WFL  -EK          Initiation of Pharyngeal Swallow    Initiation of Pharyngeal Swallow  --  bolus in pyriform sinuses  -EK       Pharyngeal Phase  --  impaired pharyngeal phase of swallowing  -EK       Penetration During the Swallow  --  thin liquids  -EK       Aspiration During the Swallow  --  nectar-thick liquids;thin liquids  -EK       Response to Penetration  --  shallow;deep  -EK       Response to Aspiration  --  other (see comments) silent penetration  -EK       Attempted Compensatory Maneuvers  --  chin tuck  -EK       Response to Attempted Compensatory Maneuvers  --  other (see comments) reduced penetration  -EK       Pharyngeal Phase, Comment  --  All trials of thin and NTL resulted to premature spillage, delayed swallow reflex to the level of the pyriform sinus. Pt with deep laryngeal penetration on the thin by spoon; deep penetration on the NTL by cup. Pt displayed decrease bolus control. SLP recommends mech soft and thin liquids by spoon with chin tuck. SLP educated on less than a full tsp and chin tuck. SLP will also address and educate pt on oral prep set and ensure safety of chin tuck.   -EK           SLP Communication to Radiology    Severity Level of Dysphagia  --  mild dysphagia  -EK       Consistencies Aspirated/Penetrated  --  penetrated;thin liquids;nectar-thick liquids  -EK       Summary Statement  --  Pt with silent laryngeal penetration, the depth the penetration is contigent on the bolus size. Least restrictive diet recommendation is mech soft and thin liquids by spoon with chin tuck.   -EK          Clinical Impression    SLP Swallowing Diagnosis  --  mild;pharyngeal dysfunction  -EK       Functional Impact  --  risk of aspiration/pneumonia  -EK       Rehab Potential/Prognosis, Swallowing  --  good, to achieve stated therapy goals  -EK          Recommendations    Therapy Frequency (Swallow)  evaluation only  -EC  5 days per week  -EK       Predicted Duration Therapy Intervention (Days)  --  until discharge  -EK       SLP Diet Recommendation  --  soft textures;ground;thin liquids  -EK       Recommended Precautions and Strategies  --  upright posture during/after eating;small bites of food and sips of liquid;alternate between small bites of food and sips of liquid  -EK       SLP Rec. for Method of Medication Administration  --  meds whole;with pudding or applesauce  -EK       Monitor for Signs of Aspiration  --  yes  -EK          Swallow Goals (SLP)    Oral Nutrition/Hydration Goal Selection (SLP)  --  oral nutrition/hydration, SLP goal 1  -EK       Swallow Compensatory Strategies Goal Selection (SLP)  --  swallow compensatory strategies, SLP goal 1  -EK          Oral Nutrition/Hydration Goal 1 (SLP)    Oral Nutrition/Hydration Goal 1, SLP  --  Pt to tolerate least restrictive diet with no s/s of aspiration for adequate nutirtion and hydration.   -EK       Time Frame (Oral Nutrition/Hydration Goal 1, SLP)  --  by discharge  -EK       Progress/Outcomes (Oral Nutrition/Hydration Goal 1, SLP)  --  other (see comments) new goal  -EK          Swallow Compensatory Strategies Goal 1 (SLP)    Activity (Swallow  Compensatory Strategies/Techniques Goal 1, SLP)  --  compensatory strategies;small bites;chin tuck posture;alternate food/liquid intake;postural techniques;during p.o. trials;liquids by teaspoon only  -EK       [place]/Accuracy (Swallow Compensatory Strategies/Techniques Goal 1, SLP)  --  independently (over 90% accuracy)  -EK       Progress/Outcomes (Swallow Compensatory Strategies/Techniques Goal 1, SLP)  --  other (see comments) new goal  -EK         User Key  (r) = Recorded By, (t) = Taken By, (c) = Cosigned By    Initials Name Effective Dates    EC Megan Vera CCC-SLP 07/24/19 -     EK Megan Garcia CCC-SLP 07/24/19 -           EDUCATION  The patient has been educated in the following areas:   Dysphagia (Swallowing Impairment) Modified Diet Instruction.    SLP Recommendation and Plan                          Anticipated Dischage Disposition: home     Therapy Frequency (Swallow): evaluation only          Plan of Care Reviewed With: patient    SLP GOALS     Row Name 11/06/19 0859 11/05/19 1435 11/04/19 1315       Oral Nutrition/Hydration Goal 1 (SLP)    Oral Nutrition/Hydration Goal 1, SLP  Pt to tolerate least restrictive diet with no s/s of aspiration for adequate nutirtion and hydration.   -CK  Pt to tolerate least restrictive diet with no s/s of aspiration for adequate nutirtion and hydration.   -CK  Pt to tolerate least restrictive diet with no s/s of aspiration for adequate nutirtion and hydration.   -EK    Time Frame (Oral Nutrition/Hydration Goal 1, SLP)  by discharge  -CK  by discharge  -CK  by discharge  -EK    Barriers (Oral Nutrition/Hydration Goal 1, SLP)  compliance w/swallowing strategies  -CK  compliance w/swallowing strategies  -CK  --    Progress/Outcomes (Oral Nutrition/Hydration Goal 1, SLP)  goal ongoing  -CK  goal ongoing  -CK  other (see comments) new goal  -EK       Swallow Compensatory Strategies Goal 1 (SLP)    Activity (Swallow Compensatory  Strategies/Techniques Goal 1, SLP)  compensatory strategies;small bites;chin tuck posture;alternate food/liquid intake;postural techniques;during p.o. trials;liquids by teaspoon only  -CK  compensatory strategies;small bites;chin tuck posture;alternate food/liquid intake;postural techniques;during p.o. trials;liquids by teaspoon only  -CK  compensatory strategies;small bites;chin tuck posture;alternate food/liquid intake;postural techniques;during p.o. trials;liquids by teaspoon only  -EK    [place]/Accuracy (Swallow Compensatory Strategies/Techniques Goal 1, SLP)  independently (over 90% accuracy)  -CK  independently (over 90% accuracy)  -CK  independently (over 90% accuracy)  -EK    Time Frame (Swallow Compensatory Strategies/Techniques Goal 1, SLP)  by discharge  -CK  by discharge  -CK  --    Barriers (Swallow Compensatory Strategies/Techniques Goal 1, SLP)  compliance  -CK  compliance  -CK  --    Progress/Outcomes (Swallow Compensatory Strategies/Techniques Goal 1, SLP)  goal ongoing  -CK  goal ongoing  -CK  other (see comments) new goal  -EK       Articulation Goal 1 (SLP)    Improve Articulation Goal 1 (SLP)  by over-articulating at phrase level;by over-articulating in connected speech;90%  -CK  by over-articulating at phrase level;by over-articulating in connected speech;90%  -CK  --    Time Frame (Articulation Goal 1, SLP)  by discharge  -CK  by discharge  -CK  --    Barriers (Articulation Goal 1, SLP)  right sided weakness  -CK  right sided weakness  -CK  --    Progress (Articulation Goal 1, SLP)  --  60%  -CK  --    Progress/Outcomes (Articulation Goal 1, SLP)  goal ongoing  -CK  goal ongoing  -CK  --      User Key  (r) = Recorded By, (t) = Taken By, (c) = Cosigned By    Initials Name Provider Type    Megan Wells CCC-SLP Speech and Language Pathologist    Romina Griffin MS CCC-SLP Speech and Language Pathologist             Time Calculation:   Time Calculation- SLP     Sandra  "Name 19 1048             Time Calculation- SLP    SLP Start Time  1000  -EC      SLP Stop Time  1040  -EC      SLP Time Calculation (min)  40 min  -EC      Total Timed Code Minutes- SLP  40 minute(s)  -EC      SLP Received On  19  -EC        User Key  (r) = Recorded By, (t) = Taken By, (c) = Cosigned By    Initials Name Provider Type     Megan Vera CCC-SLP Speech and Language Pathologist          Therapy Charges for Today     Code Description Service Date Service Provider Modifiers Qty    12191268915 HC ST EVAL ORAL PHARYNG SWALLOW 1 2019 Megan Vera CCC-SLP GN 1    34142357252 HC ST EVAL SPEECH AND PROD W LANG  2 2019 Megan Vera CCC-SLP GN 1               ELIAS Wren  2019 and Inpatient Rehabilitation - Speech Language Pathology Initial Evaluation    [facility]     Patient Name: Rama Goel  : 1945  MRN: [id number]    Today's Date: 2019                   Admit Date: 2019       Visit Dx:      ICD-10-CM ICD-9-CM   1. Dysarthria R47.1 784.51   2. Oropharyngeal dysphagia R13.12 787.22       Patient Active Problem List   Diagnosis   • Altered mental status   • DJD (degenerative joint disease), multiple sites   • Hypertension   • Left pontine stroke (CMS/HCC)   • CVA (cerebral vascular accident) (CMS/HCC)   • Urinary hesitancy   • Muscle spasm of both lower legs   • Recurrent falls while walking   • Dysarthria       Past Medical History:   Diagnosis Date   • Arrhythmia     intermittent   • CVA (cerebral vascular accident) (CMS/HCC) 2018    also had one last week 10/2019 in [facility]   • DJD (degenerative joint disease), multiple sites     thoracic and lumbar spine   • Hypertension    • Lupus (CMS/HCC)     drug induced   • Stroke (CMS/HCC)        Past Surgical History:   Procedure Laterality Date   • ANKLE SURGERY Right     \"bone replacement with coral\"   • APPENDECTOMY     •  SECTION     • CHOLECYSTECTOMY     • " PARATHYROIDECTOMY     • ROTATOR CUFF REPAIR Left    • SPINAL FUSION  05/18/2018    T11-S1   • TYMPANOPLASTY            SLP EVALUATION (last 72 hours)      SLP SLC Evaluation     Row Name 11/07/19 1000                   Oral Motor Structure and Function    Oral Motor Structure and Function  WFL  -EC        Dentition Assessment  natural, present and adequate  -EC           Motor Speech Assessment/Intervention    Motor Speech, Comment  pt feels like tongue and speech have returned to [place]  -EC           Standardized Tests    Cognitive/Memory Tests  MOCA: [place] Cognitive Assessment  -EC           MOCA: [place] Cognitive Assessment    MOCA Total Score  30  -EC        MOCA Total Score Indicative Of:  Normal Cognitive Function  -EC           Recommendations    Therapy Frequency (SLP SLC)  evaluation only  -EC          User Key  (r) = Recorded By, (t) = Taken By, (c) = Cosigned By    Initials Name Effective Dates    Megan Frausto CCC-SLP 07/24/19 -              EDUCATION    The patient has been educated in the following areas:       Dysphagia (Swallowing Impairment).      SLP Recommendation and Plan                  Anticipated Dischage Disposition: home     Therapy Frequency (Swallow): evaluation only     Plan of Care Review  Plan of Care Reviewed With: patient        SLP GOALS     Row Name 11/06/19 0859 11/05/19 1435 11/04/19 1315       Oral Nutrition/Hydration Goal 1 (SLP)    Oral Nutrition/Hydration Goal 1, SLP  Pt to tolerate least restrictive diet with no s/s of aspiration for adequate nutirtion and hydration.   -CK  Pt to tolerate least restrictive diet with no s/s of aspiration for adequate nutirtion and hydration.   -CK  Pt to tolerate least restrictive diet with no s/s of aspiration for adequate nutirtion and hydration.   -EK    Time Frame (Oral Nutrition/Hydration Goal 1, SLP)  by discharge  -CK  by discharge  -CK  by discharge  -EK    Barriers (Oral Nutrition/Hydration Goal 1, SLP)   compliance w/swallowing strategies  -CK  compliance w/swallowing strategies  -CK  --    Progress/Outcomes (Oral Nutrition/Hydration Goal 1, SLP)  goal ongoing  -CK  goal ongoing  -CK  other (see comments) new goal  -EK       Swallow Compensatory Strategies Goal 1 (SLP)    Activity (Swallow Compensatory Strategies/Techniques Goal 1, SLP)  compensatory strategies;small bites;chin tuck posture;alternate food/liquid intake;postural techniques;during p.o. trials;liquids by teaspoon only  -CK  compensatory strategies;small bites;chin tuck posture;alternate food/liquid intake;postural techniques;during p.o. trials;liquids by teaspoon only  -CK  compensatory strategies;small bites;chin tuck posture;alternate food/liquid intake;postural techniques;during p.o. trials;liquids by teaspoon only  -EK    [place]/Accuracy (Swallow Compensatory Strategies/Techniques Goal 1, SLP)  independently (over 90% accuracy)  -CK  independently (over 90% accuracy)  -CK  independently (over 90% accuracy)  -EK    Time Frame (Swallow Compensatory Strategies/Techniques Goal 1, SLP)  by discharge  -CK  by discharge  -CK  --    Barriers (Swallow Compensatory Strategies/Techniques Goal 1, SLP)  compliance  -CK  compliance  -CK  --    Progress/Outcomes (Swallow Compensatory Strategies/Techniques Goal 1, SLP)  goal ongoing  -CK  goal ongoing  -CK  other (see comments) new goal  -EK       Articulation Goal 1 (SLP)    Improve Articulation Goal 1 (SLP)  by over-articulating at phrase level;by over-articulating in connected speech;90%  -CK  by over-articulating at phrase level;by over-articulating in connected speech;90%  -CK  --    Time Frame (Articulation Goal 1, SLP)  by discharge  -CK  by discharge  -CK  --    Barriers (Articulation Goal 1, SLP)  right sided weakness  -CK  right sided weakness  -CK  --    Progress (Articulation Goal 1, SLP)  --  60%  -CK  --    Progress/Outcomes (Articulation Goal 1, SLP)  goal ongoing  -CK  goal ongoing  -CK  --       User Key  (r) = Recorded By, (t) = Taken By, (c) = Cosigned By    Initials Name Provider Type    Megan Wells CCC-SLP Speech and Language Pathologist    Romina Griffin, MS CCC-SLP Speech and Language Pathologist                      Time Calculation:       Time Calculation- SLP     Row Name 11/07/19 1048             Time Calculation- SLP    SLP Start Time  1000  -EC      SLP Stop Time  1040  -EC      SLP Time Calculation (min)  40 min  -EC      Total Timed Code Minutes- SLP  40 minute(s)  -EC      SLP Received On  11/07/19  -EC        User Key  (r) = Recorded By, (t) = Taken By, (c) = Cosigned By    Initials Name Provider Type    EC Megan Vera CCC-SLP Speech and Language Pathologist            Therapy Charges for Today     Code Description Service Date Service Provider Modifiers Qty    39690156830 HC ST EVAL ORAL PHARYNG SWALLOW 1 11/7/2019 Megan Vera CCC-SLP GN 1    [id number] HC ST EVAL SPEECH AND PROD W LANG  2 11/7/2019 Megan Vera CCC-SLP GN 1                           ELIAS Wren  11/7/2019

## 2019-11-07 NOTE — PLAN OF CARE
Problem: Patient Care Overview  Goal: Plan of Care Review  Outcome: Ongoing (interventions implemented as appropriate)    Goal: Individualization and Mutuality  Outcome: Ongoing (interventions implemented as appropriate)    Goal: Discharge Needs Assessment  Outcome: Ongoing (interventions implemented as appropriate)    Goal: Home Safety Plan  Outcome: Ongoing (interventions implemented as appropriate)    Goal: Coping Plan  Outcome: Ongoing (interventions implemented as appropriate)    Goal: Community Reintegration Plan  Outcome: Ongoing (interventions implemented as appropriate)      Problem: Stroke (IRF) (Adult)  Goal: Promote Optimal Functional [place]  Outcome: Ongoing (interventions implemented as appropriate)      Problem: Skin Injury Risk (Adult)  Goal: Identify Related Risk Factors and Signs and Symptoms  Outcome: Ongoing (interventions implemented as appropriate)    Goal: Skin Health and Integrity  Outcome: Ongoing (interventions implemented as appropriate)      Problem: Fall Risk (Adult)  Goal: Identify Related Risk Factors and Signs and Symptoms  Outcome: Ongoing (interventions implemented as appropriate)    Goal: Absence of Fall  Outcome: Ongoing (interventions implemented as appropriate)      Problem: Pain, Chronic (Adult)  Goal: Identify Related Risk Factors and Signs and Symptoms  Outcome: Ongoing (interventions implemented as appropriate)   11/07/19 0404   Pain, Chronic (Adult)   Related Risk Factors (Chronic Pain) prolonged healing   Signs and Symptoms (Chronic Pain) (leg cramps)

## 2019-11-07 NOTE — CONSULTS
Adult Nutrition  Assessment    Patient Name:  Rama Goel  YOB: 1945  MRN: [id number]  Admit Date:  11/6/2019    Assessment Date:  11/7/2019    Comments:  Pt admitted to ARU to improve function and mobility S/P Stroke.  Pt reports fair appetite.  Reports usual wt of 145-155 lb.  Food preferences received.  Pt is being followed by SLP due to problems swallowing post stroke.  Regular diet ordered this date.  Intake 100% - 1x, 75% - 2x, 25% - 1x.  Meds and labs reviewed.          Reason for Assessment     Row Name 11/07/19 1624          Reason for Assessment    Reason For Assessment  per organizational policy ARU Admit             Labs/Tests/Procedures/Meds     Row Name 11/07/19 1625          Labs/Procedures/Meds    Lab Results Reviewed  reviewed        Medications    Pertinent Medications Reviewed  reviewed           Estimated/Assessed Needs     Row Name 11/07/19 1627          Calculation Measurements    Weight Used For Calculations  55.1 kg (121 lb 7.6 oz)        Estimated/Assessed Needs    Additional Documentation  Calorie Requirements (Group);Fluid Requirements (Group);Protein Requirements (Group);Hometown-St. Jeor Equation (Group)        Calorie Requirements    Estimated Calorie Requirement (kcal/day)  1325     Estimated Calorie Need Method  Hometown-St Jeor        Hometown-St. Jeor Equation    RMR (Hometown-St. Jeor Equation)  1019.25        Protein Requirements    Weight Used For Protein Calculations  55.1 kg (121 lb 7.6 oz)     Est Protein Requirement Amount (gms/kg)  1.2 gm protein     Estimated Protein Requirements (gms/day)  66.12        Fluid Requirements    Estimated Fluid Requirements (mL/day)  1377     RDA Method (mL)  1377     Ajay-Segar Method (over 20 kg)  2602         Nutrition Prescription Ordered     Row Name 11/07/19 1629          Nutrition Prescription PO    Current PO Diet  Soft Texture     Texture  Ground         Evaluation of Received Nutrient/Fluid Intake     Row Name  11/07/19 1630          PO Evaluation    Number of Meals  4     % PO Intake  100% - 1x, 75% - 2x, 25% - 1x               Electronically signed by:  Kimberly Ashley, KAYLEE  11/07/19 4:31 PM

## 2019-11-07 NOTE — PLAN OF CARE
Problem: Patient Care Overview  Goal: Plan of Care Review  Outcome: Ongoing (interventions implemented as appropriate)  Encourage intake.  Honor food preferences.   11/07/19 0983   OTHER   Outcome Summary initial assessment

## 2019-11-07 NOTE — THERAPY EVALUATION
"Inpatient Rehabilitation - Physical Therapy Initial Evaluation       [facility]     Patient Name: Rama Goel  : 1945  MRN: [id number]    Today's Date: 2019                    Admit Date: 2019      Visit Dx:     ICD-10-CM ICD-9-CM   1. Dysarthria R47.1 784.51   2. Oropharyngeal dysphagia R13.12 787.22   3. Impaired physical mobility Z74.09 781.99       Patient Active Problem List   Diagnosis   • Altered mental status   • DJD (degenerative joint disease), multiple sites   • Hypertension   • Left pontine stroke (CMS/HCC)   • CVA (cerebral vascular accident) (CMS/HCC)   • Urinary hesitancy   • Muscle spasm of both lower legs   • Recurrent falls while walking   • Dysarthria       Past Medical History:   Diagnosis Date   • Arrhythmia     intermittent   • CVA (cerebral vascular accident) (CMS/HCC) 2018    also had one last week 10/2019 in [facility]   • DJD (degenerative joint disease), multiple sites     thoracic and lumbar spine   • Hypertension    • Lupus (CMS/HCC)     drug induced   • Stroke (CMS/HCC)        Past Surgical History:   Procedure Laterality Date   • ANKLE SURGERY Right     \"bone replacement with coral\"   • APPENDECTOMY     •  SECTION     • CHOLECYSTECTOMY     • PARATHYROIDECTOMY     • ROTATOR CUFF REPAIR Left    • SPINAL FUSION  2018    T11-S1   • TYMPANOPLASTY            PT ASSESSMENT (last 12 hours)      IRF Physical Therapy Evaluation     Row Name 19 1000 19       Evaluation/Treatment Time and Intent    Subjective Information  --  no complaints  -CZ    Existing Precautions/Restrictions  --  fall  -CZ    Patient/Family Observations  --  Sitting in w/c, RA, anxious to get to bathroom.   -CZ    Start Time (Evaluation/Treatment)  --  -CZ  0830  -CZ    Stop Time (Evaluation/Treatment)  --  -CZ  0939  -CZ    Row Name 19 0830          Relationship/Environment    Lives With  alone  -CZ     Concerns About Impact on Relationships  Friend will be " staying with patient initially upon discharge home.   -CZ     Row Name 11/07/19 0830          Resource/Environmental Concerns    Current Living Arrangements  home/apartment/condo  -     Row Name 11/07/19 0830          Living Environment    Living Arrangements  house  -CZ     Home Accessibility  stairs to enter home;stairs within home  -CZ     Row Name 11/07/19 0830          Home Main Entrance    Number of Stairs, Main Entrance  four  -CZ     Stair Railings, Main Entrance  railing on right side (ascending)  -     Row Name 11/07/19 0830          Stairs Within Home, Primary    Number of Stairs, Within Home, Primary  one  -CZ     Stair Railings, Within Home, Primary  none  -CZ     Stairs Comment, Within Home, Primary  One step to kennels, no railing.   -     Row Name 11/07/19 0830          Cognition/Psychosocial- PT/OT    Affect/Mental Status (Cognitive)  WFL  -     Orientation Status (Cognition)  oriented x 4  -CZ     Row Name 11/07/19 0830          Mobility    Additional Documentation  Wheelchair Mobility/Management (Group)  -     Row Name 11/07/19 0830          Bed Mobility Assessment/Treatment    Bed Mobility Assessment/Treatment  supine-sit;sit-supine  -CZ     Supine-Sit Portia (Bed Mobility)  supervision  -     Row Name 11/07/19 0830          Transfer Assessment/Treatment    Transfer Assessment/Treatment  sit-stand transfer;stand-sit transfer;toilet transfer  -     Row Name 11/07/19 0830          Toilet Transfer    Type (Toilet Transfer)  sit-stand  -     Portia Level (Toilet Transfer)  supervision  -     Row Name 11/07/19 0830          Gait/Stairs Assessment/Training    Portia Level (Gait)  contact guard  -CZ     Assistive Device (Gait)  walker, front-wheeled Provided more appropriately sized walker.   -CZ     Distance in Feet (Gait)  10'x2, 50'x1, 150'x1  -CZ     Portia Level (Stairs)  contact guard  -CZ     Handrail Location (Stairs)  right side (ascending)  -CZ      Number of Steps (Stairs)  4 steps  -CZ     Ascending Technique (Stairs)  step-over-step  -CZ     Descending Technique (Stairs)  step-to-step  -CZ     Comment (Gait/Stairs)  Stooped posture, cues to stay close to walker.   -     Row Name 11/07/19 0830          Wheelchair Mobility/Management    Method of Wheelchair Locomotion (Mobility)  bimanual (upper extremity) propulsion;bipedal (lower extremity) propulsion  -CZ     Mobility Activities (Wheelchair)  forward propulsion;turning  -CZ     Forward Propulsion McLeod (Wheelchair)  supervision  -CZ     Turning McLeod (Wheelchair)  supervision  -CZ     Distance Propelled in Feet (Wheelchair)  50'x 1, 150'x1  -CZ     Comment, Parts Management (Wheelchair)  Provided w/c cushion.   -     Row Name 11/07/19 0830          General ROM    GENERAL ROM COMMENTS  RLE: WFL, LLE: -10 degrees knee extension (arthritis).   -     Row Name 11/07/19 0830          MMT (Manual Muscle Testing)    General MMT Comments  BLEs 4/5 grossly except L hip is 4-/5  -     Row Name 11/07/19 0830          Sensory    Hearing Status  hearing impairment, right  -CZ     Sensory General Assessment  light touch sensation deficits identified  -     Row Name 11/07/19 0830          Light Touch Sensation Assessment    Left Lower Extremity: Light Touch Sensation Assessment  intact  -CZ     Right Lower Extremity: Light Touch Sensation Assessment  mild impairment, 75% or more correct responses  -CZ     Comment, Right Lower Extremity: Light Touch Sensation Assessment  Secondary to surgery, not CVA.   -     Row Name 11/07/19 0830          Pain Assessment    Additional Documentation  Pain Scale: Numbers Pre/Post-Treatment (Group)  -     Row Name 11/07/19 0830          Pain Scale: Numbers Pre/Post-Treatment    Pain Scale: Numbers, Pretreatment  3/10  -CZ     Pain Scale: Numbers, Post-Treatment  0/10 - no pain  -CZ     Pain Location - Side  Right  -CZ     Pain Location  foot  -CZ     Pre/Post  Treatment Pain Comment  Medial foot, just above arch, pain with weightbearing.   -CZ     Row Name 11/07/19 0830          PT Clinical Impression    Patient's Goals For Discharge  return home;take care of myself at home  -CZ     Rehab Potential/Prognosis (PT Eval)  good, to achieve stated therapy goals  -CZ     Frequency of Treatment (PT Eval)  5 times per week  -CZ     Estimated Length of Stay, Weeks (PT Eval)  2 weeks  -CZ     Expected Discharge Disposition (PT Eval)  home or self care  -CZ     Row Name 11/07/19 0830          Vital Signs    Pre Systolic BP Rehab  151  -CZ     Pre Treatment Diastolic BP  76  -CZ     Post Systolic BP Rehab  135  -CZ     Post Treatment Diastolic BP  73  -CZ     Pretreatment Heart Rate (beats/min)  85  -CZ     Posttreatment Heart Rate (beats/min)  77  -CZ     Pre SpO2 (%)  97  -CZ     O2 Delivery Pre Treatment  room air  -CZ     Post SpO2 (%)  97  -CZ     O2 Delivery Post Treatment  room air  -CZ     Pre Patient Position  Sitting  -CZ     Post Patient Position  Sitting  -CZ     Row Name 11/07/19 0830          IRF PT Goals    Bed Mobility Goal Selection (PT-IRF)  bed mobility, PT goal 1  -CZ     Transfer Goal Selection (PT-IRF)  transfers, PT goal 1  -CZ     Gait (Walking Locomotion) Goal Selection (PT-IRF)  gait, PT goal 1;gait, PT goal (free text)  -CZ     Stairs Goal Selection (PT-IRF)  stairs, PT goal 1  -CZ     Row Name 11/07/19 0830          Bed Mobility Goal 1 (PT-IRF)    Activity/Assistive Device (Bed Mobility Goal 1, PT-IRF)  sit to supine/supine to sit  -CZ     [place] Level (Bed Mobility Goal 1, PT-IRF)  independent  -CZ     Time Frame (Bed Mobility Goal 1, PT-IRF)  short term goal (STG);5 - 7 days  -CZ     Progress/Outcomes (Bed Mobility Goal 1, PT-IRF)  goal not met  -CZ     Row Name 11/07/19 0830          Transfer Goal 1 (PT-IRF)    Activity/Assistive Device (Transfer Goal 1, PT-IRF)  sit-to-stand/stand-to-sit;bed-to-chair/chair-to-bed  -CZ     [place] Level  (Transfer Goal 1, PT-IRF)  conditional independence  -CZ     Time Frame (Transfer Goal 1, PT-IRF)  short term goal (STG);5 - 7 days  -CZ     Barriers (Transfer Goal 1, PT-IRF)  Impulsive at times.   -CZ     Progress/Outcomes (Transfer Goal 1, PT-IRF)  goal not met  -CZ     Row Name 11/07/19 0830          Gait/Walking Locomotion Goal 1 (PT-IRF)    Activity/Assistive Device (Gait/Walking Locomotion Goal 1, PT-IRF)  walker, rolling  -CZ     Gait/Walking Locomotion Distance Goal 1 (PT-IRF)  150'x2  -CZ     [place] Level (Gait/Walking Locomotion Goal 1, PT-IRF)  conditional independence  -CZ     Time Frame (Gait/Walking Locomotion Goal 1, PT-IRF)  long term goal (LTG);by discharge  -CZ     Barriers (Gait/Walking Locomotion Goal 1, PT-IRF)  Stooped posture.   -CZ     Progress/Outcomes (Gait/Walking Locomotion Goal 1, PT-IRF)  goal not met  -CZ     Row Name 11/07/19 0830          Gait/Walking Locomotion Goal (PT-IRF)    Gait/Walking Locomotion Goal (PT-IRF)  Tinetti fall risk, score: 25/28  -CZ     Time Frame (Gait/Walking Locomotion Goal, PT-IRF)  long term goal (LTG);by discharge  -CZ     Progress/Outcomes (Gait/Walking Locomotion Goal, PT-IRF)  goal not met  -CZ     Row Name 11/07/19 0830          Stairs Goal 1 (PT-IRF)    Activity/Assistive Device (Stairs Goal 1, PT-IRF)  using handrail, right  -CZ     Number of Stairs (Stairs Goal 1, PT-IRF)  4 steps, R rail.   -CZ     [place] Level (Stairs Goal 1, PT-IRF)  conditional independence  -CZ     Time Frame (Stairs Goal 1, PT-IRF)  long term goal (LTG);by discharge  -CZ     Progress/Outcomes (Stairs Goal 1, PT-IRF)  goal not met  -CZ     Row Name 11/07/19 0830          Positioning and Restraints    Pre-Treatment Position  sitting in chair/recliner  -CZ     Post Treatment Position  wheelchair  -CZ     In Wheelchair  sitting;call light within reach;encouraged to call for assist;exit alarm on  -CZ       User Key  (r) = Recorded By, (t) = Taken By, (c) = Cosigned By     Initials Name Provider Type     Hermilo Coronel, PT Physical Therapist          Physical Therapy Education     Title: PT OT SLP Therapies (In Progress)     Topic: Physical Therapy (In Progress)     Point: Mobility training (Done)     Learning Progress Summary           Patient Acceptance, E, BARRETT,NR by ALEISHA at 11/7/2019  1:02 PM    Comment:  Mellisa assessed: 23/28 or moderate fall risk.  Recommend continued use of FWW.  Tag alarm applied.                               User Key     Initials Effective Dates Name Provider Type Discipline     04/03/18 -  Hermilo Coronel, PT Physical Therapist PT                PT Recommendation and Plan    Planned Therapy Interventions (PT Eval): bed mobility training, balance training, gait training, patient/family education, postural re-education, stair training, strengthening, stretching, transfer training  Frequency of Treatment (PT Eval): 5 times per week  Plan of Care Reviewed With: patient  Outcome Summary: Initial PT evaluation complete.  Patient is alert and cooperative.  She requires SPV with bed mobility and transfers, CGA with gait, ambulating 10'x2, 50'x1 (with 2 turns) and 150'x1 with FWW, cues for upright posture.  Patient ascends/descends 4 steps with R railing, CGA.  And she propels w/c 50'x1 and 150'x1 with BUEs and LEs, SPV.  Mellisa assessed: 23/28 or moderate fall risk; recommend continued use of FWW, patient can be quite impulsive. Goals established, continue skilled PT.       Outcome Measures     Row Name 11/07/19 0830 11/06/19 1053 11/06/19 0826       How much help from another person do you currently need...    Turning from your back to your side while in flat bed without using bedrails?  3  -CZ  --  4  -CA    Moving from lying on back to sitting on the side of a flat bed without bedrails?  3  -CZ  --  4  -CA    Moving to and from a bed to a chair (including a wheelchair)?  3  -CZ  --  3  -CA    Standing up from a chair using your arms (e.g., wheelchair,  "bedside chair)?  3  -CZ  --  3  -CA    Climbing 3-5 steps with a railing?  3  -CZ  --  3  -CA    To walk in hospital room?  3  -CZ  --  3  -CA    AM-[facility] 6 Clicks Score (PT)  18  -CZ  --  20  -CA       How much help from another is currently needed...    Putting on and taking off regular lower body clothing?  --  3  -ME  --    Bathing (including washing, rinsing, and drying)  --  3  -ME  --    Toileting (which includes using toilet bed pan or urinal)  --  3  -ME  --    Putting on and taking off regular upper body clothing  --  3  -ME  --    Taking care of personal grooming (such as brushing teeth)  --  3  -ME  --    Eating meals  --  4  -ME  --    AM-[facility] 6 Clicks Score (OT)  --  19  -ME  --       Tinetti Assessment    Tinetti Assessment  yes  -CZ  --  --    Sitting Balance  1  -CZ  --  --    Arises  2  -CZ  --  --    Attempts to Rise  2  -CZ  --  --    Immediate Standing Balance (first 5 sec)  2  -CZ  --  --    Standing Balance  1  -CZ  --  --    Sternal Nudge (feet close together)  2  -CZ  --  --    Eyes Closed (feet close together)  1  -CZ  --  --    Turning 360 Degrees- Steps  0  -CZ  --  --    Turning 360 Degrees- Steadiness  1  -CZ  --  --    Sitting Down  2  -CZ  --  --    Tinetti Balance Score  14  -CZ  --  --    Gait Initiation (immediate after told \"go\")  1  -CZ  --  --    Step Length- Right Swing  1  -CZ  --  --    Step Length- Left Swing  1  -CZ  --  --    Foot Clearance- Right Foot  1  -CZ  --  --    Foot Clearance- Left Foot  1  -CZ  --  --    Step Symmetry  1  -CZ  --  --    Step Continuity  1  -CZ  --  --    Path (excursion)  1  -CZ  --  --    Trunk  0  -CZ  --  --    Base of Support  1  -CZ  --  --    Gait Score  9  -CZ  --  --    Tinetti Total Score  23  -CZ  --  --    Tinetti Assistive Device  rolling walker  -CZ  --  --       Functional Assessment    Outcome Measure Options  AM-[facility] 6 Clicks Basic Mobility (PT);Tinetti  -CZ  AM-PAC 6 Clicks Daily Activity (OT)  -ME  AM-PAC 6 Clicks Basic Mobility " (PT)  -CA    Row Name 11/05/19 1550 11/04/19 1600 11/04/19 1402       9 Hole Peg    9-Hole Peg Left  --  --  33 seconds   -ME    9-Hole Peg Right  --  --  40 seconds  pt is right handed   -ME       How much help from another person do you currently need...    Turning from your back to your side while in flat bed without using bedrails?  4  -JA  4  -GB  --    Moving from lying on back to sitting on the side of a flat bed without bedrails?  3  -JA  3  -GB  --    Moving to and from a bed to a chair (including a wheelchair)?  3  -JA  3  -GB  --    Standing up from a chair using your arms (e.g., wheelchair, bedside chair)?  3  -JA  3  -GB  --    Climbing 3-5 steps with a railing?  3  -JA  2  -GB  --    To walk in hospital room?  3  -  3  -GB  --    AM-PAC 6 Clicks Score (PT)  19  -JA  18  -GB  --       How much help from another is currently needed...    Putting on and taking off regular lower body clothing?  --  --  3  -ME    Bathing (including washing, rinsing, and drying)  --  --  2  -ME    Toileting (which includes using toilet bed pan or urinal)  --  --  3  -ME    Putting on and taking off regular upper body clothing  --  --  3  -ME    Taking care of personal grooming (such as brushing teeth)  --  --  3  -ME    Eating meals  --  --  4  -ME    AM-PAC 6 Clicks Score (OT)  --  --  18  -ME       Modified Barthel    Modified Barthel Comments  --  --  feeding 5, bathing 0, grooming 0, dressing 5, bowels 10, bladder 10, toilet use 5, transfers 5, mobility 10, stairs 5, total 45/100  -ME       Modified Nicolasa Scale    Modified [place] Scale  --  --  4 - Moderately severe disability.  Unable to walk without assistance, and unable to attend to own bodily needs without assistance.  -ME       Functional Assessment    Outcome Measure Options  AM-PAC 6 Clicks Basic Mobility (PT)  -JA  AM-PAC 6 Clicks Basic Mobility (PT)  -GB  AM-PAC 6 Clicks Daily Activity (OT);9 Hole Peg;Modified Barthel Index;Modified [place]  -ME      User Elliott   (r) = Recorded By, (t) = Taken By, (c) = Cosigned By    Initials Name Provider Type    GB Kandy Camara, PT Physical Therapist    Ulysses Delarosa, PTA Physical Therapy Assistant    Blue Mccrary, PTA Physical Therapy Assistant    CZ Hermilo Coronel, PT Physical Therapist    Che Hinkle, OT Occupational Therapist               Time Calculation:     PT Charges     Row Name 11/07/19 1308             Time Calculation    Start Time  0830  -CZ      Stop Time  0939  -CZ      Time Calculation (min)  69 min  -CZ      PT Received On  11/07/19  -CZ      PT Goal Re-Cert Due Date  11/20/19  -CZ         Time Calculation- PT    Total Timed Code Minutes- PT  25 minute(s)  -CZ         Timed Charges    96249 - Gait Training Minutes   25  -CZ        User Key  (r) = Recorded By, (t) = Taken By, (c) = Cosigned By    Initials Name Provider Type    CZ Hermilo Coronel, PT Physical Therapist          Therapy Charges for Today     Code Description Service Date Service Provider Modifiers Qty    60069380806 HC GAIT TRAINING EA 15 MIN 11/7/2019 Hermilo Coronel, PT GP 2    03983597686 HC PT EVAL MOD COMPLEXITY 3 11/7/2019 Hermilo Coronel, PT GP 1        Non-skid socks and gait belt in place. Toileting offered. Call light and needs within reach. Patient advised to not get up alone and to call the nurse for assistance.  Tag alarm on.     Mobility  Admission Goal Date Date Date Date Date Discharge   A.Roll left and right: The ability to roll from lying on back to left and right side, and return to lying on back on the bed. 4          B.Sit to lying: The ability to move from sitting on side of bed to lying flat on the bed. 4          C. Lying to sitting on side of bed: The ability to move from lying on the back to sitting on the side of the bed with feet flat on the floor, and with no back support. 4          D.Sit to stand: The ability to come to a standing position from sitting in a chair, wheelchair, or on the  side of the bed. 4          E. Chair/bed-to-chair transfer: The ability to transfer to and from a bed to a chair (or wheelchair). 4          F.Toilet transfer: The ability to get on and off a toilet or commode.    4          G. Car Transfer: the ability to transfer in and out of a car or van on the passenger side.            I.Walk 10 feet: Once standing, the ability to walk at least 10 feet in a room, corridor, or similar space. If admission performance is coded 07, 09, 10, or 88 Skip to [id number], 1 step (curb) 4          J.Walk 50 feet with two turns: Once standing, the ability to walk at least 50 feet and make two turns. 4          K.Walk 150 feet: Once standing, the ability to walk at least 150 feet in a corridor or similar space. 4 6         L.Walking 10 feet on uneven surfaces: The ability to walk 10 feet on uneven or sloping surfaces (indoor or outdoor), such as turf or gravel.           M.1 step (curb): The ability to go up and down a curb and/or up and down one step. If admission performance is coded 07, 09, 10, or 88 Skip to [id number], Picking up object           N.4 steps: The ability to go up and down four steps with or without a rail. If admission performance is coded 07, 09, 10, or 88 Skip to [id number], Picking up object 4          O.12 steps: The ability to go up and down 12 steps with or without a rail.           P.Picking up object: The ability to bend/stoop from a standing position to  a small object, such as a spoon, from the floor. (Does pt use a wheelchair and/or scooter?) If YES, go to R.            R. Wheel 50 feet with two turns: Once seated in wheelchair/scooter, the ability to wheel at least 50 feet and make two turns. 4          S.Wheel 150 feet: Once seated in wheelchair/scooter, the ability to wheel at least 150 feet in a corridor or similar space. 4                PT G-Codes  Outcome Measure Options: AM-PAC 6 Clicks Basic Mobility (PT), Tinetti  AM-PAC 6 Clicks Score (PT): 18  Tinetti  Total Score: 23      Hermilo Coronel, PT  11/7/2019

## 2019-11-07 NOTE — PROGRESS NOTES
LOS: 1 day   Patient Care Team:  Inna Harris APRN as PCP - General (Family Medicine)  Declan Friedman MD as PCP - Claims Attributed  Maykel Marinelli DPM as Consulting Physician (Podiatry)    Chief Complaint:   Left pontine stroke (CMS/HCC)          Interval History:     SUBJECTIVE: Good spirits.  She slept well.  She is not complaining of pain.  She is agreeable to therapy  She tells me that she did not take the Dyazide today.  She says that she rarely takes it and would like to try to control her blood pressure without it if possible.  She does not describe any muscle spasms during the night  History taken from: patient chart RN    Objective     Vital Signs  Temp:  [96.2 °F (35.7 °C)-97.6 °F (36.4 °C)] 96.2 °F (35.7 °C)  Heart Rate:  [75-80] 80  Resp:  [18] 18  BP: (139-168)/(64-78) 151/76      11/06/19  1402 11/07/19  0300   Weight: 70.8 kg (156 lb) 70.6 kg (155 lb 9.6 oz)         Physical Exam:     General Appearance:    Alert, cooperative, in no acute distress   Head:    Normocephalic, without obvious abnormality, atraumatic   Eyes:            Lids and lashes normal, conjunctivae and sclerae normal, no   icterus, no pallor, corneas clear, PERRLA   Throat:   No oral lesions, no thrush, oral mucosa moist   Neck:   No adenopathy, supple, trachea midline, no thyromegaly, no   carotid bruit, no JVD       Lungs:     Clear to auscultation,respirations regular, even and                  Unlabored no rales rhonchi or wheezes    Heart:    Regular rhythm and normal rate, normal S1 and S2, no            murmur, no gallop, no rub, no click heart rate regular without murmurs gallops or rubs no ectopy   Chest Wall:    No abnormalities observed   Abdomen:     Normal bowel sounds, no masses, no organomegaly, soft        non-tender, non-distended, no guarding, no rebound                Tenderness normal exam   Extremities:   Moves all extremities well, no edema, no cyanosis, no             Redness mild right-sided  weakness and minimal ataxia on the right       Skin:   No bleeding, bruising or rash   Lymph nodes:   No palpable adenopathy   Neurologic:   Cranial nerves 2 - 12 grossly intact, sensation intact, DTR       present and equal bilaterally, mild right-sided weakness       RESULTS REVIEW:     Lab Results (last 24 hours)     Procedure Component Value Units Date/Time    TSH [[id number]]  (Abnormal) Collected:  11/07/19 0547    Specimen:  Blood Updated:  11/07/19 0645     TSH 4.810 uIU/mL     Comprehensive Metabolic Panel [[id number]]  (Abnormal) Collected:  11/07/19 0547    Specimen:  Blood Updated:  11/07/19 0640     Glucose 100 mg/dL      BUN 15 mg/dL      Creatinine 0.70 mg/dL      Sodium 142 mmol/L      Potassium 4.0 mmol/L      Chloride 101 mmol/L      CO2 31.0 mmol/L      Calcium 9.4 mg/dL      Total Protein 7.1 g/dL      Albumin 3.90 g/dL      ALT (SGPT) 13 U/L      AST (SGOT) 19 U/L      Alkaline Phosphatase 102 U/L      Total Bilirubin 0.6 mg/dL      eGFR Non African Amer 82 mL/min/1.73      Globulin 3.2 gm/dL      A/G Ratio 1.2 g/dL      BUN/Creatinine Ratio 21.4     Anion Gap 10.0 mmol/L     Narrative:       GFR Normal >60  Chronic Kidney Disease <60  Kidney Failure <15    Iron Profile [[id number]]  (Abnormal) Collected:  11/07/19 0547    Specimen:  Blood Updated:  11/07/19 0640     Iron 59 mcg/dL      Iron Saturation 17 %      Transferrin 235 mg/dL      TIBC 350 mcg/dL     CBC Auto Differential [[id number]]  (Normal) Collected:  11/07/19 0547    Specimen:  Blood Updated:  11/07/19 0620     WBC 5.54 10*3/mm3      RBC 4.09 10*6/mm3      Hemoglobin 12.0 g/dL      Hematocrit 36.4 %      MCV 89.0 fL      MCH 29.3 pg      MCHC 33.0 g/dL      RDW 12.7 %      RDW-SD 41.3 fl      MPV 9.8 fL      Platelets 247 10*3/mm3      Neutrophil % 61.1 %      Lymphocyte % 24.9 %      Monocyte % 10.5 %      Eosinophil % 2.7 %      Basophil % 0.4 %      Immature Grans % 0.4 %      Neutrophils, Absolute 3.39 10*3/mm3      Lymphocytes,  Absolute 1.38 10*3/mm3      Monocytes, Absolute 0.58 10*3/mm3      Eosinophils, Absolute 0.15 10*3/mm3      Basophils, Absolute 0.02 10*3/mm3      Immature Grans, Absolute 0.02 10*3/mm3      nRBC 0.0 /100 WBC         Imaging Results (Last 72 Hours)     ** No results found for the last 72 hours. **        Nursing notes and therapy notes have been reviewed  I have reviewed medications  Assessment/Plan     Active Hospital Problems    Diagnosis   • **Left pontine stroke (CMS/HCC)   • Dysarthria   • Muscle spasm of both lower legs     Worse since this stroke     • Recurrent falls while walking     Due to muscle spasm     • Urinary hesitancy     History of in and out self caths after initial surgery 2018     • DJD (degenerative joint disease), multiple sites     thoracic and lumbar spine     • Hypertension   • Altered mental status           PLAN: She is doing very well today.  She is participating with therapy.  Her strength and ataxia are improving.  Muscle spasm is much improved.  Blood pressure control is marginal but at this time I will stop the Dyazide because she does not want to take it.  We will discuss again pending her blood pressure levels during her stay on rehab.  Lab work is reviewed  Arthritic pain is fairly well controlled she seems to be doing well   24 hour reassessment shows that the patient is a good rehabilitation candidate.  Expected to be able to participate 3 hours a day, make measurable improvement, and return home at discharge.    Reassessment shows that post admission functional status is consistent with the preadmission screen.          This document has been electronically signed by Castro West MD on November 7, 2019 11:39 AM

## 2019-11-07 NOTE — THERAPY EVALUATION
"Inpatient Rehabilitation - Occupational Therapy Initial Evaluation    [facility]     Patient Name: Rama Goel  : 1945  MRN: [id number]    Today's Date: 2019                 Admit Date: 2019         ICD-10-CM ICD-9-CM   1. Dysarthria R47.1 784.51   2. Oropharyngeal dysphagia R13.12 787.22   3. Impaired physical mobility Z74.09 781.99   4. Impaired mobility and ADLs Z74.09 799.89       Patient Active Problem List   Diagnosis   • Altered mental status   • DJD (degenerative joint disease), multiple sites   • Hypertension   • Left pontine stroke (CMS/HCC)   • CVA (cerebral vascular accident) (CMS/HCC)   • Urinary hesitancy   • Muscle spasm of both lower legs   • Recurrent falls while walking   • Dysarthria       Past Medical History:   Diagnosis Date   • Arrhythmia     intermittent   • CVA (cerebral vascular accident) (CMS/HCC) 2018    also had one last week 10/2019 in [facility]   • DJD (degenerative joint disease), multiple sites     thoracic and lumbar spine   • Hypertension    • Lupus (CMS/HCC)     drug induced   • Stroke (CMS/HCC)        Past Surgical History:   Procedure Laterality Date   • ANKLE SURGERY Right     \"bone replacement with coral\"   • APPENDECTOMY     •  SECTION     • CHOLECYSTECTOMY     • PARATHYROIDECTOMY     • ROTATOR CUFF REPAIR Left    • SPINAL FUSION  2018    T11-S1   • TYMPANOPLASTY              IRF OT ASSESSMENT FLOWSHEET (last 72 hours)      IRF Occupational Therapy Evaluation     Row Name 19 1051                   Evaluation/Treatment Time and Intent    Subjective Information  no complaints  -ME        Existing Precautions/Restrictions  fall  -ME        Document Type  evaluation  -ME        Mode of Treatment  individual therapy;occupational therapy  -ME        Patient/Family Observations  sitting up in w/c in room   -ME        Start Time (Evaluation/Treatment)  1051  -ME        Stop Time (Evaluation/Treatment)  1206  -ME        Row Name " 11/07/19 1051                   Relationship/Environment    Lives With  alone friend has been living with her since hospitalization   -ME        Concerns About Impact on Relationships  pt's friend Saurav will be staying with her initially upon discharge home   -ME        Row Name 11/07/19 1051                   Resource/Environmental Concerns    Current Living Arrangements  home/apartment/condo  -ME        Row Name 11/07/19 1051                   Living Environment    Living Arrangements  house  -ME        Home Accessibility  stairs to enter home;stairs within home pt has a deonna  -ME        Row Name 11/07/19 1051                   Home Main Entrance    Number of Stairs, Main Entrance  four  -ME        Stair Railings, Main Entrance  railing on right side (ascending)  -ME        Row Name 11/07/19 1051                   Home Use of Assistive/Adaptive Equipment    Equipment Currently Used at Home  shower chair;walker, rolling;cane, straight built in chair;raised toilet;RW & cane since home from [facility]  -ME        Row Name 11/07/19 1051                   Stairs Within Home, Primary    Number of Stairs, Within Home, Primary  one  -ME        Stair Railings, Within Home, Primary  none  -ME        Stairs Comment, Within Home, Primary  pt has one step in the home to get to her dog kennels  -ME        Row Name 11/07/19 1051                   Cognition/Psychosocial- PT/OT    Affect/Mental Status (Cognitive)  WFL  -ME        Orientation Status (Cognition)  oriented x 4  -ME        Follows Commands (Cognition)  WFL  -ME        Personal Safety Interventions  fall prevention program maintained;gait belt;nonskid shoes/slippers when out of bed  -ME        Cognitive Function (Cognitive)  safety deficit  -ME        Safety Deficit (Cognitive)  mild deficit;awareness of need for assistance;impulsivity;insight into deficits/self awareness;judgment;problem solving;safety precautions awareness;safety precautions follow-through/compliance  -ME         Row Name 11/07/19 1051                   Bed Mobility Assessment/Treatment    Comment (Bed Mobility)  pt already sitting up in wheelchair when OT arrived, and returned back to wheelchair at end of session.   -ME        Row Name 11/07/19 1051                   Functional Mobility    Functional Mobility- Ind. Level  contact guard assist;verbal cues required  -ME        Functional Mobility- Device  rolling walker  -ME        Functional Mobility- Comment  pt used wheelchair to get into bathroom, then RW to walk to shower with CGA assist. wheelchair to get back to room and complete grooming due to pt fatigue.   -ME        Row Name 11/07/19 1051                   Transfer Assessment/Treatment    Transfer Assessment/Treatment  sit-stand transfer;stand-sit transfer;shower transfer  -ME        Row Name 11/07/19 1051                   Sit-Stand Transfer    Sit-Stand [place] (Transfers)  contact guard;supervision  -ME        Assistive Device (Sit-Stand Transfers)  walker, front-wheeled  -ME        Row Name 11/07/19 1051                   Stand-Sit Transfer    Stand-Sit [place] (Transfers)  contact guard;supervision  -ME        Assistive Device (Stand-Sit Transfers)  walker, front-wheeled  -ME        Row Name 11/07/19 1051                   Shower Transfer    Type (Shower Transfer)  sit-stand;stand-sit  -ME        [place] Level (Shower Transfer)  contact guard;supervision;verbal cues  -ME        Assistive Device (Shower Transfer)  walker, front-wheeled;shower chair;grab bars/tub rail  -ME        Row Name 11/07/19 1051                   Safety Issues, Functional Mobility    Safety Issues Affecting Function (Mobility)  awareness of need for assistance;impulsivity;insight into deficits/self awareness;judgment;safety precaution awareness;safety precautions follow-through/compliance  -ME        Impairments Affecting Function (Mobility)  balance;coordination;strength  -ME        Comment, Safety Issues/Impairments  (Mobility)  no muscle spasms noted this date. pt states that these have decreased, and that she had one small one overnight.  -ME        Row Name 11/07/19 1051                   Basic Activities of Daily Living (BADLs)    Basic Activities of Daily Living  bathing;upper body dressing;lower body dressing;grooming  -ME        Row Name 11/07/19 1051                   Bathing Assessment/Treatment    Bathing [place] Level  bathing skills;standby assist;supervision;contact guard assist  -ME        Assistive Device (Bathing)  shower chair  -ME        Bathing Position  supported sitting;supported standing  -ME        Bathing Setup Assistance  adjust water temperature;obtain supplies  -ME        Comment (Bathing)  pt is set up for all aspects of bathing. UB bathing; stand by to supervision. LB bathing: stand-by while in sitting, and CGA while in standing. pt is impulsive at times, and stands without warning. balance is decreased. grab bar used while in standing. built in seat in shower used. pt does require assistance to bathe back, but she states that this is her prior, and at home she uses a long handled sponge to complete this task.   -ME        Row Name 11/07/19 1051                   Upper Body Dressing Assessment/Treatment    Upper Body Dressing Task  doff;don;pull over garment;standby assist;set up assistance  -ME        Set-up Assistance (Upper Body Dressing)  obtain clothing  -ME        Comment (Upper Body Dressing)  pt was able to doff shirt prior to bathing, and don shirt following bathing with set-up and stand by assistance, while sitting on dried shower chair.   -ME        Row Name 11/07/19 1051                   Lower Body Dressing Assessment/Treatment    Lower Body Dressing [place] Level  doff;don;pants/bottoms;shoes/slippers;socks;underwear  -ME        Comment (Lower Body Dressing)  pt required supervision for donning/doffing of pants and underwear while in sitting. while in standing to don/doff  pants and underwear pt required CGA. set-up for all LB dressing tasks. supervision for donning/doffing of socks/shoes, as pt leans very far foward. additional time to complete this task. completed while sitting on dried shower chair, blanket/towel placed under feet to prevent slipping.   -ME        Row Name 11/07/19 1051                   Grooming Assessment/Treatment    Grooming [place] Level  hair care, combing/brushing;oral care regimen;wash face, hands;set up;conditional independence  -ME        Grooming Position  other (see comments) sitting in wheelchair at sink  -ME        Grooming Setup Assistance  obtain supplies  -ME        Row Name 11/07/19 1051                   BADL Safety/Performance    Impairments, BADL Safety/Performance  balance;endurance/activity tolerance;coordination;strength  -ME        Row Name 11/07/19 1051                   General ROM    GENERAL ROM COMMENTS  BUE AROM WFL grossly; opposition completed with good accuracy   -ME        Row Name 11/07/19 1051                   MMT (Manual Muscle Testing)    General MMT Comments  BUE and  strength 4- to 4/5 grossly   -ME        Row Name 11/07/19 1051                   Motor Assessment/Intervention    Additional Documentation  Fine Motor Testing & Training (Group)  -ME        Row Name 11/07/19 1051                   Fine Motor Testing & Training    Fine Motor Tests  9 Hole Peg Test of Fine Motor Coordination Results  -ME        Results, 9 Hole Peg Test of Fine Motor Coordination-Right  36 seconds   -ME        Results, 9 Hole Peg Test of Fine Motor Coordination-Left  31 seconds   -ME        Comment, Fine Motor Coordination  pt still states that she feels like her fine motor coordination in the R hand is decreased; pt is right handed. some difficulty with translation of pegs noted (with R), no dropping of pegs this testing. educated to continue working on the exercises that she was given before (coin translation exercises from palm to  "finger tips, picking up coins from slick surface) in order to increae fine motor coordination. pt states that she has continued to practice her handwriting.  -ME        Row Name 11/07/19 1051                   Sensory    Hearing Status  hearing impairment, right states that she has a \"fake\" ear drum in the R ear   -ME        Sensory General Assessment  other (see comments) light touch testing   -ME        Row Name 11/07/19 1051                   Vision Assessment/Intervention    Visual Impairment/Limitations  WFL  -ME        Row Name 11/07/19 1051                   Light Touch Sensation Assessment    Left Upper Extremity: Light Touch Sensation Assessment  intact  -ME        Right Upper Extremity: Light Touch Sensation Assessment  intact  -ME        Row Name 11/07/19 1051                   Pain Scale: Numbers Pre/Post-Treatment    Pain Scale: Numbers, Pretreatment  0/10 - no pain  -ME        Pain Scale: Numbers, Post-Treatment  0/10 - no pain  -ME        Pain Intervention(s)  Ambulation/increased activity;Repositioned;Distraction  -ME        Row Name 11/07/19 1051                   OT Clinical Impression    General Observations of Patient  pt sitting up in wheelchair when OT arrived. pt returned to wheelchair at end of session, and taken to table to get ready for lunch.   -ME        Patient's Goals For Discharge  return home;take care of myself at home;return to all previous roles/activities  -ME        Family Goals For Discharge  other (see comments) no family present   -ME        Rehab Potential/Prognosis: Occupational Therapy  good, to achieve stated therapy goals  -ME        Frequency of Treatment (OT Eval)  other (see comments) 5-7 days per week   -ME        Estimated Length of Stay (OT Eval)  1 week;2 weeks  -ME        Problem List: Occupational Therapy  impaired balance;coordination impaired;postural control impaired;other (see comments) decreased independence with ADLs and IADLs   -ME        " Limitations/Impairments  safety/cognitive  -ME        Activity Limitations Related to Problem List (OT Eval)  other (see comments) requires increased assistance with ADLS and func mob  -ME        Anticipated Equipment Needs At Discharge (OT Eval)  other (see comments) will monitor with pt progress   -ME        Expected Discharge Disposition (OT Eval)  home or self care;home with home health care;still a patient  -ME        Planned Therapy Interventions (OT Eval)  activity tolerance training;adaptive equipment training;BADL retraining;functional balance retraining;IADL retraining;neuromuscular control/coordination retraining;occupation/activity based interventions;patient/caregiver education/training;ROM/therapeutic exercise;strengthening exercise;transfer/mobility retraining  -ME        Row Name 11/07/19 1051                   Vital Signs    Pre Systolic BP Rehab  115  -ME        Pre Treatment Diastolic BP  70  -ME        Post Systolic BP Rehab  146  -ME        Post Treatment Diastolic BP  72  -ME        Pretreatment Heart Rate (beats/min)  74  -ME        Posttreatment Heart Rate (beats/min)  76  -ME        Pre SpO2 (%)  96  -ME        O2 Delivery Pre Treatment  room air  -ME        Post SpO2 (%)  99  -ME        O2 Delivery Post Treatment  room air  -ME        Pre Patient Position  Sitting  -ME        Post Patient Position  Sitting  -ME        Row Name 11/07/19 1051                   IRF OT Goals    Bathing Goal Selection (OT-IRF)  bathing, OT goal 1  -ME        UB Dressing Goal Selection (OT-IRF)  UB dressing, OT goal 1  -ME        LB Dressing Goal Selection (OT-IRF)  LB dressing, OT goal 1  -ME        Functional Mobility Goal Selection (OT)  functional mobility, OT goal 1  -ME        Coordination Goal Selection (OT)  coordination, OT goal 1  -ME        Additional Documentation  Functional Mobility Goal Selection (OT-IRF) (Row);Coordination Goal Selection (OT-IRF) (Row)  -ME        Row Name 11/07/19 1051                    Bathing Goal 1 (OT-IRF)    Activity/Device (Bathing Goal 1, OT-IRF)  bathing skills, all  -ME        [place] Level (Bathing Goal 1, OT-IRF)  conditional independence  -ME        Time Frame (Bathing Goal 1, OT-IRF)  long term goal (LTG);by discharge  -ME        Progress/Outcomes (Bathing Goal 1, OT-IRF)  goal not met  -ME        Row Name 11/07/19 1051                   UB Dressing Goal 1 (OT-IRF)    Activity/Device (UB Dressing Goal 1, OT-IRF)  upper body dressing  -ME        [place] (UB Dress Goal 1, OT-IRF)  independent  -ME        Time Frame (UB Dressing Goal 1, OT-IRF)  long term goal (LTG);by discharge  -ME        Progress/Outcomes (UB Dressing Goal 1, OT-IRF)  goal not met  -ME        Row Name 11/07/19 1051                   LB Dressing Goal 1 (OT-IRF)    Activity/Device (LB Dressing Goal 1, OT-IRF)  lower body dressing  -ME        [place] (LB Dressing Goal 1, OT-IRF)  conditional independence  -ME        Time Frame (LB Dressing Goal 1, OT-IRF)  long term goal (LTG);by discharge  -ME        Progress/Outcomes (LB Dressing Goal 1, OT-IRF)  goal not met  -ME        Row Name 11/07/19 1051                   Functional Mobility Goal 1 (OT)    Activity/Assistive Device (Functional Mobility Goal 1, OT)  walker, rolling  -ME        [place] Level/Cues Needed (Functional Mobility Goal 1, OT)  conditional independence  -ME        Distance Goal 1 (Functional Mobility, OT)  for ADLs and community mobility with no LOB and good safety.   -ME        Time Frame (Functional Mobility Goal 1, OT)  long term goal (LTG);by discharge  -ME        Progress/Outcome (Functional Mobility Goal 1, OT)  goal not met  -ME        Row Name 11/07/19 1051                   Coordination Goal 1 (OT)    Activity/Assistive Device (Coordination Goal 1, OT)  FM task write using cursive & appropriate spacing w good legibility  -ME        [place] Level/Cues Needed (Coordination Goal 1, OT)  independent  -ME        Time  Frame (Coordination Goal 1, OT)  long term goal (LTG);by discharge  -ME        Progress/Outcomes (Coordination Goal 1, OT)  goal not met  -ME        Row Name 11/07/19 1051                   Positioning and Restraints    Pre-Treatment Position  sitting in chair/recliner  -ME        Post Treatment Position  wheelchair  -ME        In Wheelchair  notified nsg;encouraged to call for assist;exit alarm on sitting at table near nurses station   -ME          User Key  (r) = Recorded By, (t) = Taken By, (c) = Cosigned By    Initials Name Effective Dates    ME Che Oh, OT 09/10/19 -            Occupational Therapy Education     Title: PT OT SLP Therapies (In Progress)     Topic: Occupational Therapy (In Progress)     Point: ADL training (Done)     Description: Instruct learner(s) on proper safety adaptation and remediation techniques during self care or transfers.   Instruct in proper use of assistive devices.    Learning Progress Summary           Patient Acceptance, E, VU,DU by ME at 11/7/2019  3:40 PM    Comment:  Educated on OT and POC. Educated to continue exercises given to her for translation and fine motor coordination. Educated to call for assist. Educated on hand placement for transfers. Educated on safe mechanics for completing ADLs.                   Point: Home exercise program (Done)     Description: Instruct learner(s) on appropriate technique for monitoring, assisting and/or progressing therapeutic exercises/activities.    Learning Progress Summary           Patient Acceptance, E, VU,DU by ME at 11/7/2019  3:40 PM    Comment:  Educated on OT and POC. Educated to continue exercises given to her for translation and fine motor coordination. Educated to call for assist. Educated on hand placement for transfers. Educated on safe mechanics for completing ADLs.                   Point: Precautions (Done)     Description: Instruct learner(s) on prescribed precautions during self-care and functional transfers.     Learning Progress Summary           Patient Acceptance, ARINA, GISSELLE HYDE by ME at 11/7/2019  3:40 PM    Comment:  Educated on OT and POC. Educated to continue exercises given to her for translation and fine motor coordination. Educated to call for assist. Educated on hand placement for transfers. Educated on safe mechanics for completing ADLs.                               User Key     Initials Effective Dates Name Provider Type Discipline    ME 09/10/19 -  Che Oh, ALISON Occupational Therapist OT                    OT Recommendation and Plan    Planned Therapy Interventions (OT Eval): activity tolerance training, adaptive equipment training, BADL retraining, functional balance retraining, IADL retraining, neuromuscular control/coordination retraining, occupation/activity based interventions, patient/caregiver education/training, ROM/therapeutic exercise, strengthening exercise, transfer/mobility retraining    Plan of Care Review  Plan of Care Reviewed With: patient  Plan of Care Reviewed With: patient  IRF Plan of Care Review: progress ongoing, continue  Outcome Summary: OT evaluation completed this date. Pt is pleasant and cooperative, states that she feels she is doing much better. Continues to be concerned about the fine motor function of her R hand; 9 hole peg test indicates R: 36 seconds and L: 31 seconds (pt is R handed). Some decreased coordination and translation demonstrated, no dropping of pegs. shower transfer completed with CGA-supervision, using RW. bathing completed with set-up. UB bathing completed with stand-by to supervision, unable to wash back. LB bathing in sitting completed with stand-by, in standing completed with CGA and use of grab bars for steadying assitance. sitting on shower chair in shower for bathing. UB dressing completed in sitting, and  with set-up and stand-by assist. LB dressing in sitting completed with supervision, while in standing completed with CGA and grab bars for steadying  assist. donned/doffed socks and shoes with supervision and set-up as pt leans very far forward when completing this task. grooming at sink completed with set-up assistance, and completed while sitting in wheelchair. ROM and strength are WFL. deficits noted in the areas of ADL/IADL independence, functional mobility, transfers, and fine motor coordination. following inpatient rehab stay pt should return home with self-care and assist from family as needed. possible outpatient or home health OT depending on pt progress, which OT will monitor closely.       Outcome Measures     Row Name 11/07/19 1051 11/07/19 0830 11/06/19 1053       9 Hole Peg    9-Hole Peg Left  31 seconds   -ME  --  --    9-Hole Peg Right  36 seconds   -ME  --  --       How much help from another person do you currently need...    Turning from your back to your side while in flat bed without using bedrails?  --  3  -CZ  --    Moving from lying on back to sitting on the side of a flat bed without bedrails?  --  3  -CZ  --    Moving to and from a bed to a chair (including a wheelchair)?  --  3  -CZ  --    Standing up from a chair using your arms (e.g., wheelchair, bedside chair)?  --  3  -CZ  --    Climbing 3-5 steps with a railing?  --  3  -CZ  --    To walk in hospital room?  --  3  -CZ  --    AM-PAC 6 Clicks Score (PT)  --  18  -CZ  --       How much help from another is currently needed...    Putting on and taking off regular lower body clothing?  3  -ME  --  3  -ME    Bathing (including washing, rinsing, and drying)  3  -ME  --  3  -ME    Toileting (which includes using toilet bed pan or urinal)  3  -ME  --  3  -ME    Putting on and taking off regular upper body clothing  3  -ME  --  3  -ME    Taking care of personal grooming (such as brushing teeth)  3  -ME  --  3  -ME    Eating meals  4  -ME  --  4  -ME    AM-PAC 6 Clicks Score (OT)  19  -ME  --  19  -ME       Modified Nicolasa Scale    Modified Nicolasa Scale  4 - Moderately severe disability.   "Unable to walk without assistance, and unable to attend to own bodily needs without assistance.  -ME  --  --       Tinetti Assessment    Tinetti Assessment  --  yes  -CZ  --    Sitting Balance  --  1  -CZ  --    Arises  --  2  -CZ  --    Attempts to Rise  --  2  -CZ  --    Immediate Standing Balance (first 5 sec)  --  2  -CZ  --    Standing Balance  --  1  -CZ  --    Sternal Nudge (feet close together)  --  2  -CZ  --    Eyes Closed (feet close together)  --  1  -CZ  --    Turning 360 Degrees- Steps  --  0  -CZ  --    Turning 360 Degrees- Steadiness  --  1  -CZ  --    Sitting Down  --  2  -CZ  --    T[place]etti Balance Score  --  14  -CZ  --    Gait Initiation (immediate after told \"go\")  --  1  -CZ  --    Step Length- Right Swing  --  1  -CZ  --    Step Length- Left Swing  --  1  -CZ  --    Foot Clearance- Right Foot  --  1  -CZ  --    Foot Clearance- Left Foot  --  1  -CZ  --    Step Symmetry  --  1  -CZ  --    Step Continuity  --  1  -CZ  --    Path (excursion)  --  1  -CZ  --    Trunk  --  0  -CZ  --    Base of Support  --  1  -CZ  --    Gait Score  --  9  -CZ  --    Tinetti Total Score  --  23  -CZ  --    Tinetti Assistive Device  --  rolling walker  -CZ  --       Functional Assessment    Outcome Measure Options  AM-PAC 6 Clicks Daily Activity (OT);9 Hole Peg  -ME  AM-PAC 6 Clicks Basic Mobility (PT);Tinetti  -CZ  AM-PAC 6 Clicks Daily Activity (OT)  -ME    Row Name 11/06/19 0826 11/05/19 1550 11/04/19 1600       How much help from another person do you currently need...    Turning from your back to your side while in flat bed without using bedrails?  4  -CA  4  -JA  4  -GB    Moving from lying on back to sitting on the side of a flat bed without bedrails?  4  -CA  3  -JA  3  -GB    Moving to and from a bed to a chair (including a wheelchair)?  3  -CA  3  -JA  3  -GB    Standing up from a chair using your arms (e.g., wheelchair, bedside chair)?  3  -CA  3  -JA  3  -GB    Climbing 3-5 steps with a railing?  3  -CA  " 3  -JA  2  -GB    To walk in hospital room?  3  -CA  3  -JA  3  -GB    AM-PAC 6 Clicks Score (PT)  20  -CA  19  -JA  18  -GB       Functional Assessment    Outcome Measure Options  AM-PAC 6 Clicks Basic Mobility (PT)  -CA  AM-PAC 6 Clicks Basic Mobility (PT)  -JA  AM-PAC 6 Clicks Basic Mobility (PT)  -GB      User Key  (r) = Recorded By, (t) = Taken By, (c) = Cosigned By    Initials Name Provider Type    Kandy Tolentino, PT Physical Therapist    Ulysses Delarosa, PTA Physical Therapy Assistant    Blue Mccrary, PTA Physical Therapy Assistant    Hermilo Lopez, PT Physical Therapist    ME Che Oh, OT Occupational Therapist                   Self-Care Admission Goal Date Date Date Date Date Discharge   Eating: The ability to use suitable utensils to bring food and/or liquid to the mouth and swallow food and/or liquid once the meal is placed before the patient. 5 6         Oral hygiene: The ability to use suitable items to clean teeth. Dentures (if applicable): The ability to insert and remove dentures into and from the mouth, and manage denture soaking and rinsing with use of equipment. 5 6         Toileting hygiene: The ability to maintain perineal hygiene, adjust clothes before and after voiding or having a bowel movement. If managing an ostomy, include wiping the opening but not managing equipment. 10 6         Shower/bathe self: The ability to bathe self, including washing, rinsing, and drying self (excludes washing of back and hair). Does not include transferring in/out of tub/shower. 4 6         Upper body dressing: The ability to dress and undress above the waist; including fasteners, if applicable. 4 6         Lower body dressing: The ability to dress and undress below the waist, including fasteners; does not include footwear. 4 6         Putting on/taking off footwear: The ability to put on and take off socks and shoes or other footwear that is appropriate for safe mobility;  including fasteners, if applicable. 4 6               Time Calculation:     OT Start Time: 1051  OT Stop Time: 1206  OT Time Calculation (min): 75 min  Therapy Charges for Today     Code Description Service Date Service Provider Modifiers Qty    34097426823 HC OT EVAL MOD COMPLEXITY 4 11/7/2019 Che Oh OT GO 1    [id number]  OT SELF CARE/MGMT/TRAIN EA 15 MIN 11/7/2019 Che Oh OT GO 1                   Che Oh OT  11/7/2019

## 2019-11-07 NOTE — PLAN OF CARE
Problem: Patient Care Overview  Goal: Plan of Care Review  Outcome: Ongoing (interventions implemented as appropriate)   11/07/19 1046   OTHER   Outcome Summary speech/swallow evaluation completed this date. pt demonstrates mild throat clear 10% of trials of thin liquids via straw. pt educated for oral prep set and small sips, which she completed independently fromprevious sessions. pt scored 30.30 on MOCA.    Patient Care Overview   IRF Plan of Care Review progress ongoing, continue   Progress, Functional Goals demonstrating adequate progress   Coping/Psychosocial   Plan of Care Reviewed With patient

## 2019-11-07 NOTE — PLAN OF CARE
Problem: Patient Care Overview  Goal: Plan of Care Review  Outcome: Ongoing (interventions implemented as appropriate)   11/07/19 1255   OTHER   Outcome Summary Pt. transfers SBA, amb. 100' + 150' with RW CGA, perform x15 reps seated therex. Balance & gt. next treatment    Patient Care Overview   IRF Plan of Care Review progress ongoing, continue   Progress, Functional Goals demonstrating adequate progress   Coping/Psychosocial   Plan of Care Reviewed With patient     Goal: Discharge Needs Assessment  Outcome: Ongoing (interventions implemented as appropriate)   11/06/19 1442 11/07/19 1051   Disability   Equipment Currently Used at Home --  shower chair;walker, rolling;cane, straight  (built in chair;raised toilet;RW & cane since home from [facility])   Discharge Needs Assessment   Patient/Family Anticipates Transition to home  (maybe outpatient if agreeable) --    Patient/Family Anticipated Services at Transition outpatient care --    Transportation Anticipated family or friend will provide --

## 2019-11-07 NOTE — PATIENT CARE CONFERENCE
Attendance of weekly team conference:   PAULA Russell, Dr. Castro West, Inna Garcia, SLP, Olman Wagner PTA and Mojgan Summers RN    Patient's progress reviewed, FIMs reviewed, discharge date discussed and equipment needs addressed.     Expected Discharge Date: Evaluation completed  Anticipated discharge orders/equipment: To be determined.

## 2019-11-07 NOTE — THERAPY TREATMENT NOTE
Inpatient Rehabilitation - Occupational Therapy Treatment Note    [facility]     Patient Name: Rama Goel  : 1945  MRN: [id number]    Today's Date: 2019                 Admit Date: 2019      Visit Dx:    ICD-10-CM ICD-9-CM   1. Dysarthria R47.1 784.51   2. Oropharyngeal dysphagia R13.12 787.22   3. Impaired physical mobility Z74.09 781.99       Patient Active Problem List   Diagnosis   • Altered mental status   • DJD (degenerative joint disease), multiple sites   • Hypertension   • Left pontine stroke (CMS/HCC)   • CVA (cerebral vascular accident) (CMS/HCC)   • Urinary hesitancy   • Muscle spasm of both lower legs   • Recurrent falls while walking   • Dysarthria         Therapy Treatment    IRF Treatment Summary     Row Name 19 1345 19 1255          Evaluation/Treatment Time and Intent    Subjective Information  no complaints  -  no complaints  -JA     Existing Precautions/Restrictions  fall  -RC  fall  -JA     Document Type  therapy note (daily note)  -  therapy note (daily note)  -JA     Mode of Treatment  occupational therapy;individual therapy  -  individual therapy;physical therapy  -JA     Patient/Family Observations  in w/c   -RC  s[place] [facility]  -JA     Start Time (Evaluation/Treatment)  1345  -  1255  -JA     Stop Time (Evaluation/Treatment)  1410  -  1325  -JA     Recorded by [RC] Tiki Grover COTA/L [JA] Ulysses Gill PTA     Row Name 19 1345 19 1255          Cognition/Psychosocial- PT/OT    Affect/Mental Status (Cognitive)  WFL  -  WFL  -JA     Orientation Status (Cognition)  --  oriented x 4  -JA     Recorded by [RC] Tiki Grover COTA/L [JA] Ulysses Gill PTA     Row Name 19 1255             Bed Mobility Assessment/Treatment    Bed Mobility Assessment/Treatment  --  -JA      Supine-Sit Dougherty (Bed Mobility)  --  -JA      Recorded by [JA] Ulysses Gill PTA      Row Name 19  1255             Transfer Assessment/Treatment    Transfer Assessment/Treatment  sit-stand transfer;stand-sit transfer;toilet transfer  -JA      Recorded by [JA] Ulysses Gill, TOAN      Row Name 11/07/19 1255             Sit-Stand Transfer    Sit-Stand Larimer (Transfers)  contact guard  -JA      Assistive Device (Sit-Stand Transfers)  walker, front-wheeled  -JA      Recorded by [JA] Ulysses Gill, TOAN      Row Name 11/07/19 1255             Stand-Sit Transfer    Stand-Sit Lar[place] (Transfers)  supervision  -LISA      Assistive Device (Stand-Sit Transfers)  walker, front-wheeled  -JA      Recorded by [JA] Ulysses Gill, PTA      Row Name 11/07/19 1255             Toilet Transfer    Type (Toilet Transfer)  stand pivot/stand step  -JA      Larimer Level (Toilet Transfer)  supervision  -LISA      Assistive Device (Toilet Transfer)  grab bars/safety frame;raised toilet seat  -JA      Recorded by [JA] Ulysses Gill, TOAN      Row Name 11/07/19 1255             Gait/Stairs Assessment/Training    Larimer Level (Gait)  contact guard  -JA      Assistive Device (Gait)  walker, front-wheeled Provided more appropriately sized walker.   -JA      Distance in Feet (Gait)  100', 150'  -JA      Pattern (Gait)  step-through  -JA      Deviations/Abnormal Patterns (Gait)  base of support, narrow  -JA      Left Sided Gait Deviations  weight shift ability decreased  -JA      Larimer Level (Stairs)  --  -JA      Handrail Location (Stairs)  --  -JA      Ascending Technique (Stairs)  --  -JA      Descending Technique (Stairs)  --  -JA      Recorded by [JA] Ulysses Gill, TOAN      Row Name 11/07/19 1345 11/07/19 1255          Wheelchair Mobility/Management    Method of Wheelchair Locomotion (Mobility)  bipedal (lower extremity) propulsion  -  bimanual (upper extremity) propulsion;bipedal (lower extremity) propulsion  -JA     Mobility Activities (Wheelchair)  mobility (all) activities  -   forward propulsion;turning  -JA     Mobility Inyo Level (Wheelchair)  supervision  -  --     Forward Propulsion Inyo (Wheelchair)  --  supervision  -JA     Turning [place] (Wheelchair)  --  supervision  -     Distance Propelled in Feet (Wheelchair)  --  100  -JA     Recorded by [RC] Tiki Grover COTA/L [JA] Ulysses Gill, PTA     Row Name 11/07/19 1345             Fine Motor Testing & Training    Training Activity, Fine Motor Coordination  manipulation of pegs  -      Comment, Fine Motor Coordination  pt was givern pen w/  and paper for handwriting practice in room   -      Recorded by [RC] Tiki Grover COTA/L      Row Name 11/07/19 1255             Sensory    Hearing Status  hearing impairment, right  -      Sensory General Assessment  --  -JA      Recorded by [JA] Ulysses Gill, PTA      Row Name 11/07/19 1255             Light Touch Sensation Assessment    Left Lower Extremity: Light Touch Sensation Assessment  --  -JA      Right Lower Extremity: Light Touch Sensation Assessment  --  -JA      Recorded by [JA] Ulysses Gill, PTA      Row Name 11/07/19 1345 11/07/19 1257          Pain Scale: Numbers Pre/Post-Treatment    Pain Scale: Numbers, Pretreatment  0/10 - no pain  -  0/10 - no pain  -     Pain Scale: Numbers, Post-Treatment  0/10 - no pain  -  0/10 - no pain  -     Pain Location - Side  --  --  -     Pain Location  --  --  -JA     Recorded by [RC] Tiki Grover COTA/DIANA [JA] Ulysses Gill, PTA     Row Name 11/07/19 1255             Lower Extremity Seated Therapeutic Exercise    Performed, Seated Lower Extremity (Therapeutic Exercise)  LAQ (long arc quad), knee extension;hip flexion/extension  -      Exercise Type, Seated Lower Extremity (Therapeutic Exercise)  AROM (active range of motion)  -      Sets/Reps Detail, Seated Lower Extremity (Therapeutic Exercise)  x15  -JA      Recorded by [JA] Ulysses Gill, [employer]  Name 11/07/19 1255             Vital Signs    Pre Patient Position  Sitting  -JA      Intra Patient Position  Standing  -JA      Post Patient Position  Sitting  -JA      Recorded by [JA] Ulysses Gill PTA      Row Name 11/07/19 1345 11/07/19 1255          Positioning and Restraints    Pre-Treatment Position  sitting in chair/recliner  -RC  sitting in chair/recliner  -JA     Post Treatment Position  wheelchair  -RC  wheelchair  -JA     In Wheelchair  encouraged to call for assist;call light within reach;exit alarm on  -  sitting;call light within reach;encouraged to call for assist  -JA     Recorded by [RC] Tiki Grover COTA/DIANA [JA] Ulysses Gill, TOAN     Row Name 11/07/19 1255             Daily Summary of Progress (PT)    Functional Goal Overall Progress: Physical Therapy  progressing toward functional goals as expected  -      Recorded by [LISA] Ulysses Gill PTA      Row Name 11/07/19 1345             Daily Summary of Progress (OT)    Functional Goal Overall Progress: Occupational Therapy  progressing toward functional goals as expected  -      Recommendations: Occupational Therapy  cont poc   -RC      Recorded by [RC] Tiki Grover COTA/L        User Key  (r) = Recorded By, (t) = Taken By, (c) = Cosigned By    Initials Name Effective Dates     Ulysses Gill PTA 03/07/18 -      Tiki Grover COTA/DINAA 03/07/18 -                  OT Recommendation and Plan         Plan of Care Review  Plan of Care Reviewed With: patient  Daily Summary of Progress (OT)  Functional Goal Overall Progress: Occupational Therapy: progressing toward functional goals as expected  Recommendations: Occupational Therapy: cont poc   Plan of Care Reviewed With: patient  IRF Plan of Care Review: progress ongoing, continue  Progress, Functional Goals: demonstrating adequate progress  Outcome Summary: discussed pt w/ OT prior to tx, PM tx focus on FMC act.   cont poc            Outcome Measures     Sandra  "Name 11/07/19 0830 11/06/19 1053 11/06/19 0826       How much help from another person do you currently need...    Turning from your back to your side while in flat bed without using bedrails?  3  -CZ  --  4  -CA    Moving from lying on back to sitting on the side of a flat bed without bedrails?  3  -CZ  --  4  -CA    Moving to and from a bed to a chair (including a wheelchair)?  3  -CZ  --  3  -CA    Standing up from a chair using your arms (e.g., wheelchair, bedside chair)?  3  -CZ  --  3  -CA    Climbing 3-5 steps with a railing?  3  -CZ  --  3  -CA    To walk in hospital room?  3  -CZ  --  3  -CA    AM-PAC 6 Clicks Score (PT)  18  -CZ  --  20  -CA       How much help from another is currently needed...    Putting on and taking off regular lower body clothing?  --  3  -ME  --    Bathing (including washing, rinsing, and drying)  --  3  -ME  --    Toileting (which includes using toilet bed pan or urinal)  --  3  -ME  --    Putting on and taking off regular upper body clothing  --  3  -ME  --    Taking care of personal grooming (such as brushing teeth)  --  3  -ME  --    Eating meals  --  4  -ME  --    AM-PAC 6 Clicks Score (OT)  --  19  -ME  --       Tinetti Assessment    Tinetti Assessment  yes  -CZ  --  --    Sitting Balance  1  -CZ  --  --    Arises  2  -CZ  --  --    Attempts to Rise  2  -CZ  --  --    Immediate Standing Balance (first 5 sec)  2  -CZ  --  --    Standing Balance  1  -CZ  --  --    Sternal Nudge (feet close together)  2  -CZ  --  --    Eyes Closed (feet close together)  1  -CZ  --  --    Turning 360 Degrees- Steps  0  -CZ  --  --    Turning 360 Degrees- Steadiness  1  -CZ  --  --    Sitting Down  2  -CZ  --  --    Tinetti Balance Score  14  -CZ  --  --    Gait Initiation (immediate after told \"go\")  1  -CZ  --  --    Step Length- Right Swing  1  -CZ  --  --    Step Length- Left Swing  1  -CZ  --  --    Foot Clearance- Right Foot  1  -CZ  --  --    Foot Clearance- Left Foot  1  -CZ  --  --    Step " Symmetry  1  -CZ  --  --    Step Continuity  1  -CZ  --  --    Path (excursion)  1  -CZ  --  --    Trunk  0  -CZ  --  --    Base of Support  1  -CZ  --  --    Gait Score  9  -CZ  --  --    Tinetti Total Score  23  -CZ  --  --    Tinetti Assistive Device  rolling walker  -CZ  --  --       Functional Assessment    Outcome Measure Options  AM-PAC 6 Clicks Basic Mobility (PT);Tinetti  -CZ  AM-PAC 6 Clicks Daily Activity (OT)  -[facility]-PAC 6 Clicks Basic Mobility (PT)  -CA    Row Name 11/05/19 1550 11/04/19 1600          How much help from another person do you currently need...    Turning from your back to your side while in flat bed without using bedrails?  4  -JA  4  -GB     Moving from lying on back to sitting on the side of a flat bed without bedrails?  3  -JA  3  -GB     Moving to and from a bed to a chair (including a wheelchair)?  3  -JA  3  -GB     Standing up from a chair using your arms (e.g., wheelchair, bedside chair)?  3  -JA  3  -GB     Climbing 3-5 steps with a railing?  3  -JA  2  -GB     To walk in hospital room?  3  -JA  3  -GB     AM-PAC 6 Clicks Score (PT)  19  -JA  18  -GB        Functional Assessment    Outcome Measure Options  AM-PAC 6 Clicks Basic Mobility (PT)  -  AM-PAC 6 Clicks Basic Mobility (PT)  -GB       User Key  (r) = Recorded By, (t) = Taken By, (c) = Cosigned By    Initials Name Provider Type    Kandy Tolentino, PT Physical Therapist    Ulysses Delarosa, PTA Physical Therapy Assistant    Blue Mccrary, PTA Physical Therapy Assistant    Hermilo Lopez, PT Physical Therapist    ME Che Oh, OT Occupational Therapist             Time Calculation:     Time Calculation- OT     Row Name 11/07/19 1421 11/07/19 1348 11/07/19 1308       Time Calculation- OT    OT Start Time  1345  -RC  --  --    OT Stop Time  1410  -RC  --  --    OT Time Calculation (min)  25 min  -RC  --  --    Total Timed Code Minutes- OT  25 minute(s)  -RC  --  --    OT Received On  11/07/19   -  --  --       Timed Charges    53520 - Gait Training Minutes   --  10  -JA  25  -CZ      User Key  (r) = Recorded By, (t) = Taken By, (c) = Cosigned By    Initials Name Provider Type    Ulysses Delarosa, PTA Physical Therapy Assistant    Tiki Deleon COTA/L Occupational Therapy Assistant    CZ Hermilo Coronel, PT Physical Therapist          Therapy Charges for Today     Code Description Service Date Service Provider Modifiers Qty    [id number]  OT THERAPEUTIC ACT EA 15 MIN 11/7/2019 Tiki Grover COTA/DIANA GO 2                   WHITNEY Barr  11/7/2019

## 2019-11-07 NOTE — PLAN OF CARE
Problem: Patient Care Overview  Goal: Plan of Care Review  Outcome: Ongoing (interventions implemented as appropriate)   11/07/19 1030   OTHER   Outcome Summary Initial PT evaluation complete. Patient is alert and cooperative. She requires SPV with bed mobility and transfers, CGA with gait, ambulating 10'x2, 50'x1 (with 2 turns) and 150'x1 with FWW, cues for upright posture. Patient ascends/descends 4 steps with R railing, CGA. And she propels w/c 50'x1 and 150'x1 with BUEs and LEs, SPV. Tinetti assessed: 23/28 or moderate fall risk; recommend continued use of FWW, patient can be quite impulsive. Goals established, continue skilled PT.    Coping/Psychosocial   Plan of Care Reviewed With patient

## 2019-11-07 NOTE — PLAN OF CARE
Problem: Patient Care Overview  Goal: Plan of Care Review  Outcome: Ongoing (interventions implemented as appropriate)   11/07/19 1432   OTHER   Outcome Summary discussed pt w/ OT prior to tx, PM tx focus on FMC act. cont poc   Patient Care Overview   IRF Plan of Care Review progress ongoing, continue   Progress, Functional Goals demonstrating adequate progress   Coping/Psychosocial   Plan of Care Reviewed With patient

## 2019-11-08 ENCOUNTER — APPOINTMENT (OUTPATIENT)
Dept: GENERAL RADIOLOGY | Facility: HOSPITAL | Age: 74
End: 2019-11-08

## 2019-11-08 PROBLEM — G25.81 RESTLESS LEG SYNDROME, UNCONTROLLED: Status: ACTIVE | Noted: 2019-11-08

## 2019-11-08 LAB — URATE SERPL-MCNC: 5.6 MG/DL (ref 2.4–5.7)

## 2019-11-08 PROCEDURE — 99232 SBSQ HOSP IP/OBS MODERATE 35: CPT | Performed by: FAMILY MEDICINE

## 2019-11-08 PROCEDURE — 97530 THERAPEUTIC ACTIVITIES: CPT

## 2019-11-08 PROCEDURE — 97116 GAIT TRAINING THERAPY: CPT

## 2019-11-08 PROCEDURE — 73630 X-RAY EXAM OF FOOT: CPT

## 2019-11-08 PROCEDURE — 97110 THERAPEUTIC EXERCISES: CPT

## 2019-11-08 PROCEDURE — 25010000002 ENOXAPARIN PER 10 MG: Performed by: FAMILY MEDICINE

## 2019-11-08 RX ORDER — ROPINIROLE 1 MG/1
1 TABLET, FILM COATED ORAL NIGHTLY
Status: DISCONTINUED | OUTPATIENT
Start: 2019-11-08 | End: 2019-11-15 | Stop reason: HOSPADM

## 2019-11-08 RX ORDER — DIAZEPAM 5 MG/1
10 TABLET ORAL EVERY 8 HOURS PRN
Status: DISCONTINUED | OUTPATIENT
Start: 2019-11-08 | End: 2019-11-14

## 2019-11-08 RX ORDER — CELECOXIB 200 MG/1
200 CAPSULE ORAL DAILY
Status: COMPLETED | OUTPATIENT
Start: 2019-11-08 | End: 2019-11-10

## 2019-11-08 RX ORDER — BACLOFEN 10 MG/1
10 TABLET ORAL EVERY 8 HOURS SCHEDULED
Status: DISCONTINUED | OUTPATIENT
Start: 2019-11-08 | End: 2019-11-11

## 2019-11-08 RX ORDER — OXYCODONE HYDROCHLORIDE AND ACETAMINOPHEN 5; 325 MG/1; MG/1
1 TABLET ORAL EVERY 6 HOURS PRN
Status: DISCONTINUED | OUTPATIENT
Start: 2019-11-08 | End: 2019-11-15 | Stop reason: HOSPADM

## 2019-11-08 RX ADMIN — GABAPENTIN 400 MG: 400 CAPSULE ORAL at 17:09

## 2019-11-08 RX ADMIN — BACLOFEN 10 MG: 10 TABLET ORAL at 21:22

## 2019-11-08 RX ADMIN — DIAZEPAM 10 MG: 5 TABLET ORAL at 10:18

## 2019-11-08 RX ADMIN — GABAPENTIN 400 MG: 400 CAPSULE ORAL at 12:42

## 2019-11-08 RX ADMIN — BACLOFEN 10 MG: 10 TABLET ORAL at 14:29

## 2019-11-08 RX ADMIN — ASPIRIN 81 MG 81 MG: 81 TABLET ORAL at 08:11

## 2019-11-08 RX ADMIN — OXYCODONE HYDROCHLORIDE AND ACETAMINOPHEN 1 TABLET: 5; 325 TABLET ORAL at 17:09

## 2019-11-08 RX ADMIN — CYCLOBENZAPRINE HYDROCHLORIDE 10 MG: 10 TABLET, FILM COATED ORAL at 07:50

## 2019-11-08 RX ADMIN — CELECOXIB 200 MG: 200 CAPSULE ORAL at 09:52

## 2019-11-08 RX ADMIN — THERA TABS 1 TABLET: TAB at 08:11

## 2019-11-08 RX ADMIN — CLOPIDOGREL BISULFATE 75 MG: 75 TABLET ORAL at 08:11

## 2019-11-08 RX ADMIN — CARVEDILOL 25 MG: 25 TABLET, FILM COATED ORAL at 17:09

## 2019-11-08 RX ADMIN — ENOXAPARIN SODIUM 40 MG: 40 INJECTION SUBCUTANEOUS at 21:22

## 2019-11-08 RX ADMIN — CARVEDILOL 25 MG: 25 TABLET, FILM COATED ORAL at 08:11

## 2019-11-08 RX ADMIN — GABAPENTIN 400 MG: 400 CAPSULE ORAL at 00:38

## 2019-11-08 RX ADMIN — ROPINIROLE 1 MG: 1 TABLET, FILM COATED ORAL at 21:22

## 2019-11-08 RX ADMIN — BACLOFEN 10 MG: 10 TABLET ORAL at 08:11

## 2019-11-08 RX ADMIN — GABAPENTIN 400 MG: 400 CAPSULE ORAL at 06:12

## 2019-11-08 RX ADMIN — IBUPROFEN 200 MG: 200 TABLET, FILM COATED ORAL at 10:18

## 2019-11-08 RX ADMIN — MAGNESIUM HYDROXIDE 10 ML: 2400 SUSPENSION ORAL at 08:11

## 2019-11-08 RX ADMIN — ATORVASTATIN CALCIUM 80 MG: 40 TABLET, FILM COATED ORAL at 21:22

## 2019-11-08 RX ADMIN — OXYCODONE HYDROCHLORIDE AND ACETAMINOPHEN 1 TABLET: 5; 325 TABLET ORAL at 10:38

## 2019-11-08 NOTE — PLAN OF CARE
Problem: Patient Care Overview  Goal: Plan of Care Review  Outcome: Ongoing (interventions implemented as appropriate)   11/08/19 1525   OTHER   Outcome Summary Pt. with difficulty with treatment this a.m. due to R LE spasms and foot pain, Pt. with improved pain & no spasms noted during p.m. treatment, but very groggy from pain meds. Pt. amb. this p.m. with Wilton 66'-90' with RW Wilton for performance & Wilton for transfers. Cont. to mobilize back protection with mobility due to recent extensive back sx 2018.    Patient Care Overview   IRF Plan of Care Review progress ongoing, continue   Progress, Functional Goals demonstrating adequate progress   Coping/Psychosocial   Plan of Care Reviewed With patient     Goal: Discharge Needs Assessment  Outcome: Ongoing (interventions implemented as appropriate)   11/07/19 1051 11/08/19 0428   Discharge Needs Assessment   Patient/Family Anticipates Transition to --  home   Patient/Family Anticipated Services at Transition --  outpatient care   Transportation Anticipated --  family or friend will provide   Disability   Equipment Currently Used at Home shower chair;walker, rolling;cane, straight  (built in chair;raised toilet;RW & cane since home from [email or web address]) --

## 2019-11-08 NOTE — PROGRESS NOTES
LOS: 2 days   Patient Care Team:  Inna Harris APRN as PCP - General (Family Medicine)  Declan Friedman MD as PCP - Claims Attributed  Maykel Marinelli DPM as Consulting Physician (Podiatry)    Chief Complaint:   Left pontine stroke (CMS/HCC)          Interval History:     SUBJECTIVE: This morning when I saw her she was complaining of pain in the medial instep of the right foot.  She had some complaints S3 but says is much worse today.  Since I saw her this morning she is again developing rapidly alternating muscle spasms and lower extremity and upper extremities.  She has been working well with therapy this morning spasms started when she got back in bed    Noted that after her stroke in 2018 she had muscle spasms and it was attributed to an unusual form of a stroke per the patient's history.  She is continued to have muscle spasm since that time is been placed on Requip for restless leg and spasm at night initial stroke 1 year ago.  With recurrent stroke 2 weeks ago was noted to continue to have the muscle spasms that were not very uncomfortable.  Also noted to have them in the emergency room on admission here.  She tells me she has had a history of gout in the past and wonders if that might be causing her discomfort in the right midfoot.  In the past she had gout in the right heel.  History taken from: patient chart RN    Objective     Vital Signs  Temp:  [97.4 °F (36.3 °C)-97.6 °F (36.4 °C)] 97.6 °F (36.4 °C)  Heart Rate:  [74-82] 82  Resp:  [18-20] 20  BP: (141-152)/(65-67) 141/67      11/06/19  1402 11/07/19  0300   Weight: 70.8 kg (156 lb) 70.6 kg (155 lb 9.6 oz)         Physical Exam:     General Appearance:    Alert, cooperative, in no acute distress when I saw her early in the morning.  Now she is very uncomfortable because of muscle spasm   Head:    Normocephalic, without obvious abnormality, atraumatic   Eyes:            Lids and lashes normal, conjunctivae and sclerae normal, no   icterus,  no pallor, corneas clear, PERRLA   Throat:   No oral lesions, no thrush, oral mucosa moist   Neck:   No adenopathy, supple, trachea midline, no thyromegaly, no   carotid bruit, no JVD       Lungs:     Clear to auscultation,respirations regular, even and                  Unlabored no rales rhonchi or wheezes    Heart:    Regular rhythm and normal rate, normal S1 and S2, no            murmur, no gallop, no rub, no click no ectopy   Chest Wall:    No abnormalities observed   Abdomen:     Normal bowel sounds, no masses, no organomegaly, soft        non-tender, non-distended, no guarding, no rebound                Tenderness normal exam   Extremities:   Moves all extremities well, no edema, no cyanosis, no             Redness full range of motion of the extremities  When I examined her at approximately 10:50 AM she was having rapidly alternating spasms in the lower extremities in the right upper extremities.  On the prolonged palpation I could not document  spasm and tightness of the muscles.         Skin:   No bleeding, bruising or rash   Lymph nodes:   No palpable adenopathy   Neurologic:   Cranial nerves 2 - 12 grossly intact, sensation intact, DTR       present and equal bilaterally dysarthria is improving.  Right-sided weakness continues to improve       RESULTS REVIEW:     Lab Results (last 24 hours)     Procedure Component Value Units Date/Time    Uric Acid [[id number]]  (Normal) Collected:  11/07/19 0547    Specimen:  Blood Updated:  11/08/19 0842     Uric Acid 5.6 mg/dL         Imaging Results (Last 72 Hours)     Procedure Component Value Units Date/Time    XR Foot 3+ View Right [[id number]] Collected:  11/08/19 0850     Updated:  11/08/19 1003    Narrative:       Procedure:  Right foot        Indication:  Right foot pain   .    Technique:  Three views   .    Prior relevant exam:  None.  1. Plantar calcaneal spur. Soft tissue calcifications plantar  aponeurosis suggesting changes of chronic plantar  fasciitis.    2.Degenerative changes, osteophytes seen in the dorsum of the  foot at the articulation between the tarsal navicular and  cuneiforms.    3.Mild degenerative changes first metatarsophalangeal joint.    The right foot is otherwise unremarkable.      Impression:       CONCLUSION: As above.    Electronically signed by:  Noman De La Rosa MD  11/8/2019 10:02 AM [facility]  Workstation: [id number]        Nursing notes and therapy notes have been reviewed  I have reviewed medications  Assessment/Plan     Active Hospital Problems    Diagnosis   • **Left pontine stroke (CMS/HCC)   • Restless leg syndrome, uncontrolled     First diagnosed after initial pontine stroke 2018.  Has been treated with Requip at night since that time     • Dysarthria   • Muscle spasm of both lower legs     Worse since this stroke     • Recurrent falls while walking     Due to muscle spasm     • Urinary hesitancy     History of in and out self caths after initial surgery 2018     • DJD (degenerative joint disease), multiple sites     thoracic and lumbar spine  Status post extensive stabilization of the thoracolumbar spine 2018     • Hypertension   • Altered mental status           PLAN: She is working well with therapy.  Blood pressure is well controlled.  Speech is improving.  Muscle spasms continue this is a long-standing problem.  I have increased the Requip, increased back, increase PRN Flexeril and as needed Valium, Percocet as needed  We will need to monitor her for sedation at this time there is none  Will recheck later today  Results of the uric acid and x-rays discussed with her.  She does have a history of arthritis she says due to lupus        This document has been electronically signed by Castro West MD on November 8, 2019 10:58 AM

## 2019-11-08 NOTE — PLAN OF CARE
Problem: Patient Care Overview  Goal: Plan of Care Review  Outcome: Ongoing (interventions implemented as appropriate)   11/08/19 1446   OTHER   Outcome Summary at beginiing of AM tx pt was 10/10 pain in r le, meds began to releive pain and pt sat eob for fmc act. transfered bed-chair w/ min @. IN pm tx pt was lethargic and participacted in ue bike and fmc act. @ of 1 and sba of 1 for transfer chair to bed. pt left in bed in care of nursing, cont poc    Patient Care Overview   IRF Plan of Care Review progress ongoing, continue   Progress, Functional Goals demonstrating adequate progress   Coping/Psychosocial   Plan of Care Reviewed With patient

## 2019-11-08 NOTE — PLAN OF CARE
Problem: Patient Care Overview  Goal: Plan of Care Review  Outcome: Outcome(s) achieved Date Met: 11/08/19 11/08/19 2544   OTHER   Outcome Summary pt has had severe legs crams and spasms today and new orders received whichgave her relief.vss,will cont to monitor   Patient Care Overview   IRF Plan of Care Review progress ongoing, continue   Progress, Functional Goals demonstrating adequate progress   Coping/Psychosocial   Plan of Care Reviewed With patient

## 2019-11-08 NOTE — THERAPY TREATMENT NOTE
Inpatient Rehabilitation - Occupational Therapy Treatment Note    [facility]     Patient Name: Rama Goel  : 1945  MRN: [id number]    Today's Date: 2019                 Admit Date: 2019      Visit Dx:    ICD-10-CM ICD-9-CM   1. Dysarthria R47.1 784.51   2. Oropharyngeal dysphagia R13.12 787.22   3. Impaired physical mobility Z74.09 781.99   4. Impaired mobility and ADLs Z74.09 799.89       Patient Active Problem List   Diagnosis   • Altered mental status   • DJD (degenerative joint disease), multiple sites   • Hypertension   • Left pontine stroke (CMS/HCC)   • CVA (cerebral vascular accident) (CMS/HCC)   • Urinary hesitancy   • Muscle spasm of both lower legs   • Recurrent falls while walking   • Dysarthria   • Restless leg syndrome, uncontrolled         Therapy Treatment    IRF Treatment Summary     Row Name 19 1355 19 1050 19 0915       Evaluation/Treatment Time and Intent    Subjective Information  no complaints  -  complains of;pain  -  complains of;pain  -JA    Existing Precautions/Restrictions  fall  -RC  fall  -RC  fall  -JA    Document Type  therapy note (daily note)  -  therapy note (daily note)  -  therapy note (daily note)  -JA    Mode of Treatment  occupational therapy;individual therapy  -RC  occupational therapy;individual therapy  -  occupational therapy;individual therapy  -    Patient/Family Observations  --  in bed   -  in w/c pt. reports increase pain at R foot and unable to WB at this time.   -JA    Start Time (Evaluation/Treatment)  1355  -  1050  -  0915  -JA    Stop Time (Evaluation/Treatment)  1425  -RC  1158  -  1015  -JA    Recorded by [RC] Tiki Grover COTA/L [RC] Tiki Grover COTA/DIANA [JA] Ulysses Gill PTA    Row Name 19 1355             Relationship/Environment    Primary Source of Support/Comfort  friend  -      Recorded by [RC] Tiki Grover COTA/L      Row Name 19 1355 19 1050  11/08/19 0915       Cognition/Psychosocial- PT/OT    Affect/Mental Status (Cognitive)  low arousal/lethargic  -RC  WFL  -RC  WFL  -JA    Recorded by [RC] Tiki Grover COTA/L [RC] Tiki Grover COTA/DIANA [JA] Ulysses Gill, PTA    Row Name 11/08/19 1050 11/08/19 0915          Bed Mobility Assessment/Treatment    [place] Level (Bed Mobility)  --  independent  -JA     Supine-Sit Endeavor (Bed Mobility)  independent  -  supervision  -JA     Sit-Supine [place]de[place] (Bed Mobility)  independent  -  supervision  -JA     Comment (Bed Mobility)  --  on mat   -JA     Recorded by [RC] Tiki Grover COTA/DIANA [JA] Ulysses Gill, PTA     Row Name 11/08/19 0915             Transfer Assessment/Treatment    Transfer Assessment/Treatment  bed-chair transfer;chair-bed transfer;stand pivot/stand step transfer  -JA      Recorded by [JA] Ulysses Gill, PTA      Row Name 11/08/19 1355 11/08/19 1050 11/08/19 0915       Bed-Chair Transfer    Bed-Chair Endeavor (Transfers)  minimum assist (75% patient effort) sba of 1 for safety pt is sleepy   -  minimum assist (75% patient effort)  -  minimum assist (75% patient effort)  -JA    Assistive Device (Bed-Chair Transfers)  --  wheelchair  -RC  other (see comments) no AD  -JA    Recorded by [RC] Tiki Grover COTA/L [RC] Tiki Grover COTA/DIANA [JA] Ulysses Gill, PTA    Row Name 11/08/19 0915             Chair-Bed Transfer    Chair-Bed Endeavor (Transfers)  minimum assist (75% patient effort)  -JA      Assistive Device (Chair-Bed Transfers)  other (see comments) no AD  -JA      Recorded by [JA] Ulysses Gill, PTA      Row Name 11/08/19 0915             Stand Pivot/Stand Step Transfer    Stand Pivot/Stand Step Endeavor  minimum assist (75% patient effort)  -JA      Assistive Device (Stand Pivot Stand Step Transfer)  other (see comments) no AD  -JA      Recorded by [JA] Ulysses Gill, PTA      Row Name 11/08/19 0915              Wheelchair Mobility/Management    Method of Wheelchair Locomotion (Mobility)  guido-bipedal propulsion (left sided);guido manual (left upper extremity) propulsion  -      Mobility Activities (Wheelchair)  mobility (all) activities  -      Mobility [place] Level (Wheelchair)  supervision  -      Distance Propelled in Feet (Wheelchair)  100  -JA      Recorded by [JA] Ulysses Gill, PTA      Row Name 11/08/19 1050             Lower Body Dressing Assessment/Treatment    Lower Body Dressing [place] Level  don;shoes/slippers;minimum assist (75% patient effort)  -      Recorded by [RC] Tiki Grover COTA/L      Row Name 11/08/19 1355 11/08/19 1050          Fine Motor Testing & Training    Training Activity, Fine Motor Coordination  manipulation of coins  -  -- handwriting act with  on pen   -     Recorded by [RC] Tiki Grover COTA/L [RC] Tiki Grover COTA/L     Row Name 11/08/19 1050 11/08/19 0915          Pain Scale: Numbers Pre/Post-Treatment    Pain Scale: Numbers, Pretreatment  10/10  -RC  10/10 when muscle spasms   -     Pain Scale: Numbers, Post-Treatment  0/10 - no pain  -RC  10/10 when muscle spasm  -     Pain Location - Side  Right  -RC  --     Pain Location  extremity  -RC  --     Pre/Post Treatment Pain Comment  spasm  di subside after aprox 10 min   -  Pt.'s unable to WB on R foot this a.m. pt. agreeable to supine therex, but after completion of supine therex on mat pt. with increase muscle spasms at R LE with increase pain and treatment ended and pt. transferred back to bed in room ns. notified this a.m.   (Significant)   -     Pain Intervention(s)  Medication (See MAR)  -  Medication (See MAR);Repositioned  -     Recorded by [RC] Tiki Grover COTA/DIANA [JA] Ulysses Gill, TOAN     Row Name 11/08/19 1050             Static Sitting Balance    Level of [place] (Unsupported Sitting, Static Balance)  supervision  -      Sitting Position  (Unsupported Sitting, Static Balance)  sitting on edge of bed  -      Time Able to Maintain Position (Unsupported Sitting, Static Balance)  more than 5 minutes  -RC      Recorded by [RC] Tiki Grover COTA/L      Row Name 11/08/19 1355             Aerobic Exercise Activity    Exercise Performed (Aerobic, Therapeutic Exercise)  arm bike  -      Expected Outcome (Aerobic, Therapeutic Exercise)  improve functional tolerance, self-care activity;improve performance, BADLs  -      Time (Aerobic, Therapeutic Exercise)  12  -RC      Recorded by [RC] Tiki Grover COTA/L      Row Name 11/08/19 0915             Lower Extremity Supine Therapeutic Exercise    Performed, Supine Lower Extremity (Therapeutic Exercise)  hip flexion/extension;hip abduction/adduction;SAQ (short arc quad) over bolster;ankle dorsiflexion/plantarflexion;heel slides;other (see comments) Gentle hamstring stretch B  -JA      Exercise Type, Supine Lower Extremity (Therapeutic Exercise)  AROM (active range of motion)  -JA      Sets/Reps Detail, Supine Lower Extremity (Therapeutic Exercise)  x10-20, 3x30'' hold  -JA      Recorded by [JA] Ulysses Gill PTA      Row Name 11/08/19 1355 11/08/19 0915          Vital Signs    Intra Systolic BP Rehab  96  -RC  --     Intra Treatment Diastolic BP  61  -RC  --     Intratreatment Heart Rate (beats/min)  71  -RC  --     Pre Patient Position  --  Sitting  -JA     Intra Patient Position  --  Supine  -JA     Post Patient Position  --  Sitting  -JA     Recorded by [RC] Tiki Grover COTA/DIANA [JA] Ulysses Gill PTA     Row Name 11/08/19 1355 11/08/19 1050 11/08/19 0915       Positioning and Restraints    Pre-Treatment Position  sitting in chair/recliner  -RC  in bed  -RC  sitting in chair/recliner  -JA    Post Treatment Position  bed  -RC  wheelchair  -RC  bed  -JA    In Bed  call light within reach;encouraged to call for assist;exit alarm on;with nsg  -RC  --  side lying right  -JA    In  Wheelchair  --  call light within reach;encouraged to call for assist;exit alarm on;notified nsg  -RC  --    Recorded by [KIERSTEN] Tiki Grover COTA/L [KIERSTEN] Tiki Grover COTA/DIANA [LISA] Ulysses Gill, PTA    Row Name 11/08/19 0915             Daily Summary of Progress (PT)    Functional Goal Overall Progress: Physical Therapy  progressing toward functional goals as expected  -LISA      Recommendations: Physical Therapy  Cont. as pt.'s pain will allow  -      Recorded by [LISA] Ulysses Gill, PTA      Row Name 11/08/19 1355             Daily Summary of Progress (OT)    Functional Goal Overall Progress: Occupational Therapy  progressing toward functional goals as expected  -      Recommendations: Occupational Therapy  cont poc   -RC      Recorded by [KIERSTEN] Tiki Grover COTA/L        User Key  (r) = Recorded By, (t) = Taken By, (c) = Cosigned By    Initials Name Effective Dates    Ulysses Delarosa, PTA 03/07/18 -      Tiki Grover COTA/DIANA 03/07/18 -                  OT Recommendation and Plan         Plan of Care Review  Plan of Care Reviewed With: patient  Daily Summary of Progress (OT)  Functional Goal Overall Progress: Occupational Therapy: progressing toward functional goals as expected  Recommendations: Occupational Therapy: cont poc   Plan of Care Reviewed With: patient  IRF Plan of Care Review: progress ongoing, continue  Progress, Functional Goals: demonstrating adequate progress  Outcome Summary: at beginiing of AM tx pt was 10/10 pain in r le, meds began to releive pain and pt sat eob for fmc act. transfered bed-chair w/ min @. IN pm tx pt was lethargic and participacte in ue bike and fmc act @ of 1 and sba of 1 for transfer chair to bed.   pt left in bed in care of nursing,   cont poc     OT IRF GOALS     Row Name 11/08/19 3902 11/07/19 1051          Bathing Goal 1 (OT-IRF)    Activity/Device (Bathing Goal 1, OT-IRF)  bathing skills, all  -RC  bathing skills, all  -ME      [place] Level (Bathing Goal 1, OT-IRF)  conditional independence  -RC  conditional independence  -ME     Time Frame (Bathing Goal 1, OT-IRF)  long term goal (LTG);by discharge  -RC  long term goal (LTG);by discharge  -ME     Progress/Outcomes (Bathing Goal 1, OT-IRF)  goal not met  -RC  goal not met  -ME        UB Dressing Goal 1 (OT-IRF)    Activity/Device (UB Dressing Goal 1, OT-IRF)  upper body dressing  -RC  upper body dressing  -ME     [place] (UB Dress Goal 1, OT-IRF)  independent  -RC  independent  -ME     Time Frame (UB Dressing Goal 1, OT-IRF)  long term goal (LTG);by discharge  -RC  long term goal (LTG);by discharge  -ME     Progress/Outcomes (UB Dressing Goal 1, OT-IRF)  goal not met  -RC  goal not met  -ME        LB Dressing Goal 1 (OT-IRF)    Activity/Device (LB Dressing Goal 1, OT-IRF)  lower body dressing  -RC  lower body dressing  -ME     [place] (LB Dressing Goal 1, OT-IRF)  conditional independence  -RC  conditional independence  -ME     Time Frame (LB Dressing Goal 1, OT-IRF)  long term goal (LTG);by discharge  -RC  long term goal (LTG);by discharge  -ME     Progress/Outcomes (LB Dressing Goal 1, OT-IRF)  goal not met  -RC  goal not met  -ME       User Key  (r) = Recorded By, (t) = Taken By, (c) = Cosigned By    Initials Name Provider Type    Tiki Deleon MITCHELL/L Occupational Therapy Assistant    Che Hinkle OT Occupational Therapist          Occupational Therapy Education     Title: PT OT SLP Therapies (In Progress)     Topic: Occupational Therapy (In Progress)     Point: ADL training (Done)     Description: Instruct learner(s) on proper safety adaptation and remediation techniques during self care or transfers.   Instruct in proper use of assistive devices.    Learning Progress Summary           Patient Acceptance, BARRETT SANTA DU by ME at 11/7/2019  3:40 PM    Comment:  Educated on OT and POC. Educated to continue exercises given to her for translation and fine motor  coordination. Educated to call for assist. Educated on hand placement for transfers. Educated on safe mechanics for completing ADLs.                   Point: Home exercise program (Done)     Description: Instruct learner(s) on appropriate technique for monitoring, assisting and/or progressing therapeutic exercises/activities.    Learning Progress Summary           Patient Acceptance, E, GISSELLE YHDE by ME at 11/7/2019  3:40 PM    Comment:  Educated on OT and POC. Educated to continue exercises given to her for translation and fine motor coordination. Educated to call for assist. Educated on hand placement for transfers. Educated on safe mechanics for completing ADLs.                   Point: Precautions (In Progress)     Description: Instruct learner(s) on prescribed precautions during self-care and functional transfers.    Learning Progress Summary           Patient Acceptance, E, NR by  at 11/8/2019  2:46 PM    Acceptance, E, GISSELLE HYDE by ME at 11/7/2019  3:40 PM    Comment:  Educated on OT and POC. Educated to continue exercises given to her for translation and fine motor coordination. Educated to call for assist. Educated on hand placement for transfers. Educated on safe mechanics for completing ADLs.                   Point: Body mechanics (In Progress)     Description: Instruct learner(s) on proper positioning and spine alignment during self-care, functional mobility activities and/or exercises.    Learning Progress Summary           Patient Acceptance, E, NR by  at 11/8/2019  2:46 PM                               User Key     Initials Effective Dates Name Provider Type Discipline     03/07/18 -  Tiki Grover COTA/L Occupational Therapy Assistant OT    ME 09/10/19 -  Che Oh OT Occupational Therapist OT                Outcome Measures     Row Name 11/08/19 1355 11/08/19 0915 11/07/19 1051       9 Hole Peg    9-Hole Peg Left  --  --  31 seconds   -ME    9-Hole Peg Right  --  --  36 seconds   -ME        How much help from another person do you currently need...    Turning from your back to your side while in flat bed without using bedrails?  --  3  -JA  --    Moving from lying on back to sitting on the side of a flat bed without bedrails?  --  3  -JA  --    Moving to and from a bed to a chair (including a wheelchair)?  --  3  -JA  --    Standing up from a chair using your arms (e.g., wheelchair, bedside chair)?  --  3  -JA  --    Climbing 3-5 steps with a railing?  --  3  -JA  --    To walk in hospital room?  --  3  -JA  --    AM-PAC 6 Clicks Score (PT)  --  18  -JA  --       How much help from another is currently needed...    Putting on and taking off regular lower body clothing?  3  -RC  --  3  -ME    Bathing (including washing, rinsing, and drying)  3  -RC  --  3  -ME    Toileting (which includes using toilet bed pan or urinal)  3  -RC  --  3  -ME    Putting on and taking off regular upper body clothing  3  -RC  --  3  -ME    Taking care of personal grooming (such as brushing teeth)  3  -RC  --  3  -ME    Eating meals  4  -RC  --  4  -ME    AM-PAC 6 Clicks Score (OT)  19  -RC  --  19  -ME       Modified [place] Scale    Modified [place] Scale  --  --  4 - Moderately severe disability.  Unable to walk without assistance, and unable to attend to own bodily needs without assistance.  -ME       Functional Assessment    Outcome Measure Options  --  AM-PAC 6 Clicks Basic Mobility (PT)  -  AM-PAC 6 Clicks Daily Activity (OT);9 Hole Peg  -ME    Row Name 11/07/19 0830 11/06/19 1053 11/06/19 0826       How much help from another person do you currently need...    Turning from your back to your side while in flat bed without using bedrails?  3  -CZ  --  4  -CA    Moving from lying on back to sitting on the side of a flat bed without bedrails?  3  -CZ  --  4  -CA    Moving to and from a bed to a chair (including a wheelchair)?  3  -CZ  --  3  -CA    Standing up from a chair using your arms (e.g., wheelchair, bedside  "chair)?  3  -CZ  --  3  -CA    Climbing 3-5 steps with a railing?  3  -CZ  --  3  -CA    To walk in hospital room?  3  -CZ  --  3  -CA    AM-[facility] 6 Clicks Score (PT)  18  -CZ  --  20  -CA       How much help from another is currently needed...    Putting on and taking off regular lower body clothing?  --  3  -ME  --    Bathing (including washing, rinsing, and drying)  --  3  -ME  --    Toileting (which includes using toilet bed pan or urinal)  --  3  -ME  --    Putting on and taking off regular upper body clothing  --  3  -ME  --    Taking care of personal grooming (such as brushing teeth)  --  3  -ME  --    Eating meals  --  4  -ME  --    AM-[facility] 6 Clicks Score (OT)  --  19  -ME  --       T[place]etti Assessment    [place] Assessment  yes  -CZ  --  --    Sitting Balance  1  -CZ  --  --    Arises  2  -CZ  --  --    Attempts to Rise  2  -CZ  --  --    Immediate Standing Balance (first 5 sec)  2  -CZ  --  --    Standing Balance  1  -CZ  --  --    Sternal Nudge (feet close together)  2  -CZ  --  --    Eyes Closed (feet close together)  1  -CZ  --  --    Turning 360 Degrees- Steps  0  -CZ  --  --    Turning 360 Degrees- Steadiness  1  -CZ  --  --    Sitting Down  2  -CZ  --  --    [place] Balance Score  14  -CZ  --  --    Gait Initiation (immediate after told \"go\")  1  -CZ  --  --    Step Length- Right Swing  1  -CZ  --  --    Step Length- Left Swing  1  -CZ  --  --    Foot Clearance- Right Foot  1  -CZ  --  --    Foot Clearance- Left Foot  1  -CZ  --  --    Step Symmetry  1  -CZ  --  --    Step Continuity  1  -CZ  --  --    Path (excursion)  1  -CZ  --  --    Trunk  0  -CZ  --  --    Base of Support  1  -CZ  --  --    Gait Score  9  -CZ  --  --    T[place]etti Total Score  23  -CZ  --  --    T[place]etti Assistive Device  rolling walker  -CZ  --  --       Functional Assessment    Outcome Measure Options  AM-[facility] 6 Clicks Basic Mobility (PT);[place]  -CZ  AM-PAC 6 Clicks Daily Activity (OT)  -ME  AM-PAC 6 Clicks Basic Mobility (PT)  -CA "    Row Name 11/05/19 1550             How much help from another person do you currently need...    Turning from your back to your side while in flat bed without using bedrails?  4  -JA      Moving from lying on back to sitting on the side of a flat bed without bedrails?  3  -JA      Moving to and from a bed to a chair (including a wheelchair)?  3  -JA      Standing up from a chair using your arms (e.g., wheelchair, bedside chair)?  3  -JA      Climbing 3-5 steps with a railing?  3  -JA      To walk in hospital room?  3  -JA      AM-PAC 6 Clicks Score (PT)  19  -JA         Functional Assessment    Outcome Measure Options  AM-PAC 6 Clicks Basic Mobility (PT)  -JA        User Key  (r) = Recorded By, (t) = Taken By, (c) = Cosigned By    Initials Name Provider Type    Ulysses Delarosa, PTA Physical Therapy Assistant    Blue Mccrary, PTA Physical Therapy Assistant    Tiki Deleon, MITCHELL/L Occupational Therapy Assistant    Hermilo Lopez, PT Physical Therapist    Che Hinkle, OT Occupational Therapist             Time Calculation:     Time Calculation- OT     Row Name 11/08/19 1449 11/08/19 1333          Time Calculation- OT    OT Start Time  1355  -RC  1050  -RC     OT Stop Time  1425  -RC  1158  -     OT Time Calculation (min)  30 min  -RC  68 min  -     Total Timed Code Minutes- OT  30 minute(s)  -RC  68 minute(s)  -     OT Received On  11/08/19  -RC  11/08/19  -       User Key  (r) = Recorded By, (t) = Taken By, (c) = Cosigned By    Initials Name Provider Type     Tiki Grover, MITCHELL/L Occupational Therapy Assistant          Therapy Charges for Today     Code Description Service Date Service Provider Modifiers Qty    62476579217 HC OT THERAPEUTIC ACT EA 15 MIN 11/7/2019 Tiki Grover, MITCHELL/L GO 2    22165955667 HC OT THERAPEUTIC ACT EA 15 MIN 11/8/2019 Tiki Grover, MITCHELL/L GO 4    38607856790 HC OT THER PROC EA 15 MIN 11/8/2019 Tiki Grover, MITCHELL/L GO 1    [id number]  HC OT THERAPEUTIC ACT EA 15 MIN 11/8/2019 Tiki Grover, MITCHELL/L GO 1    65383803358 HC OT THER PROC EA 15 MIN 11/8/2019 ReillyTiki, MITCHELL/L GO 1                   Tiki Grover MITCHELL/L  11/8/2019

## 2019-11-08 NOTE — THERAPY TREATMENT NOTE
"Inpatient Rehabilitation - Physical Therapy Treatment Note  [facility]     Patient Name: Rama Goel  : 1945  MRN: [id number]    Today's Date: 2019                 Admit Date: 2019      Visit Dx:      ICD-10-CM ICD-9-CM   1. Dysarthria R47.1 784.51   2. Oropharyngeal dysphagia R13.12 787.22   3. Impaired physical mobility Z74.09 781.99   4. Impaired mobility and ADLs Z74.09 799.89       Patient Active Problem List   Diagnosis   • Altered mental status   • DJD (degenerative joint disease), multiple sites   • Hypertension   • Left pontine stroke (CMS/HCC)   • CVA (cerebral vascular accident) (CMS/HCC)   • Urinary hesitancy   • Muscle spasm of both lower legs   • Recurrent falls while walking   • Dysarthria   • Restless leg syndrome, uncontrolled       Therapy Treatment    IRF Treatment Summary     Row Name 19 1525 19 1355 19 1050       Evaluation/Treatment Time and Intent    Subjective Information  complains of \"groggy\"  -JA  no complaints  -  complains of;pain  -RC    Existing Precautions/Restrictions  fall  -JA  fall  -RC  fall  -RC    Document Type  therapy note (daily note)  -JA  therapy note (daily note)  -RC  therapy note (daily note)  -    Mode of Treatment  individual therapy;physical therapy  -JA  occupational therapy;individual therapy  -RC  occupational therapy;individual therapy  -RC    Patient/Family Observations  Pt. asleep in bed no family present   -JA  --  in bed   -    Start Time (Evaluation/Treatment)  1525  -JA  1355  -RC  1050  -RC    Stop Time (Evaluation/Treatment)  1555  -JA  1425  -RC  1158  -RC    Recorded by [JA] Ulysses Gill PTA [RC] Tiki Grover COTA/L [RC] Tiki Grover COTA/L    Row Name 19 0915             Evaluation/Treatment Time and Intent    Subjective Information  complains of;pain  -JA      Existing Precautions/Restrictions  fall  -      Document Type  therapy note (daily note)  -      Mode of Treatment "  occupational therapy;individual therapy  -JA      Patient/Family Observations  in w/c pt. reports increase pain at R foot and unable to WB at this time.   -JA      Start Time (Evaluation/Treatment)  0915  -JA      Stop Time (Evaluation/Treatment)  1015  -JA      Recorded by [JA] Ulysses Gill PTA      Row Name 11/08/19 1355             Relationship/Environment    Primary Source of Support/Comfort  friend  -RC      Recorded by [RC] Tiki Grover MITCHELL/L      Row Name 11/08/19 1525 11/08/19 1355 11/08/19 1050       Cognition/Psychosocial- PT/OT    Affect/Mental Status (Cognitive)  low arousal/lethargic  -JA  low arousal/lethargic  -  WFL  -RC    Follows Commands (Cognition)  delayed response/completion;repetition of directions required;verbal cues/prompting required  -JA  --  --    Cognitive Function (Cognitive)  safety deficit  -JA  --  --    Recorded by [LISA] Ulysses Gill PTA [RC] Tiki Grover MITCHELL/L [RC] Tiki Grover MITCHELL/L    Row Name 11/08/19 0915             Cognition/Psychosocial- PT/OT    Affect/Mental Status (Cognitive)  WFL  -JA      Recorded by [JA] Ulysses Gill PTA      Row Name 11/08/19 1525 11/08/19 1050 11/08/19 0915       Bed Mobility Assessment/Treatment    [place] Level (Bed Mobility)  --  --  independent  -JA    Rolling Right [place] (Bed Mobility)  conditional independence  -JA  --  --    Supine-Sit [place] (Bed Mobility)  supervision  -JA  independent  -  supervision  -JA    Sit-Supine [place] (Bed Mobility)  supervision  -  independent  -  supervision  -JA    Comment (Bed Mobility)  --  --  on mat   -JA    Recorded by [LISA] Ulysses Gill PTA [RC] Tiki Grover MITCHELL/L [LISA] Ulysses Gill PTA    Row Name 11/08/19 1525 11/08/19 0915          Transfer Assessment/Treatment    Transfer Assessment/Treatment  chair-bed transfer  -JA  bed-chair transfer;chair-bed transfer;stand pivot/stand step transfer  -JA     Recorded by  [LISA] Ulysses Gill PTA [JA] Ulysses Gill, PTA     Row Name 11/08/19 1525 11/08/19 1355 11/08/19 1050       Bed-Chair Transfer    Bed-Chair [place] (Transfers)  --  -JA  minimum assist (75% patient effort) sba of 1 for safety pt is sleepy   -RC  minimum assist (75% patient effort)  -RC    Assistive Device (Bed-Chair Transfers)  --  -JA  --  wheelchair  -RC    Recorded by [LISA] Ulysses Gill PTA [RC] Tiki Grover, MITCHELL/L [RC] Tiki Grover, MITCHELL/L    Row Name 11/08/19 0915             Bed-Chair Transfer    Bed-Chair [place] (Transfers)  minimum assist (75% patient effort)  -JA      Assistive Device (Bed-Chair Transfers)  other (see comments) no AD  -JA      Recorded by [LISA] Ulysses Gill PTA      Row Name 11/08/19 1525 11/08/19 0915          Chair-Bed Transfer    Chair-Bed [place] (Transfers)  contact guard;minimum assist (75% patient effort)  -JA  minimum assist (75% patient effort)  -JA     Assistive Device (Chair-Bed Transfers)  other (see comments) no AD  -JA  other (see comments) no AD  -JA     Recorded by [LISA] Ulysses Gill PTA [JA] Ulysses Gill, PTA     Row Name 11/08/19 1525             Sit-Stand Transfer    Sit-Stand [place] (Transfers)  contact guard;verbal cues  -JA      Assistive Device (Sit-Stand Transfers)  walker, front-wheeled  -JA      Recorded by [LISA] Ulysses Gill PTA      Row Name 11/08/19 1525             Stand-Sit Transfer    Stand-Sit [place] (Transfers)  contact guard;verbal cues  -JA      Assistive Device (Stand-Sit Transfers)  walker, front-wheeled  -JA      Recorded by [LISA] Ulysses Gill, TOAN      Row Name 11/08/19 1525 11/08/19 0915          Stand Pivot/Stand Step Transfer    Stand Pivot/Stand Step [place]  --  -JA  minimum assist (75% patient effort)  -JA     Assistive Device (Stand Pivot Stand Step Transfer)  --  -JA  other (see comments) no AD  -JA     Recorded by [LISA] Ulysses Gill PTA [JA]  "Ulysses Gill PTA     Row Name 11/08/19 1525             Gait/Stairs Assessment/Training    [place] Level (Gait)  minimum assist (75% patient effort)  -LISA      Assistive Device (Gait)  walker, front-wheeled  -JA      Distance in Feet (Gait)  66', 54', 90'  -JA      Pattern (Gait)  step-through  -JA      Deviations/Abnormal Patterns (Gait)  base of support, narrow;gait speed decreased;stride length decreased;ataxic  -JA      Bilateral Gait Deviations  heel strike decreased  -JA      Comment (Gait/Stairs)  Pt. with knee flexed posture with gt., NBOS, slow speed with gt. due to \"groggy\" from pain meds per pt. report   -JA      Recorded by [JA] Ulysses Gill PTA      Row Name 11/08/19 1525 11/08/19 0915          Wheelchair Mobility/Management    Method of Wheelchair Locomotion (Mobility)  --  -JA  guido-bipedal propulsion (left sided);guido manual (left upper extremity) propulsion  -JA     Mobility Activities (Wheelchair)  --  -JA  mobility (all) activities  -JA     Mobility [place] Level (Wheelchair)  --  -  supervision  -JA     Distance Propelled in Feet (Wheelchair)  --  100  -JA     Recorded by [JA] Ulysses Gill PTA [JA] Ulysses Gill PTA     Row Name 11/08/19 1050             Lower Body Dressing Assessment/Treatment    Lower Body Dressing [place] Level  don;shoes/slippers;minimum assist (75% patient effort)  -      Recorded by [RC] Tiki Grover COTA/L      Row Name 11/08/19 1355 11/08/19 1050          Fine Motor Testing & Training    Training Activity, Fine Motor Coordination  manipulation of coins  -  -- handwriting act with  on pen   -RC     Recorded by [RC] Tiki Grover COTA/L [RC] Tiki Grover COTA/L     Row Name 11/08/19 1525 11/08/19 1050 11/08/19 0915       Pain Scale: Numbers Pre/Post-Treatment    Pain Scale: Numbers, Pretreatment  2/10  -JA  10/10  -RC  10/10 when muscle spasms   -JA    Pain Scale: Numbers, Post-Treatment  0/10 - no pain  " -JA  0/10 - no pain  -RC  10/10 when muscle spasm  -JA    Pain Location - Side  Right  -JA  Right  -RC  --    Pain Location  foot  -JA  extremity  -RC  --    Pre/Post Treatment Pain Comment  --  spasm  di subside after aprox 10 min   -RC  Pt.'s unable to WB on R foot this a.m. pt. agreeable to supine therex, but after completion of supine therex on mat pt. with increase muscle spasms at R LE with increase pain and treatment ended and pt. transferred back to bed in room nsg. notified this a.m.   (Significant)   -    Pain Intervention(s)  Medication (See MAR);Repositioned;Ambulation/increased activity  -JA  Medication (See MAR)  -  Medication (See MAR);Repositioned  -JA    Recorded by [LISA] Ulysses Gill PTA [RC] Tiki Grover COTA/DIANA [JA] Ulysses Gill PTA    Row Name 11/08/19 1050             Static Sitting Balance    Level of [place] (Unsupported Sitting, Static Balance)  supervision  -      Sitting Position (Unsupported Sitting, Static Balance)  sitting on edge of bed  -      Time Able to Maintain Position (Unsupported Sitting, Static Balance)  more than 5 minutes  -RC      Recorded by [RC] Tiki Grover COTA/L      Row Name 11/08/19 1355             Aerobic Exercise Activity    Exercise Performed (Aerobic, Therapeutic Exercise)  arm bike  -      Expected Outcome (Aerobic, Therapeutic Exercise)  improve functional tolerance, self-care activity;improve performance, BADLs  -      Time (Aerobic, Therapeutic Exercise)  12  -RC      Recorded by [RC] Tiki Grover COTA/L      Row Name 11/08/19 1525             Lower Extremity Seated Therapeutic Exercise    Performed, Seated Lower Extremity (Therapeutic Exercise)  LAQ (long arc quad), knee extension  -JA      Exercise Type, Seated Lower Extremity (Therapeutic Exercise)  AROM (active range of motion)  -JA      Sets/Reps Detail, Seated Lower Extremity (Therapeutic Exercise)  20  -JA      Recorded by [JA] Ulysses Gill PTA       Row Name 11/08/19 0915             Lower Extremity Supine Therapeutic Exercise    Performed, Supine Lower Extremity (Therapeutic Exercise)  hip flexion/extension;hip abduction/adduction;SAQ (short arc quad) over bolster;ankle dorsiflexion/plantarflexion;heel slides;other (see comments) Gentle hamstring stretch B  -JA      Exercise Type, Supine Lower Extremity (Therapeutic Exercise)  AROM (active range of motion)  -JA      Sets/Reps Detail, Supine Lower Extremity (Therapeutic Exercise)  x10-20, 3x30'' hold  -JA      Recorded by [LISA] Ulysses Gill PTA      Row Name 11/08/19 1525 11/08/19 1355 11/08/19 0915       Vital Signs    Intra Systolic BP Rehab  --  96  -RC  --    Intra Treatment Diastolic BP  --  61  -RC  --    Intratreatment Heart Rate (beats/min)  --  71  -RC  --    Pre Patient Position  Supine  -JA  --  Sitting  -JA    Intra Patient Position  Standing  -JA  --  Supine  -JA    Post Patient Position  Supine  -JA  --  Sitting  -JA    Recorded by [LISA] Ulysses Gill PTA [RC] Tiki Grover MITCHELL/L [JA] Ulysses Gill PTA    Row Name 11/08/19 1525 11/08/19 1355 11/08/19 1050       Positioning and Restraints    Pre-Treatment Position  in bed  -JA  sitting in chair/recliner  -RC  in bed  -RC    Post Treatment Position  bed  -  bed  -RC  wheelchair  -RC    In Bed  supine;call light within reach;encouraged to call for assist;exit alarm on  -JA  call light within reach;encouraged to call for assist;exit alarm on;with nsg  -RC  --    In Wheelchair  --  --  call light within reach;encouraged to call for assist;exit alarm on;notified nsg  -RC    Recorded by [LISA] Ulysses Gill PTA [RC] Tiki Grover MITCHELL/L [RC] Tiki Grover MITCHELL/L    Row Name 11/08/19 0915             Positioning and Restraints    Pre-Treatment Position  sitting in chair/recliner  -JA      Post Treatment Position  bed  -JA      In Bed  side lying right  -JA      Recorded by [LISA] Ulysses Gill PTA      Row  Name 11/08/19 1525 11/08/19 0915          Daily Summary of Progress (PT)    Functional Goal Overall Progress: Physical Therapy  progressing toward functional goals as expected  -JA  progressing toward functional goals as expected  -LISA     Recommendations: Physical Therapy  --  Cont. as pt.'s pain will allow  -JA     Recorded by [LISA] Ulysses Gill PTA [JA] Ulysses Gill, TOAN     Row Name 11/08/19 1355             Daily Summary of Progress (OT)    Functional Goal Overall Progress: Occupational Therapy  progressing toward functional goals as expected  -      Recommendations: Occupational Therapy  cont poc   -RC      Recorded by [RC] Tiki Grover MITCHELL/L        User Key  (r) = Recorded By, (t) = Taken By, (c) = Cosigned By    Initials Name Effective Dates    Ulysses Delarosa PTA 03/07/18 -     Tiki Deleon MITCHELL/L 03/07/18 -            Physical Therapy Education     Title: PT OT SLP Therapies (In Progress)     Topic: Physical Therapy (In Progress)     Point: Mobility training (Done)     Learning Progress Summary           Patient Acceptance, E, VU,NR by  at 11/7/2019  1:02 PM    Comment:  Mellisa assessed: 23/28 or moderate fall risk.  Recommend continued use of FWW.  Tag alarm applied.                               User Key     Initials Effective Dates Name Provider Type Discipline     04/03/18 -  Hermilo Coronel, PT Physical Therapist PT                  PT Recommendation and Plan     Plan of Care Reviewed With: patient  Daily Summary of Progress (PT)  Functional Goal Overall Progress: Physical Therapy: progressing toward functional goals as expected  Recommendations: Physical Therapy: Cont. as pt.'s pain will allow  IRF Plan of Care Review: progress ongoing, continue  Progress, Functional Goals: demonstrating adequate progress  Outcome Summary: Pt. with difficulty with treatment this a.m. due to R LE spasms and foot pain, Pt. with improved pain & no spasms noted during p.m.  treatment, but very groggy from pain meds. Pt. amb. this p.m. with [place] 66'-90' with RW [place] for performance & [place] for transfers. Cont. to mobilize back protection with mobility due to recent extensive back sx 2018.       PT IRF GOALS     Row Name 11/08/19 0915             Bed Mobility Goal 1 (PT-IRF)    Activity/Assistive Device (Bed Mobility Goal 1, PT-IRF)  sit to supine/supine to sit  -JA      [place] Level (Bed Mobility Goal 1, PT-IRF)  independent  -JA      Time Frame (Bed Mobility Goal 1, PT-IRF)  short term goal (STG);5 - 7 days  -JA      Progress/Outcomes (Bed Mobility Goal 1, PT-IRF)  goal not met  -JA         Transfer Goal 1 (PT-IRF)    Activity/Assistive Device (Transfer Goal 1, PT-IRF)  sit-to-stand/stand-to-sit;bed-to-chair/chair-to-bed  -JA      [place] Level (Transfer Goal 1, PT-IRF)  conditional independence  -JA      Time Frame (Transfer Goal 1, PT-IRF)  short term goal (STG);5 - 7 days  -JA      Barriers (Transfer Goal 1, PT-IRF)  Impulsive at times.   -JA      Progress/Outcomes (Transfer Goal 1, PT-IRF)  goal not met  -JA         Gait/Walking Locomotion Goal 1 (PT-IRF)    Activity/Assistive Device (Gait/Walking Locomotion Goal 1, PT-IRF)  walker, rolling  -JA      Gait/Walking Locomotion Distance Goal 1 (PT-IRF)  150'x2  -JA      [place] Level (Gait/Walking Locomotion Goal 1, PT-IRF)  conditional independence  -JA      Time Frame (Gait/Walking Locomotion Goal 1, PT-IRF)  long term goal (LTG);by discharge  -JA      Barriers (Gait/Walking Locomotion Goal 1, PT-IRF)  Stooped posture.   -JA      Progress/Outcomes (Gait/Walking Locomotion Goal 1, PT-IRF)  goal not met  -JA         Gait/Walking Locomotion Goal (PT-IRF)    Gait/Walking Locomotion Goal (PT-IRF)  Tinetti fall risk, score: 25/28  -JA      Time Frame (Gait/Walking Locomotion Goal, PT-IRF)  long term goal (LTG);by discharge  -JA      Progress/Outcomes (Gait/Walking Locomotion Goal, PT-IRF)  goal not met  -JA         Stairs  Goal 1 (PT-IRF)    Activity/Assistive Device (Stairs Goal 1, PT-IRF)  using handrail, right  -JA      Number of Stairs (Stairs Goal 1, PT-IRF)  4 steps, R rail.   -JA      [place] Level (Stairs Goal 1, PT-IRF)  conditional independence  -JA      Time Frame (Stairs Goal 1, PT-IRF)  long term goal (LTG);by discharge  -JA      Progress/Outcomes (Stairs Goal 1, PT-IRF)  goal not met  -JA        User Key  (r) = Recorded By, (t) = Taken By, (c) = Cosigned By    Initials Name Provider Type    Ulysses Delarosa PTA Physical Therapy Assistant        Outcome Measures     Row Name 11/08/19 1355 11/08/19 0915 11/07/19 1051       9 Hole Peg    9-[place]ole [place] Left  --  --  31 seconds   -ME    9-[place]ole Peg Right  --  --  36 seconds   -ME       How much help from another person do you currently need...    Turning from your back to your side while in flat bed without using bedrails?  --  3  -JA  --    Moving from lying on back to sitting on the side of a flat bed without bedrails?  --  3  -JA  --    Moving to and from a bed to a chair (including a wheelchair)?  --  3  -JA  --    Standing up from a chair using your arms (e.g., wheelchair, bedside chair)?  --  3  -JA  --    Climbing 3-5 steps with a railing?  --  3  -JA  --    To walk in hospital room?  --  3  -JA  --    AM-PAC 6 Clicks Score (PT)  --  18  -JA  --       How much help from another is currently needed...    Putting on and taking off regular lower body clothing?  3  -RC  --  3  -ME    Bathing (including washing, rinsing, and drying)  3  -RC  --  3  -ME    Toileting (which includes using toilet bed pan or urinal)  3  -RC  --  3  -ME    Putting on and taking off regular upper body clothing  3  -RC  --  3  -ME    Taking care of personal grooming (such as brushing teeth)  3  -RC  --  3  -ME    Eating meals  4  -RC  --  4  -ME    AM-PAC 6 Clicks Score (OT)  19  -RC  --  19  -ME       Modified [place] Scale    Modified [place] Scale  --  --  4 - Moderately severe  disability.  Unable to walk without assistance, and unable to attend to own bodily needs without assistance.  -ME       Functional Assessment    Outcome Measure Options  --  AM-[facility] 6 Clicks Basic Mobility (PT)  -JA  AM-[facility] 6 Clicks Daily Activity (OT);9 [place] Peg  -ME    Row Name 11/07/19 0830 11/06/19 1053 11/06/19 0826       How much help from another person do you currently need...    Turning from your back to your side while in flat bed without using bedrails?  3  -CZ  --  4  -CA    Moving from lying on back to sitting on the side of a flat bed without bedrails?  3  -CZ  --  4  -CA    Moving to and from a bed to a chair (including a wheelchair)?  3  -CZ  --  3  -CA    Standing up from a chair using your arms (e.g., wheelchair, bedside chair)?  3  -CZ  --  3  -CA    Climbing 3-5 steps with a railing?  3  -CZ  --  3  -CA    To walk in hospital room?  3  -CZ  --  3  -CA    AM-[facility] 6 Clicks Score (PT)  18  -CZ  --  20  -CA       How much help from another is currently needed...    Putting on and taking off regular lower body clothing?  --  3  -ME  --    Bathing (including washing, rinsing, and drying)  --  3  -ME  --    Toileting (which includes using toilet bed pan or urinal)  --  3  -ME  --    Putting on and taking off regular upper body clothing  --  3  -ME  --    Taking care of personal grooming (such as brushing teeth)  --  3  -ME  --    Eating meals  --  4  -ME  --    AM-[facility] 6 Clicks Score (OT)  --  19  -ME  --       Tinetti Assessment    Tinetti Assessment  yes  -CZ  --  --    Sitting Balance  1  -CZ  --  --    Arises  2  -CZ  --  --    Attempts to Rise  2  -CZ  --  --    Immediate Standing Balance (first 5 sec)  2  -CZ  --  --    Standing Balance  1  -CZ  --  --    Sternal Nudge (feet close together)  2  -CZ  --  --    Eyes Closed (feet close together)  1  -CZ  --  --    Turning 360 Degrees- Steps  0  -CZ  --  --    Turning 360 Degrees- Steadiness  1  -CZ  --  --    Sitting Down  2  -CZ  --  --    Tinetti  "Balance Score  14  -CZ  --  --    Gait Initiation (immediate after told \"go\")  1  -CZ  --  --    Step Length- Right Swing  1  -CZ  --  --    Step Length- Left Swing  1  -CZ  --  --    Foot Clearance- Right Foot  1  -CZ  --  --    Foot Clearance- Left Foot  1  -CZ  --  --    Step Symmetry  1  -CZ  --  --    Step Continuity  1  -CZ  --  --    Path (excursion)  1  -CZ  --  --    Trunk  0  -CZ  --  --    Base of Support  1  -CZ  --  --    Gait Score  9  -CZ  --  --    Tinetti Total Score  23  -CZ  --  --    T[place]etti Assistive Device  rolling walker  -CZ  --  --       Functional Assessment    Outcome Measure Options  AM-PAC 6 Clicks Basic Mobility (PT);Tinetti  -CZ  AM-PAC 6 Clicks Daily Activity (OT)  -ME  AM-PAC 6 Clicks Basic Mobility (PT)  -CA      User Key  (r) = Recorded By, (t) = Taken By, (c) = Cosigned By    Initials Name Provider Type    Ulysses Delarosa, PTA Physical Therapy Assistant    CA Blue Weston, PTA Physical Therapy Assistant    RC Tiki Grover, MITCHELL/L Occupational Therapy Assistant    CZ Hermilo Coronel, PT Physical Therapist    ME Che Oh, OT Occupational Therapist             Time Calculation:     PT Charges     Row Name 11/08/19 1616 11/08/19 1043          Time Calculation    Start Time  1525  -LISA  0915  -LISA     Stop Time  1555  -LISA  1015  -JA     Time Calculation (min)  30 min  -JA  60 min  -LISA        Time Calculation- PT    Total Timed Code Minutes- PT  30 minute(s)  -JA  60 minute(s)  -JA        Timed Charges    05390 - PT Therapeutic Exercise Minutes  --  45  -JA     75643 - Gait Training Minutes   30  -JA  --     35351 - PT Therapeutic Activity Minutes  --  15  -JA       User Key  (r) = Recorded By, (t) = Taken By, (c) = Cosigned By    Initials Name Provider Type    Ulysses Delarosa, TOAN Physical Therapy Assistant          Therapy Charges for Today     Code Description Service Date Service Provider Modifiers Qty    99317458546 HC GAIT TRAINING EA 15 MIN 11/7/2019 " Ulysses Gill, PTA GP 1    32560780533 HC PT THERAPEUTIC ACT EA 15 MIN 11/7/2019 Ulysses Gill, PTA GP 1    71145970251 HC PT THER PROC EA 15 MIN 11/8/2019 Ulysses Gill, PTA GP 3    43861251378 HC PT THERAPEUTIC ACT EA 15 MIN 11/8/2019 Ulysses Gill, PTA GP 1    19067514213 HC GAIT TRAINING EA 15 MIN 11/8/2019 Ulysses Gill, PTA GP 2            PT G-Codes  Outcome Measure Options: AM-PAC 6 Clicks Basic Mobility (PT)  AM-PAC 6 Clicks Score (PT): 18  AM-PAC 6 Clicks Score (OT): 19  Modified [place] Scale: 4 - Moderately severe disability.  Unable to walk without assistance, and unable to attend to own bodily needs without assistance.  Tinetti Total Score: 23      Ulysses Gill PTA  11/8/2019

## 2019-11-08 NOTE — PLAN OF CARE
Problem: Patient Care Overview  Goal: Plan of Care Review  Outcome: Ongoing (interventions implemented as appropriate)    Goal: Individualization and Mutuality  Outcome: Ongoing (interventions implemented as appropriate)    Goal: Discharge Needs Assessment  Outcome: Ongoing (interventions implemented as appropriate)    Goal: Home Safety Plan  Outcome: Ongoing (interventions implemented as appropriate)    Goal: Coping Plan  Outcome: Ongoing (interventions implemented as appropriate)    Goal: Community Reintegration Plan  Outcome: Ongoing (interventions implemented as appropriate)      Problem: Stroke (IRF) (Adult)  Goal: Promote Optimal Functional [place]  Outcome: Ongoing (interventions implemented as appropriate)      Problem: Skin Injury Risk (Adult)  Goal: Identify Related Risk Factors and Signs and Symptoms  Outcome: Ongoing (interventions implemented as appropriate)    Goal: Skin Health and Integrity  Outcome: Ongoing (interventions implemented as appropriate)      Problem: Fall Risk (Adult)  Goal: Identify Related Risk Factors and Signs and Symptoms  Outcome: Ongoing (interventions implemented as appropriate)    Goal: Absence of Fall  Outcome: Ongoing (interventions implemented as appropriate)      Problem: Pain, Chronic (Adult)  Goal: Identify Related Risk Factors and Signs and Symptoms  Outcome: Ongoing (interventions implemented as appropriate)    Goal: Acceptable Pain/Comfort Level and Functional Ability  Outcome: Ongoing (interventions implemented as appropriate)   11/08/19 0428   Pain, Chronic (Adult)   Acceptable Pain/Comfort Level and Functional Ability making progress toward outcome

## 2019-11-09 PROCEDURE — 97530 THERAPEUTIC ACTIVITIES: CPT

## 2019-11-09 PROCEDURE — 25010000002 ENOXAPARIN PER 10 MG: Performed by: FAMILY MEDICINE

## 2019-11-09 PROCEDURE — 97535 SELF CARE MNGMENT TRAINING: CPT

## 2019-11-09 PROCEDURE — 97542 WHEELCHAIR MNGMENT TRAINING: CPT

## 2019-11-09 PROCEDURE — 97110 THERAPEUTIC EXERCISES: CPT

## 2019-11-09 PROCEDURE — 97116 GAIT TRAINING THERAPY: CPT

## 2019-11-09 RX ADMIN — BACLOFEN 10 MG: 10 TABLET ORAL at 05:31

## 2019-11-09 RX ADMIN — GABAPENTIN 400 MG: 400 CAPSULE ORAL at 00:46

## 2019-11-09 RX ADMIN — BACLOFEN 10 MG: 10 TABLET ORAL at 13:10

## 2019-11-09 RX ADMIN — CLOPIDOGREL BISULFATE 75 MG: 75 TABLET ORAL at 08:12

## 2019-11-09 RX ADMIN — GABAPENTIN 400 MG: 400 CAPSULE ORAL at 23:27

## 2019-11-09 RX ADMIN — GABAPENTIN 400 MG: 400 CAPSULE ORAL at 17:07

## 2019-11-09 RX ADMIN — BACLOFEN 10 MG: 10 TABLET ORAL at 21:47

## 2019-11-09 RX ADMIN — THERA TABS 1 TABLET: TAB at 08:12

## 2019-11-09 RX ADMIN — GABAPENTIN 400 MG: 400 CAPSULE ORAL at 11:11

## 2019-11-09 RX ADMIN — CARVEDILOL 25 MG: 25 TABLET, FILM COATED ORAL at 08:12

## 2019-11-09 RX ADMIN — ENOXAPARIN SODIUM 40 MG: 40 INJECTION SUBCUTANEOUS at 20:35

## 2019-11-09 RX ADMIN — ROPINIROLE 1 MG: 1 TABLET, FILM COATED ORAL at 20:35

## 2019-11-09 RX ADMIN — GABAPENTIN 400 MG: 400 CAPSULE ORAL at 05:31

## 2019-11-09 RX ADMIN — CARVEDILOL 25 MG: 25 TABLET, FILM COATED ORAL at 17:07

## 2019-11-09 RX ADMIN — CELECOXIB 200 MG: 200 CAPSULE ORAL at 08:12

## 2019-11-09 RX ADMIN — ATORVASTATIN CALCIUM 80 MG: 40 TABLET, FILM COATED ORAL at 20:34

## 2019-11-09 RX ADMIN — ASPIRIN 81 MG 81 MG: 81 TABLET ORAL at 08:12

## 2019-11-09 NOTE — PROGRESS NOTES
[facility] Medicine Services  INPATIENT PROGRESS NOTE    Length of Stay: 3  Date of Admission: 11/6/2019  Primary Care Physician: Inna Harris APRN    Subjective   Chief Complaint: left pontine stroke  HPI: 73-year-old  female with past medical history of CVA, hypertension, lupus who is currently admitted to the acute rehab unit for further physical and occupational therapy after suffering a left pontine stroke with right-sided weakness.  During today's visit, the patient is participating with therapy.  She reports continued right lower extremity pain and spasms.    Review of Systems   Constitutional: Negative for chills and fever.   Respiratory: Negative for cough, chest tightness, shortness of breath and wheezing.    Cardiovascular: Negative for chest pain, palpitations and leg swelling.   Gastrointestinal: Negative for abdominal pain, diarrhea, nausea and vomiting.   Musculoskeletal: Negative for back pain and neck pain.   Skin: Negative for pallor.   Neurological: Positive for weakness. Negative for dizziness.        Right sided weakness   Psychiatric/Behavioral: Negative for confusion. The patient is not nervous/anxious.         All pertinent negatives and positives are as above. All other systems have been reviewed and are negative unless otherwise stated.     Objective    Temp:  [96.8 °F (36 °C)-98.4 °F (36.9 °C)] 96.8 °F (36 °C)  Heart Rate:  [68-80] 68  Resp:  [18-19] 18  BP: (119-126)/(58-59) 119/58    Physical Exam   Constitutional: She is oriented to person, place, and time. She appears well-developed and well-nourished. No distress.   HENT:   Head: Normocephalic and atraumatic.   Right Ear: External ear normal.   Left Ear: External ear normal.   Nose: Nose normal.   Eyes: Conjunctivae are normal. Right eye exhibits no discharge. Left eye exhibits no discharge.   Neck: Normal range of motion. Neck supple. No JVD present.   Cardiovascular: Normal rate,  regular rhythm, normal heart sounds and intact distal pulses. Exam reveals no gallop and no friction rub.   No murmur heard.  Pulmonary/Chest: Effort normal and breath sounds normal. No stridor. No respiratory distress. She has no wheezes. She has no rales.   Abdominal: Soft. Bowel sounds are normal. She exhibits no distension. There is no tenderness.   Musculoskeletal: Normal range of motion. She exhibits no edema.   Right sided weakness   Neurological: She is alert and oriented to person, place, and time.   Skin: Skin is warm and dry. She is not diaphoretic.   Psychiatric: She has a normal mood and affect. Her behavior is normal.   Nursing note and vitals reviewed.          Results Review:  I have reviewed the labs, radiology results, and diagnostic studies.    Laboratory Data:   Results from last 7 days   Lab Units 11/07/19 0547 11/05/19 1042 11/03/19 2107   SODIUM mmol/L 142 142 144   POTASSIUM mmol/L 4.0 3.8 4.3   CHLORIDE mmol/L 101 106 103   CO2 mmol/L 31.0* 26.0 27.0   BUN mg/dL 15 12 19   CREATININE mg/dL 0.70 0.66 0.94   GLUCOSE mg/dL 100* 134* 122*   CALCIUM mg/dL 9.4 8.6 9.4   BILIRUBIN mg/dL 0.6  --  0.6   ALK PHOS U/L 102  --  121*   ALT (SGPT) U/L 13  --  17   AST (SGOT) U/L 19  --  29   ANION GAP mmol/L 10.0 10.0 14.0     Estimated Creatinine Clearance: 59.8 mL/min (by C-G formula based on SCr of 0.7 mg/dL).      Results from last 7 days   Lab Units 11/07/19  0547   URIC ACID mg/dL 5.6     Results from last 7 days   Lab Units 11/07/19  0547 11/05/19  1042 11/03/19 2107   WBC 10*3/mm3 5.54 5.60 7.96   HEMOGLOBIN g/dL 12.0 10.5* 11.7*   HEMATOCRIT % 36.4 31.8* 35.9   PLATELETS 10*3/mm3 247 204 231     Results from last 7 days   Lab Units 11/03/19 2107   INR  1.06       Culture Data:   No results found for: BLOODCX  No results found for: URINECX  No results found for: RESPCX  No results found for: WOUNDCX  No results found for: STOOLCX  No components found for: BODYFLD    Radiology Data:   Imaging  Results (Last 24 Hours)     ** No results found for the last 24 hours. **          I have reviewed the patient's current medications.     Assessment/Plan     Active Hospital Problems    Diagnosis   • **Left pontine stroke (CMS/[facility])   • Restless leg syndrome, uncontrolled     First diagnosed after initial pontine stroke 2018.  Has been treated with Requip at night since that time     • Dysarthria   • Muscle spasm of both lower legs     Worse since this stroke     • Recurrent falls while walking     Due to muscle spasm     • Urinary hesitancy     History of in and out self caths after initial surgery 2018     • DJD (degenerative joint disease), multiple sites     thoracic and lumbar spine  Status post extensive stabilization of the thoracolumbar spine 2018     • Hypertension   • Altered mental status       Plan:    -Continue Pt/Ot  -Continue Speech therapy.  -Continue Baclofen, Flexeril for spasms.   -Hospitalist group for weekend coverage.             This document has been electronically signed by DALLIN Schneider on November 9, 2019 1:41 PM

## 2019-11-09 NOTE — PLAN OF CARE
Problem: Patient Care Overview  Goal: Plan of Care Review  Outcome: Ongoing (interventions implemented as appropriate)   11/09/19 3668   OTHER   Outcome Summary Pt able to perform B LE ther ex in sitting with min c/o. Pt states her R foot is irritated when she WB through it but is improving slowly. Pt amb with RW and CGA for 70ft several times in am and 140ft in pm. Pt performed sit to stand t/f's with emphasis on hand placement and usage. Pt would cont to benefit from therapy upon DC.   Patient Care Overview   IRF Plan of Care Review progress ongoing, continue   Progress, Functional Goals demonstrating adequate progress   Coping/Psychosocial   Plan of Care Reviewed With patient

## 2019-11-09 NOTE — PLAN OF CARE
Problem: Patient Care Overview  Goal: Plan of Care Review  Outcome: Ongoing (interventions implemented as appropriate)   11/09/19 0137 11/09/19 1403   Patient Care Overview   IRF Plan of Care Review --  progress ongoing, continue   Progress, Functional Goals demonstrating adequate progress --    Coping/Psychosocial   Plan of Care Reviewed With patient --        Problem: Stroke (IRF) (Adult)  Goal: Promote Optimal Functional [place]  Outcome: Ongoing (interventions implemented as appropriate)      Problem: Skin Injury Risk (Adult)  Goal: Identify Related Risk Factors and Signs and Symptoms  Outcome: Outcome(s) achieved Date Met: 11/09/19    Goal: Skin Health and Integrity  Outcome: Ongoing (interventions implemented as appropriate)      Problem: Fall Risk (Adult)  Goal: Identify Related Risk Factors and Signs and Symptoms  Outcome: Outcome(s) achieved Date Met: 11/09/19    Goal: Absence of Fall  Outcome: Ongoing (interventions implemented as appropriate)      Problem: Pain, Chronic (Adult)  Goal: Identify Related Risk Factors and Signs and Symptoms  Outcome: Outcome(s) achieved Date Met: 11/09/19    Goal: Acceptable Pain/Comfort Level and Functional Ability  Outcome: Ongoing (interventions implemented as appropriate)

## 2019-11-09 NOTE — PLAN OF CARE
Problem: Patient Care Overview  Goal: Plan of Care Review  Outcome: Ongoing (interventions implemented as appropriate)    Goal: Individualization and Mutuality  Outcome: Ongoing (interventions implemented as appropriate)    Goal: Discharge Needs Assessment  Outcome: Ongoing (interventions implemented as appropriate)    Goal: Home Safety Plan  Outcome: Ongoing (interventions implemented as appropriate)    Goal: Coping Plan  Outcome: Ongoing (interventions implemented as appropriate)    Goal: Community Reintegration Plan  Outcome: Ongoing (interventions implemented as appropriate)      Problem: Stroke (IRF) (Adult)  Goal: Promote Optimal Functional [place]  Outcome: Ongoing (interventions implemented as appropriate)      Problem: Skin Injury Risk (Adult)  Goal: Identify Related Risk Factors and Signs and Symptoms  Outcome: Ongoing (interventions implemented as appropriate)    Goal: Skin Health and Integrity  Outcome: Ongoing (interventions implemented as appropriate)      Problem: Fall Risk (Adult)  Goal: Identify Related Risk Factors and Signs and Symptoms  Outcome: Ongoing (interventions implemented as appropriate)    Goal: Absence of Fall  Outcome: Ongoing (interventions implemented as appropriate)      Problem: Pain, Chronic (Adult)  Goal: Identify Related Risk Factors and Signs and Symptoms  Outcome: Ongoing (interventions implemented as appropriate)    Goal: Acceptable Pain/Comfort Level and Functional Ability  Outcome: Ongoing (interventions implemented as appropriate)   11/09/19 0137   Pain, Chronic (Adult)   Acceptable Pain/Comfort Level and Functional Ability making progress toward outcome

## 2019-11-09 NOTE — PLAN OF CARE
Problem: Patient Care Overview  Goal: Plan of Care Review  Outcome: Ongoing (interventions implemented as appropriate)   11/09/19 0137 11/09/19 0906   OTHER   Outcome Summary --  Pt completed ADL (shower) this AM. UB/LB bathing- SBA w/ AE. UB dressing SBA w/ set-up, LB dressing CGA. Sit-stand-sit- Shower/toilet t/f CGA of 1 w/ RW. No new goals met this date. Cont OT POC.   Patient Care Overview   IRF Plan of Care Review progress ongoing, continue --    Progress, Functional Goals demonstrating adequate progress --    Coping/Psychosocial   Plan of Care Reviewed With patient --

## 2019-11-09 NOTE — THERAPY TREATMENT NOTE
Inpatient Rehabilitation - Physical Therapy Treatment Note  [facility]     Patient Name: Rama Goel  : 1945  MRN: [id number]    Today's Date: 2019                 Admit Date: 2019      Visit Dx:      ICD-10-CM ICD-9-CM   1. Dysarthria R47.1 784.51   2. Oropharyngeal dysphagia R13.12 787.22   3. Impaired physical mobility Z74.09 781.99   4. Impaired mobility and ADLs Z74.09 799.89       Patient Active Problem List   Diagnosis   • Altered mental status   • DJD (degenerative joint disease), multiple sites   • Hypertension   • Left pontine stroke (CMS/HCC)   • CVA (cerebral vascular accident) (CMS/HCC)   • Urinary hesitancy   • Muscle spasm of both lower legs   • Recurrent falls while walking   • Dysarthria   • Restless leg syndrome, uncontrolled       Therapy Treatment    IRF Treatment Summary     Row Name 19 1354 19 1037 19 0906       Evaluation/Treatment Time and Intent    Subjective Information  no complaints  -TW  no complaints  -TW  no complaints  -KD    Existing Precautions/Restrictions  --  fall  -TW  fall  -KD    Document Type  --  therapy note (daily note)  -TW  therapy note (daily note)  -KD    Mode of Treatment  --  individual therapy;physical therapy  -TW  occupational therapy  -KD    Patient/Family Observations  Pt up in w/c.  -TW  Pt up in w/c.  -TW  pt up in w/c  -KD    Start Time (Evaluation/Treatment)  1354  -TW  1037  -TW  0906  -KD    Stop Time (Evaluation/Treatment)  1440  -TW  1125  -TW  1036  -KD    Recorded by [TW] Abundio Barrett, TOAN [TW] Abundio Barrett, TOAN [KD] Carola Kang COTA/L    Row Name 19 1354 19 1037 19 0906       Cognition/Psychosocial- PT/OT    Affect/Mental Status (Cognitive)  WFL  -TW  WFL  -TW  --    Orientation Status (Cognition)  --  oriented x 4  -TW  oriented x 4  -KD    Follows Commands (Cognition)  follows two step commands;75-90% accuracy  -TW  follows two step commands;75-90% accuracy  -TW  --     Personal Safety Interventions  fall prevention program maintained;gait belt;nonskid shoes/slippers when out of bed  -TW  fall prevention program maintained;gait belt;nonskid shoes/slippers when out of bed  -TW  fall prevention program maintained  -KD    Cognitive Function (Cognitive)  --  safety deficit  -TW  --    Recorded by [TW] Abundio Barrett PTA [TW] Abundio Barrett PTA [KD] Carola Kang COTA/L    Row Name 11/09/19 1354 11/09/19 1037          Bed Mobility Assessment/Treatment    Comment (Bed Mobility)  Pt up in w/c and remained at completion of tx.  -TW  Pt up in w/c and remained in w/c after completion of tx.  -TW     Recorded by [TW] Abundio Barrett PTA [TW] Abundio Barrett PTA     Row Name 11/09/19 0906             Functional Mobility    Functional Mobility- Ind. Level  contact guard assist  -KD      Functional Mobility- Device  rolling walker  -KD      Functional Mobility-Distance (Feet)  15  -KD      Recorded by [KD] Carola Kang COTA/L      Row Name 11/09/19 1037 11/09/19 0906          Transfer Assessment/Treatment    Transfer Assessment/Treatment  chair-bed transfer  -TW  toilet transfer;shower transfer;sit-stand transfer;stand-sit transfer  -KD     Recorded by [TW] Abundio Barrett PTA [KD] Carola Kang COTA/L     Row Name 11/09/19 1037             Bed-Chair Transfer    Bed-Chair [place] (Transfers)  contact guard;minimum assist (75% patient effort) w/c to toilet  -TW      Assistive Device (Bed-Chair Transfers)  walker, front-wheeled  -TW      Recorded by [TW] Abundio Barrett PTA      Row Name 11/09/19 1037             Chair-Bed Transfer    Chair-Bed [place] (Transfers)  contact guard;minimum assist (75% patient effort) toilet to w/c.  -TW      Assistive Device (Chair-Bed Transfers)  walker, front-wheeled  -TW      Recorded by [TW] Abundio Barrett PTA      Row Name 11/09/19 1037 11/09/19 0906          Sit-Stand Transfer    Sit-Stand  [place] (Transfers)  contact guard;verbal cues  -TW  contact guard  -KD     Assistive Device (Sit-Stand Transfers)  walker, front-wheeled  -TW  walker, front-wheeled  -KD     Recorded by [TW] Abundio Barrett PTA [KD] Carola Kang COTA/L     Row Name 11/09/19 1037 11/09/19 0906          Stand-Sit Transfer    Stand-Sit [place] (Transfers)  contact guard;verbal cues  -TW  contact guard  -KD     Assistive Device (Stand-Sit Transfers)  walker, front-wheeled  -TW  walker, front-wheeled  -KD     Recorded by [TW] Abundio Barrett PTA [KD] Carola Kang MITCHELL/L     Row Name 11/09/19 1037 11/09/19 0906          Toilet Transfer    Type (Toilet Transfer)  --  sit-stand;stand-sit  -KD     [place] Level (Toilet Transfer)  contact guard  -TW  contact guard  -KD     Assistive Device (Toilet Transfer)  grab bars/safety frame;walker, front-wheeled  -TW  grab bars/safety frame  -KD     Recorded by [TW] Abundio Barrett PTA [KD] Carola Kang MITCHELL/L     Row Name 11/09/19 0906             Shower Transfer    Type (Shower Transfer)  sit-stand;stand-sit  -KD      [place] Level (Shower Transfer)  contact guard  -KD      Assistive Device (Shower Transfer)  grab bars/tub rail;walker, front-wheeled;tub bench  -KD      Recorded by [KD] Carola Kang COTA/L      Row Name 11/09/19 1354 11/09/19 1037          Gait/Stairs Assessment/Training    Gait/Stairs Assessment/Training  --  gait/ambulation assistive device  -TW     [place] Level (Gait)  --  contact guard  -TW     Assistive Device (Gait)  --  walker, front-wheeled  -TW     Distance in Feet (Gait)  140ft x1  -TW  70ft x3  -TW     Pattern (Gait)  --  step-through  -TW     Deviations/Abnormal Patterns (Gait)  --  base of support, narrow;gait speed decreased;stride length decreased;ataxic  -TW     Bilateral Gait Deviations  --  heel strike decreased  -TW     Recorded by [TW] Abundio Barrett PTA [TW] Abundio Barrett, PTA     Row Name  11/09/19 0906             Wheelchair Mobility/Management    Method of Wheelchair Locomotion (Mobility)  bimanual (upper extremity) propulsion  -KD      Mobility [place] Level (Wheelchair)  supervision  -KD      Forward Propulsion [place] (Wheelchair)  supervision  -KD      Distance Propelled in Feet (Wheelchair)  150  -KD      Recorded by [KD] Carola Kang COTA/L      Row Name 11/09/19 0906             Bathing Assessment/Treatment    Bathing [place] Level  bathing skills;lower body;upper body;set up;supervision  -KD      Assistive Device (Bathing)  bath mitt;grab bar/tub rail;hand held shower spray hose;long-handled sponge;tub bench  -KD      Bathing Position  unsupported sitting  -KD      Recorded by [KD] Carola Kang COTA/L      Row Name 11/09/19 0906             Upper Body Dressing Assessment/Treatment    Upper Body Dressing Task  upper body dressing skills;doff;don;pull over garment;set up assistance;supervision  -KD      Upper Body Dressing Position  unsupported sitting  -KD      Recorded by [KD] Carola Kang COTA/L      Row Name 11/09/19 0906             Lower Body Dressing Assessment/Treatment    Lower Body Dressing [place] Level  doff;don;shoes/slippers;socks;underwear;pants/bottoms;contact guard assist  -KD      Lower Body Dressing Position  unsupported sitting  -KD      Recorded by [KD] Carola Kang COTA/L      Row Name 11/09/19 0906             Grooming Assessment/Treatment    Grooming [place] Level  grooming skills;deodorant application;hair care, combing/brushing;wash face, hands;supervision;set up  -KD      Grooming Position  supported sitting  -KD      Recorded by [KD] Carola Kang COTA/L      Row Name 11/09/19 0906             Toileting Assessment/Treatment    Toileting [place] Level  toileting skills;adjust/manage clothing;perform perineal hygiene;supervision  -KD      Assistive Device Use (Toileting)  grab bar/safety frame  -KD      Toileting  Position  supported sitting  -KD      Recorded by [KD] Carola Kang COTA/L      Row Name 11/09/19 0906             Gross Motor Coordination    Gross Motor Impairments  -- Pt completed laundry task at table requiring set-up only  -KD      Recorded by [KD] Carola Kang COTA/L      Row Name 11/09/19 0906             Fine Motor Testing & Training    Fine Motor Tests  9 Hole Peg Test of Fine Motor Coordination Results  -KD      Results, 9 Hole Peg Test of Fine Motor Coordination-Right  27  -KD      Results, 9 Hole Peg Test of Fine Motor Coordination-Left  24  -KD      Training Activity, Fine Motor Coordination  manipulation of pegs  -KD      Comment, Fine Motor Coordination  Pt particpated in hand writing activity  -KD      Recorded by [KD] Carola Kang COTA/L      Row Name 11/09/19 1037 11/09/19 0906          Pain Scale: Numbers Pre/Post-Treatment    Pain Scale: Numbers, Pretreatment  0/10 - no pain  -TW  0/10 - no pain  -KD     Pain Scale: Numbers, Post-Treatment  2/10 States R foot only hurts with WB.  -TW  0/10 - no pain  -KD     Pain Location - Side  Right  -TW  --     Pain Location  foot  -TW  --     Recorded by [TW] Abundio Barrett, PTA [KD] Carola Kang COTA/L     Row Name 11/09/19 0906             Aerobic Exercise Activity    Exercise Performed (Aerobic, Therapeutic Exercise)  arm bike  -KD      Expected Outcome (Aerobic, Therapeutic Exercise)  improve functional tolerance, self-care activity;strengthen normal movement patterns  -KD      Time (Aerobic, Therapeutic Exercise)  10  -KD      Recorded by [KD] Carola Kang COTA/L      Row Name 11/09/19 1354 11/09/19 1037          Lower Extremity Seated Therapeutic Exercise    Performed, Seated Lower Extremity (Therapeutic Exercise)  hip flexion/extension;hip abduction/adduction;ankle dorsiflexion/plantarflexion;LAQ (long arc quad), knee extension  -TW  hip flexion/extension;hip abduction/adduction;LAQ (long arc quad), knee extension;ankle  dorsiflexion/plantarflexion  -TW     Exercise Type, Seated Lower Extremity (Therapeutic Exercise)  AROM (active range of motion)  -TW  AROM (active range of motion)  -TW     Sets/Reps Detail, Seated Lower Extremity (Therapeutic Exercise)  1/20  -TW  1/20 -TW     Recorded by [TW] Abundio Barrett PTA [TW] Abundio Barrett PTA     Row Name 11/09/19 1354 11/09/19 1037 11/09/19 0906       Vital Signs    Pre Systolic BP Rehab  --  131  -TW  131  -KD    Pre Treatment Diastolic BP  --  65  -TW  65  -KD    Pretreatment Heart Rate (beats/min)  66  -TW  68  -TW  68  -KD    Intratreatment Heart Rate (beats/min)  74  -TW  --  --    Posttreatment Heart Rate (beats/min)  70  -TW  74  -TW  71  -KD    Pre SpO2 (%)  96  -TW  100  -TW  100  -KD    O2 Delivery Pre Treatment  room air  -TW  room air  -TW  room air  -KD    Intra SpO2 (%)  97  -TW  --  --    Post SpO2 (%)  95  -TW  98  -TW  98  -KD    O2 Delivery Post Treatment  --  --  room air  -KD    Pre Patient Position  Sitting  -TW  Sitting  -TW  Sitting  -KD    Intra Patient Position  Standing  -TW  Standing  -TW  Standing  -KD    Post Patient Position  Sitting  -TW  Sitting  -TW  Sitting  -KD    Recorded by [TW] Abundio Barrett PTA [TW] Abundio Barrett PTA [KD] Carola Kang COTA/L    Row Name 11/09/19 1354 11/09/19 1037 11/09/19 0906       Positioning and Restraints    Pre-Treatment Position  sitting in chair/recliner  -TW  sitting in chair/recliner  -TW  sitting in chair/recliner  -KD    Post Treatment Position  wheelchair  -TW  wheelchair  -TW  wheelchair  -KD    In Wheelchair  sitting;call light within reach;encouraged to call for assist;exit alarm on  -TW  sitting;call light within reach;encouraged to call for assist;exit alarm on  -TW  with PT  -KD    Recorded by [TW] Abundio Barrett PTA [TW] Abundio Barrett PTA [KD] Carola Kang COTA/L    Row Name 11/09/19 1354 11/09/19 1037          Daily Summary of Progress (PT)    Functional Goal  Overall Progress: Physical Therapy  progressing toward functional goals as expected  -TW  progressing toward functional goals as expected  -TW     Recommendations: Physical Therapy  Cont with balance activity.  -TW  cont with progression.  -TW     Recorded by [TW] Abundio Barrett PTA [TW] Abundio Barrett PTA     Row Name 11/09/19 0906             Daily Summary of Progress (OT)    Functional Goal Overall Progress: Occupational Therapy  progressing toward functional goals as expected  -KD      Recommendations: Occupational Therapy  Cont OT POC  -KD      Recorded by [KD] Carola Kang COTA/L        User Key  (r) = Recorded By, (t) = Taken By, (c) = Cosigned By    Initials Name Effective Dates    TW Abundio Barrett PTA 03/07/18 -     Carola Serrano COTA/L 03/07/18 -            Physical Therapy Education     Title: PT OT SLP Therapies (In Progress)     Topic: Physical Therapy (In Progress)     Point: Mobility training (Done)     Learning Progress Summary           Patient Acceptance, E, VU,NR by  at 11/7/2019  1:02 PM    Comment:  Mellisa assessed: 23/28 or moderate fall risk.  Recommend continued use of FWW.  Tag alarm applied.                               User Key     Initials Effective Dates Name Provider Type Discipline     04/03/18 -  Hermilo Coronel, PT Physical Therapist PT                  PT Recommendation and Plan     Plan of Care Reviewed With: patient  Daily Summary of Progress (PT)  Functional Goal Overall Progress: Physical Therapy: progressing toward functional goals as expected  Recommendations: Physical Therapy: Cont with balance activity.  IRF Plan of Care Review: progress ongoing, continue  Progress, Functional Goals: demonstrating adequate progress  Outcome Summary: Pt able to perform B LE ther ex in sitting with min c/o. Pt states her R foot is irritated when she WB through it but is improving slowly. Pt amb with RW and CGA for 70ft several times in am and 140ft in pm. Pt  performed sit to stand t/f's with emphasis on hand placement and usage. Pt would cont to benefit from therapy upon DC.      PT IRF GOALS     Row Name 11/09/19 2604             Bed Mobility Goal 1 (PT-IRF)    Activity/Assistive Device (Bed Mobility Goal 1, PT-IRF)  sit to supine/supine to sit  -TW      [place] Level (Bed Mobility Goal 1, PT-IRF)  independent  -TW      Time Frame (Bed Mobility Goal 1, PT-IRF)  short term goal (STG);5 - 7 days  -TW      Progress/Outcomes (Bed Mobility Goal 1, PT-IRF)  goal not met  -TW         Transfer Goal 1 (PT-IRF)    Activity/Assistive Device (Transfer Goal 1, PT-IRF)  sit-to-stand/stand-to-sit;bed-to-chair/chair-to-bed  -TW      [place] Level (Transfer Goal 1, PT-IRF)  conditional independence  -TW      Time Frame (Transfer Goal 1, PT-IRF)  short term goal (STG);5 - 7 days  -TW      Barriers (Transfer Goal 1, PT-IRF)  Impulsive at times.   -TW      Progress/Outcomes (Transfer Goal 1, PT-IRF)  goal not met  -TW         Gait/Walking Locomotion Goal 1 (PT-IRF)    Activity/Assistive Device (Gait/Walking Locomotion Goal 1, PT-IRF)  walker, rolling  -TW      Gait/Walking Locomotion Distance Goal 1 (PT-IRF)  150'x2  -TW      [place] Level (Gait/Walking Locomotion Goal 1, PT-IRF)  conditional independence  -TW      Time Frame (Gait/Walking Locomotion Goal 1, PT-IRF)  long term goal (LTG);by discharge  -TW      Barriers (Gait/Walking Locomotion Goal 1, PT-IRF)  Stooped posture.   -TW      Progress/Outcomes (Gait/Walking Locomotion Goal 1, PT-IRF)  goal not met  -TW         Gait/Walking Locomotion Goal (PT-IRF)    Gait/Walking Locomotion Goal (PT-IRF)  Tinetti fall risk, score: 25/28  -TW      Time Frame (Gait/Walking Locomotion Goal, PT-IRF)  long term goal (LTG);by discharge  -TW      Progress/Outcomes (Gait/Walking Locomotion Goal, PT-IRF)  goal not met  -TW         Stairs Goal 1 (PT-IRF)    Activity/Assistive Device (Stairs Goal 1, PT-IRF)  using handrail, right  -TW       Number of Stairs (Stairs Goal 1, PT-IRF)  4 steps, R rail.   -TW      [place] Level (Stairs Goal 1, PT-IRF)  conditional independence  -TW      Time Frame (Stairs Goal 1, PT-IRF)  long term goal (LTG);by discharge  -TW      Progress/Outcomes (Stairs Goal 1, PT-IRF)  goal not met  -TW        User Key  (r) = Recorded By, (t) = Taken By, (c) = Cosigned By    Initials Name Provider Type    TW Abundio Barrett PTA Physical Therapy Assistant        Outcome Measures     Row Name 11/09/19 1354 11/09/19 0906 11/08/19 1355       How much help from another person do you currently need...    Turning from your back to your side while in flat bed without using bedrails?  3  -TW  --  --    Moving from lying on back to sitting on the side of a flat bed without bedrails?  3  -TW  --  --    Moving to and from a bed to a chair (including a wheelchair)?  3  -TW  --  --    Standing up from a chair using your arms (e.g., wheelchair, bedside chair)?  3  -TW  --  --    Climbing 3-5 steps with a railing?  3  -TW  --  --    To walk in hospital room?  3  -TW  --  --    AM-PAC 6 Clicks Score (PT)  18  -TW  --  --       How much help from another is currently needed...    Putting on and taking off regular lower body clothing?  --  3  -KD  3  -RC    Bathing (including washing, rinsing, and drying)  --  3  -KD  3  -RC    Toileting (which includes using toilet bed pan or urinal)  --  3  -KD  3  -RC    Putting on and taking off regular upper body clothing  --  3  -KD  3  -RC    Taking care of personal grooming (such as brushing teeth)  --  3  -KD  3  -RC    Eating meals  --  4  -KD  4  -RC    AM-PAC 6 Clicks Score (OT)  --  19  -KD  19  -RC    Row Name 11/08/19 0915 11/07/19 1051 11/07/19 0830       9 Hole Peg    9-Hole Peg Left  --  31 seconds   -ME  --    9-Hole Peg Right  --  36 seconds   -ME  --       How much help from another person do you currently need...    Turning from your back to your side while in flat bed without using  "bedrails?  3  -JA  --  3  -CZ    Moving from lying on back to sitting on the side of a flat bed without bedrails?  3  -JA  --  3  -CZ    Moving to and from a bed to a chair (including a wheelchair)?  3  -JA  --  3  -CZ    Standing up from a chair using your arms (e.g., wheelchair, bedside chair)?  3  -JA  --  3  -CZ    Climbing 3-5 steps with a railing?  3  -JA  --  3  -CZ    To walk in hospital room?  3  -JA  --  3  -CZ    AM-PAC 6 Clicks Score (PT)  18  -JA  --  18  -CZ       How much help from another is currently needed...    Putting on and taking off regular lower body clothing?  --  3  -ME  --    Bathing (including washing, rinsing, and drying)  --  3  -ME  --    Toileting (which includes using toilet bed pan or urinal)  --  3  -ME  --    Putting on and taking off regular upper body clothing  --  3  -ME  --    Taking care of personal grooming (such as brushing teeth)  --  3  -ME  --    Eating meals  --  4  -ME  --    AM-PAC 6 Clicks Score (OT)  --  19  -ME  --       Modified Nicolasa Scale    Modified Nicolasa Scale  --  4 - Moderately severe disability.  Unable to walk without assistance, and unable to attend to own bodily needs without assistance.  -ME  --       Tinetti Assessment    Tinetti Assessment  --  --  yes  -CZ    Sitting Balance  --  --  1  -CZ    Arises  --  --  2  -CZ    Attempts to Rise  --  --  2  -CZ    Immediate Standing Balance (first 5 sec)  --  --  2  -CZ    Standing Balance  --  --  1  -CZ    Sternal Nudge (feet close together)  --  --  2  -CZ    Eyes Closed (feet close together)  --  --  1  -CZ    Turning 360 Degrees- Steps  --  --  0  -CZ    Turning 360 Degrees- Steadiness  --  --  1  -CZ    Sitting Down  --  --  2  -CZ    Tinetti Balance Score  --  --  14  -CZ    Gait Initiation (immediate after told \"go\")  --  --  1  -CZ    Step Length- Right Swing  --  --  1  -CZ    Step Length- Left Swing  --  --  1  -CZ    Foot Clearance- Right Foot  --  --  1  -CZ    Foot Clearance- Left Foot  --  --  " 1  -CZ    Step Symmetry  --  --  1  -CZ    Step Continuity  --  --  1  -CZ    Path (excursion)  --  --  1  -CZ    Trunk  --  --  0  -CZ    Base of Support  --  --  1  -CZ    Gait Score  --  --  9  -CZ    Tinetti Total Score  --  --  23  -CZ    Tinetti Assistive Device  --  --  rolling walker  -CZ       Functional Assessment    Outcome Measure Options  AM-PAC 6 Clicks Basic Mobility (PT)  -JA  AM-PAC 6 Clicks Daily Activity (OT);9 Hole Peg  -ME  AM-PAC 6 Clicks Basic Mobility (PT);Tinetti  -CZ      User Key  (r) = Recorded By, (t) = Taken By, (c) = Cosigned By    Initials Name Provider Type    Ulysses Delarosa, PTA Physical Therapy Assistant    TW Abundio Barrett, PTA Physical Therapy Assistant    RC Tiki Grover, MITCHELL/L Occupational Therapy Assistant    KD Carola Kang, MITCHELL/L Occupational Therapy Assistant    CZ Hermilo Coronel, PT Physical Therapist    ME Che Oh, OT Occupational Therapist             Time Calculation:     PT Charges     Row Name 11/09/19 1504 11/09/19 1355          Time Calculation    Start Time  1354  -TW  1037  -TW     Stop Time  1440  -TW  1125  -TW     Time Calculation (min)  46 min  -TW  48 min  -TW     PT Received On  11/09/19  -TW  11/09/19  -TW     PT Goal Re-Cert Due Date  11/20/19  -TW  11/20/19  -TW        Time Calculation- PT    Total Timed Code Minutes- PT  46 minute(s)  -TW  48 minute(s)  -TW       User Key  (r) = Recorded By, (t) = Taken By, (c) = Cosigned By    Initials Name Provider Type     Abundio Barrett, PTA Physical Therapy Assistant          Therapy Charges for Today     Code Description Service Date Service Provider Modifiers Qty    95277444620 HC GAIT TRAINING EA 15 MIN 11/9/2019 Abundio Barrett, PTA GP 1    61315700967 HC PT THERAPEUTIC ACT EA 15 MIN 11/9/2019 Abundio Barrett, PTA GP 1    79896868922 HC PT THER PROC EA 15 MIN 11/9/2019 Abundio Barrett, PTA GP 1    83107638341 HC GAIT TRAINING EA 15 MIN 11/9/2019 Arnold  Abundio ORTIZ PTA GP 1    [id number]  PT THERAPEUTIC ACT EA 15 MIN 11/9/2019 Abundio Barrett, PTA GP 1    [id number]  PT THER PROC EA 15 MIN 11/9/2019 Abundio Barrett, TOAN GP 1            PT G-Codes  Outcome Measure Options: AM-PAC 6 Clicks Basic Mobility (PT)  AM-PAC 6 Clicks Score (PT): 18  AM-PAC 6 Clicks Score (OT): 19  Modified [place] Scale: 4 - Moderately severe disability.  Unable to walk without assistance, and unable to attend to own bodily needs without assistance.  Tinetti Total Score: 23      Abundio Barrett PTA  11/9/2019

## 2019-11-09 NOTE — THERAPY TREATMENT NOTE
Inpatient Rehabilitation - Occupational Therapy Treatment Note    [facility]     Patient Name: Rama Goel  : 1945  MRN: [id number]    Today's Date: 2019                 Admit Date: 2019      Visit Dx:    ICD-10-CM ICD-9-CM   1. Dysarthria R47.1 784.51   2. Oropharyngeal dysphagia R13.12 787.22   3. Impaired physical mobility Z74.09 781.99   4. Impaired mobility and ADLs Z74.09 799.89       Patient Active Problem List   Diagnosis   • Altered mental status   • DJD (degenerative joint disease), multiple sites   • Hypertension   • Left pontine stroke (CMS/HCC)   • CVA (cerebral vascular accident) (CMS/HCC)   • Urinary hesitancy   • Muscle spasm of both lower legs   • Recurrent falls while walking   • Dysarthria   • Restless leg syndrome, uncontrolled         Therapy Treatment    IRF Treatment Summary     Row Name 19 09             Evaluation/Treatment Time and Intent    Subjective Information  no complaints  -KD      Existing Precautions/Restrictions  fall  -KD      Document Type  therapy note (daily note)  -KD      Mode of Treatment  occupational therapy  -KD      Patient/Family Observations  pt up in w/c  -KD      Start Time (Evaluation/Treatment)  0906  -KD      Stop Time (Evaluation/Treatment)  1036  -KD      Recorded by [KD] Carola Kang COTA/L      Row Name 19 09             Cognition/Psychosocial- PT/OT    Orientation Status (Cognition)  oriented x 4  -KD      Personal Safety Interventions  fall prevention program maintained  -KD      Recorded by [KD] Carola Kang COTA/L      Row Name 19 09             Functional Mobility    Functional Mobility- Ind. Level  contact guard assist  -KD      Functional Mobility- Device  rolling walker  -KD      Functional Mobility-Distance (Feet)  15  -KD      Recorded by [KD] Carola Kang COTA/L      Row Name 19 09             Transfer Assessment/Treatment    Transfer Assessment/Treatment  toilet  transfer;shower transfer;sit-stand transfer;stand-sit transfer  -KD      Recorded by [KD] Carola Kang COTA/L      Row Name 11/09/19 0906             Sit-Stand Transfer    Sit-Stand Rooks (Transfers)  contact guard  -KD      Assistive Device (Sit-Stand Transfers)  walker, front-wheeled  -KD      Recorded by [KD] Carola Kang MITCHELL/L      Row Name 11/09/19 0906             Stand-Sit Transfer    Stand-Sit Rooks (Transfers)  contact guard  -KD      Assistive Device (Stand-Sit Transfers)  walker, front-wheeled  -KD      Recorded by [KD] Carola Kang MITCHELL/L      Row Name 11/09/19 0906             Toilet Transfer    Type (Toilet Transfer)  sit-stand;stand-sit  -KD      Rooks Level (Toilet Transfer)  contact guard  -KD      Assistive Device (Toilet Transfer)  grab bars/safety frame  -KD      Recorded by [KD] Carola Kang MITCHELL/L      Row Name 11/09/19 0906             Shower Transfer    Type (Shower Transfer)  sit-stand;stand-sit  -KD      Rooks Level (Shower Transfer)  contact guard  -KD      Assistive Device (Shower Transfer)  grab bars/tub rail;walker, front-wheeled;tub bench  -KD      Recorded by [KD] Carola Kang COTA/L      Row Name 11/09/19 0906             Wheelchair Mobility/Management    Method of Wheelchair Locomotion (Mobility)  bimanual (upper extremity) propulsion  -KD      Mobility Rooks Level (Wheelchair)  supervision  -KD      Forward Propulsion Rooks (Wheelchair)  supervision  -KD      Distance Propelled in Feet (Wheelchair)  150  -KD      Recorded by [KD] Carola Kang COTA/L      Row Name 11/09/19 0906             Bathing Assessment/Treatment    Bathing Rooks Level  bathing skills;lower body;upper body;set up;supervision  -KD      Assistive Device (Bathing)  bath mitt;grab bar/tub rail;hand held shower spray hose;long-handled sponge;tub bench  -KD      Bathing Position  unsupported sitting  -KD      Recorded by [KD] Carola Kang,  MITCHELL/L      Row Name 11/09/19 0906             Upper Body Dressing Assessment/Treatment    Upper Body Dressing Task  upper body dressing skills;doff;don;pull over garment;set up assistance;supervision  -KD      Upper Body Dressing Position  unsupported sitting  -KD      Recorded by [KD] Carola Kang COTA/L      Row Name 11/09/19 0906             Lower Body Dressing Assessment/Treatment    Lower Body Dressing [place] Level  doff;don;shoes/slippers;socks;underwear;pants/bottoms;contact guard assist  -KD      Lower Body Dressing Position  unsupported sitting  -KD      Recorded by [KD] Carola Kang COTA/L      Row Name 11/09/19 0906             Grooming Assessment/Treatment    Grooming [place] Level  grooming skills;deodorant application;hair care, combing/brushing;wash face, hands;supervision;set up  -KD      Grooming Position  supported sitting  -KD      Recorded by [KD] Carola Kang COTA/L      Row Name 11/09/19 0906             Toileting Assessment/Treatment    Toileting [place] Level  toileting skills;adjust/manage clothing;perform perineal hygiene;supervision  -KD      Assistive Device Use (Toileting)  grab bar/safety frame  -KD      Toileting Position  supported sitting  -KD      Recorded by [KD] Carola Kang COTA/L      Row Name 11/09/19 0906             Gross Motor Coordination    Gross Motor Impairments  -- Pt completed laundry task at table requiring set-up only  -KD      Recorded by [KD] Carola Kang COTA/L      Row Name 11/09/19 0906             Fine Motor Testing & Training    Fine Motor Tests  9 Hole Peg Test of Fine Motor Coordination Results  -KD      Results, 9 Hole Peg Test of Fine Motor Coordination-Right  27  -KD      Results, 9 Hole Peg Test of Fine Motor Coordination-Left  24  -KD      Training Activity, Fine Motor Coordination  manipulation of pegs  -KD      Comment, Fine Motor Coordination  Pt particpated in hand writing activity  -KD      Recorded by [KD]  Carola Kang COTA/L      Row Name 11/09/19 0906             Pain Scale: Numbers Pre/Post-Treatment    Pain Scale: Numbers, Pretreatment  0/10 - no pain  -KD      Pain Scale: Numbers, Post-Treatment  0/10 - no pain  -KD      Recorded by [FRANCINE] Carola Kang COTA/L      Row Name 11/09/19 0906             Aerobic Exercise Activity    Exercise Performed (Aerobic, Therapeutic Exercise)  arm bike  -KD      Expected Outcome (Aerobic, Therapeutic Exercise)  improve functional tolerance, self-care activity;strengthen normal movement patterns  -KD      Time (Aerobic, Therapeutic Exercise)  10  -KD      Recorded by [FRANCINE] Carola Kang COTA/L      Row Name 11/09/19 0906             Vital Signs    Pre Systolic BP Rehab  131  -KD      Pre Treatment Diastolic BP  65  -KD      Pretreatment Heart Rate (beats/min)  68  -KD      Posttreatment Heart Rate (beats/min)  71  -KD      Pre SpO2 (%)  100  -KD      O2 Delivery Pre Treatment  room air  -KD      Post SpO2 (%)  98  -KD      O2 Delivery Post Treatment  room air  -KD      Pre Patient Position  Sitting  -KD      Intra Patient Position  Standing  -KD      Post Patient Position  Sitting  -KD      Recorded by [KD] Carola Kang COTA/L      Row Name 11/09/19 0906             Positioning and Restraints    Pre-Treatment Position  sitting in chair/recliner  -KD      Post Treatment Position  wheelchair  -KD      In Wheelchair  with PT  -KD      Recorded by [FRANCINE] Carola Kang COTA/L      Row Name 11/09/19 0906             Daily Summary of Progress (OT)    Functional Goal Overall Progress: Occupational Therapy  progressing toward functional goals as expected  -KD      Recommendations: Occupational Therapy  Cont OT POC  -KD      Recorded by [FRANCINE] Carola Kang COTA/L        User Key  (r) = Recorded By, (t) = Taken By, (c) = Cosigned By    Initials Name Effective Dates    KD Carola Kang COTA/L 03/07/18 -                  OT Recommendation and Plan            Daily Summary of  Progress (OT)  Functional Goal Overall Progress: Occupational Therapy: progressing toward functional goals as expected  Recommendations: Occupational Therapy: Cont OT POC  Outcome Summary: Pt completed ADL (shower) this AM. UB/LB bathing- SBA w/ AE. UB dressing SBA w/ set, LB dressing CGA. Sit-stand- Shower/toilet t/f CGA of 1 w/ RW. No new goals met this date. Cont OT POC.    OT IRF GOALS     Row Name 11/09/19 0906 11/08/19 1355 11/07/19 1051       Bathing Goal 1 (OT-IRF)    Activity/Device (Bathing Goal 1, OT-IRF)  bathing skills, all  -KD  bathing skills, all  -RC  bathing skills, all  -ME    [place]atio Level (Bathing Goal 1, OT-IRF)  conditional independence  -KD  conditional independence  -RC  conditional independence  -ME    Time Frame (Bathing Goal 1, OT-IRF)  long term goal (LTG);by discharge  -KD  long term goal (LTG);by discharge  -RC  long term goal (LTG);by discharge  -ME    Progress/Outcomes (Bathing Goal 1, OT-IRF)  goal not met  -KD  goal not met  -RC  goal not met  -ME       UB Dressing Goal 1 (OT-IRF)    Activity/Device (UB Dressing Goal 1, OT-IRF)  upper body dressing  -KD  upper body dressing  -RC  upper body dressing  -ME    Horatio (UB Dress Goal 1, OT-IRF)  independent  -KD  independent  -RC  independent  -ME    Time Frame (UB Dressing Goal 1, OT-IRF)  long term goal (LTG);by discharge  -KD  long term goal (LTG);by discharge  -RC  long term goal (LTG);by discharge  -ME    Progress/Outcomes (UB Dressing Goal 1, OT-IRF)  goal not met  -KD  goal not met  -RC  goal not met  -ME       LB Dressing Goal 1 (OT-IRF)    Activity/Device (LB Dressing Goal 1, OT-IRF)  lower body dressing  -KD  lower body dressing  -RC  lower body dressing  -ME    Horatio (LB Dressing Goal 1, OT-IRF)  conditional independence  -KD  conditional independence  -RC  conditional independence  -ME    Time Frame (LB Dressing Goal 1, OT-IRF)  long term goal (LTG);by discharge  -KD  long term goal (LTG);by discharge  -RC   long term goal (LTG);by discharge  -ME    Progress/Outcomes (LB Dressing Goal 1, OT-IRF)  goal not met  -KD  goal not met  -RC  goal not met  -ME      User Key  (r) = Recorded By, (t) = Taken By, (c) = Cosigned By    Initials Name Provider Type    Tiki Deleon MITCHELL/L Occupational Therapy Assistant    Carola Serrano COTA/L Occupational Therapy Assistant    ME Che Oh OT Occupational Therapist          Occupational Therapy Education     Title: PT OT SLP Therapies (In Progress)     Topic: Occupational Therapy (In Progress)     Point: ADL training (Done)     Description: Instruct learner(s) on proper safety adaptation and remediation techniques during self care or transfers.   Instruct in proper use of assistive devices.    Learning Progress Summary           Patient Acceptance, E, VU,DU by ME at 11/7/2019  3:40 PM    Comment:  Educated on OT and POC. Educated to continue exercises given to her for translation and fine motor coordination. Educated to call for assist. Educated on hand placement for transfers. Educated on safe mechanics for completing ADLs.                   Point: Home exercise program (Done)     Description: Instruct learner(s) on appropriate technique for monitoring, assisting and/or progressing therapeutic exercises/activities.    Learning Progress Summary           Patient Acceptance, E, VU,DU by ME at 11/7/2019  3:40 PM    Comment:  Educated on OT and POC. Educated to continue exercises given to her for translation and fine motor coordination. Educated to call for assist. Educated on hand placement for transfers. Educated on safe mechanics for completing ADLs.                   Point: Precautions (In Progress)     Description: Instruct learner(s) on prescribed precautions during self-care and functional transfers.    Learning Progress Summary           Patient Acceptance, E, NR by  at 11/8/2019  2:46 PM    Acceptance, E, VU,DU by ME at 11/7/2019  3:40 PM    Comment:  Educated  on OT and POC. Educated to continue exercises given to her for translation and fine motor coordination. Educated to call for assist. Educated on hand placement for transfers. Educated on safe mechanics for completing ADLs.                   Point: Body mechanics (In Progress)     Description: Instruct learner(s) on proper positioning and spine alignment during self-care, functional mobility activities and/or exercises.    Learning Progress Summary           Patient Acceptance, E, NR by  at 11/8/2019  2:46 PM                               User Key     Initials Effective Dates Name Provider Type Discipline     03/07/18 -  Tiki Grover COTA/L Occupational Therapy Assistant OT    ME 09/10/19 -  Che Oh OT Occupational Therapist OT                Outcome Measures     Row Name 11/09/19 0906 11/08/19 1355 11/08/19 0915       How much help from another person do you currently need...    Turning from your back to your side while in flat bed without using bedrails?  --  --  3  -JA    Moving from lying on back to sitting on the side of a flat bed without bedrails?  --  --  3  -JA    Moving to and from a bed to a chair (including a wheelchair)?  --  --  3  -JA    Standing up from a chair using your arms (e.g., wheelchair, bedside chair)?  --  --  3  -JA    Climbing 3-5 steps with a railing?  --  --  3  -JA    To walk in hospital room?  --  --  3  -JA    AM-PAC 6 Clicks Score (PT)  --  --  18  -JA       How much help from another is currently needed...    Putting on and taking off regular lower body clothing?  3  -KD  3  -RC  --    Bathing (including washing, rinsing, and drying)  3  -KD  3  -RC  --    Toileting (which includes using toilet bed pan or urinal)  3  -KD  3  -RC  --    Putting on and taking off regular upper body clothing  3  -KD  3  -RC  --    Taking care of personal grooming (such as brushing teeth)  3  -KD  3  -RC  --    Eating meals  4  -KD  4  -RC  --    AM-PAC 6 Clicks Score (OT)  19 -KD 19   -RC  --       Functional Assessment    Outcome Measure Options  --  --  AM-PAC 6 Clicks Basic Mobility (PT)  -LISA Flores Name 11/07/19 1051 11/07/19 0830          9 Hole Peg    9-Hole Peg Left  31 seconds   -ME  --     9-Hole Peg Right  36 seconds   -ME  --        How much help from another person do you currently need...    Turning from your back to your side while in flat bed without using bedrails?  --  3  -CZ     Moving from lying on back to sitting on the side of a flat bed without bedrails?  --  3  -CZ     Moving to and from a bed to a chair (including a wheelchair)?  --  3  -CZ     Standing up from a chair using your arms (e.g., wheelchair, bedside chair)?  --  3  -CZ     Climbing 3-5 steps with a railing?  --  3  -CZ     To walk in hospital room?  --  3  -CZ     AM-PAC 6 Clicks Score (PT)  --  18  -CZ        How much help from another is currently needed...    Putting on and taking off regular lower body clothing?  3  -ME  --     Bathing (including washing, rinsing, and drying)  3  -ME  --     Toileting (which includes using toilet bed pan or urinal)  3  -ME  --     Putting on and taking off regular upper body clothing  3  -ME  --     Taking care of personal grooming (such as brushing teeth)  3  -ME  --     Eating meals  4  -ME  --     AM-PAC 6 Clicks Score (OT)  19  -ME  --        Modified Nicolasa Scale    Modified [place] Scale  4 - Moderately severe disability.  Unable to walk without assistance, and unable to attend to own bodily needs without assistance.  -ME  --        Tinetti Assessment    Tinetti Assessment  --  yes  -CZ     Sitting Balance  --  1  -CZ     Arises  --  2  -CZ     Attempts to Rise  --  2  -CZ     Immediate Standing Balance (first 5 sec)  --  2  -CZ     Standing Balance  --  1  -CZ     Sternal Nudge (feet close together)  --  2  -CZ     Eyes Closed (feet close together)  --  1  -CZ     Turning 360 Degrees- Steps  --  0  -CZ     Turning 360 Degrees- Steadiness  --  1  -CZ     Sitting Down   "--  2  -CZ     Tinetti Balance Score  --  14  -CZ     Gait Initiation (immediate after told \"go\")  --  1  -CZ     Step Length- Right Swing  --  1  -CZ     Step Length- Left Swing  --  1  -CZ     Foot Clearance- Right Foot  --  1  -CZ     Foot Clearance- Left Foot  --  1  -CZ     Step Symmetry  --  1  -CZ     Step Continuity  --  1  -CZ     Path (excursion)  --  1  -CZ     Trunk  --  0  -CZ     Base of Support  --  1  -CZ     Gait Score  --  9  -CZ     Tinetti Total Score  --  23  -CZ     Tinetti Assistive Device  --  rolling walker  -CZ        Functional Assessment    Outcome Measure Options  AM-PAC 6 Clicks Daily Activity (OT);9 Hole Peg  -ME  AM-PAC 6 Clicks Basic Mobility (PT);Tinetti  -CZ       User Key  (r) = Recorded By, (t) = Taken By, (c) = Cosigned By    Initials Name Provider Type    Ulysses Delarosa, PTA Physical Therapy Assistant    Tiki Deleon, MITCHELL/L Occupational Therapy Assistant    Carola Serrano COTA/L Occupational Therapy Assistant    CZ Hermilo Coronel, PT Physical Therapist    ME Che Oh, OT Occupational Therapist             Time Calculation:     Time Calculation- OT     Row Name 11/09/19 1112             Time Calculation- OT    OT Start Time  0906  -KD      OT Stop Time  1036  -KD      OT Time Calculation (min)  90 min  -KD      Total Timed Code Minutes- OT  90 minute(s)  -KD      OT Received On  11/09/19  -KD        User Key  (r) = Recorded By, (t) = Taken By, (c) = Cosigned By    Initials Name Provider Type    Carola Serrano COTA/L Occupational Therapy Assistant          Therapy Charges for Today     Code Description Service Date Service Provider Modifiers Qty    28788697341 HC OT SELF CARE/MGMT/TRAIN EA 15 MIN 11/9/2019 Carola Kang COTA/L GO 3    63039760610 HC OT THERAPEUTIC ACT EA 15 MIN 11/9/2019 Carola Kang COTA/L GO 2    23211856484 HC OT WHEELCHAIR MGMT EA 15 MIN 11/9/2019 Carola Kang MITCHELL/L GO 1                   Carola Kang, " MITCHELL/L  11/9/2019

## 2019-11-10 PROCEDURE — 25010000002 ENOXAPARIN PER 10 MG: Performed by: FAMILY MEDICINE

## 2019-11-10 RX ORDER — ACETAMINOPHEN 325 MG/1
650 TABLET ORAL EVERY 6 HOURS PRN
Status: DISCONTINUED | OUTPATIENT
Start: 2019-11-10 | End: 2019-11-15 | Stop reason: HOSPADM

## 2019-11-10 RX ADMIN — ACETAMINOPHEN 650 MG: 325 TABLET, FILM COATED ORAL at 13:01

## 2019-11-10 RX ADMIN — CLOPIDOGREL BISULFATE 75 MG: 75 TABLET ORAL at 08:18

## 2019-11-10 RX ADMIN — ENOXAPARIN SODIUM 40 MG: 40 INJECTION SUBCUTANEOUS at 20:35

## 2019-11-10 RX ADMIN — BACLOFEN 10 MG: 10 TABLET ORAL at 21:45

## 2019-11-10 RX ADMIN — ACETAMINOPHEN 650 MG: 325 TABLET, FILM COATED ORAL at 19:36

## 2019-11-10 RX ADMIN — THERA TABS 1 TABLET: TAB at 08:18

## 2019-11-10 RX ADMIN — GABAPENTIN 400 MG: 400 CAPSULE ORAL at 06:15

## 2019-11-10 RX ADMIN — CARVEDILOL 25 MG: 25 TABLET, FILM COATED ORAL at 17:04

## 2019-11-10 RX ADMIN — GABAPENTIN 400 MG: 400 CAPSULE ORAL at 17:04

## 2019-11-10 RX ADMIN — CYCLOBENZAPRINE HYDROCHLORIDE 10 MG: 10 TABLET, FILM COATED ORAL at 20:34

## 2019-11-10 RX ADMIN — BACLOFEN 10 MG: 10 TABLET ORAL at 13:01

## 2019-11-10 RX ADMIN — BACLOFEN 10 MG: 10 TABLET ORAL at 06:15

## 2019-11-10 RX ADMIN — GABAPENTIN 400 MG: 400 CAPSULE ORAL at 11:52

## 2019-11-10 RX ADMIN — ATORVASTATIN CALCIUM 80 MG: 40 TABLET, FILM COATED ORAL at 20:34

## 2019-11-10 RX ADMIN — ASPIRIN 81 MG 81 MG: 81 TABLET ORAL at 08:18

## 2019-11-10 RX ADMIN — CARVEDILOL 25 MG: 25 TABLET, FILM COATED ORAL at 08:18

## 2019-11-10 RX ADMIN — MAGNESIUM HYDROXIDE 10 ML: 2400 SUSPENSION ORAL at 08:18

## 2019-11-10 RX ADMIN — ROPINIROLE 1 MG: 1 TABLET, FILM COATED ORAL at 20:34

## 2019-11-10 RX ADMIN — CELECOXIB 200 MG: 200 CAPSULE ORAL at 08:18

## 2019-11-10 NOTE — PLAN OF CARE
Problem: Patient Care Overview  Goal: Plan of Care Review  Outcome: Ongoing (interventions implemented as appropriate)   11/10/19 0432   Patient Care Overview   IRF Plan of Care Review progress ongoing, continue   Progress, Functional Goals demonstrating adequate progress   Coping/Psychosocial   Plan of Care Reviewed With patient       Problem: Stroke (IRF) (Adult)  Goal: Promote Optimal Functional [place]  Outcome: Ongoing (interventions implemented as appropriate)      Problem: Skin Injury Risk (Adult)  Goal: Skin Health and Integrity  Outcome: Ongoing (interventions implemented as appropriate)      Problem: Fall Risk (Adult)  Goal: Absence of Fall  Outcome: Ongoing (interventions implemented as appropriate)      Problem: Pain, Chronic (Adult)  Goal: Acceptable Pain/Comfort Level and Functional Ability  Outcome: Ongoing (interventions implemented as appropriate)

## 2019-11-10 NOTE — PROGRESS NOTES
[facility] Medicine Services  INPATIENT PROGRESS NOTE    Length of Stay: 4  Date of Admission: 11/6/2019  Primary Care Physician: Inna Harris APRN    Subjective   Chief Complaint: left pontine stroke  HPI: 73-year-old  female with past medical history of CVA, hypertension, lupus who is currently admitted to the acute rehab unit for further physical and occupational therapy after suffering a left pontine stroke with right-sided weakness.  During today's visit, the patient denies complaints.  She reports improvement in leg spasms and pain.    Review of Systems   Constitutional: Negative for chills and fever.   Respiratory: Negative for cough, chest tightness, shortness of breath and wheezing.    Cardiovascular: Negative for chest pain, palpitations and leg swelling.   Gastrointestinal: Negative for abdominal pain, diarrhea, nausea and vomiting.   Musculoskeletal: Negative for back pain and neck pain.   Skin: Negative for pallor.   Neurological: Positive for weakness. Negative for dizziness.        Right sided weakness   Psychiatric/Behavioral: Negative for confusion. The patient is not nervous/anxious.         All pertinent negatives and positives are as above. All other systems have been reviewed and are negative unless otherwise stated.     Objective    Temp:  [96.7 °F (35.9 °C)-97.1 °F (36.2 °C)] 96.7 °F (35.9 °C)  Heart Rate:  [69-76] 76  Resp:  [18] 18  BP: (134-164)/(61-72) 134/61    Physical Exam   Constitutional: She is oriented to person, place, and time. She appears well-developed and well-nourished. No distress.   HENT:   Head: Normocephalic and atraumatic.   Right Ear: External ear normal.   Left Ear: External ear normal.   Nose: Nose normal.   Eyes: Conjunctivae are normal. Right eye exhibits no discharge. Left eye exhibits no discharge.   Neck: Normal range of motion. Neck supple. No JVD present.   Cardiovascular: Normal rate, regular rhythm, normal  heart sounds and intact distal pulses. Exam reveals no gallop and no friction rub.   No murmur heard.  Pulmonary/Chest: Effort normal and breath sounds normal. No stridor. No respiratory distress. She has no wheezes. She has no rales.   Abdominal: Soft. Bowel sounds are normal. She exhibits no distension. There is no tenderness.   Musculoskeletal: Normal range of motion. She exhibits no edema.   Right sided weakness   Neurological: She is alert and oriented to person, place, and time.   Skin: Skin is warm and dry. She is not diaphoretic.   Psychiatric: She has a normal mood and affect. Her behavior is normal.   Nursing note and vitals reviewed.          Results Review:  I have reviewed the labs, radiology results, and diagnostic studies.    Laboratory Data:   Results from last 7 days   Lab Units 11/07/19 0547 11/05/19 1042 11/03/19 2107   SODIUM mmol/L 142 142 144   POTASSIUM mmol/L 4.0 3.8 4.3   CHLORIDE mmol/L 101 106 103   CO2 mmol/L 31.0* 26.0 27.0   BUN mg/dL 15 12 19   CREATININE mg/dL 0.70 0.66 0.94   GLUCOSE mg/dL 100* 134* 122*   CALCIUM mg/dL 9.4 8.6 9.4   BILIRUBIN mg/dL 0.6  --  0.6   ALK PHOS U/L 102  --  121*   ALT (SGPT) U/L 13  --  17   AST (SGOT) U/L 19  --  29   ANION GAP mmol/L 10.0 10.0 14.0     Estimated Creatinine Clearance: 59.7 mL/min (by C-G formula based on SCr of 0.7 mg/dL).      Results from last 7 days   Lab Units 11/07/19  0547   URIC ACID mg/dL 5.6     Results from last 7 days   Lab Units 11/07/19  0547 11/05/19  1042 11/03/19 2107   WBC 10*3/mm3 5.54 5.60 7.96   HEMOGLOBIN g/dL 12.0 10.5* 11.7*   HEMATOCRIT % 36.4 31.8* 35.9   PLATELETS 10*3/mm3 247 204 231     Results from last 7 days   Lab Units 11/03/19  2107   INR  1.06       Culture Data:   No results found for: BLOODCX  No results found for: URINECX  No results found for: RESPCX  No results found for: WOUNDCX  No results found for: STOOLCX  No components found for: BODYFLD    Radiology Data:   Imaging Results (Last 24 Hours)      ** No results found for the last 24 hours. **          I have reviewed the patient's current medications.     Assessment/Plan     Active Hospital Problems    Diagnosis   • **Left pontine stroke (CMS/HCC)   • Restless leg syndrome, uncontrolled     First diagnosed after initial pontine stroke 2018.  Has been treated with Requip at night since that time     • Dysarthria   • Muscle spasm of both lower legs     Worse since this stroke     • Recurrent falls while walking     Due to muscle spasm     • Urinary hesitancy     History of in and out self caths after initial surgery 2018     • DJD (degenerative joint disease), multiple sites     thoracic and lumbar spine  Status post extensive stabilization of the thoracolumbar spine 2018     • Hypertension   • Altered mental status       Plan:    -Continue Pt/Ot  -Continue Speech therapy.  -Continue Baclofen, Flexeril for spasms.   -Hospitalist group for weekend coverage.             This document has been electronically signed by DALLIN Schneider on November 10, 2019 12:02 PM

## 2019-11-10 NOTE — PLAN OF CARE
Problem: Patient Care Overview  Goal: Plan of Care Review  Outcome: Ongoing (interventions implemented as appropriate)   11/10/19 0432 11/10/19 1311   Patient Care Overview   IRF Plan of Care Review --  progress ongoing, continue   Progress, Functional Goals demonstrating adequate progress --    Coping/Psychosocial   Plan of Care Reviewed With patient --        Problem: Stroke (IRF) (Adult)  Goal: Promote Optimal Functional [place]  Outcome: Ongoing (interventions implemented as appropriate)      Problem: Skin Injury Risk (Adult)  Goal: Skin Health and Integrity  Outcome: Outcome(s) achieved Date Met: 11/10/19      Problem: Fall Risk (Adult)  Goal: Absence of Fall  Outcome: Ongoing (interventions implemented as appropriate)      Problem: Pain, Chronic (Adult)  Goal: Acceptable Pain/Comfort Level and Functional Ability  Outcome: Ongoing (interventions implemented as appropriate)

## 2019-11-11 PROCEDURE — 97110 THERAPEUTIC EXERCISES: CPT

## 2019-11-11 PROCEDURE — 97116 GAIT TRAINING THERAPY: CPT

## 2019-11-11 PROCEDURE — 97535 SELF CARE MNGMENT TRAINING: CPT

## 2019-11-11 PROCEDURE — 97112 NEUROMUSCULAR REEDUCATION: CPT

## 2019-11-11 PROCEDURE — 99232 SBSQ HOSP IP/OBS MODERATE 35: CPT | Performed by: FAMILY MEDICINE

## 2019-11-11 PROCEDURE — 97530 THERAPEUTIC ACTIVITIES: CPT

## 2019-11-11 PROCEDURE — 25010000002 ENOXAPARIN PER 10 MG: Performed by: FAMILY MEDICINE

## 2019-11-11 RX ORDER — BACLOFEN 10 MG/1
20 TABLET ORAL EVERY 8 HOURS SCHEDULED
Status: DISCONTINUED | OUTPATIENT
Start: 2019-11-11 | End: 2019-11-15 | Stop reason: HOSPADM

## 2019-11-11 RX ORDER — AMOXICILLIN 250 MG
1 CAPSULE ORAL NIGHTLY
Status: DISCONTINUED | OUTPATIENT
Start: 2019-11-11 | End: 2019-11-15 | Stop reason: HOSPADM

## 2019-11-11 RX ADMIN — CARVEDILOL 25 MG: 25 TABLET, FILM COATED ORAL at 17:32

## 2019-11-11 RX ADMIN — OXYCODONE HYDROCHLORIDE AND ACETAMINOPHEN 1 TABLET: 5; 325 TABLET ORAL at 00:20

## 2019-11-11 RX ADMIN — OXYCODONE HYDROCHLORIDE AND ACETAMINOPHEN 1 TABLET: 5; 325 TABLET ORAL at 07:25

## 2019-11-11 RX ADMIN — CYCLOBENZAPRINE HYDROCHLORIDE 10 MG: 10 TABLET, FILM COATED ORAL at 08:33

## 2019-11-11 RX ADMIN — ENOXAPARIN SODIUM 40 MG: 40 INJECTION SUBCUTANEOUS at 20:23

## 2019-11-11 RX ADMIN — BACLOFEN 20 MG: 10 TABLET ORAL at 14:20

## 2019-11-11 RX ADMIN — ROPINIROLE 1 MG: 1 TABLET, FILM COATED ORAL at 20:23

## 2019-11-11 RX ADMIN — GABAPENTIN 400 MG: 400 CAPSULE ORAL at 00:14

## 2019-11-11 RX ADMIN — GABAPENTIN 400 MG: 400 CAPSULE ORAL at 06:20

## 2019-11-11 RX ADMIN — GABAPENTIN 400 MG: 400 CAPSULE ORAL at 12:20

## 2019-11-11 RX ADMIN — OXYCODONE HYDROCHLORIDE AND ACETAMINOPHEN 1 TABLET: 5; 325 TABLET ORAL at 18:28

## 2019-11-11 RX ADMIN — GABAPENTIN 400 MG: 400 CAPSULE ORAL at 17:07

## 2019-11-11 RX ADMIN — BACLOFEN 10 MG: 10 TABLET ORAL at 06:20

## 2019-11-11 RX ADMIN — CLOPIDOGREL BISULFATE 75 MG: 75 TABLET ORAL at 08:33

## 2019-11-11 RX ADMIN — BACLOFEN 20 MG: 10 TABLET ORAL at 21:03

## 2019-11-11 RX ADMIN — ATORVASTATIN CALCIUM 80 MG: 40 TABLET, FILM COATED ORAL at 20:23

## 2019-11-11 RX ADMIN — OXYCODONE HYDROCHLORIDE AND ACETAMINOPHEN 1 TABLET: 5; 325 TABLET ORAL at 12:48

## 2019-11-11 RX ADMIN — THERA TABS 1 TABLET: TAB at 08:33

## 2019-11-11 RX ADMIN — ASPIRIN 81 MG 81 MG: 81 TABLET ORAL at 08:33

## 2019-11-11 RX ADMIN — CARVEDILOL 25 MG: 25 TABLET, FILM COATED ORAL at 08:33

## 2019-11-11 RX ADMIN — SENNOSIDES AND DOCUSATE SODIUM 1 TABLET: 8.6; 5 TABLET ORAL at 21:10

## 2019-11-11 RX ADMIN — DIAZEPAM 10 MG: 5 TABLET ORAL at 08:33

## 2019-11-11 NOTE — PLAN OF CARE
Problem: Patient Care Overview  Goal: Plan of Care Review  Outcome: Ongoing (interventions implemented as appropriate)   11/11/19 7245   OTHER   Outcome Summary pt has done well today.musclw spasms have not been as bad today,vss,will cont to monitor   Patient Care Overview   IRF Plan of Care Review progress ongoing, continue   Progress, Functional Goals demonstrating adequate progress   Coping/Psychosocial   Plan of Care Reviewed With patient       Problem: Stroke (IRF) (Adult)  Goal: Promote Optimal Functional [place]  Outcome: Ongoing (interventions implemented as appropriate)      Problem: Fall Risk (Adult)  Goal: Absence of Fall  Outcome: Ongoing (interventions implemented as appropriate)

## 2019-11-11 NOTE — THERAPY TREATMENT NOTE
Inpatient Rehabilitation - Occupational Therapy Treatment Note    [facility]     Patient Name: Rama Goel  : 1945  MRN: [id number]    Today's Date: 2019                 Admit Date: 2019      Visit Dx:    ICD-10-CM ICD-9-CM   1. Dysarthria R47.1 784.51   2. Oropharyngeal dysphagia R13.12 787.22   3. Impaired physical mobility Z74.09 781.99   4. Impaired mobility and ADLs Z74.09 799.89       Patient Active Problem List   Diagnosis   • Altered mental status   • DJD (degenerative joint disease), multiple sites   • Hypertension   • Left pontine stroke (CMS/HCC)   • CVA (cerebral vascular accident) (CMS/HCC)   • Urinary hesitancy   • Muscle spasm of both lower legs   • Recurrent falls while walking   • Dysarthria   • Restless leg syndrome, uncontrolled         Therapy Treatment    IRF Treatment Summary     Row Name 19 1330 19 1000 19 0830       Evaluation/Treatment Time and Intent    Subjective Information  complains of;pain spasam in r le   -  no complaints  -  no complaints  -JA    Existing Precautions/Restrictions  fall  -RC  fall  -RC  fall  -JA    Document Type  therapy note (daily note)  -  therapy note (daily note)  -  therapy note (daily note)  -    Mode of Treatment  occupational therapy;individual therapy  -  individual therapy  -  individual therapy;physical therapy  -JA    Patient/Family Observations  in chair   -RC  in w/c   -RC  Pt. sitting up in w/c   -JA    Start Time (Evaluation/Treatment)  1330  -RC  1000  -RC  0830  -JA    Stop Time (Evaluation/Treatment)  1345  -RC  1115  -RC  1000  -JA    Recorded by [RC] iTki Grover COTA/L [RC] Tiki Grover COTA/DIANA [JA] Ulysses Gill, TOAN    Row Name 19 1330             Relationship/Environment    Primary Source of Support/Comfort  friend  -      Recorded by [RC] Tiki Grover COTA/L      Row Name 19 1330 19 1000 19 0830       Cognition/Psychosocial- PT/OT     Affect/Mental Status (Cognitive)  WFL  -RC  WFL  -RC  WFL  -JA    Orientation Status (Cognition)  oriented x 4  -RC  oriented x 4  -RC  oriented x 4  -JA    Follows Commands (Cognition)  --  --  follows two step commands;75-90% accuracy  -JA    Cognitive Function (Cognitive)  --  --  safety deficit  -JA    Recorded by [RC] Tiki Grover COTA/L [RC] Tiki Grover COTA/DIANA [JA] Ulysses Gill, PTA    Row Name 11/11/19 1000 11/11/19 0830          Sit-Stand Transfer    Sit-Stand [place] (Transfers)  supervision  -  supervision  -     Assistive Device (Sit-Stand Transfers)  wheelchair  -RC  walker, front-wheeled  -JA     Recorded by [] Tiki Grover COTA/DIANA [JA] Ulysses Gill, PTA     Row Name 11/11/19 1000 11/11/19 0830          Stand-Sit Transfer    Stand-Sit [place] (Transfers)  supervision  -  supervision;contact guard;verbal cues  -     Assistive Device (Stand-Sit Transfers)  wheelchair  -RC  walker, front-wheeled  -JA     Recorded by [] Tiki Grover COTA/DIANA [JA] Ulysses Gill, PTA     Row Name 11/11/19 1000             Toilet Transfer    Type (Toilet Transfer)  stand pivot/stand step  -      [place] Level (Toilet Transfer)  contact guard  -      Assistive Device (Toilet Transfer)  grab bars/safety frame;wheelchair  -RC      Recorded by [] Tiki Grover COTA/L      Row Name 11/11/19 0830             Gait/Stairs Assessment/Training    Gait/Stairs Assessment/Training  gait/ambulation assistive device  -      [place] Level (Gait)  contact guard  -      Assistive Device (Gait)  walker, front-wheeled  -      Distance in Feet (Gait)  150'x4  -JA      Pattern (Gait)  step-through  -JA      Deviations/Abnormal Patterns (Gait)  base of support, narrow;gait speed decreased;ataxic  -JA      Bilateral Gait Deviations  forward flexed posture  -      [place] Level (Stairs)  supervision  -      Handrail Location (Stairs)  right side (ascending)   -JA      Number of Steps (Stairs)  4  -JA      Ascending Technique (Stairs)  step-over-step  -JA      Descending Technique (Stairs)  step-to-step  -JA      Comment (Gait/Stairs)  walker changed to assist with erect posture   -JA      Recorded by [JA] Ulysses Gill, TOAN      Row Name 11/11/19 0830             Wheelchair Mobility/Management    Mobility [place] Level (Wheelchair)  supervision retro  -JA      Distance Propelled in Feet (Wheelchair)  75  -JA      Recorded by [JA] Ulysses Gill, TOAN      Row Name 11/11/19 0830             Safety Issues, Functional Mobility    Safety Issues Affecting Function (Mobility)  awareness of need for assistance;impulsivity;insight into deficits/self awareness;judgment;safety precaution awareness;safety precautions follow-through/compliance;steps too close to assistive device  -JA      Recorded by [JA] Ulysses Gill, TOAN      Row Name 11/11/19 1000             Toileting Assessment/Treatment    Toileting [place] Level  toileting skills;contact guard assist;perform perineal hygiene;adjust/manage clothing  -      Assistive Device Use (Toileting)  grab bar/safety frame  -      Toileting Position  supported sitting;supported standing  -      Recorded by [RC] Tiki Grover COTA/L      Row Name 11/11/19 1000             Fine Motor Testing & Training    Training Activity, Fine Motor Coordination  manipulation of pegs  -      Recorded by [RC] Tiki Grover COTA/L      Row Name 11/11/19 1330 11/11/19 1000 11/11/19 0830       Pain Scale: Numbers Pre/Post-Treatment    Pain Scale: Numbers, Pretreatment  8/10  -RC  2/10  -RC  0/10 - no pain  -    Pain Scale: Numbers, Post-Treatment  8/10  -RC  0/10 - no pain  -  2/10 States R foot & hip only hurts with WB.  -JA    Pain Location - Side  Right  -RC  Right  -RC  Right  -JA    Pain Location  extremity  -RC  foot  -RC  foot & hip   -JA    Pre/Post Treatment Pain Comment  spasm   -RC  --  --    Pain  Intervention(s)  Medication (See MAR)  -RC  Medication (See MAR)  -RC  Medication (See MAR);Repositioned;Ambulation/increased activity  -JA    Recorded by [RC] Tiki Grover COTA/L [RC] Tiki Grover COTA/DIANA [JA] Ulysses Gill, PTA    Row Name 11/11/19 0830             Dynamic Balance Activity    Therapeutic Training Performed (Dynamic Balance)  side stepping;other (see comments) tandem walk   -JA      Support Needed for Balance (Dynamic Balance Training)  CGA  -JA      Recorded by [JA] Ulysses Gill, PTA      Row Name 11/11/19 1330             Upper Extremity Seated Therapeutic Exercise    Performed, Seated Upper Extremity (Therapeutic Exercise)  digit flexion/extension;wrist radial/ulnar deviation;forearm supination/pronation r hand and forearm, 6 pack ex  -RC      Exercise Type, Seated Upper Extremity (Therapeutic Exercise)  AROM (active range of motion)  -RC      Expected Outcomes, Seated Upper Extremity (Therapeutic Exercise)  improve functional tolerance, self-care activity;improve performance, BADLs;improve motor control  -RC      Sets/Reps Detail, Seated Upper Extremity (Therapeutic Exercise)  10  -RC      Recorded by [RC] Tiki Grover COTA/L      Row Name 11/11/19 1000             Aerobic Exercise Activity    Exercise Performed (Aerobic, Therapeutic Exercise)  arm bike  -      Expected Outcome (Aerobic, Therapeutic Exercise)  improve functional tolerance, self-care activity;improve performance, BADLs  -RC      Time (Aerobic, Therapeutic Exercise)  15  -RC      Recorded by [RC] Tiki Grover COTA/L      Row Name 11/11/19 0830             Lower Extremity Seated Therapeutic Exercise    Performed, Seated Lower Extremity (Therapeutic Exercise)  LAQ (long arc quad), knee extension;hip flexion/extension;hip abduction/adduction;knee flexion/extension;ankle dorsiflexion/plantarflexion;other (see comments) [place]   -JA      Exercise Type, Seated Lower Extremity (Therapeutic Exercise)   AROM (active range of motion);isometric contraction, static;resistive exercise  -JA      Sets/Reps Detail, Seated Lower Extremity (Therapeutic Exercise)  2x15, lvl 2 ham pulls & PF  -JA      Recorded by [LISA] Ulysses Gill PTA      Row Name 11/11/19 0830             Vital Signs    Pre Patient Position  Sitting  -JA      Intra Patient Position  Standing  -JA      Post Patient Position  Sitting  -JA      Recorded by [LISA] Ulysses Gill PTA      Row Name 11/11/19 1000 11/11/19 0830          Positioning and Restraints    Pre-Treatment Position  sitting in chair/recliner  -RC  sitting in chair/recliner  -JA     Post Treatment Position  chair  -RC  wheelchair  -JA     In Chair  notified nsg;encouraged to call for assist;call light within reach  -RC  --     In Wheelchair  --  sitting;with OT  -JA     Recorded by [KIERSTEN] Tiki Grover COTA/DIANA [LISA] Ulysses Gill PTA     Row Name 11/11/19 1330 11/11/19 0830          Daily Summary of Progress (PT)    Functional Goal Overall Progress: Physical Therapy  progressing toward functional goals as expected  -RC  progressing toward functional goals as expected  -     Recommendations: Physical Therapy  cont poc   -RC  Cont. balance activity, gt. quality, LE strengthening   -JA     Recorded by [KIERSTEN] Tiik Grover COTA/DIANA [LISA] Ulysses Gill PTA       User Key  (r) = Recorded By, (t) = Taken By, (c) = Cosigned By    Initials Name Effective Dates    Ulysses Delarosa PTA 03/07/18 -      Tiki Grover COTA/L 03/07/18 -                  OT Recommendation and Plan         Plan of Care Review  Plan of Care Reviewed With: patient  Daily Summary of Progress (OT)  Functional Goal Overall Progress: Occupational Therapy: progressing toward functional goals as expected  Recommendations: Occupational Therapy: cont poc   Plan of Care Reviewed With: patient  IRF Plan of Care Review: progress ongoing, continue  Progress, Functional Goals: demonstrating adequate  progress  Outcome Summary: participated in [facility] act and fx transfers w/ OT this date. plan shower for tomorrow. cont poc     OT IRF GOALS     Row Name 11/11/19 1330 11/09/19 0906 11/08/19 1355       Bathing Goal 1 (OT-IRF)    Activity/Device (Bathing Goal 1, OT-IRF)  bathing skills, all  -RC  bathing skills, all  -KD  bathing skills, all  -RC    [place] Level (Bathing Goal 1, OT-IRF)  conditional independence  -RC  conditional independence  -KD  conditional independence  -RC    Time Frame (Bathing Goal 1, OT-IRF)  long term goal (LTG);by discharge  -RC  long term goal (LTG);by discharge  -KD  long term goal (LTG);by discharge  -RC    Progress/Outcomes (Bathing Goal 1, OT-IRF)  goal not met  -RC  goal not met  -KD  goal not met  -RC       UB Dressing Goal 1 (OT-IRF)    Activity/Device (UB Dressing Goal 1, OT-IRF)  upper body dressing  -RC  upper body dressing  -KD  upper body dressing  -RC    [place] (UB Dress Goal 1, OT-IRF)  independent  -RC  independent  -KD  independent  -RC    Time Frame (UB Dressing Goal 1, OT-IRF)  long term goal (LTG);by discharge  -RC  long term goal (LTG);by discharge  -KD  long term goal (LTG);by discharge  -RC    Progress/Outcomes (UB Dressing Goal 1, OT-IRF)  goal not met  -RC  goal not met  -KD  goal not met  -RC       LB Dressing Goal 1 (OT-IRF)    Activity/Device (LB Dressing Goal 1, OT-IRF)  lower body dressing  -RC  lower body dressing  -KD  lower body dressing  -RC    [place] (LB Dressing Goal 1, OT-IRF)  conditional independence  -RC  conditional independence  -KD  conditional independence  -RC    Time Frame (LB Dressing Goal 1, OT-IRF)  long term goal (LTG);by discharge  -RC  long term goal (LTG);by discharge  -KD  long term goal (LTG);by discharge  -RC    Progress/Outcomes (LB Dressing Goal 1, OT-IRF)  goal not met  -RC  goal not met  -KD  goal not met  -RC    Row Name 11/07/19 1051             Bathing Goal 1 (OT-IRF)    Activity/Device (Bathing Goal 1, OT-IRF)   bathing skills, all  -ME      [place] Level (Bathing Goal 1, OT-IRF)  conditional independence  -ME      Time Frame (Bathing Goal 1, OT-IRF)  long term goal (LTG);by discharge  -ME      Progress/Outcomes (Bathing Goal 1, OT-IRF)  goal not met  -ME         UB Dressing Goal 1 (OT-IRF)    Activity/Device (UB Dressing Goal 1, OT-IRF)  upper body dressing  -ME      [place] (UB Dress Goal 1, OT-IRF)  independent  -ME      Time Frame (UB Dressing Goal 1, OT-IRF)  long term goal (LTG);by discharge  -ME      Progress/Outcomes (UB Dressing Goal 1, OT-IRF)  goal not met  -ME         LB Dressing Goal 1 (OT-IRF)    Activity/Device (LB Dressing Goal 1, OT-IRF)  lower body dressing  -ME      [place] (LB Dressing Goal 1, OT-IRF)  conditional independence  -ME      Time Frame (LB Dressing Goal 1, OT-IRF)  long term goal (LTG);by discharge  -ME      Progress/Outcomes (LB Dressing Goal 1, OT-IRF)  goal not met  -ME        User Key  (r) = Recorded By, (t) = Taken By, (c) = Cosigned By    Initials Name Provider Type    iTki Deleon MITCHELL/L Occupational Therapy Assistant    Carola Serrano COTA/L Occupational Therapy Assistant    Che Hinkle OT Occupational Therapist          Occupational Therapy Education     Title: PT OT SLP Therapies (In Progress)     Topic: Occupational Therapy (In Progress)     Point: ADL training (Done)     Description: Instruct learner(s) on proper safety adaptation and remediation techniques during self care or transfers.   Instruct in proper use of assistive devices.    Learning Progress Summary           Patient Acceptance, BARRETT SANTA DU by ME at 11/7/2019  3:40 PM    Comment:  Educated on OT and POC. Educated to continue exercises given to her for translation and fine motor coordination. Educated to call for assist. Educated on hand placement for transfers. Educated on safe mechanics for completing ADLs.                   Point: Home exercise program (Done)     Description: Instruct  learner(s) on appropriate technique for monitoring, assisting and/or progressing therapeutic exercises/activities.    Learning Progress Summary           Patient Acceptance, E, GISSELLE HYDE by ME at 11/7/2019  3:40 PM    Comment:  Educated on OT and POC. Educated to continue exercises given to her for translation and fine motor coordination. Educated to call for assist. Educated on hand placement for transfers. Educated on safe mechanics for completing ADLs.                   Point: Precautions (In Progress)     Description: Instruct learner(s) on prescribed precautions during self-care and functional transfers.    Learning Progress Summary           Patient Acceptance, E, NR by  at 11/11/2019  1:53 PM    Acceptance, E, NR by  at 11/8/2019  2:46 PM    Acceptance, E, GISSELLE HYDE by ME at 11/7/2019  3:40 PM    Comment:  Educated on OT and POC. Educated to continue exercises given to her for translation and fine motor coordination. Educated to call for assist. Educated on hand placement for transfers. Educated on safe mechanics for completing ADLs.                   Point: Body mechanics (In Progress)     Description: Instruct learner(s) on proper positioning and spine alignment during self-care, functional mobility activities and/or exercises.    Learning Progress Summary           Patient Acceptance, E, NR by  at 11/11/2019  1:53 PM    Acceptance, E, NR by  at 11/8/2019  2:46 PM                               User Key     Initials Effective Dates Name Provider Type Discipline     03/07/18 -  Tiki Grover COTA/L Occupational Therapy Assistant OT    ME 09/10/19 -  Che Oh OT Occupational Therapist OT                Outcome Measures     Row Name 11/11/19 1330 11/11/19 0830 11/09/19 1354       How much help from another person do you currently need...    Turning from your back to your side while in flat bed without using bedrails?  --  3  -JA  3  -TW    Moving from lying on back to sitting on the side of a flat  bed without bedrails?  --  3  -JA  3  -TW    Moving to and from a bed to a chair (including a wheelchair)?  --  3  -JA  3  -TW    Standing up from a chair using your arms (e.g., wheelchair, bedside chair)?  --  3  -JA  3  -TW    Climbing 3-5 steps with a railing?  --  3  -JA  3  -TW    To walk in hospital room?  --  3  -JA  3  -TW    AM-PAC 6 Clicks Score (PT)  --  18  -JA  18  -TW       How much help from another is currently needed...    Putting on and taking off regular lower body clothing?  3  -RC  --  --    Bathing (including washing, rinsing, and drying)  3  -RC  --  --    Toileting (which includes using toilet bed pan or urinal)  3  -RC  --  --    Putting on and taking off regular upper body clothing  3  -RC  --  --    Taking care of personal grooming (such as brushing teeth)  3  -RC  --  --    Eating meals  4  -RC  --  --    AM-PAC 6 Clicks Score (OT)  19  -RC  --  --       Functional Assessment    Outcome Measure Options  --  AM-PAC 6 Clicks Basic Mobility (PT)  -  --    Row Name 11/09/19 0906             How much help from another is currently needed...    Putting on and taking off regular lower body clothing?  3  -KD      Bathing (including washing, rinsing, and drying)  3  -KD      Toileting (which includes using toilet bed pan or urinal)  3  -KD      Putting on and taking off regular upper body clothing  3  -KD      Taking care of personal grooming (such as brushing teeth)  3  -KD      Eating meals  4  -KD      AM-PAC 6 Clicks Score (OT)  19  -KD        User Key  (r) = Recorded By, (t) = Taken By, (c) = Cosigned By    Initials Name Provider Type    Ulysses Delarosa, TOAN Physical Therapy Assistant    TW Abundio Barrett, TOAN Physical Therapy Assistant    Tiki Deleon, PAULA/L Occupational Therapy Assistant    KD Carola Kang, PAULA/L Occupational Therapy Assistant             Time Calculation:     Time Calculation- OT     Row Name 11/11/19 1356 11/11/19 1355 11/11/19 1030       Time  Calculation- OT    OT Start Time  1330  -RC  1000  -RC  --    OT Stop Time  1345  -RC  1115  -RC  --    OT Time Calculation (min)  15 min  -RC  75 min  -RC  --    Total Timed Code Minutes- OT  --  75 minute(s)  -RC  --    OT Received On  11/11/19  -RC  11/11/19  -  --       Timed Charges    74044 - Gait Training Minutes   --  --  25  -JA      User Key  (r) = Recorded By, (t) = Taken By, (c) = Cosigned By    Initials Name Provider Type    Ulysses Delarosa, PTA Physical Therapy Assistant     ReillyTiki, MITCHELL/L Occupational Therapy Assistant          Therapy Charges for Today     Code Description Service Date Service Provider Modifiers Qty    38872859324 HC OT SELF CARE/MGMT/TRAIN EA 15 MIN 11/11/2019 Reilly Tiki G, MITCHELL/L GO 1    05117048692 HC OT THERAPEUTIC ACT EA 15 MIN 11/11/2019 ReillyTiki, MITCHELL/L GO 4    38295300078 HC OT THER PROC EA 15 MIN 11/11/2019 Tiki Grover, MITCHELL/L GO 1                   Tiki Grover, MITCHELL/L  11/11/2019

## 2019-11-11 NOTE — PLAN OF CARE
Problem: Patient Care Overview  Goal: Plan of Care Review  Outcome: Ongoing (interventions implemented as appropriate)   11/11/19 9953   OTHER   Outcome Summary participated in c act and fx transfers w/ OT this date. plan shower for tomorrow. cont poc    Patient Care Overview   IRF Plan of Care Review progress ongoing, continue   Progress, Functional Goals demonstrating adequate progress   Coping/Psychosocial   Plan of Care Reviewed With patient

## 2019-11-11 NOTE — PROGRESS NOTES
LOS: 5 days   Patient Care Team:  Inna Harris APRN as PCP - General (Family Medicine)  Declan Friedman MD as PCP - Claims Attributed  Maykel Marinelli DPM as Consulting Physician (Podiatry)    Chief Complaint:   Left pontine stroke (CMS/HCC)          Interval History:     SUBJECTIVE: She is feeling better.  Over the weekend noted to have some right hip pain with ambulation she said because of lower extremity muscle spasm.  She says she is not having any pain today.  She has had some mild constipation.  She has a history of loose bowel movements and did not want a laxative early in admission.  She does have some episodes of difficulty with urination and is at baseline there and says is getting better.  These are symptoms that seem to be due to prior back surgery instead of the stroke  She says she is getting better  Spasms are improved  History taken from: patient chart RN    Objective     Vital Signs  Temp:  [96.2 °F (35.7 °C)-97.1 °F (36.2 °C)] 96.2 °F (35.7 °C)  Heart Rate:  [54-71] 54  Resp:  [18] 18  BP: (128-147)/(61-65) 147/65      11/07/19  0300 11/09/19  0500 11/10/19  0600   Weight: 70.6 kg (155 lb 9.6 oz) 71.6 kg (157 lb 14.4 oz) 71.4 kg (157 lb 6.4 oz)         Physical Exam:     General Appearance:    Alert, cooperative, in no acute distress   Head:    Normocephalic, without obvious abnormality, atraumatic   Eyes:            Lids and lashes normal, conjunctivae and sclerae normal, no   icterus, no pallor, corneas clear, PERRLA   Throat:   No oral lesions, no thrush, oral mucosa moist   Neck:   No adenopathy, supple, trachea midline, no thyromegaly, no   carotid bruit, no JVD       Lungs:     Clear to auscultation,respirations regular, even and                  Unlabored no rales rhonchi or wheezes good air movement    Heart:    Regular rhythm and normal rate, normal S1 and S2, no            murmur, no gallop, no rub, no click no ectopy   Chest Wall:    No abnormalities observed   Abdomen:      Normal bowel sounds, no masses, no organomegaly, soft        non-tender, non-distended, no guarding, no rebound                Tenderness normal exam   Extremities:   Moves all extremities well, no edema, no cyanosis, no             Redness examination of the right lower extremity shows full range of motion in internal and external rotation of the hip without pain       Skin:   No bleeding, bruising or rash   Lymph nodes:   No palpable adenopathy   Neurologic:   Cranial nerves 2 - 12 grossly intact, sensation intact, DTR       present and equal bilaterally right-sided weakness is improving       RESULTS REVIEW:     Lab Results (last 24 hours)     ** No results found for the last 24 hours. **        Imaging Results (Last 72 Hours)     Procedure Component Value Units Date/Time    XR Foot 3+ View Right [[id number]] Collected:  11/08/19 0850     Updated:  11/08/19 1003    Narrative:       Procedure:  Right foot        Indication:  Right foot pain   .    Technique:  Three views   .    Prior relevant exam:  None.  1. Plantar calcaneal spur. Soft tissue calcifications plantar  aponeurosis suggesting changes of chronic plantar fasciitis.    2.Degenerative changes, osteophytes seen in the dorsum of the  foot at the articulation between the tarsal navicular and  cuneiforms.    3.Mild degenerative changes first metatarsophalangeal joint.    The right foot is otherwise unremarkable.      Impression:       CONCLUSION: As above.    Electronically signed by:  Noman De La Rosa MD  11/8/2019 10:02 AM [facility]  Workstation: [id number]        Nursing notes and therapy notes have been reviewed  I have reviewed medications  Assessment/Plan     Active Hospital Problems    Diagnosis   • **Left pontine stroke (CMS/HCC)   • Restless leg syndrome, uncontrolled     First diagnosed after initial pontine stroke 2018.  Has been treated with Requip at night since that time     • Dysarthria   • Muscle spasm of both lower legs     Worse since this stroke     •  Recurrent falls while walking     Due to muscle spasm     • Urinary hesitancy     History of in and out self caths after initial surgery 2018     • DJD (degenerative joint disease), multiple sites     thoracic and lumbar spine  Status post extensive stabilization of the thoracolumbar spine 2018     • Hypertension   • Altered mental status           PLAN: She is participating with therapy and she is improving.  Chronic problem of leg spasm improving  Degenerative joint disease of pain in the foot better hip pain better  She is agreeable to taking a laxative daily and if difficulty will increase or decrease as needed  Encouraged to continue therapy and she is participating well  Recheck lab work tomorrow        This document has been electronically signed by Castro West MD on November 11, 2019 9:17 AM

## 2019-11-11 NOTE — THERAPY TREATMENT NOTE
Inpatient Rehabilitation - Physical Therapy Treatment Note  [facility]     Patient Name: Rama Goel  : 1945  MRN: [id number]    Today's Date: 2019                 Admit Date: 2019      Visit Dx:      ICD-10-CM ICD-9-CM   1. Dysarthria R47.1 784.51   2. Oropharyngeal dysphagia R13.12 787.22   3. Impaired physical mobility Z74.09 781.99   4. Impaired mobility and ADLs Z74.09 799.89       Patient Active Problem List   Diagnosis   • Altered mental status   • DJD (degenerative joint disease), multiple sites   • Hypertension   • Left pontine stroke (CMS/HCC)   • CVA (cerebral vascular accident) (CMS/HCC)   • Urinary hesitancy   • Muscle spasm of both lower legs   • Recurrent falls while walking   • Dysarthria   • Restless leg syndrome, uncontrolled       Therapy Treatment    IRF Treatment Summary     Row Name 19             Evaluation/Treatment Time and Intent    Subjective Information  no complaints  -JA      Existing Precautions/Restrictions  fall  -JA      Document Type  therapy note (daily note)  -JA      Mode of Treatment  individual therapy;physical therapy  -JA      Patient/Family Observations  Pt. sitting up in w/c   -JA      Start Time (Evaluation/Treatment)  0830  -JA      Stop Time (Evaluation/Treatment)  1000  -JA      Recorded by [LISA] Ulysses Gill PTA      Row Name 19             Cognition/Psychosocial- PT/OT    Affect/Mental Status (Cognitive)  WFL  -JA      Orientation Status (Cognition)  oriented x 4  -JA      Follows Commands (Cognition)  follows two step commands;75-90% accuracy  -LISA      Cognitive Function (Cognitive)  safety deficit  -JA      Recorded by [JA] Ulysses Gill PTA      Row Name 19             Sit-Stand Transfer    Sit-Stand [place] (Transfers)  supervision  -LISA      Assistive Device (Sit-Stand Transfers)  walker, front-wheeled  -JA      Recorded by [LISA] Ulysses Gill PTA      Row Name 19              Stand-Sit Transfer    Stand-Sit [place] (Transfers)  supervision;contact guard;verbal cues  -JA      Assistive Device (Stand-Sit Transfers)  walker, front-wheeled  -JA      Recorded by [JA] Ulysses Gill PTA      Row Name 11/11/19 0868             Gait/Stairs Assessment/Training    Gait/Stairs Assessment/Training  gait/ambulation assistive device  -JA      [place] Level (Gait)  contact guard  -JA      Assistive Device (Gait)  walker, front-wheeled  -JA      Distance in Feet (Gait)  150'x4  -JA      Pattern (Gait)  step-through  -JA      Deviations/Abnormal Patterns (Gait)  base of support, narrow;gait speed decreased;ataxic  -JA      Bilateral Gait Deviations  forward flexed posture  -JA      [place] Level (Stairs)  supervision  -JA      Handrail Location (Stairs)  right side (ascending)  -JA      Number of Steps (Stairs)  4  -JA      Ascending Technique (Stairs)  step-over-step  -JA      Descending Technique (Stairs)  step-to-step  -JA      Comment (Gait/Stairs)  walker changed to assist with erect posture   -JA      Recorded by [JA] Ulysses Gill PTA      Row Name 11/11/19 0830             Wheelchair Mobility/Management    Mobility [place] Level (Wheelchair)  supervision retro  -JA      Distance Propelled in Feet (Wheelchair)  75  -JA      Recorded by [LISA] Ulysses Gill PTA      Row Name 11/11/19 0832             Safety Issues, Functional Mobility    Safety Issues Affecting Function (Mobility)  awareness of need for assistance;impulsivity;insight into deficits/self awareness;judgment;safety precaution awareness;safety precautions follow-through/compliance;steps too close to assistive device  -JA      Recorded by [LISA] Ulysses Gill PTA      Row Name 11/11/19 0813             Pain Scale: Numbers Pre/Post-Treatment    Pain Scale: Numbers, Pretreatment  0/10 - no pain  -JA      Pain Scale: Numbers, Post-Treatment  2/10 States R foot & hip only hurts with WB.   -JA      Pain Location - Side  Right  -JA      Pain Location  foot & hip   -JA      Pain Intervention(s)  Medication (See MAR);Repositioned;Ambulation/increased activity  -JA      Recorded by [LISA] Ulysses Gill PTA      Row Name 11/11/19 0830             Dynamic Balance Activity    Therapeutic Training Performed (Dynamic Balance)  side stepping;other (see comments) tandem walk   -JA      Support Needed for Balance (Dynamic Balance Training)  CGA  -JA      Recorded by [LISA] Ulysses Gill PTA      Row Name 11/11/19 0830             Lower Extremity Seated Therapeutic Exercise    Performed, Seated Lower Extremity (Therapeutic Exercise)  LAQ (long arc quad), knee extension;hip flexion/extension;hip abduction/adduction;knee flexion/extension;ankle dorsiflexion/plantarflexion;other (see comments) [place]   -JA      Exercise Type, Seated Lower Extremity (Therapeutic Exercise)  AROM (active range of motion);isometric contraction, static;resistive exercise  -JA      Sets/Reps Detail, Seated Lower Extremity (Therapeutic Exercise)  2x15, lvl 2 ham pulls & PF  -JA      Recorded by [JA] Ulysses Gill PTA      Row Name 11/11/19 0830             Vital Signs    Pre Patient Position  Sitting  -JA      Intra Patient Position  Standing  -JA      Post Patient Position  Sitting  -JA      Recorded by [LISA] Ulysses Gill PTA      Row Name 11/11/19 0830             Positioning and Restraints    Pre-Treatment Position  sitting in chair/recliner  -JA      Post Treatment Position  wheelchair  -JA      In Wheelchair  sitting;with OT  -JA      Recorded by [LISA] Ulysses Gill PTA      Row Name 11/11/19 0830             Daily Summary of Progress (PT)    Functional Goal Overall Progress: Physical Therapy  progressing toward functional goals as expected  -JA      Recommendations: Physical Therapy  Cont. balance activity, gt. quality, LE strengthening   -JA      Recorded by [LISA] Ulysses Gill PTA        User Elliott   (r) = Recorded By, (t) = Taken By, (c) = Cosigned By    Initials Name Effective Dates    Ulsyses Delarosa PTA 03/07/18 -            Physical Therapy Education     Title: PT OT SLP Therapies (In Progress)     Topic: Physical Therapy (In Progress)     Point: Mobility training (Done)     Learning Progress Summary           Patient Acceptance, E,TB,D, VU,DU by LISA at 11/11/2019 10:21 AM    Comment:  Reinforced erect posture with gt. , proper hand placement with stand-sit,  need for assist upon d/c at home due to pt. at risk for fall at this time & due to caring for animals at home.    Acceptance, E, VU,NR by ALEISHA at 11/7/2019  1:02 PM    Comment:  Mellisa assessed: 23/28 or moderate fall risk.  Recommend continued use of FWW.  Tag alarm applied.                   Point: Body mechanics (Done)     Learning Progress Summary           Patient Acceptance, E,TB,D, VU,DU by LISA at 11/11/2019 10:21 AM    Comment:  Reinforced erect posture with gt. , proper hand placement with stand-sit,  need for assist upon d/c at home due to pt. at risk for fall at this time & due to caring for animals at home.                   Point: Precautions (Done)     Learning Progress Summary           Patient Acceptance, E,TB,D, VU,DU by LISA at 11/11/2019 10:21 AM    Comment:  Reinforced erect posture with gt. , proper hand placement with stand-sit,  need for assist upon d/c at home due to pt. at risk for fall at this time & due to caring for animals at home.                               User Key     Initials Effective Dates Name Provider Type Discipline    LISA 03/07/18 -  Ulysses Gill PTA Physical Therapy Assistant PT    ALEISHA 04/03/18 -  Hermilo Coronel PT Physical Therapist PT                  PT Recommendation and Plan     Plan of Care Reviewed With: patient  Daily Summary of Progress (PT)  Functional Goal Overall Progress: Physical Therapy: progressing toward functional goals as expected  Recommendations: Physical Therapy: Cont. balance  activity, gt. quality, LE strengthening   IRF Plan of Care Review: progress ongoing, continue  Progress, Functional Goals: demonstrating adequate progress  Outcome Summary: Pt. with improved gt. quality this a.m., but cont's with stooped posture, but improved with taller walker this a.m. Pt. with R hip pain this a.m.,but did not limit pt.'s mobility & no muscle spasms during treatment.       PT IRF GOALS     Row Name 11/11/19 0830             Bed Mobility Goal 1 (PT-IRF)    Activity/Assistive Device (Bed Mobility Goal 1, PT-IRF)  sit to supine/supine to sit  -JA      [place] Level (Bed Mobility Goal 1, PT-IRF)  independent  -JA      Time Frame (Bed Mobility Goal 1, PT-IRF)  short term goal (STG);5 - 7 days  -JA      Progress/Outcomes (Bed Mobility Goal 1, PT-IRF)  goal not met  -JA         Transfer Goal 1 (PT-IRF)    Activity/Assistive Device (Transfer Goal 1, PT-IRF)  sit-to-stand/stand-to-sit;bed-to-chair/chair-to-bed  -JA      [place] Level (Transfer Goal 1, PT-IRF)  conditional independence  -JA      Time Frame (Transfer Goal 1, PT-IRF)  short term goal (STG);5 - 7 days  -JA      Barriers (Transfer Goal 1, PT-IRF)  Impulsive at times.   -JA      Progress/Outcomes (Transfer Goal 1, PT-IRF)  goal not met  -JA         Gait/Walking Locomotion Goal 1 (PT-IRF)    Activity/Assistive Device (Gait/Walking Locomotion Goal 1, PT-IRF)  walker, rolling  -JA      Gait/Walking Locomotion Distance Goal 1 (PT-IRF)  150'x2  -JA      [place] Level (Gait/Walking Locomotion Goal 1, PT-IRF)  conditional independence  -JA      Time Frame (Gait/Walking Locomotion Goal 1, PT-IRF)  long term goal (LTG);by discharge  -JA      Barriers (Gait/Walking Locomotion Goal 1, PT-IRF)  Stooped posture.   -JA      Progress/Outcomes (Gait/Walking Locomotion Goal 1, PT-IRF)  goal not met  -JA         Gait/Walking Locomotion Goal (PT-IRF)    Gait/Walking Locomotion Goal (PT-IRF)  Tinetti fall risk, score: 25/28  -JA      Time Frame  (Gait/Walking Locomotion Goal, PT-IRF)  long term goal (LTG);by discharge  -JA      Progress/Outcomes (Gait/Walking Locomotion Goal, PT-IRF)  goal not met  -JA         Stairs Goal 1 (PT-IRF)    Activity/Assistive Device (Stairs Goal 1, PT-IRF)  using handrail, right  -JA      Number of Stairs (Stairs Goal 1, PT-IRF)  4 steps, R rail.   -JA      [place] Level (Stairs Goal 1, PT-IRF)  conditional independence  -JA      Time Frame (Stairs Goal 1, PT-IRF)  long term goal (LTG);by discharge  -JA      Progress/Outcomes (Stairs Goal 1, PT-IRF)  goal not met  -JA        User Key  (r) = Recorded By, (t) = Taken By, (c) = Cosigned By    Initials Name Provider Type    Ulysses Delarosa PTA Physical Therapy Assistant        Outcome Measures     Row Name 11/11/19 0830 11/09/19 1354 11/09/19 0906       How much help from another person do you currently need...    Turning from your back to your side while in flat bed without using bedrails?  3  -JA  3  -TW  --    Moving from lying on back to sitting on the side of a flat bed without bedrails?  3  -JA  3  -TW  --    Moving to and from a bed to a chair (including a wheelchair)?  3  -JA  3  -TW  --    Standing up from a chair using your arms (e.g., wheelchair, bedside chair)?  3  -JA  3  -TW  --    Climbing 3-5 steps with a railing?  3  -JA  3  -TW  --    To walk in hospital room?  3  -JA  3  -TW  --    AM-PAC 6 Clicks Score (PT)  18  -JA  18  -TW  --       How much help from another is currently needed...    Putting on and taking off regular lower body clothing?  --  --  3  -KD    Bathing (including washing, rinsing, and drying)  --  --  3  -KD    Toileting (which includes using toilet bed pan or urinal)  --  --  3  -KD    Putting on and taking off regular upper body clothing  --  --  3  -KD    Taking care of personal grooming (such as brushing teeth)  --  --  3  -KD    Eating meals  --  --  4  -KD    AM-PAC 6 Clicks Score (OT)  --  --  19  -KD       Functional Assessment     Outcome Measure Options  AM-PAC 6 Clicks Basic Mobility (PT)  -JA  --  --    Row Name 11/08/19 1355             How much help from another is currently needed...    Putting on and taking off regular lower body clothing?  3  -RC      Bathing (including washing, rinsing, and drying)  3  -RC      Toileting (which includes using toilet bed pan or urinal)  3  -RC      Putting on and taking off regular upper body clothing  3  -RC      Taking care of personal grooming (such as brushing teeth)  3  -RC      Eating meals  4  -RC      AM-PAC 6 Clicks Score (OT)  19  -RC        User Key  (r) = Recorded By, (t) = Taken By, (c) = Cosigned By    Initials Name Provider Type    Ulysses Delarosa, TOAN Physical Therapy Assistant    Abundio Maria, PTA Physical Therapy Assistant    RC Tiki Grover, MITCHELL/L Occupational Therapy Assistant    Carola Serrano, MITCHELL/L Occupational Therapy Assistant             Time Calculation:     PT Charges     Row Name 11/11/19 1030             Time Calculation    Start Time  0830  -LISA      Stop Time  1000  -LISA      Time Calculation (min)  90 min  -LISA         Time Calculation- PT    Total Timed Code Minutes- PT  90 minute(s)  -         Timed Charges    48784 - PT Therapeutic Exercise Minutes  50  -LISA      75227 -  PT Neuromuscular Reeducation Minutes  15  -LISA      10520 - Gait Training Minutes   25  -        User Key  (r) = Recorded By, (t) = Taken By, (c) = Cosigned By    Initials Name Provider Type    Ulysses Delarosa PTA Physical Therapy Assistant          Therapy Charges for Today     Code Description Service Date Service Provider Modifiers Qty    63796808822 HC PT NEUROMUSC RE EDUCATION EA 15 MIN 11/11/2019 Ulysses Gill, PTA GP 1    01501104669 HC PT THER PROC EA 15 MIN 11/11/2019 Ulysses Gill, PTA GP 3    37603173223 HC GAIT TRAINING EA 15 MIN 11/11/2019 Ulysses Gill, PTA GP 2            PT G-Codes  Outcome Measure Options: AM-PAC 6 Clicks Basic  Mobility (PT)  AM-PAC 6 Clicks Score (PT): 18  AM-PAC 6 Clicks Score (OT): 19  Modified [place] Scale: 4 - Moderately severe disability.  Unable to walk without assistance, and unable to attend to own bodily needs without assistance.  Tinetti Total Score: 23      Ulysses Gill, PTA  11/11/2019

## 2019-11-11 NOTE — PLAN OF CARE
Problem: Patient Care Overview  Goal: Plan of Care Review  Outcome: Ongoing (interventions implemented as appropriate)   11/11/19 0250   OTHER   Outcome Summary Patient has complainted of increased pain in right hip with oral pain medication given to decrease pain, she has also complained of more spasm in lower right leg.     11/11/19 0250   OTHER   Outcome Summary Patient has complainted of increased pain in right hip with oral pain medication given to decrease pain,   Patient Care Overview   IRF Plan of Care Review progress ongoing, continue   Progress, Functional Goals demonstrating adequate progress   Coping/Psychosocial   Plan of Care Reviewed With patient      Patient Care Overview   IRF Plan of Care Review progress ongoing, continue   Progress, Functional Goals demonstrating adequate progress   Coping/Psychosocial   Plan of Care Reviewed With patient       Problem: Stroke (IRF) (Adult)  Goal: Promote Optimal Functional [place]  Outcome: Ongoing (interventions implemented as appropriate)      Problem: Fall Risk (Adult)  Goal: Absence of Fall  Outcome: Ongoing (interventions implemented as appropriate)      Problem: Pain, Chronic (Adult)  Goal: Acceptable Pain/Comfort Level and Functional Ability  Outcome: Ongoing (interventions implemented as appropriate)

## 2019-11-11 NOTE — PLAN OF CARE
Problem: Patient Care Overview  Goal: Plan of Care Review  Outcome: Ongoing (interventions implemented as appropriate)   11/11/19 0830   OTHER   Outcome Summary Pt. with improved gt. quality this a.m., but cont's with stooped posture, but improved with taller walker this a.m. Pt. with R hip pain this a.m.,but did not limit pt.'s mobility & no muscle spasms during treatment.    Patient Care Overview   IRF Plan of Care Review progress ongoing, continue   Progress, Functional Goals demonstrating adequate progress   Coping/Psychosocial   Plan of Care Reviewed With patient     Goal: Discharge Needs Assessment  Outcome: Ongoing (interventions implemented as appropriate)   11/09/19 0137   Discharge Needs Assessment   Patient/Family Anticipates Transition to home   Patient/Family Anticipated Services at Transition outpatient care   Transportation Anticipated family or friend will provide   Disability   Equipment Currently Used at Home cane, straight;shower chair;walker, rolling

## 2019-11-12 LAB
ANION GAP SERPL CALCULATED.3IONS-SCNC: 10 MMOL/L (ref 5–15)
BASOPHILS # BLD AUTO: 0.03 10*3/MM3 (ref 0–0.2)
BASOPHILS NFR BLD AUTO: 0.5 % (ref 0–1.5)
BUN BLD-MCNC: 20 MG/DL (ref 8–23)
BUN/CREAT SERPL: 23.8 (ref 7–25)
CALCIUM SPEC-SCNC: 9.3 MG/DL (ref 8.6–10.5)
CHLORIDE SERPL-SCNC: 100 MMOL/L (ref 98–107)
CO2 SERPL-SCNC: 29 MMOL/L (ref 22–29)
CREAT BLD-MCNC: 0.84 MG/DL (ref 0.57–1)
DEPRECATED RDW RBC AUTO: 42.5 FL (ref 37–54)
EOSINOPHIL # BLD AUTO: 0.15 10*3/MM3 (ref 0–0.4)
EOSINOPHIL NFR BLD AUTO: 2.7 % (ref 0.3–6.2)
ERYTHROCYTE [DISTWIDTH] IN BLOOD BY AUTOMATED COUNT: 13 % (ref 12.3–15.4)
GFR SERPL CREATININE-BSD FRML MDRD: 67 ML/MIN/1.73
GLUCOSE BLD-MCNC: 151 MG/DL (ref 65–99)
HCT VFR BLD AUTO: 32.9 % (ref 34–46.6)
HGB BLD-MCNC: 10.8 G/DL (ref 12–15.9)
IMM GRANULOCYTES # BLD AUTO: 0.03 10*3/MM3 (ref 0–0.05)
IMM GRANULOCYTES NFR BLD AUTO: 0.5 % (ref 0–0.5)
LYMPHOCYTES # BLD AUTO: 1.26 10*3/MM3 (ref 0.7–3.1)
LYMPHOCYTES NFR BLD AUTO: 22.3 % (ref 19.6–45.3)
MCH RBC QN AUTO: 29.6 PG (ref 26.6–33)
MCHC RBC AUTO-ENTMCNC: 32.8 G/DL (ref 31.5–35.7)
MCV RBC AUTO: 90.1 FL (ref 79–97)
MONOCYTES # BLD AUTO: 0.48 10*3/MM3 (ref 0.1–0.9)
MONOCYTES NFR BLD AUTO: 8.5 % (ref 5–12)
NEUTROPHILS # BLD AUTO: 3.7 10*3/MM3 (ref 1.7–7)
NEUTROPHILS NFR BLD AUTO: 65.5 % (ref 42.7–76)
NRBC BLD AUTO-RTO: 0 /100 WBC (ref 0–0.2)
PLATELET # BLD AUTO: 274 10*3/MM3 (ref 140–450)
PMV BLD AUTO: 9.8 FL (ref 6–12)
POTASSIUM BLD-SCNC: 3.9 MMOL/L (ref 3.5–5.2)
RBC # BLD AUTO: 3.65 10*6/MM3 (ref 3.77–5.28)
SODIUM BLD-SCNC: 139 MMOL/L (ref 136–145)
WBC NRBC COR # BLD: 5.65 10*3/MM3 (ref 3.4–10.8)

## 2019-11-12 PROCEDURE — 99232 SBSQ HOSP IP/OBS MODERATE 35: CPT | Performed by: FAMILY MEDICINE

## 2019-11-12 PROCEDURE — 97116 GAIT TRAINING THERAPY: CPT

## 2019-11-12 PROCEDURE — 97110 THERAPEUTIC EXERCISES: CPT

## 2019-11-12 PROCEDURE — 97530 THERAPEUTIC ACTIVITIES: CPT

## 2019-11-12 PROCEDURE — 97535 SELF CARE MNGMENT TRAINING: CPT

## 2019-11-12 PROCEDURE — 25010000002 ENOXAPARIN PER 10 MG: Performed by: FAMILY MEDICINE

## 2019-11-12 PROCEDURE — 85025 COMPLETE CBC W/AUTO DIFF WBC: CPT | Performed by: FAMILY MEDICINE

## 2019-11-12 PROCEDURE — 80048 BASIC METABOLIC PNL TOTAL CA: CPT | Performed by: FAMILY MEDICINE

## 2019-11-12 RX ADMIN — CYCLOBENZAPRINE HYDROCHLORIDE 10 MG: 10 TABLET, FILM COATED ORAL at 18:16

## 2019-11-12 RX ADMIN — CARVEDILOL 25 MG: 25 TABLET, FILM COATED ORAL at 17:22

## 2019-11-12 RX ADMIN — BACLOFEN 20 MG: 10 TABLET ORAL at 06:25

## 2019-11-12 RX ADMIN — OXYCODONE HYDROCHLORIDE AND ACETAMINOPHEN 1 TABLET: 5; 325 TABLET ORAL at 12:38

## 2019-11-12 RX ADMIN — GABAPENTIN 400 MG: 400 CAPSULE ORAL at 17:22

## 2019-11-12 RX ADMIN — ENOXAPARIN SODIUM 40 MG: 40 INJECTION SUBCUTANEOUS at 20:29

## 2019-11-12 RX ADMIN — CYCLOBENZAPRINE HYDROCHLORIDE 10 MG: 10 TABLET, FILM COATED ORAL at 07:05

## 2019-11-12 RX ADMIN — CARVEDILOL 25 MG: 25 TABLET, FILM COATED ORAL at 07:05

## 2019-11-12 RX ADMIN — BACLOFEN 20 MG: 10 TABLET ORAL at 13:12

## 2019-11-12 RX ADMIN — BACLOFEN 20 MG: 10 TABLET ORAL at 21:10

## 2019-11-12 RX ADMIN — THERA TABS 1 TABLET: TAB at 08:03

## 2019-11-12 RX ADMIN — GABAPENTIN 400 MG: 400 CAPSULE ORAL at 06:25

## 2019-11-12 RX ADMIN — ASPIRIN 81 MG 81 MG: 81 TABLET ORAL at 08:03

## 2019-11-12 RX ADMIN — ATORVASTATIN CALCIUM 80 MG: 40 TABLET, FILM COATED ORAL at 20:29

## 2019-11-12 RX ADMIN — OXYCODONE HYDROCHLORIDE AND ACETAMINOPHEN 1 TABLET: 5; 325 TABLET ORAL at 07:05

## 2019-11-12 RX ADMIN — OXYCODONE HYDROCHLORIDE AND ACETAMINOPHEN 1 TABLET: 5; 325 TABLET ORAL at 01:02

## 2019-11-12 RX ADMIN — DIAZEPAM 10 MG: 5 TABLET ORAL at 07:05

## 2019-11-12 RX ADMIN — GABAPENTIN 400 MG: 400 CAPSULE ORAL at 00:16

## 2019-11-12 RX ADMIN — CLOPIDOGREL BISULFATE 75 MG: 75 TABLET ORAL at 08:03

## 2019-11-12 RX ADMIN — GABAPENTIN 400 MG: 400 CAPSULE ORAL at 12:19

## 2019-11-12 RX ADMIN — ROPINIROLE 1 MG: 1 TABLET, FILM COATED ORAL at 20:29

## 2019-11-12 RX ADMIN — GABAPENTIN 400 MG: 400 CAPSULE ORAL at 23:58

## 2019-11-12 RX ADMIN — OXYCODONE HYDROCHLORIDE AND ACETAMINOPHEN 1 TABLET: 5; 325 TABLET ORAL at 18:16

## 2019-11-12 NOTE — PLAN OF CARE
Problem: Skin Injury Risk (Adult)  Intervention: Promote/Optimize Nutrition   11/12/19 1102   Nutrition Interventions   Oral Nutrition Promotion calorie dense foods provided

## 2019-11-12 NOTE — PLAN OF CARE
Problem: Patient Care Overview  Goal: Discharge Needs Assessment  Outcome: Ongoing (interventions implemented as appropriate)      Problem: Stroke (IRF) (Adult)  Goal: Promote Optimal Functional [place]  Outcome: Ongoing (interventions implemented as appropriate)  Pt oob with walker and gait belt.     Problem: Fall Risk (Adult)  Goal: Absence of Fall  Outcome: Ongoing (interventions implemented as appropriate)  No falls at this time.    Problem: Pain, Chronic (Adult)  Goal: Acceptable Pain/Comfort Level and Functional Ability  Outcome: Ongoing (interventions implemented as appropriate)  Po prn pain meds work with good results.

## 2019-11-12 NOTE — PROGRESS NOTES
LOS: 6 days   Patient Care Team:  Inna Harris APRN as PCP - General (Family Medicine)  Declan Friedman MD as PCP - Claims Attributed  Maykel Marinelli DPM as Consulting Physician (Podiatry)    Chief Complaint:   Left pontine stroke (CMS/HCC)          Interval History:     SUBJECTIVE: This morning she does feel better.  She is not having as many spasms.  She is working well with therapy.  She does continue to have complaints of episodic urinary incontinence.  That has been a problem prior to admission as well.  She is making plans to go home she has a walker at home that she can use.  She tells me she actually has 3 walkers.  She would like outpatient therapy at discharge  History taken from: patient chart RN    Objective     Vital Signs  Temp:  [95.9 °F (35.5 °C)-97.5 °F (36.4 °C)] 95.9 °F (35.5 °C)  Heart Rate:  [72-88] 72  Resp:  [18] 18  BP: (124-147)/(58-67) 124/58      11/09/19  0500 11/10/19  0600 11/12/19  0600   Weight: 71.6 kg (157 lb 14.4 oz) 71.4 kg (157 lb 6.4 oz) 72 kg (158 lb 11.7 oz)         Physical Exam:     General Appearance:    Alert, cooperative, in no acute distress   Head:    Normocephalic, without obvious abnormality, atraumatic   Eyes:            Lids and lashes normal, conjunctivae and sclerae normal, no   icterus, no pallor, corneas clear, PERRLA   Throat:   No oral lesions, no thrush, oral mucosa moist   Neck:   No adenopathy, supple, trachea midline, no thyromegaly, no   carotid bruit, no JVD       Lungs:     Clear to auscultation,respirations regular, even and                  Unlabored good bilateral air movement    Heart:    Regular rhythm and normal rate, normal S1 and S2, no            murmur, no gallop, no rub, no click no ectopy   Chest Wall:    No abnormalities observed   Abdomen:     Normal bowel sounds, no masses, no organomegaly, soft        non-tender, non-distended, no guarding, no rebound                Tenderness normal exam   Extremities:   Moves all  extremities well, no edema, no cyanosis, no             Redness good strength range of motion       Skin:   No bleeding, bruising or rash   Lymph nodes:   No palpable adenopathy   Neurologic:   Cranial nerves 2 - 12 grossly intact, sensation intact, DTR       present and equal bilaterally strength is improving.  Dysarthria fairly stable       RESULTS REVIEW:     Lab Results (last 24 hours)     Procedure Component Value Units Date/Time    Basic Metabolic Panel [[id number]]  (Abnormal) Collected:  11/12/19 0730    Specimen:  Blood Updated:  11/12/19 0825     Glucose 151 mg/dL      BUN 20 mg/dL      Creatinine 0.84 mg/dL      Sodium 139 mmol/L      Potassium 3.9 mmol/L      Chloride 100 mmol/L      CO2 29.0 mmol/L      Calcium 9.3 mg/dL      eGFR Non African Amer 67 mL/min/1.73      BUN/Creatinine Ratio 23.8     Anion Gap 10.0 mmol/L     Narrative:       GFR Normal >60  Chronic Kidney Disease <60  Kidney Failure <15    CBC & Differential [[id number]] Collected:  11/12/19 0730    Specimen:  Blood Updated:  11/12/19 0813    Narrative:       The following orders were created for panel order CBC & Differential.  Procedure                               Abnormality         Status                     ---------                               -----------         ------                     CBC Auto Differential[[id number]]        Abnormal            Final result                 Please view results for these tests on the individual orders.    CBC Auto Differential [[id number]]  (Abnormal) Collected:  11/12/19 0730    Specimen:  Blood Updated:  11/12/19 0813     WBC 5.65 10*3/mm3      RBC 3.65 10*6/mm3      Hemoglobin 10.8 g/dL      Hematocrit 32.9 %      MCV 90.1 fL      MCH 29.6 pg      MCHC 32.8 g/dL      RDW 13.0 %      RDW-SD 42.5 fl      MPV 9.8 fL      Platelets 274 10*3/mm3      Neutrophil % 65.5 %      Lymphocyte % 22.3 %      Monocyte % 8.5 %      Eosinophil % 2.7 %      Basophil % 0.5 %      Immature Grans % 0.5 %       Neutrophils, Absolute 3.70 10*3/mm3      Lymphocytes, Absolute 1.26 10*3/mm3      Monocytes, Absolute 0.48 10*3/mm3      Eosinophils, Absolute 0.15 10*3/mm3      Basophils, Absolute 0.03 10*3/mm3      Immature Grans, Absolute 0.03 10*3/mm3      nRBC 0.0 /100 WBC         Imaging Results (Last 72 Hours)     ** No results found for the last 72 hours. **        Nursing notes and therapy notes have been reviewed  I have reviewed medications  Assessment/Plan     Active Hospital Problems    Diagnosis   • **Left pontine stroke (CMS/HCC)   • Restless leg syndrome, uncontrolled     First diagnosed after initial pontine stroke 2018.  Has been treated with Requip at night since that time     • Dysarthria   • Muscle spasm of both lower legs     Worse since this stroke     • Recurrent falls while walking     Due to muscle spasm     • Urinary hesitancy     History of in and out self caths after initial surgery 2018     • DJD (degenerative joint disease), multiple sites     thoracic and lumbar spine  Status post extensive stabilization of the thoracolumbar spine 2018     • Hypertension   • Altered mental status           PLAN: She is participating well with therapy making progress.  She will be ready to go home on Friday.  She has a walker but she would like outpatient therapy in [facility].  She tells me she had an appointment scheduled but was admitted prior to being seen  Continue intensive therapy  Medications reviewed and will continue  I did also review her recent admission and discharge from [facility] at the time of the acute stroke.  Reviewing the chart does show that she had spasms after her initial back surgery/stroke but they did resolve completely only return at the time of the stroke  We will continue intensive therapy        This document has been electronically signed by Castro West MD on November 12, 2019 10:19 AM

## 2019-11-12 NOTE — PLAN OF CARE
Problem: Patient Care Overview  Goal: Plan of Care Review  Outcome: Ongoing (interventions implemented as appropriate)   11/12/19 8247   OTHER   Outcome Summary p has done well today.spasms have been under control today she states.vss,will cont to monitor   Patient Care Overview   IRF Plan of Care Review progress ongoing, continue   Progress, Functional Goals demonstrating adequate progress   Coping/Psychosocial   Plan of Care Reviewed With patient       Problem: Stroke (IRF) (Adult)  Goal: Promote Optimal Functional Grand  Outcome: Ongoing (interventions implemented as appropriate)      Problem: Fall Risk (Adult)  Goal: Absence of Fall  Outcome: Ongoing (interventions implemented as appropriate)      Problem: Pain, Chronic (Adult)  Goal: Acceptable Pain/Comfort Level and Functional Ability  Outcome: Ongoing (interventions implemented as appropriate)

## 2019-11-12 NOTE — THERAPY TREATMENT NOTE
Inpatient Rehabilitation - Occupational Therapy Treatment Note    [facility]     Patient Name: Rama Goel  : 1945  MRN: [id number]    Today's Date: 2019                 Admit Date: 2019      Visit Dx:    ICD-10-CM ICD-9-CM   1. Dysarthria R47.1 784.51   2. Oropharyngeal dysphagia R13.12 787.22   3. Impaired physical mobility Z74.09 781.99   4. Impaired mobility and ADLs Z74.09 799.89       Patient Active Problem List   Diagnosis   • Altered mental status   • DJD (degenerative joint disease), multiple sites   • Hypertension   • Left pontine stroke (CMS/HCC)   • CVA (cerebral vascular accident) (CMS/HCC)   • Urinary hesitancy   • Muscle spasm of both lower legs   • Recurrent falls while walking   • Dysarthria   • Restless leg syndrome, uncontrolled         Therapy Treatment    IRF Treatment Summary     Row Name 19 1340 19 1045 19 0830       Evaluation/Treatment Time and Intent    Subjective Information  no complaints  -RC  no complaints  -RC  no complaints  -KW    Existing Precautions/Restrictions  fall  -RC  fall  -RC  fall  -KW    Document Type  therapy note (daily note)  -RC  therapy note (daily note)  -RC  therapy note (daily note)  -KW    Mode of Treatment  occupational therapy;individual therapy  -RC  occupational therapy;individual therapy  -RC  physical therapy;individual therapy  -KW    Patient/Family Observations  in w/c   -RC  in chair in room   -RC  up in w/c in room  -KW    Start Time (Evaluation/Treatment)  1340  -RC  1045  -RC  0830  -KW    Stop Time (Evaluation/Treatment)  1355  -RC  1200  -RC  1002  -KW    Recorded by [RC] Tiki Grover COTA/L [RC] Tiki Grover COTA/L [KW] Jessica Lind, PT    Row Name 19 1340 19 1045 19 0830       Cognition/Psychosocial- PT/OT    Affect/Mental Status (Cognitive)  WFL  -RC  WFL  -RC  WFL  -KW    Orientation Status (Cognition)  oriented x 4  -RC  oriented x 4  -RC  oriented x 4  -KW    Personal  Safety Interventions  --  --  fall prevention program maintained;gait belt;muscle strengthening facilitated;nonskid shoes/slippers when out of bed;supervised activity  -KW    Safety Deficit (Cognitive)  awareness of need for assistance;insight into deficits/self awareness  -  --  mild deficit  -KW    Recorded by [RC] Tiki Grover COTA/L [RC] Tiki Grover COTA/DIANA [KW] Jessica Lind, PT    Row Name 11/12/19 0830             Bed Mobility Assessment/Treatment    Comment (Bed Mobility)  Pt up in w/c upon arrival to room; sitting up in chair at end of session in room; not performed this tx session  -KW      Recorded by [KW] Jessica Lind, DANNY      Row Name 11/12/19 0830             Transfer Assessment/Treatment    Transfer Assessment/Treatment  sit-stand transfer;stand-sit transfer;toilet transfer  -KW      Recorded by [KW] Jessica Lind, PT      Row Name 11/12/19 0830             Bed-Chair Transfer    Bed-Chair [place] (Transfers)  contact guard  -KW      Assistive Device (Bed-Chair Transfers)  walker, front-wheeled  -KW      Recorded by [KW] Jessica Lind, PT      Row Name 11/12/19 0830             Chair-Bed Transfer    Chair-Bed [place] (Transfers)  contact guard  -KW      Assistive Device (Chair-Bed Transfers)  walker, front-wheeled  -KW      Recorded by [KW] Jessica Lind, PT      Row Name 11/12/19 1045 11/12/19 0830          Sit-Stand Transfer    Sit-Stand [place] (Transfers)  supervision  -  supervision  -KW     Assistive Device (Sit-Stand Transfers)  walker, front-wheeled  -RC  walker, front-wheeled  -KW     Recorded by [RC] Tiki Grover COTA/L [KW] Jessica Lind, PT     Row Name 11/12/19 1045 11/12/19 0830          Stand-Sit Transfer    Stand-Sit [place] (Transfers)  supervision  -RC  supervision  -KW     Assistive Device (Stand-Sit Transfers)  walker, front-wheeled  -RC  walker, front-wheeled  -KW     Recorded by [RC] Tiki Grover COTA/L [KW] Jessica Lind, PT     Row  Name 11/12/19 0830             Toilet Transfer    Type (Toilet Transfer)  stand-sit;sit-stand  -KW      [place] Level (Toilet Transfer)  contact guard  -KW      Assistive Device (Toilet Transfer)  grab bars/safety frame  -KW      Recorded by [KW] Jessica Lind, DANNY      Row Name 11/12/19 1048             Shower Transfer    Type (Shower Transfer)  stand pivot/stand step  -RC      [place] Level (Shower Transfer)  contact guard  -RC      Assistive Device (Shower Transfer)  wheelchair;shower chair;grab bars/tub rail  -RC      Recorded by [RC] Tiki Grover COTA/L      Row Name 11/12/19 0830             Gait/Stairs Assessment/Training    Gait/Stairs Assessment/Training  gait/ambulation assistive device  -KW      [place] Level (Gait)  contact guard  -KW      Assistive Device (Gait)  walker, front-wheeled  -KW      Distance in Feet (Gait)  200ft x2  -KW      Pattern (Gait)  step-through  -KW      Deviations/Abnormal Patterns (Gait)  base of support, narrow;gait speed decreased  -KW      Bilateral Gait Deviations  forward flexed posture  -KW      Comment (Gait/Stairs)  VC throughout for posture  -KW      Recorded by [KW] Jessica Lind PT      Row Name 11/12/19 1045 11/12/19 0830          Wheelchair Mobility/Management    Method of Wheelchair Locomotion (Mobility)  bipedal (lower extremity) propulsion  -RC  bimanual (upper extremity) propulsion;bipedal (lower extremity) propulsion  -KW     Mobility Activities (Wheelchair)  mobility (all) activities  -RC  forward propulsion;turning  -KW     Mobility [place] Level (Wheelchair)  supervision  -RC  supervision  -KW     Forward Propulsion [place] (Wheelchair)  --  supervision  -KW     Turning [place] (Wheelchair)  --  supervision  -KW     Distance Propelled in Feet (Wheelchair)  --  100  -KW     Recorded by [RC] Tiki Grover COTA/L [KW] Jessica Lidn, PT     Row Name 11/12/19 0830             Safety Issues, Functional Mobility    Safety  Issues Affecting Function (Mobility)  awareness of need for assistance;impulsivity;insight into deficits/self awareness;safety precaution awareness;safety precautions follow-through/compliance  -KW      Impairments Affecting Function (Mobility)  balance;endurance/activity tolerance;coordination;strength;pain  -KW      Recorded by [KW] Jessica Lind PT      Row Name 11/12/19 1045             Basic Activities of Daily Living (BADLs)    Additional Documentation  -- I loading washer from w/c   -RC      Recorded by [RC] Tiki Grover COTA/L      Row Name 11/12/19 1045             Bathing Assessment/Treatment    Bathing [place] Level  bathing skills;set up;supervision  -RC      Assistive Device (Bathing)  shower chair;hand held shower spray hose;long-handled sponge  -RC      Recorded by [RC] Tiki Grover COTA/L      Row Name 11/12/19 1045             Upper Body Dressing Assessment/Treatment    Upper Body Dressing Task  doff;don;pull over garment;independent  -RC      Upper Body Dressing Position  supported sitting  -RC      Recorded by [RC] Tiki Grover COTA/L      Row Name 11/12/19 1045             Lower Body Dressing Assessment/Treatment    Lower Body Dressing [place] Level  don;doff;pants/bottoms;shoes/slippers;socks;underwear;contact guard assist  -RC      Lower Body Dressing Position  supported sitting;supported standing  -RC      Recorded by [RC] Tiki Grover COTA/L      Row Name 11/12/19 1045             Grooming Assessment/Treatment    Grooming [place] Level  grooming skills;independent  -RC      Grooming Position  supported sitting  -RC      Recorded by [RC] Tiki Grover COTA/L      Row Name 11/12/19 0830             Toileting Assessment/Treatment    Toileting [place] Level  toileting skills;contact guard assist  -KW      Assistive Device Use (Toileting)  grab bar/safety frame  -KW      Toileting Position  unsupported sitting;supported standing  -KW      Recorded by  [KW] Jessica Lind PT      Row Name 11/12/19 1340 11/12/19 1045          Fine Motor Testing & Training    Training Activity, Fine Motor Coordination  -- bolt board  -RC  manipulation of pegs  -RC     Recorded by [RC] Tiki Grover COTA/L [RC] Tiki Grover COTA/L     Row Name 11/12/19 1340 11/12/19 1045 11/12/19 0830       Pain Scale: Numbers Pre/Post-Treatment    Pain Scale: Numbers, Pretreatment  0/10 - no pain  -RC  0/10 - no pain  -RC  0/10 - no pain  -KW    Pain Scale: Numbers, Post-Treatment  0/10 - no pain  -RC  0/10 - no pain  -RC  0/10 - no pain  -KW    Recorded by [RC] Tiki Grover COTA/L [RC] Tiki Grover COTA/DIANA [KW] Jessica Lind PT    Row Name 11/12/19 0830             Static Sitting Balance    Level of [place] (Unsupported Sitting, Static Balance)  supervision  -KW      Sitting Position (Unsupported Sitting, Static Balance)  sitting edge of mat  -KW      Time Able to Maintain Position (Unsupported Sitting, Static Balance)  more than 5 minutes  -KW      Recorded by [KW] Jessica Lind PT      Row Name 11/12/19 0830             Dynamic Balance Activity    Therapeutic Training Performed (Dynamic Balance)  functional activities with reaching or leaning any direction  -KW      Support Needed for Balance (Dynamic Balance Training)  CGA  -KW      Comment (Dynamic Balance Training)  Standing with walker reaching outside [place] for object and throwing at target  -KW      Recorded by [KW] Jessica Lind PT      Row Name 11/12/19 0830             Lower Extremity Standing Therapeutic Exercise    Performed, Standing Lower Extremity (Therapeutic Exercise)  hip flexion/extension  -KW      Exercise Type, Standing Lower Extremity (Therapeutic Exercise)  AROM (active range of motion)  -KW      Sets/Reps Detail, Standing Lower Extremity (Therapeutic Exercise)  20*2  -KW      Recorded by [KW] Jessica Lind PT      Row Name 11/12/19 0830             Lower Extremity Seated Therapeutic Exercise     Performed, Seated Lower Extremity (Therapeutic Exercise)  hip flexion/extension;hip abduction/adduction;LAQ (long arc quad), knee extension;ankle dorsiflexion/plantarflexion glut sets  -KW      Exercise Type, Seated Lower Extremity (Therapeutic Exercise)  AROM (active range of motion)  -KW      Sets/Reps Detail, Seated Lower Extremity (Therapeutic Exercise)  20*2  -KW      Recorded by [KW] Jessica Lind, DANNY      Row Name 11/12/19 0830             Vital Signs    Pre Systolic BP Rehab  135  -KW      Pre Treatment Diastolic BP  62  -KW      Post Systolic BP Rehab  144  -KW      Post Treatment Diastolic BP  71  -KW      Pretreatment Heart Rate (beats/min)  77  -KW      Posttreatment Heart Rate (beats/min)  70  -KW      Pre SpO2 (%)  96  -KW      O2 Delivery Pre Treatment  room air  -KW      Post SpO2 (%)  96  -KW      O2 Delivery Post Treatment  room air  -KW      Pre Patient Position  Sitting  -KW      Intra Patient Position  Standing  -KW      Post Patient Position  Sitting  -KW      Recorded by [KW] Jessica Lind, DANNY      Row Name 11/12/19 1340 11/12/19 1045 11/12/19 0830       Positioning and Restraints    Pre-Treatment Position  sitting in chair/recliner  -RC  sitting in chair/recliner  -RC  sitting in chair/recliner  -KW    Post Treatment Position  wheelchair  -RC  wheelchair  -RC  chair  -KW    In Chair  --  --  sitting;exit alarm on;call light within reach;encouraged to call for assist  -KW    In Wheelchair  encouraged to call for assist;notified nsg notified nursing pt needs tag alarm  -RC  encouraged to call for assist;notified nsg  -RC  --    Recorded by [RC] Tiki Grover COTA/L [RC] Tiki Grover COTA/DIANA [KW] Jessica Lind, DANNY    Row Name 11/12/19 0830             Daily Summary of Progress (PT)    Functional Goal Overall Progress: Physical Therapy  progressing toward functional goals as expected  -KW      Recommendations: Physical Therapy  cont PT POC  -KW      Recorded by [KW] Jessica Lind, DANNY       Row Name 11/12/19 1340             Daily Summary of Progress (OT)    Functional Goal Overall Progress: Occupational Therapy  progressing toward functional goals as expected  -RC      Recommendations: Occupational Therapy  cont poc   -RC      Recorded by [RC] Tiki Grover COTA/L        User Key  (r) = Recorded By, (t) = Taken By, (c) = Cosigned By    Initials Name Effective Dates    RC Tiki Grover, MITCHELL/L 03/07/18 -     KW Jessica Lind, PT 07/23/18 -                  OT Recommendation and Plan         Plan of Care Review  Plan of Care Reviewed With: patient  Daily Summary of Progress (OT)  Functional Goal Overall Progress: Occupational Therapy: progressing toward functional goals as expected  Recommendations: Occupational Therapy: cont poc   Plan of Care Reviewed With: patient  IRF Plan of Care Review: progress ongoing, continue  Progress, Functional Goals: demonstrating adequate progress  Outcome Summary: pt participated in shower dressing act, I for ub dressing álvarez for bathing sba for lb dressing, pt transfered self w/c to toilet w/o asking for assist, reminded pt to ask for assist prior to transfers.   cont poc .     OT IRF GOALS     Row Name 11/12/19 1340 11/11/19 1330 11/09/19 0906       Bathing Goal 1 (OT-IRF)    Activity/Device (Bathing Goal 1, OT-IRF)  bathing skills, all  -RC  bathing skills, all  -RC  bathing skills, all  -KD    [place] Level (Bathing Goal 1, OT-IRF)  conditional independence  -RC  conditional independence  -RC  conditional independence  -KD    Time Frame (Bathing Goal 1, OT-IRF)  long term goal (LTG);by discharge  -RC  long term goal (LTG);by discharge  -RC  long term goal (LTG);by discharge  -KD    Progress/Outcomes (Bathing Goal 1, OT-IRF)  goal not met  -RC  goal not met  -RC  goal not met  -KD       UB Dressing Goal 1 (OT-IRF)    Activity/Device (UB Dressing Goal 1, OT-IRF)  upper body dressing  -RC  upper body dressing  -RC  upper body dressing  -KD    [place] (UB  Dress Goal 1, OT-IRF)  independent  -RC  independent  -RC  independent  -KD    Time Frame (UB Dressing Goal 1, OT-IRF)  long term goal (LTG);by discharge  -RC  long term goal (LTG);by discharge  -RC  long term goal (LTG);by discharge  -KD    Progress/Outcomes (UB Dressing Goal 1, OT-IRF)  goal not met  -RC  goal not met  -RC  goal not met  -KD       LB Dressing Goal 1 (OT-IRF)    Activity/Device (LB Dressing Goal 1, OT-IRF)  lower body dressing  -RC  lower body dressing  -RC  lower body dressing  -KD    [place] (LB Dressing Goal 1, OT-IRF)  conditional independence  -RC  conditional independence  -RC  conditional independence  -KD    Time Frame (LB Dressing Goal 1, OT-IRF)  long term goal (LTG);by discharge  -RC  long term goal (LTG);by discharge  -RC  long term goal (LTG);by discharge  -KD    Progress/Outcomes (LB Dressing Goal 1, OT-IRF)  goal not met  -RC  goal not met  -RC  goal not met  -KD    Row Name 11/08/19 1355 11/07/19 1051          Bathing Goal 1 (OT-IRF)    Activity/Device (Bathing Goal 1, OT-IRF)  bathing skills, all  -RC  bathing skills, all  -ME     [place] Level (Bathing Goal 1, OT-IRF)  conditional independence  -RC  conditional independence  -ME     Time Frame (Bathing Goal 1, OT-IRF)  long term goal (LTG);by discharge  -RC  long term goal (LTG);by discharge  -ME     Progress/Outcomes (Bathing Goal 1, OT-IRF)  goal not met  -RC  goal not met  -ME        UB Dressing Goal 1 (OT-IRF)    Activity/Device (UB Dressing Goal 1, OT-IRF)  upper body dressing  -RC  upper body dressing  -ME     [place] (UB Dress Goal 1, OT-IRF)  independent  -RC  independent  -ME     Time Frame (UB Dressing Goal 1, OT-IRF)  long term goal (LTG);by discharge  -RC  long term goal (LTG);by discharge  -ME     Progress/Outcomes (UB Dressing Goal 1, OT-IRF)  goal not met  -RC  goal not met  -ME        LB Dressing Goal 1 (OT-IRF)    Activity/Device (LB Dressing Goal 1, OT-IRF)  lower body dressing  -RC  lower body  dressing  -ME     [place] (LB Dressing Goal 1, OT-IRF)  conditional independence  -RC  conditional independence  -ME     Time Frame (LB Dressing Goal 1, OT-IRF)  long term goal (LTG);by discharge  -RC  long term goal (LTG);by discharge  -ME     Progress/Outcomes (LB Dressing Goal 1, OT-IRF)  goal not met  -RC  goal not met  -ME       User Key  (r) = Recorded By, (t) = Taken By, (c) = Cosigned By    Initials Name Provider Type    RC Tiki Grover MITCHELL/L Occupational Therapy Assistant     Carola Kang COTA/L Occupational Therapy Assistant    ME Che Oh OT Occupational Therapist          Occupational Therapy Education     Title: PT OT SLP Therapies (In Progress)     Topic: Occupational Therapy (In Progress)     Point: ADL training (In Progress)     Description: Instruct learner(s) on proper safety adaptation and remediation techniques during self care or transfers.   Instruct in proper use of assistive devices.    Learning Progress Summary           Patient Acceptance, E, NR by  at 11/12/2019  2:07 PM    Acceptance, E, VU,DU by ME at 11/7/2019  3:40 PM    Comment:  Educated on OT and POC. Educated to continue exercises given to her for translation and fine motor coordination. Educated to call for assist. Educated on hand placement for transfers. Educated on safe mechanics for completing ADLs.                   Point: Home exercise program (Done)     Description: Instruct learner(s) on appropriate technique for monitoring, assisting and/or progressing therapeutic exercises/activities.    Learning Progress Summary           Patient Acceptance, E, VU,DU by ME at 11/7/2019  3:40 PM    Comment:  Educated on OT and POC. Educated to continue exercises given to her for translation and fine motor coordination. Educated to call for assist. Educated on hand placement for transfers. Educated on safe mechanics for completing ADLs.                   Point: Precautions (In Progress)     Description: Instruct  learner(s) on prescribed precautions during self-care and functional transfers.    Learning Progress Summary           Patient Acceptance, E, NR by  at 11/12/2019  2:07 PM    Acceptance, E, NR by  at 11/11/2019  1:53 PM    Acceptance, E, NR by  at 11/8/2019  2:46 PM    Acceptance, E, VUDU by ME at 11/7/2019  3:40 PM    Comment:  Educated on OT and POC. Educated to continue exercises given to her for translation and fine motor coordination. Educated to call for assist. Educated on hand placement for transfers. Educated on safe mechanics for completing ADLs.                   Point: Body mechanics (In Progress)     Description: Instruct learner(s) on proper positioning and spine alignment during self-care, functional mobility activities and/or exercises.    Learning Progress Summary           Patient Acceptance, E, NR by  at 11/12/2019  2:07 PM    Acceptance, E, NR by  at 11/11/2019  1:53 PM    Acceptance, E, NR by  at 11/8/2019  2:46 PM                               User Key     Initials Effective Dates Name Provider Type Discipline     03/07/18 -  Tiki Grover COTA/L Occupational Therapy Assistant OT    ME 09/10/19 -  Che Oh OT Occupational Therapist OT                Outcome Measures     Row Name 11/12/19 1340 11/12/19 0830 11/11/19 1330       How much help from another person do you currently need...    Turning from your back to your side while in flat bed without using bedrails?  --  3  -KW  --    Moving from lying on back to sitting on the side of a flat bed without bedrails?  --  3  -KW  --    Moving to and from a bed to a chair (including a wheelchair)?  --  3  -KW  --    Standing up from a chair using your arms (e.g., wheelchair, bedside chair)?  --  3  -KW  --    Climbing 3-5 steps with a railing?  --  3  -KW  --    To walk in hospital room?  --  3  -KW  --    AM-PAC 6 Clicks Score (PT)  --  18  -KW  --       How much help from another is currently needed...    Putting on and  taking off regular lower body clothing?  3  -RC  --  3  -RC    Bathing (including washing, rinsing, and drying)  3  -RC  --  3  -RC    Toileting (which includes using toilet bed pan or urinal)  3  -RC  --  3  -RC    Putting on and taking off regular upper body clothing  4  -RC  --  3  -RC    Taking care of personal grooming (such as brushing teeth)  4  -RC  --  3  -RC    Eating meals  4  -RC  --  4  -RC    AM-PAC 6 Clicks Score (OT)  21  -RC  --  19  -RC       Functional Assessment    Outcome Measure Options  --  AM-PAC 6 Clicks Basic Mobility (PT)  -KW  --    Row Name 11/11/19 0830             How much help from another person do you currently need...    Turning from your back to your side while in flat bed without using bedrails?  3  -JA      Moving from lying on back to sitting on the side of a flat bed without bedrails?  3  -JA      Moving to and from a bed to a chair (including a wheelchair)?  3  -JA      Standing up from a chair using your arms (e.g., wheelchair, bedside chair)?  3  -JA      Climbing 3-5 steps with a railing?  3  -JA      To walk in hospital room?  3  -JA      AM-PAC 6 Clicks Score (PT)  18  -JA         Functional Assessment    Outcome Measure Options  AM-PAC 6 Clicks Basic Mobility (PT)  -        User Key  (r) = Recorded By, (t) = Taken By, (c) = Cosigned By    Initials Name Provider Type    Ulysses Delarosa, PTA Physical Therapy Assistant    Tiki Deleon COTA/L Occupational Therapy Assistant    Jessica Zhu, PT Physical Therapist             Time Calculation:     Time Calculation- OT     Row Name 11/12/19 1412 11/12/19 1411          Time Calculation- OT    OT Start Time  1340  -RC  1045  -RC     OT Stop Time  1355  -RC  1200  -RC     OT Time Calculation (min)  15 min  -RC  75 min  -RC     Total Timed Code Minutes- OT  15 minute(s)  -RC  75 minute(s)  -RC     OT Received On  11/12/19  -RC  11/12/19  -       User Key  (r) = Recorded By, (t) = Taken By, (c) = Cosigned By     Initials Name Provider Type    RC Tiki Grover, MITCHELL/L Occupational Therapy Assistant          Therapy Charges for Today     Code Description Service Date Service Provider Modifiers Qty    71485119141 HC OT SELF CARE/MGMT/TRAIN EA 15 MIN 11/11/2019 Tiki Grover, MITCHELL/L GO 1    81988250663 HC OT THERAPEUTIC ACT EA 15 MIN 11/11/2019 Tiki Grover, MITCHELL/L GO 4    68076503672 HC OT THER PROC EA 15 MIN 11/11/2019 Tiki Grover, MITCHELL/L GO 1    81630414871 HC OT SELF CARE/MGMT/TRAIN EA 15 MIN 11/12/2019 Tiki Grover, MITCHELL/L GO 4    08729819552 HC OT THERAPEUTIC ACT EA 15 MIN 11/12/2019 Tiki Grover MITCHELL/L GO 1    41636155993 HC OT THERAPEUTIC ACT EA 15 MIN 11/12/2019 Tiki Grover, MITCHELL/L GO 1                     Self-Care Admission Goal Date Date Date Date Date Discharge   Eating: The ability to use suitable utensils to bring food and/or liquid to the mouth and swallow food and/or liquid once the meal is placed before the patient.   5        Oral hygiene: The ability to use suitable items to clean teeth. Dentures (if applicable): The ability to insert and remove dentures into and from the mouth, and manage denture soaking and rinsing with use of equipment.   5        Toileting hygiene: The ability to maintain perineal hygiene, adjust clothes before and after voiding or having a bowel movement. If managing an ostomy, include wiping the opening but not managing equipment.   4        Shower/bathe self: The ability to bathe self, including washing, rinsing, and drying self (excludes washing of back and hair). Does not include transferring in/out of tub/shower.   5        Upper body dressing: The ability to dress and undress above the waist; including fasteners, if applicable.   5        Lower body dressing: The ability to dress and undress below the waist, including fasteners; does not include footwear.   4        Putting on/taking off footwear: The ability to put on and take off socks and shoes  or other footwear that is appropriate for safe mobility; including fasteners, if applicable.   5                     Tiki Grover, PAULA/DIANA  11/12/2019

## 2019-11-12 NOTE — CONSULTS
Adult Nutrition  Assessment    Patient Name:  Rama Goel  YOB: 1945  MRN: [id number]  Admit Date:  11/6/2019    Assessment Date:  11/12/2019    Comments:  Spoke with pt at noon. She is happy with her meals especially after she is not getting ground meat any longer. Likes the breakfast we bring and compliments the food. Appetite has improved. Will continue to monitor. Noted allergy to aspartame.    Reason for Assessment     Row Name 11/12/19 1056          Reason for Assessment    Reason For Assessment  follow-up protocol     Diagnosis  neurologic conditions     Identified At Risk by Screening Criteria  difficulty chewing/swallowing now resolved         Nutrition/Diet History     Row Name 11/12/19 1056          Nutrition/Diet History    Typical Food/Fluid Intake  pt said she is glad she is not on the ground meat anymore. likes her breakfast at home and gets the same here. appetite is imprvoed. complimented the meals.     Food Preferences  whole meats         Anthropometrics     Row Name 11/12/19 0600          Anthropometrics    Weight  72 kg (158 lb 11.7 oz)         Labs/Tests/Procedures/Meds     Row Name 11/12/19 1058          Labs/Procedures/Meds    Lab Results Reviewed  reviewed, pertinent        Medications    Pertinent Medications Reviewed  reviewed, pertinent             Nutrition Prescription Ordered     Row Name 11/12/19 1058          Nutrition Prescription PO    Current PO Diet  Soft Texture     Texture  Ground     Supplement  Mighty Shake     Supplement Frequency  2 times a day         Evaluation of Received Nutrient/Fluid Intake     Row Name 11/12/19 1058          PO Evaluation    Number of Meals  4     % PO Intake  94               Electronically signed by:  Berenice Lucio RD  11/12/19 10:59 AM

## 2019-11-12 NOTE — PLAN OF CARE
Problem: Patient Care Overview  Goal: Plan of Care Review  Outcome: Ongoing (interventions implemented as appropriate)   11/12/19 3091   OTHER   Outcome Summary pt participated in shower dressing act, I for ub dressing álvarez for bathing sba for lb dressing, pt transferred self w/c to toilet w/o asking for assist, reminded pt to ask for assist prior to transfers. cont poc .    Patient Care Overview   IRF Plan of Care Review progress ongoing, continue   Progress, Functional Goals demonstrating adequate progress   Coping/Psychosocial   Plan of Care Reviewed With patient

## 2019-11-12 NOTE — PATIENT CARE CONFERENCE
Attendance of weekly team conference:   PAULA Russell, Dr. Castro West, Inna Garcia, SLP, Olman Wagner, PTA, Kandy Diaz, PT, BERNICE DavisonW, Berenice Lcuio RD, Marivel Verdin, KELSIE and Dawson Julien, OT    Patient's progress reviewed, FIMs reviewed, discharge date discussed and equipment needs addressed.     Expected Discharge Date: 11-15-19  Anticipated discharge orders/equipment: Outpatient OT/PT in [place].

## 2019-11-12 NOTE — THERAPY TREATMENT NOTE
Inpatient Rehabilitation - Physical Therapy Treatment Note  [facility]     Patient Name: Rama Goel  : 1945  MRN: [id number]    Today's Date: 2019                 Admit Date: 2019      Visit Dx:      ICD-10-CM ICD-9-CM   1. Dysarthria R47.1 784.51   2. Oropharyngeal dysphagia R13.12 787.22   3. Impaired physical mobility Z74.09 781.99   4. Impaired mobility and ADLs Z74.09 799.89       Patient Active Problem List   Diagnosis   • Altered mental status   • DJD (degenerative joint disease), multiple sites   • Hypertension   • Left pontine stroke (CMS/HCC)   • CVA (cerebral vascular accident) (CMS/[facility])   • Urinary hesitancy   • Muscle spasm of both lower legs   • Recurrent falls while walking   • Dysarthria   • Restless leg syndrome, uncontrolled       Therapy Treatment    IRF Treatment Summary     Row Name 19 0830             Evaluation/Treatment Time and Intent    Subjective Information  no complaints  -KW      Existing Precautions/Restrictions  fall  -KW      Document Type  therapy note (daily note)  -KW      Mode of Treatment  physical therapy;individual therapy  -KW      Patient/Family Observations  up in w/c in room  -KW      Start Time (Evaluation/Treatment)  0830  -KW      Stop Time (Evaluation/Treatment)  1002  -KW      Recorded by [KW] Jessica Lind, PT      Row Name 19 0830             Cognition/Psychosocial- PT/OT    Affect/Mental Status (Cognitive)  WFL  -KW      Orientation Status (Cognition)  oriented x 4  -KW      Personal Safety Interventions  fall prevention program maintained;gait belt;muscle strengthening facilitated;nonskid shoes/slippers when out of bed;supervised activity  -KW      Safety Deficit (Cognitive)  mild deficit  -KW      Recorded by [KW] Jessica Lind, PT      Row Name 19 0830             Bed Mobility Assessment/Treatment    Comment (Bed Mobility)  Pt up in w/c upon arrival to room; sitting up in chair at end of session in room; not  performed this tx session  -KW      Recorded by [KW] Jessica Lind, PT      Row Name 11/12/19 0830             Transfer Assessment/Treatment    Transfer Assessment/Treatment  sit-stand transfer;stand-sit transfer;toilet transfer  -KW      Recorded by [KW] Jessica Lind, PT      Row Name 11/12/19 0830             Bed-Chair Transfer    Bed-Chair [place] (Transfers)  contact guard  -KW      Assistive Device (Bed-Chair Transfers)  walker, front-wheeled  -KW      Recorded by [KW] Jessica Lind PT      Row Name 11/12/19 0830             Chair-Bed Transfer    Chair-Bed [place] (Transfers)  contact guard  -KW      Assistive Device (Chair-Bed Transfers)  walker, front-wheeled  -KW      Recorded by [KW] Jessica Lind PT      Row Name 11/12/19 0830             Sit-Stand Transfer    Sit-Stand [place] (Transfers)  supervision  -KW      Assistive Device (Sit-Stand Transfers)  walker, front-wheeled  -KW      Recorded by [KW] Jessica Lind PT      Row Name 11/12/19 0830             Stand-Sit Transfer    Stand-Sit [place] (Transfers)  supervision  -KW      Assistive Device (Stand-Sit Transfers)  walker, front-wheeled  -KW      Recorded by [KW] Jessica Lind, PT      Row Name 11/12/19 0830             Toilet Transfer    Type (Toilet Transfer)  stand-sit;sit-stand  -KW      [place] Level (Toilet Transfer)  contact guard  -KW      Assistive Device (Toilet Transfer)  grab bars/safety frame  -KW      Recorded by [KW] Jessica Lind PT      Row Name 11/12/19 0830             Gait/Stairs Assessment/Training    Gait/Stairs Assessment/Training  gait/ambulation assistive device  -KW      [place] Level (Gait)  contact guard  -KW      Assistive Device (Gait)  walker, front-wheeled  -KW      Distance in Feet (Gait)  200ft x2  -KW      Pattern (Gait)  step-through  -KW      Deviations/Abnormal Patterns (Gait)  base of support, narrow;gait speed decreased  -KW      Bilateral Gait Deviations  forward flexed posture   -KW      Comment (Gait/Stairs)  VC throughout for posture  -KW      Recorded by [KW] Jessica Lind PT      Row Name 11/12/19 0830             Wheelchair Mobility/Management    Method of Wheelchair Locomotion (Mobility)  bimanual (upper extremity) propulsion;bipedal (lower extremity) propulsion  -KW      Mobility Activities (Wheelchair)  forward propulsion;turning  -KW      Mobility [place] Level (Wheelchair)  supervision  -KW      Forward Propulsion [place] (Wheelchair)  supervision  -KW      Turning [place] (Wheelchair)  supervision  -KW      Distance Propelled in Feet (Wheelchair)  100  -KW      Recorded by [KW] Jessica Lind PT      Row Name 11/12/19 0830             Safety Issues, Functional Mobility    Safety Issues Affecting Function (Mobility)  awareness of need for assistance;impulsivity;insight into deficits/self awareness;safety precaution awareness;safety precautions follow-through/compliance  -KW      Impairments Affecting Function (Mobility)  balance;endurance/activity tolerance;coordination;strength;pain  -KW      Recorded by [KW] Jessica Lind PT      Row Name 11/12/19 0830             Toileting Assessment/Treatment    Toileting [place] Level  toileting skills;contact guard assist  -KW      Assistive Device Use (Toileting)  grab bar/safety frame  -KW      Toileting Position  unsupported sitting;supported standing  -KW      Recorded by [KW] Jessica Lind PT      Row Name 11/12/19 2555             Pain Scale: Numbers Pre/Post-Treatment    Pain Scale: Numbers, Pretreatment  0/10 - no pain  -KW      Pain Scale: Numbers, Post-Treatment  0/10 - no pain  -KW      Recorded by [KW] Jessica Lind PT      Row Name 11/12/19 3530             Static Sitting Balance    Level of [place] (Unsupported Sitting, Static Balance)  supervision  -KW      Sitting Position (Unsupported Sitting, Static Balance)  sitting edge of mat  -KW      Time Able to Maintain Position (Unsupported Sitting,  Static Balance)  more than 5 minutes  -KW      Recorded by [KW] Jessica Lind PT      Row Name 11/12/19 0830             Dynamic Balance Activity    Therapeutic Training Performed (Dynamic Balance)  functional activities with reaching or leaning any direction  -KW      Support Needed for Balance (Dynamic Balance Training)  CGA  -KW      Comment (Dynamic Balance Training)  Standing with walker reaching outside [place] for object and throwing at target  -KW      Recorded by [KW] Jessica Lind PT      Row Name 11/12/19 0830             Lower Extremity Standing Therapeutic Exercise    Performed, Standing Lower Extremity (Therapeutic Exercise)  hip flexion/extension  -KW      Exercise Type, Standing Lower Extremity (Therapeutic Exercise)  AROM (active range of motion)  -KW      Sets/Reps Detail, Standing Lower Extremity (Therapeutic Exercise)  20*2  -KW      Recorded by [KW] Jessica Lind PT      Row Name 11/12/19 0830             Lower Extremity Seated Therapeutic Exercise    Performed, Seated Lower Extremity (Therapeutic Exercise)  hip flexion/extension;hip abduction/adduction;LAQ (long arc quad), knee extension;ankle dorsiflexion/plantarflexion glut sets  -KW      Exercise Type, Seated Lower Extremity (Therapeutic Exercise)  AROM (active range of motion)  -KW      Sets/Reps Detail, Seated Lower Extremity (Therapeutic Exercise)  20*2  -KW      Recorded by [KW] Jessica Lind PT      Row Name 11/12/19 0830             Vital Signs    Pre Systolic BP Rehab  135  -KW      Pre Treatment Diastolic BP  62  -KW      Post Systolic BP Rehab  144  -KW      Post Treatment Diastolic BP  71  -KW      Pretreatment Heart Rate (beats/min)  77  -KW      Posttreatment Heart Rate (beats/min)  70  -KW      Pre SpO2 (%)  96  -KW      O2 Delivery Pre Treatment  room air  -KW      Post SpO2 (%)  96  -KW      O2 Delivery Post Treatment  room air  -KW      Pre Patient Position  Sitting  -KW      Intra Patient Position  Standing  -KW      Post  Patient Position  Sitting  -KW      Recorded by [KW] Jessica Lind PT      Row Name 11/12/19 0830             Positioning and Restraints    Pre-Treatment Position  sitting in chair/recliner  -KW      Post Treatment Position  chair  -KW      In Chair  sitting;exit alarm on;call light within reach;encouraged to call for assist  -KW      Recorded by [KW] Jessica Lind PT      Row Name 11/12/19 0830             Daily Summary of Progress (PT)    Functional Goal Overall Progress: Physical Therapy  progressing toward functional goals as expected  -KW      Recommendations: Physical Therapy  cont PT POC  -KW      Recorded by [KW] Jessica Lind PT        User Key  (r) = Recorded By, (t) = Taken By, (c) = Cosigned By    Initials Name Effective Dates    KW Jessica Lind PT 07/23/18 -            Physical Therapy Education     Title: PT OT SLP Therapies (In Progress)     Topic: Physical Therapy (In Progress)     Point: Mobility training (Done)     Learning Progress Summary           Patient Acceptance, E,TB,D, VU,DU by LISA at 11/11/2019 10:21 AM    Comment:  Reinforced erect posture with gt. , proper hand placement with stand-sit,  need for assist upon d/c at home due to pt. at risk for fall at this time & due to caring for animals at home.    Acceptance, E, VU,NR by ALEISHA at 11/7/2019  1:02 PM    Comment:  Mellisa assessed: 23/28 or moderate fall risk.  Recommend continued use of FWW.  Tag alarm applied.                   Point: Body mechanics (Done)     Learning Progress Summary           Patient Acceptance, E,TB,D, VU,DU by LISA at 11/11/2019 10:21 AM    Comment:  Reinforced erect posture with gt. , proper hand placement with stand-sit,  need for assist upon d/c at home due to pt. at risk for fall at this time & due to caring for animals at home.                   Point: Precautions (Done)     Learning Progress Summary           Patient Acceptance, E,TB,D, VU,DU by LISA at 11/11/2019 10:21 AM    Comment:  Reinforced erect posture  with gt. , proper hand placement with stand-sit,  need for assist upon d/c at home due to pt. at risk for fall at this time & due to caring for animals at home.                               User Key     Initials Effective Dates Name Provider Type Discipline    JA 03/07/18 -  Ulysses Gill, PTA Physical Therapy Assistant PT    CZ 04/03/18 -  Hermilo Coronel, PT Physical Therapist PT                  PT Recommendation and Plan     Plan of Care Reviewed With: patient  Daily Summary of Progress (PT)  Functional Goal Overall Progress: Physical Therapy: progressing toward functional goals as expected  Recommendations: Physical Therapy: cont PT POC  IRF Plan of Care Review: progress ongoing, continue  Outcome Summary: PT tx completed this date. Pt performing sit<>stand with supervision and toilet tranfser with CGA. Pt ambulating 200ft x2 with CGA and FWwalker. Pt needing VC for posture throughout. Pt performing BLE therex in sitting and standing, and performing standing balance activities with CGA. No new goals met. Cont PT POC.       PT IRF GOALS     Row Name 11/12/19 0830             Bed Mobility Goal 1 (PT-IRF)    Activity/Assistive Device (Bed Mobility Goal 1, PT-IRF)  sit to supine/supine to sit  -KW      [place] Level (Bed Mobility Goal 1, PT-IRF)  independent  -KW      Time Frame (Bed Mobility Goal 1, PT-IRF)  short term goal (STG);5 - 7 days  -KW      Progress/Outcomes (Bed Mobility Goal 1, PT-IRF)  goal not met  -KW         Transfer Goal 1 (PT-IRF)    Activity/Assistive Device (Transfer Goal 1, PT-IRF)  sit-to-stand/stand-to-sit;bed-to-chair/chair-to-bed  -KW      [place] Level (Transfer Goal 1, PT-IRF)  conditional independence  -KW      Time Frame (Transfer Goal 1, PT-IRF)  short term goal (STG);5 - 7 days  -KW      Barriers (Transfer Goal 1, PT-IRF)  Impulsive at times.   -KW      Progress/Outcomes (Transfer Goal 1, PT-IRF)  goal not met  -KW         Gait/Walking Locomotion Goal 1 (PT-IRF)     Activity/Assistive Device (Gait/Walking Locomotion Goal 1, PT-IRF)  walker, rolling  -KW      Gait/Walking Locomotion Distance Goal 1 (PT-IRF)  150'x2  -KW      [place] Level (Gait/Walking Locomotion Goal 1, PT-IRF)  conditional independence  -KW      Time Frame (Gait/Walking Locomotion Goal 1, PT-IRF)  long term goal (LTG);by discharge  -KW      Barriers (Gait/Walking Locomotion Goal 1, PT-IRF)  Stooped posture.   -KW      Progress/Outcomes (Gait/Walking Locomotion Goal 1, PT-IRF)  goal not met  -KW         Gait/Walking Locomotion Goal (PT-IRF)    Gait/Walking Locomotion Goal (PT-IRF)  Tinetti fall risk, score: 25/28  -KW      Time Frame (Gait/Walking Locomotion Goal, PT-IRF)  long term goal (LTG);by discharge  -KW      Progress/Outcomes (Gait/Walking Locomotion Goal, PT-IRF)  goal not met  -KW         Stairs Goal 1 (PT-IRF)    Activity/Assistive Device (Stairs Goal 1, PT-IRF)  using handrail, right  -KW      Number of Stairs (Stairs Goal 1, PT-IRF)  4 steps, R rail.   -KW      [place] Level (Stairs Goal 1, PT-IRF)  conditional independence  -KW      Time Frame (Stairs Goal 1, PT-IRF)  long term goal (LTG);by discharge  -KW      Progress/Outcomes (Stairs Goal 1, PT-IRF)  goal not met  -KW        User Key  (r) = Recorded By, (t) = Taken By, (c) = Cosigned By    Initials Name Provider Type    KW Jessica Lind, PT Physical Therapist        Outcome Measures     Row Name 11/12/19 0830 11/11/19 1330 11/11/19 0830       How much help from another person do you currently need...    Turning from your back to your side while in flat bed without using bedrails?  3  -KW  --  3  -JA    Moving from lying on back to sitting on the side of a flat bed without bedrails?  3  -KW  --  3  -JA    Moving to and from a bed to a chair (including a wheelchair)?  3  -KW  --  3  -JA    Standing up from a chair using your arms (e.g., wheelchair, bedside chair)?  3  -KW  --  3  -JA    Climbing 3-5 steps with a railing?  3  -KW  --  3   -JA    To walk in hospital room?  3  -KW  --  3  -JA    AM-PAC 6 Clicks Score (PT)  18  -KW  --  18  -JA       How much help from another is currently needed...    Putting on and taking off regular lower body clothing?  --  3  -RC  --    Bathing (including washing, rinsing, and drying)  --  3  -RC  --    Toileting (which includes using toilet bed pan or urinal)  --  3  -RC  --    Putting on and taking off regular upper body clothing  --  3  -RC  --    Taking care of personal grooming (such as brushing teeth)  --  3  -RC  --    Eating meals  --  4  -RC  --    AM-PAC 6 Clicks Score (OT)  --  19  -RC  --       Functional Assessment    Outcome Measure Options  AM-PAC 6 Clicks Basic Mobility (PT)  -  --  AM-[facility] 6 Clicks Basic Mobility (PT)  -    Row Name 11/09/19 2644             How much help from another person do you currently need...    Turning from your back to your side while in flat bed without using bedrails?  3  -TW      Moving from lying on back to sitting on the side of a flat bed without bedrails?  3  -TW      Moving to and from a bed to a chair (including a wheelchair)?  3  -TW      Standing up from a chair using your arms (e.g., wheelchair, bedside chair)?  3  -TW      Climbing 3-5 steps with a railing?  3  -TW      To walk in hospital room?  3  -TW      AM-PAC 6 Clicks Score (PT)  18  -TW        User Key  (r) = Recorded By, (t) = Taken By, (c) = Cosigned By    Initials Name Provider Type    Ulysses Delarosa, PTA Physical Therapy Assistant    Abundio Maria, PTA Physical Therapy Assistant    Tiki Deleon, MITCHELL/L Occupational Therapy Assistant    Jessica Zhu, PT Physical Therapist        Mobility  Admission Goal Date: 11/12/19 Date Date Date Date Discharge   A.Roll left and right: The ability to roll from lying on back to left and right side, and return to lying on back on the bed.   5        B.Sit to lying: The ability to move from sitting on side of bed to lying flat on the  bed.   5        C. Lying to sitting on side of bed: The ability to move from lying on the back to sitting on the side of the bed with feet flat on the floor, and with no back support.   5        D.Sit to stand: The ability to come to a standing position from sitting in a chair, wheelchair, or on the side of the bed.   5        E. Chair/bed-to-chair transfer: The ability to transfer to and from a bed to a chair (or wheelchair).   4        F.Toilet transfer: The ability to get on and off a toilet or commode.      4        G. Car Transfer: the ability to transfer in and out of a car or van on the passenger side.    88        I.Walk 10 feet: Once standing, the ability to walk at least 10 feet in a room, corridor, or similar space. If admission performance is coded 07, 09, 10, or 88 Skip to [id number], 1 step (curb)   4        J.Walk 50 feet with two turns: Once standing, the ability to walk at least 50 feet and make two turns.   4        K.Walk 150 feet: Once standing, the ability to walk at least 150 feet in a corridor or similar space.   4        L.Walking 10 feet on uneven surfaces: The ability to walk 10 feet on uneven or sloping surfaces (indoor or outdoor), such as turf or gravel.   88        M.1 step (curb): The ability to go up and down a curb and/or up and down one step. If admission performance is coded 07, 09, 10, or 88 Skip to [id number], Picking up object   0        N.4 steps: The ability to go up and down four steps with or without a rail. If admission performance is coded 07, 09, 10, or 88 Skip to [id number], Picking up object   4        O.12 steps: The ability to go up and down 12 steps with or without a rail.   88        P.Picking up object: The ability to bend/stoop from a standing position to  a small object, such as a spoon, from the floor. (Does pt use a wheelchair and/or scooter?) If YES, go to R.    4        R. Wheel 50 feet with two turns: Once seated in wheelchair/scooter, the ability to wheel at  least 50 feet and make two turns.   5        S.Wheel 150 feet: Once seated in wheelchair/scooter, the ability to wheel at least 150 feet in a corridor or similar space.   5                 Time Calculation:     PT Charges     Row Name 11/12/19 1020             Time Calculation    Start Time  0830  -KW      Stop Time  1001  -KW      Time Calculation (min)  91 min  -KW      PT Received On  11/12/19  -KW         Time Calculation- PT    Total Timed Code Minutes- PT  91 minute(s)  -KW        User Key  (r) = Recorded By, (t) = Taken By, (c) = Cosigned By    Initials Name Provider Type    Jessica Zhu, DANNY Physical Therapist          Therapy Charges for Today     Code Description Service Date Service Provider Modifiers Qty    99309141041 HC PT THER PROC EA 15 MIN 11/12/2019 Jessica Lind, PT GP 2    [id number]  PT THERAPEUTIC ACT EA 15 MIN 11/12/2019 Jessica Lind, PT GP 2    [id number] HC GAIT TRAINING EA 15 MIN 11/12/2019 Jessica Lind, PT GP 2            PT G-Codes  Outcome Measure Options: AM-PAC 6 Clicks Basic Mobility (PT)  AM-PAC 6 Clicks Score (PT): 18  AM-PAC 6 Clicks Score (OT): 19  Modified Nicolasa Scale: 4 - Moderately severe disability.  Unable to walk without assistance, and unable to attend to own bodily needs without assistance.  Tinetti Total Score: 23      Jessica Lind PT  11/12/2019

## 2019-11-12 NOTE — PLAN OF CARE
Problem: Patient Care Overview  Goal: Plan of Care Review   11/12/19 1795   OTHER   Outcome Summary PT tx completed this date. Pt performing sit<>stand with supervision and toilet tranfser with CGA. Pt ambulating 200ft x2 with CGA and FWwalker. Pt needing VC for posture throughout. Pt performing BLE therex in sitting and standing, and performing standing balance activities with CGA. No new goals met. Cont PT POC.

## 2019-11-13 PROCEDURE — 97112 NEUROMUSCULAR REEDUCATION: CPT

## 2019-11-13 PROCEDURE — 99232 SBSQ HOSP IP/OBS MODERATE 35: CPT | Performed by: FAMILY MEDICINE

## 2019-11-13 PROCEDURE — 97535 SELF CARE MNGMENT TRAINING: CPT

## 2019-11-13 PROCEDURE — 25010000002 ENOXAPARIN PER 10 MG: Performed by: FAMILY MEDICINE

## 2019-11-13 PROCEDURE — 97110 THERAPEUTIC EXERCISES: CPT

## 2019-11-13 PROCEDURE — 97116 GAIT TRAINING THERAPY: CPT

## 2019-11-13 PROCEDURE — 97530 THERAPEUTIC ACTIVITIES: CPT

## 2019-11-13 RX ADMIN — GABAPENTIN 400 MG: 400 CAPSULE ORAL at 12:19

## 2019-11-13 RX ADMIN — ATORVASTATIN CALCIUM 80 MG: 40 TABLET, FILM COATED ORAL at 20:13

## 2019-11-13 RX ADMIN — CARVEDILOL 25 MG: 25 TABLET, FILM COATED ORAL at 17:32

## 2019-11-13 RX ADMIN — ENOXAPARIN SODIUM 40 MG: 40 INJECTION SUBCUTANEOUS at 20:13

## 2019-11-13 RX ADMIN — GABAPENTIN 400 MG: 400 CAPSULE ORAL at 17:32

## 2019-11-13 RX ADMIN — ROPINIROLE 1 MG: 1 TABLET, FILM COATED ORAL at 20:13

## 2019-11-13 RX ADMIN — CYCLOBENZAPRINE HYDROCHLORIDE 10 MG: 10 TABLET, FILM COATED ORAL at 10:28

## 2019-11-13 RX ADMIN — THERA TABS 1 TABLET: TAB at 10:26

## 2019-11-13 RX ADMIN — DIAZEPAM 10 MG: 5 TABLET ORAL at 10:27

## 2019-11-13 RX ADMIN — CARVEDILOL 25 MG: 25 TABLET, FILM COATED ORAL at 10:26

## 2019-11-13 RX ADMIN — BACLOFEN 20 MG: 10 TABLET ORAL at 14:58

## 2019-11-13 RX ADMIN — GABAPENTIN 400 MG: 400 CAPSULE ORAL at 05:02

## 2019-11-13 RX ADMIN — BACLOFEN 20 MG: 10 TABLET ORAL at 05:02

## 2019-11-13 RX ADMIN — DIAZEPAM 10 MG: 5 TABLET ORAL at 20:14

## 2019-11-13 RX ADMIN — ASPIRIN 81 MG 81 MG: 81 TABLET ORAL at 10:29

## 2019-11-13 RX ADMIN — BACLOFEN 20 MG: 10 TABLET ORAL at 22:22

## 2019-11-13 RX ADMIN — CLOPIDOGREL BISULFATE 75 MG: 75 TABLET ORAL at 10:27

## 2019-11-13 NOTE — PROGRESS NOTES
LOS: 7 days   Patient Care Team:  Inna Harris APRN as PCP - General (Family Medicine)  Declan Friedman MD as PCP - Claims Attributed  Maykel Marinelli DPM as Consulting Physician (Podiatry)    Chief Complaint:   Left pontine stroke (CMS/HCC)          Interval History:     SUBJECTIVE: She tells me last night she was having significant spasm and was unable to ambulate to the bathroom.  Nursing notes reviewed and she was taken to the bathroom by wheelchair.  She has had intermittent lower extremity spasms since her admission for the stroke approximately 3 to 4 weeks ago at [facility].  She has had episodic spasms here as well.    Localized weakness is improving but she said she could not walk last night because her legs gave way with spasm  History taken from: patient chart RN And again reviewing her records for her admission here as well at [facility] for the left pontine stroke.  Noted she had significant episodes of spasm after her initial stroke in 2018 and a new left pontine stroke mid October.    Also reviewing her chart it was noted that the neurology at [facility] thought the spasm was due to degenerative disease of the thoracic and lumbar spine and not due to her stroke.  She is to follow-up with neurosurgery as an outpatient    Objective     Vital Signs  Temp:  [98 °F (36.7 °C)] 98 °F (36.7 °C)  Heart Rate:  [62-75] 62  Resp:  [18] 18  BP: (132-151)/(62-66) 151/66      11/10/19  0600 11/12/19  0600 11/13/19  0600   Weight: 71.4 kg (157 lb 6.4 oz) 72 kg (158 lb 11.7 oz) 71.3 kg (157 lb 3 oz)         Physical Exam:     General Appearance:    Alert, cooperative, in no acute distress   Head:    Normocephalic, without obvious abnormality, atraumatic   Eyes:            Lids and lashes normal, conjunctivae and sclerae normal, no   icterus, no pallor, corneas clear, PERRLA   Throat:   No oral lesions, no thrush, oral mucosa moist   Neck:   No adenopathy, supple, trachea midline, no thyromegaly, no    carotid bruit, no JVD       Lungs:     Clear to auscultation,respirations regular, even and                  Unlabored good bilateral air movement    Heart:    Regular rhythm and normal rate, normal S1 and S2, no            murmur, no gallop, no rub, no click no ectopy   Chest Wall:    No abnormalities observed   Abdomen:     Normal bowel sounds, no masses, no organomegaly, soft        non-tender, non-distended, no guarding, no rebound                Tenderness    Extremities:   Moves all extremities well, no edema, no cyanosis, no             Redness     No pain or spasm at the time I was speaking with her   Skin:   No bleeding, bruising or rash   Lymph nodes:   No palpable adenopathy   Neurologic:   Cranial nerves 2 - 12 grossly intact, dysarthria is improving  Strength range of motion lower extremities improving  No spasm or movement disorders in the lower extremities at this time       RESULTS REVIEW:     Lab Results (last 24 hours)     ** No results found for the last 24 hours. **        Imaging Results (Last 72 Hours)     ** No results found for the last 72 hours. **        Nursing notes and therapy notes have been reviewed  I have reviewed medications  Assessment/Plan     Active Hospital Problems    Diagnosis   • **Left pontine stroke (CMS/HCC)   • Restless leg syndrome, uncontrolled     First diagnosed after initial pontine stroke 2018.  Has been treated with Requip at night since that time     • Dysarthria   • Muscle spasm of both lower legs     Worse since this stroke     • Recurrent falls while walking     Due to muscle spasm     • Urinary hesitancy     History of in and out self caths after initial surgery 2018     • DJD (degenerative joint disease), multiple sites     thoracic and lumbar spine  Status post extensive stabilization of the thoracolumbar spine 2018     • Hypertension   • Altered mental status     She tells me she has a very small bedroom and cannot get a walker into the bedroom so she uses  a cane.  On further discussion with her she cannot get a walker in the bedroom because of the dog crates in her bedroom.  She does not agree to consider moving the crates and the dogs from the bedroom and says that she would rather die than move the dogs or the crates.  She says the dog's help her walk.        PLAN: She continues to have episodic muscle spasm as her strength and balance improves overall.  She is continues to be a significant fall risk here and at home.  Noted at admission she had fallen at least 20 times at home going from her bedroom to her bathroom.  Using a cane the leg will spasm and she falls.  She does not agree to use a walker to change her arrangement and home.  She does not agree to consider other living arrangements.  With the current condition she is unsafe to return home but does not agree to any other plan    We will continue therapy.  Expect that we will plan discharge later in the week.  I think that staying longer in therapy will not decrease her fall risk with her current living arrangements she does not agree to change in any way.    I have discussed this with her at length and will continue to do so  I have tried to maximize antispasmodics for her            This document has been electronically signed by Castro West MD on November 13, 2019 9:54 AM

## 2019-11-13 NOTE — NURSING NOTE
Pt moaning and c/o severe leg spasms to right leg. Pain medication given per previous shift. Pt complained of pain when attempting to reposition in bed. Pt states she is unable to ambulate to bathroom at this time. Pt transported via W/C.

## 2019-11-13 NOTE — PLAN OF CARE
Problem: Stroke (IRF) (Adult)  Goal: Promote Optimal Functional [place]  Outcome: Ongoing (interventions implemented as appropriate)      Problem: Fall Risk (Adult)  Goal: Absence of Fall  Outcome: Ongoing (interventions implemented as appropriate)  Pt had difficulty standing up this evening. Pt transported to bathroom via W/C.    Problem: Pain, Chronic (Adult)  Goal: Acceptable Pain/Comfort Level and Functional Ability  Outcome: Ongoing (interventions implemented as appropriate)  Pt continues to have spasms in legs. Pt refused pain medication at midnight but accepted neurontin.

## 2019-11-13 NOTE — THERAPY TREATMENT NOTE
Inpatient Rehabilitation - Occupational Therapy Treatment Note    [facility]     Patient Name: Rama Goel  : 1945  MRN: [id number]    Today's Date: 2019                 Admit Date: 2019      Visit Dx:    ICD-10-CM ICD-9-CM   1. Dysarthria R47.1 784.51   2. Oropharyngeal dysphagia R13.12 787.22   3. Impaired physical mobility Z74.09 781.99   4. Impaired mobility and ADLs Z74.09 799.89       Patient Active Problem List   Diagnosis   • Altered mental status   • DJD (degenerative joint disease), multiple sites   • Hypertension   • Left pontine stroke (CMS/HCC)   • CVA (cerebral vascular accident) (CMS/HCC)   • Urinary hesitancy   • Muscle spasm of both lower legs   • Recurrent falls while walking   • Dysarthria   • Restless leg syndrome, uncontrolled         Therapy Treatment    IRF Treatment Summary     Row Name 19 1340 19 1045 19 0815       Evaluation/Treatment Time and Intent    Subjective Information  no complaints  -RC  no complaints  -RC  no complaints  -JA    Existing Precautions/Restrictions  fall  -RC  fall  -RC  fall  -JA    Document Type  therapy note (daily note)  -RC  therapy note (daily note)  -  therapy note (daily note)  -JA    Mode of Treatment  occupational therapy;individual therapy  -RC  occupational therapy;individual therapy  -RC  physical therapy;individual therapy  -JA    Patient/Family Observations  in w/c   -RC  in bedside chair   -RC  in w/c visibly upset regarding d/c in 4 days and situation at home and need for assistance at this time  -JA    Start Time (Evaluation/Treatment)  1340  -  1045  -  0815  -JA    Stop Time (Evaluation/Treatment)  1350  -RC  1210  -  0950  -JA    Recorded by [RC] Tiki Grover COTA/L [RC] Tiki Grover COTA/DIANA [JA] Ulysses Gill PTA    Row Name 19 1340 19 1045          Relationship/Environment    Primary Source of Support/Comfort  friend  -RC  friend  -RC     Recorded by [RC]  Tiki Grover COTA/DIANA [RC] Tiki Grover COTA/L     Row Name 11/13/19 1340 11/13/19 1045 11/13/19 0815       Cognition/Psychosocial- PT/OT    Affect/Mental Status (Cognitive)  WFL  -RC  WFL  -RC  WFL  -JA    Orientation Status (Cognition)  --  --  oriented x 4  -JA    Cognitive Function (Cognitive)  safety deficit  -RC  safety deficit  -RC  --    Recorded by [RC] Tiki Grover COTA/L [RC] Tiki Grover COTA/DIANA [JA] Ulysses Gill, PTA    Row Name 11/13/19 0815             Bed Mobility Assessment/Treatment    Rolling Right [place] (Bed Mobility)  independent  -JA      Supine-Sit [place] (Bed Mobility)  supervision  -JA      Sit-Supine [place] (Bed Mobility)  supervision  -JA      Assistive Device (Bed Mobility)  bed rails HOB down  -JA      Comment (Bed Mobility)  Bed elevated to height of pt. report of bed at home height, pt. unsure if tall enough plans to have friend at home measure height at home  -JA      Recorded by [JA] Ulysses Gill, PTA      Row Name 11/13/19 1045             Functional Mobility    Functional Mobility- Ind. Level  standby assist  -      Functional Mobility- Device  rolling walker  -      Functional Mobility-Distance (Feet)  70  -RC      Recorded by [RC] Tiki Grover COTA/L      Row Name 11/13/19 0815             Transfer Assessment/Treatment    Transfer Assessment/Treatment  sit-stand transfer;stand-sit transfer  -JA      Recorded by [JA] Ulysses Gill, PTA      Row Name 11/13/19 1045 11/13/19 0815          Bed-Chair Transfer    Bed-Chair [place] (Transfers)  supervision chair to chair   -  supervision;contact guard  -JA     Assistive Device (Bed-Chair Transfers)  --  walker, front-wheeled  -JA     Recorded by [RC] Tiki Grover COTA/DIANA [JA] Ulysses Gill, PTA     Row Name 11/13/19 0815             Chair-Bed Transfer    Chair-Bed [place] (Transfers)  supervision;contact guard  -JA      Assistive Device (Chair-Bed  Transfers)  walker, front-wheeled  -JA      Recorded by [JA] Ulysses Gill, PTA      Row Name 11/13/19 1045 11/13/19 0815          Sit-Stand Transfer    Sit-Stand [place] (Transfers)  supervision  -  supervision;verbal cues  -JA     Assistive Device (Sit-Stand Transfers)  --  walker, front-wheeled  -JA     Recorded by [RC] Tiki Grover COTA/L [JA] Ulysses Gill, PTA     Row Name 11/13/19 1045 11/13/19 0815          Stand-Sit Transfer    Stand-Sit [place] (Transfers)  supervision  -  supervision;verbal cues  -JA     Assistive Device (Stand-Sit Transfers)  --  walker, front-wheeled  -JA     Recorded by [RC] Tiki Grover COTA/DIAAN [JA] Ulysses Gill, PTA     Row Name 11/13/19 0815             Stand Pivot/Stand Step Transfer    Stand Pivot/Stand Step [place]  supervision;contact guard w/c-bedside chair-w/c in gym mulitple times   -JA      Assistive Device (Stand Pivot Stand Step Transfer)  walker, front-wheeled  -JA      Recorded by [JA] Ulysses Gill, PTA      Row Name 11/13/19 0815             Gait/Stairs Assessment/Training    Gait/Stairs Assessment/Training  gait/ambulation assistive device  -JA      [place] Level (Gait)  contact guard  -JA      Assistive Device (Gait)  walker, front-wheeled  -JA      Distance in Feet (Gait)  140', 170'  -JA      Pattern (Gait)  step-through  -JA      Deviations/Abnormal Patterns (Gait)  base of support, narrow;gait speed decreased  -JA      Bilateral Gait Deviations  forward flexed posture  -JA      [place] Level (Stairs)  contact guard  -JA      Assistive Device (Stairs)  walker, front-wheeled  -JA      Number of Steps (Stairs)  1 curb stepx2  -JA      Ascending Technique (Stairs)  step-to-step  -JA      Descending Technique (Stairs)  step-to-step  -JA      Recorded by [JA] Ulysses Gill, PTA      Row Name 11/13/19 1340 11/13/19 1045 11/13/19 0815       Wheelchair Mobility/Management    Method of Wheelchair  Locomotion (Mobility)  bimanual (upper extremity) propulsion;bipedal (lower extremity) propulsion  -  bipedal (lower extremity) propulsion;bimanual (upper extremity) propulsion  -  bimanual (upper extremity) propulsion;bipedal (lower extremity) propulsion  -JA    Mobility Activities (Wheelchair)  mobility (all) activities  -  mobility (all) activities  -RC  forward propulsion;turning  -JA    Mobility [place] Level (Wheelchair)  supervision  -  supervision  -  supervision  -JA    Forward Propulsion [place] (Wheelchair)  --  --  supervision  -JA    Turning [place] (Wheelchair)  --  --  supervision  -JA    Distance Propelled in Feet (Wheelchair)  50  -RC  150  -RC  150  -JA    Recorded by [RC] Tiki Grover COTA/L [RC] Tiki Grover COTA/DIANA [JA] Ulysses Gill, PTA    Row Name 11/13/19 1340 11/13/19 0815          Safety Issues, Functional Mobility    Impairments Affecting Function (Mobility)  --  balance;endurance/activity tolerance;coordination;strength;pain  -JA     Comment, Safety Issues/Impairments (Mobility)  pt reports she went to bathroom and changed pants w/o assist, discussed need to call for assist.   -RC  --     Recorded by [RC] Tiki Grover COTA/DIANA [JA] Ulysses Gill, PTA     Row Name 11/13/19 1340             Self-Feeding Assessment/Treatment    [place] Level (Self-Feeding)  set up extra time required   -RC      Position (Self-Feeding)  supported sitting  -RC      Recorded by [RC] Tiki Grover COTA/L      Row Name 11/13/19 1045             Fine Motor Testing & Training    Training Activity, Fine Motor Coordination  manipulation of pegs  -RC      Recorded by [RC] Tiki Grover COTA/L      Row Name 11/13/19 0815             Pain Scale: Numbers Pre/Post-Treatment    Pain Scale: Numbers, Pretreatment  0/10 - no pain  -JA      Pain Scale: Numbers, Post-Treatment  0/10 - no pain  -JA      Recorded by [JA] Ulysses Gill, PTA      Row Name 11/13/19  0815             Dynamic Balance Activity    Therapeutic Training Performed (Dynamic Balance)  side stepping between cones to simulate doorways at home  -JA      Support Needed for Balance (Dynamic Balance Training)  CGA  -JA      Recorded by [JA] Ulysses Gill PTA      Row Name 11/13/19 1045             Aerobic Exercise Activity    Exercise Performed (Aerobic, Therapeutic Exercise)  arm bike  -      Expected Outcome (Aerobic, Therapeutic Exercise)  improve functional tolerance, self-care activity;improve performance, BADLs  -      Time (Aerobic, Therapeutic Exercise)  15  -RC      Recorded by [RC] Tiki Grover COTA/L      Row Name 11/13/19 0815             Lower Extremity Seated Therapeutic Exercise    Performed, Seated Lower Extremity (Therapeutic Exercise)  LAQ (long arc quad), knee extension;hip flexion/extension;ankle dorsiflexion/plantarflexion  -JA      Exercise Type, Seated Lower Extremity (Therapeutic Exercise)  AROM (active range of motion)  -JA      Sets/Reps Detail, Seated Lower Extremity (Therapeutic Exercise)  1-2x20  -JA      Recorded by [JA] Ulysses Gill PTA      Row Name 11/13/19 0815             Vital Signs    Pre Systolic BP Rehab  143  -JA      Pre Treatment Diastolic BP  67  -JA      Pre Patient Position  Sitting  -JA      Intra Patient Position  Standing  -JA      Post Patient Position  Sitting  -JA      Recorded by [JA] Ulysses Gill PTA      Row Name 11/13/19 1045 11/13/19 0815          Positioning and Restraints    Pre-Treatment Position  sitting in chair/recliner  -  sitting in chair/recliner  -JA     Post Treatment Position  wheelchair  -  chair  -JA     In Bed  encouraged to call for assist;notified nsg waiting for lunch   -  --     In Chair  --  sitting;call light within reach;encouraged to call for assist;exit alarm on  -JA     Recorded by [RC] Tiki Grover COTA/DIANA [JA] Ulysses Gill, TOAN     Row Name 11/13/19 0815             Daily Summary  of Progress (PT)    Functional Goal Overall Progress: Physical Therapy  progressing toward functional goals as expected  -LISA      Recorded by [LISA] Ulysses Gill, PTA        User Key  (r) = Recorded By, (t) = Taken By, (c) = Cosigned By    Initials Name Effective Dates    Ulysses Delarosa, PTA 03/07/18 -     RC Reilly, Tiki JONES, MTICHELL/L 03/07/18 -                  OT Recommendation and Plan         Plan of Care Review  Plan of Care Reviewed With: patient  Daily Summary of Progress (OT)  Functional Goal Overall Progress: Occupational Therapy: progressing toward functional goals as expected  Recommendations: Occupational Therapy: cont poc   Plan of Care Reviewed With: patient  IRF Plan of Care Review: progress ongoing, continue  Progress, Functional Goals: demonstrating adequate progress  Outcome Summary: in PM pt reports she took self to bathroom and changed pants by herself, MITCHELL and nursing discussed w/ pt that she is high fall risk and to call for assist when ever she is going to stand or transfer.     OT IRF GOALS     Row Name 11/13/19 1340 11/12/19 1340 11/11/19 1330       Bathing Goal 1 (OT-IRF)    Activity/Device (Bathing Goal 1, OT-IRF)  bathing skills, all  -RC  bathing skills, all  -RC  bathing skills, all  -RC    [place] Level (Bathing Goal 1, OT-IRF)  conditional independence  -RC  conditional independence  -RC  conditional independence  -RC    Time Frame (Bathing Goal 1, OT-IRF)  long term goal (LTG);by discharge  -RC  long term goal (LTG);by discharge  -RC  long term goal (LTG);by discharge  -RC    Progress/Outcomes (Bathing Goal 1, OT-IRF)  goal not met  -RC  goal not met  -RC  goal not met  -RC       UB Dressing Goal 1 (OT-IRF)    Activity/Device (UB Dressing Goal 1, OT-IRF)  upper body dressing  -RC  upper body dressing  -RC  upper body dressing  -RC    [place] (UB Dress Goal 1, OT-IRF)  independent  -RC  independent  -RC  independent  -RC    Time Frame (UB Dressing Goal 1,  OT-IRF)  long term goal (LTG);by discharge  -RC  long term goal (LTG);by discharge  -RC  long term goal (LTG);by discharge  -RC    Progress/Outcomes (UB Dressing Goal 1, OT-IRF)  goal not met  -RC  goal not met  -RC  goal not met  -RC       LB Dressing Goal 1 (OT-IRF)    Activity/Device (LB Dressing Goal 1, OT-IRF)  lower body dressing  -RC  lower body dressing  -RC  lower body dressing  -RC    [place] (LB Dressing Goal 1, OT-IRF)  conditional independence  -RC  conditional independence  -RC  conditional independence  -RC    Time Frame (LB Dressing Goal 1, OT-IRF)  long term goal (LTG);by discharge  -RC  long term goal (LTG);by discharge  -RC  long term goal (LTG);by discharge  -RC    Progress/Outcomes (LB Dressing Goal 1, OT-IRF)  goal not met  -RC  goal not met  -RC  goal not met  -RC    Row Name 11/09/19 0906 11/08/19 1355 11/07/19 1051       Bathing Goal 1 (OT-IRF)    Activity/Device (Bathing Goal 1, OT-IRF)  bathing skills, all  -KD  bathing skills, all  -RC  bathing skills, all  -ME    [place] Level (Bathing Goal 1, OT-IRF)  conditional independence  -KD  conditional independence  -RC  conditional independence  -ME    Time Frame (Bathing Goal 1, OT-IRF)  long term goal (LTG);by discharge  -KD  long term goal (LTG);by discharge  -RC  long term goal (LTG);by discharge  -ME    Progress/Outcomes (Bathing Goal 1, OT-IRF)  goal not met  -KD  goal not met  -RC  goal not met  -ME       UB Dressing Goal 1 (OT-IRF)    Activity/Device (UB Dressing Goal 1, OT-IRF)  upper body dressing  -KD  upper body dressing  -RC  upper body dressing  -ME    [place] (UB Dress Goal 1, OT-IRF)  independent  -KD  independent  -RC  independent  -ME    Time Frame (UB Dressing Goal 1, OT-IRF)  long term goal (LTG);by discharge  -KD  long term goal (LTG);by discharge  -RC  long term goal (LTG);by discharge  -ME    Progress/Outcomes (UB Dressing Goal 1, OT-IRF)  goal not met  -KD  goal not met  -RC  goal not met  -ME       LB  Dressing Goal 1 (OT-IRF)    Activity/Device (LB Dressing Goal 1, OT-IRF)  lower body dressing  -KD  lower body dressing  -RC  lower body dressing  -ME    [place] (LB Dressing Goal 1, OT-IRF)  conditional independence  -KD  conditional independence  -RC  conditional independence  -ME    Time Frame (LB Dressing Goal 1, OT-IRF)  long term goal (LTG);by discharge  -KD  long term goal (LTG);by discharge  -RC  long term goal (LTG);by discharge  -ME    Progress/Outcomes (LB Dressing Goal 1, OT-IRF)  goal not met  -KD  goal not met  -RC  goal not met  -ME      User Key  (r) = Recorded By, (t) = Taken By, (c) = Cosigned By    Initials Name Provider Type    RC Tiki Grover COTA/L Occupational Therapy Assistant    Carola Serrano COTA/L Occupational Therapy Assistant    ME Che Oh OT Occupational Therapist          Occupational Therapy Education     Title: PT OT SLP Therapies (In Progress)     Topic: Occupational Therapy (In Progress)     Point: ADL training (In Progress)     Description: Instruct learner(s) on proper safety adaptation and remediation techniques during self care or transfers.   Instruct in proper use of assistive devices.    Learning Progress Summary           Patient Acceptance, E, NR by  at 11/13/2019  1:57 PM    Acceptance, E, NR by  at 11/12/2019  2:07 PM    Acceptance, E, GISSELLE HYDE by ME at 11/7/2019  3:40 PM    Comment:  Educated on OT and POC. Educated to continue exercises given to her for translation and fine motor coordination. Educated to call for assist. Educated on hand placement for transfers. Educated on safe mechanics for completing ADLs.                   Point: Home exercise program (Done)     Description: Instruct learner(s) on appropriate technique for monitoring, assisting and/or progressing therapeutic exercises/activities.    Learning Progress Summary           Patient Acceptance, E, VU,DU by ME at 11/7/2019  3:40 PM    Comment:  Educated on OT and POC. Educated to  continue exercises given to her for translation and fine motor coordination. Educated to call for assist. Educated on hand placement for transfers. Educated on safe mechanics for completing ADLs.                   Point: Precautions (In Progress)     Description: Instruct learner(s) on prescribed precautions during self-care and functional transfers.    Learning Progress Summary           Patient Acceptance, E, NR by  at 11/13/2019  1:57 PM    Acceptance, E, NR by  at 11/12/2019  2:07 PM    Acceptance, E, NR by  at 11/11/2019  1:53 PM    Acceptance, E, NR by  at 11/8/2019  2:46 PM    Acceptance, E, VU,DU by ME at 11/7/2019  3:40 PM    Comment:  Educated on OT and POC. Educated to continue exercises given to her for translation and fine motor coordination. Educated to call for assist. Educated on hand placement for transfers. Educated on safe mechanics for completing ADLs.                   Point: Body mechanics (In Progress)     Description: Instruct learner(s) on proper positioning and spine alignment during self-care, functional mobility activities and/or exercises.    Learning Progress Summary           Patient Acceptance, E, NR by  at 11/13/2019  1:57 PM    Acceptance, E, NR by  at 11/12/2019  2:07 PM    Acceptance, E, NR by  at 11/11/2019  1:53 PM    Acceptance, E, NR by  at 11/8/2019  2:46 PM                               User Key     Initials Effective Dates Name Provider Type Discipline     03/07/18 -  Tiki Grover COTA/L Occupational Therapy Assistant OT    ME 09/10/19 -  Che Oh OT Occupational Therapist OT                Outcome Measures     Row Name 11/13/19 1340 11/13/19 0815 11/12/19 1340       How much help from another person do you currently need...    Turning from your back to your side while in flat bed without using bedrails?  --  4  -JA  --    Moving from lying on back to sitting on the side of a flat bed without bedrails?  --  3  -JA  --    Moving to and from a  bed to a chair (including a wheelchair)?  --  3  -JA  --    Standing up from a chair using your arms (e.g., wheelchair, bedside chair)?  --  3  -JA  --    Climbing 3-5 steps with a railing?  --  3  -JA  --    To walk in hospital room?  --  3  -JA  --    AM-PAC 6 Clicks Score (PT)  --  19  -JA  --       How much help from another is currently needed...    Putting on and taking off regular lower body clothing?  3  -RC  --  3  -RC    Bathing (including washing, rinsing, and drying)  3  -RC  --  3  -RC    Toileting (which includes using toilet bed pan or urinal)  3  -RC  --  3  -RC    Putting on and taking off regular upper body clothing  4  -RC  --  4  -RC    Taking care of personal grooming (such as brushing teeth)  4  -RC  --  4  -RC    Eating meals  4  -RC  --  4  -RC    AM-PAC 6 Clicks Score (OT)  21  -RC  --  21  -RC       Functional Assessment    Outcome Measure Options  --  AM-PAC 6 Clicks Basic Mobility (PT)  -JA  --    Row Name 11/12/19 0830 11/11/19 1330 11/11/19 0830       How much help from another person do you currently need...    Turning from your back to your side while in flat bed without using bedrails?  3  -KW  --  3  -JA    Moving from lying on back to sitting on the side of a flat bed without bedrails?  3  -KW  --  3  -JA    Moving to and from a bed to a chair (including a wheelchair)?  3  -KW  --  3  -JA    Standing up from a chair using your arms (e.g., wheelchair, bedside chair)?  3  -KW  --  3  -JA    Climbing 3-5 steps with a railing?  3  -KW  --  3  -JA    To walk in hospital room?  3  -KW  --  3  -JA    AM-PAC 6 Clicks Score (PT)  18  -KW  --  18  -JA       How much help from another is currently needed...    Putting on and taking off regular lower body clothing?  --  3  -RC  --    Bathing (including washing, rinsing, and drying)  --  3  -RC  --    Toileting (which includes using toilet bed pan or urinal)  --  3  -RC  --    Putting on and taking off regular upper body clothing  --  3  -RC   --    Taking care of personal grooming (such as brushing teeth)  --  3  -RC  --    Eating meals  --  4  -RC  --    AM-PAC 6 Clicks Score (OT)  --  19  -RC  --       Functional Assessment    Outcome Measure Options  AM-PAC 6 Clicks Basic Mobility (PT)  -KW  --  AM-PAC 6 Clicks Basic Mobility (PT)  -LISA      User Key  (r) = Recorded By, (t) = Taken By, (c) = Cosigned By    Initials Name Provider Type    Ulysses Delarosa, TOAN Physical Therapy Assistant     Tiki Grover, MITCHELL/L Occupational Therapy Assistant    Jessica Zhu, PT Physical Therapist             Time Calculation:     Time Calculation- OT     Row Name 11/13/19 1406 11/13/19 1405 11/13/19 1404       Time Calculation- OT    OT Start Time  1340  -RC  1045  -RC  1045  -RC    OT Stop Time  1350  -RC  1210  -RC  --    OT Time Calculation (min)  10 min  -RC  85 min  -RC  --    Total Timed Code Minutes- OT  10 minute(s)  -RC  85 minute(s)  -RC  --    OT Non-Billable Time (min)  --  0 min  -  --    OT Received On  11/13/19  -RC  11/13/19  -  --    Row Name 11/13/19 1015             Timed Charges    68208 - Gait Training Minutes   30  -JA        User Key  (r) = Recorded By, (t) = Taken By, (c) = Cosigned By    Initials Name Provider Type    Ulysses Delarosa, TOAN Physical Therapy Assistant     Tiki Grover, [place]/L Occupational Therapy Assistant          Therapy Charges for Today     Code Description Service Date Service Provider Modifiers Qty    32968836268 HC OT SELF CARE/MGMT/TRAIN EA 15 MIN 11/12/2019 Hien Grovera G, [place]/L GO 4    69916545762 HC OT THERAPEUTIC ACT EA 15 MIN 11/12/2019 ReillyHiena G, [place]/L GO 1    14526334132 HC OT THERAPEUTIC ACT EA 15 MIN 11/12/2019 ReillyHiena G, [place]/L GO 1    95234949910 HC OT SELF CARE/MGMT/TRAIN EA 15 MIN 11/13/2019 Hien Grovera G, [place]/L GO 1    56634963545 HC OT THERAPEUTIC ACT EA 15 MIN 11/13/2019 Tiki Grover COTA/L  4    37309902264 HC OT THER PROC EA 15 MIN 11/13/2019  Tiki Grover COTA/L GO 1    [id number]  OT THERAPEUTIC ACT EA 15 MIN 11/13/2019 Tiki Grover COTA/DIANA GO 1                   WHITNEY Barr  11/13/2019

## 2019-11-13 NOTE — PLAN OF CARE
Problem: Patient Care Overview  Goal: Plan of Care Review  Outcome: Ongoing (interventions implemented as appropriate)   11/13/19 0815   OTHER   Outcome Summary Pt. with no muscle spasms during treatment this a.m. Pt. cont's to be SBA with transfers & SBA-CGA due to slightly unsteady gt. with NBOS & trunk sway B, but postural awareness improving. Pt. will need assist at home upon d/c due to pt.'s home situation with caring for mulitple dogs at home and overall pt.'s home environment & pt's inability to undertand her physical impairments at this time.    Patient Care Overview   IRF Plan of Care Review progress ongoing, continue   Progress, Functional Goals demonstrating adequate progress   Coping/Psychosocial   Plan of Care Reviewed With patient     Goal: Discharge Needs Assessment  Outcome: Ongoing (interventions implemented as appropriate)   11/09/19 0137   Discharge Needs Assessment   Patient/Family Anticipates Transition to home   Patient/Family Anticipated Services at Transition outpatient care   Transportation Anticipated family or friend will provide   Disability   Equipment Currently Used at Home cane, straight;shower chair;walker, rolling

## 2019-11-13 NOTE — PLAN OF CARE
Problem: Patient Care Overview  Goal: Plan of Care Review  Outcome: Ongoing (interventions implemented as appropriate)   11/13/19 2753   OTHER   Outcome Summary in PM pt reports she took self to bathroom and changed pants by herself, MITCHELL and nursing discussed w/ pt that she is high fall risk and to call for assist when ever she is going to stand or transfer.  Pt participated in fx amb, transfers w/ sba, [facility] act and ue ex,. Much discussion w/ pt regarding d/c plan. Recommend to pt that she will need assist at home and will not be able to care for her dogs or drive at  D/c. Pt expresses unrealistic expectations about not using a walker sleeping and other mobility and self care issues at home. Recommend pt th have 24/7 assist at d/c.  Cont poc    Patient Care Overview   IRF Plan of Care Review progress ongoing, continue   Progress, Functional Goals demonstrating adequate progress   Coping/Psychosocial   Plan of Care Reviewed With patient

## 2019-11-13 NOTE — THERAPY TREATMENT NOTE
Inpatient Rehabilitation - Physical Therapy Treatment Note  [facility]     Patient Name: Rama Goel  : 1945  MRN: [id number]    Today's Date: 2019                 Admit Date: 2019      Visit Dx:      ICD-10-CM ICD-9-CM   1. Dysarthria R47.1 784.51   2. Oropharyngeal dysphagia R13.12 787.22   3. Impaired physical mobility Z74.09 781.99   4. Impaired mobility and ADLs Z74.09 799.89       Patient Active Problem List   Diagnosis   • Altered mental status   • DJD (degenerative joint disease), multiple sites   • Hypertension   • Left pontine stroke (CMS/HCC)   • CVA (cerebral vascular accident) (CMS/[facility])   • Urinary hesitancy   • Muscle spasm of both lower legs   • Recurrent falls while walking   • Dysarthria   • Restless leg syndrome, uncontrolled       Therapy Treatment    IRF Treatment Summary     Row Name 19 0815             Evaluation/Treatment Time and Intent    Subjective Information  no complaints  -JA      Existing Precautions/Restrictions  fall  -JA      Document Type  therapy note (daily note)  -JA      Mode of Treatment  physical therapy;individual therapy  -LISA      Patient/Family Observations  in w/c visibly upset regarding d/c in 4 days and situation at home and need for assistance at this time  -LISA      Start Time (Evaluation/Treatment)  0815  -LISA      Stop Time (Evaluation/Treatment)  0950  -JA      Recorded by [LISA] Ulysses Gill PTA      Row Name 19 0815             Cognition/Psychosocial- PT/OT    Affect/Mental Status (Cognitive)  WFL  -LISA      Orientation Status (Cognition)  oriented x 4  -JA      Recorded by [LISA] Uylsses Gill PTA      Row Name 19 0815             Bed Mobility Assessment/Treatment    Rolling Right [place] (Bed Mobility)  independent  -JA      Supine-Sit [place] (Bed Mobility)  supervision  -JA      Sit-Supine [place] (Bed Mobility)  supervision  -JA      Assistive Device (Bed Mobility)  bed rails HOB down  -JA       Comment (Bed Mobility)  Bed elevated to height of pt. report of bed at home height, pt. unsure if tall enough plans to have friend at home measure height at home  -JA      Recorded by [JA] Ulysses Gill, PTA      Row Name 11/13/19 0815             Transfer Assessment/Treatment    Transfer Assessment/Treatment  sit-stand transfer;stand-sit transfer  -JA      Recorded by [JA] Ulysses Gill, PTA      Row Name 11/13/19 0815             Bed-Chair Transfer    Bed-Chair [place] (Transfers)  supervision;contact guard  -JA      Assistive Device (Bed-Chair Transfers)  walker, front-wheeled  -JA      Recorded by [JA] Ulysses Gill, PTA      Row Name 11/13/19 0815             Chair-Bed Transfer    Chair-Bed [place] (Transfers)  supervision;contact guard  -JA      Assistive Device (Chair-Bed Transfers)  walker, front-wheeled  -JA      Recorded by [JA] Ulysses Gill, PTA      Row Name 11/13/19 0815             Sit-Stand Transfer    Sit-Stand [place] (Transfers)  supervision;verbal cues  -JA      Assistive Device (Sit-Stand Transfers)  walker, front-wheeled  -JA      Recorded by [JA] Ulysses Gill, PTA      Row Name 11/13/19 0815             Stand-Sit Transfer    Stand-Sit [place] (Transfers)  supervision;verbal cues  -JA      Assistive Device (Stand-Sit Transfers)  walker, front-wheeled  -JA      Recorded by [JA] Ulysses Gill, PTA      Row Name 11/13/19 0815             Stand Pivot/Stand Step Transfer    Stand Pivot/Stand Step [place]  supervision;contact guard w/c-bedside chair-w/c in gym mulitple times   -JA      Assistive Device (Stand Pivot Stand Step Transfer)  walker, front-wheeled  -JA      Recorded by [JA] Ulysses Gill, PTA      Row Name 11/13/19 0815             Gait/Stairs Assessment/Training    Gait/Stairs Assessment/Training  gait/ambulation assistive device  -JA      [place] Level (Gait)  contact guard  -JA      Assistive Device (Gait)   walker, front-wheeled  -JA      Distance in Feet (Gait)  140', 170'  -JA      Pattern (Gait)  step-through  -JA      Deviations/Abnormal Patterns (Gait)  base of support, narrow;gait speed decreased  -JA      Bilateral Gait Deviations  forward flexed posture  -JA      [place] Level (Stairs)  contact guard  -JA      Assistive Device (Stairs)  walker, front-wheeled  -JA      Number of Steps (Stairs)  1 curb stepx2  -JA      Ascending Technique (Stairs)  step-to-step  -JA      Descending Technique (Stairs)  step-to-step  -JA      Recorded by [JA] Ulysses Gill, [employer]      Row Name 11/13/19 0815             Wheelchair Mobility/Management    Method of Wheelchair Locomotion (Mobility)  bimanual (upper extremity) propulsion;bipedal (lower extremity) propulsion  -JA      Mobility Activities (Wheelchair)  forward propulsion;turning  -JA      Mobility [place] Level (Wheelchair)  supervision  -JA      Forward Propulsion [place] (Wheelchair)  supervision  -JA      Turning [place] (Wheelchair)  supervision  -JA      Distance Propelled in Feet (Wheelchair)  150  -JA      Recorded by [JA] Ulysses Gill, TOAN      Row Name 11/13/19 0815             Safety Issues, Functional Mobility    Impairments Affecting Function (Mobility)  balance;endurance/activity tolerance;coordination;strength;pain  -JA      Recorded by [JA] Ulysses Gill, [employer]      Row Name 11/13/19 0815             Pain Scale: Numbers Pre/Post-Treatment    Pain Scale: Numbers, Pretreatment  0/10 - no pain  -JA      Pain Scale: Numbers, Post-Treatment  0/10 - no pain  -JA      Recorded by [JA] Ulysses Gill, TOAN      Row Name 11/13/19 0815             Dynamic Balance Activity    Therapeutic Training Performed (Dynamic Balance)  side stepping between cones to simulate doorways at home  -JA      Support Needed for Balance (Dynamic Balance Training)  CGA  -JA      Recorded by [JA] Ulysses Gill, TOAN      Row Name 11/13/19 0815              Lower Extremity Seated Therapeutic Exercise    Performed, Seated Lower Extremity (Therapeutic Exercise)  LAQ (long arc quad), knee extension;hip flexion/extension;ankle dorsiflexion/plantarflexion  -LISA      Exercise Type, Seated Lower Extremity (Therapeutic Exercise)  AROM (active range of motion)  -LISA      Sets/Reps Detail, Seated Lower Extremity (Therapeutic Exercise)  1-2x20  -JA      Recorded by [LISA] Ulysses Gill PTA      Row Name 11/13/19 0815             Vital Signs    Pre Systolic BP Rehab  143  -JA      Pre Treatment Diastolic BP  67  -JA      Pre Patient Position  Sitting  -JA      Intra Patient Position  Standing  -JA      Post Patient Position  Sitting  -JA      Recorded by [LISA] Ulysses Gill PTA      Row Name 11/13/19 0815             Positioning and Restraints    Pre-Treatment Position  sitting in chair/recliner  -JA      Post Treatment Position  chair  -JA      In Chair  sitting;call light within reach;encouraged to call for assist;exit alarm on  -JA      Recorded by [LISA] Ulysses Gill PTA      Row Name 11/13/19 0815             Daily Summary of Progress (PT)    Functional Goal Overall Progress: Physical Therapy  progressing toward functional goals as expected  -LISA      Recorded by [LISA] Ulysses Gill PTA        User Key  (r) = Recorded By, (t) = Taken By, (c) = Cosigned By    Initials Name Effective Dates    Ulysses Delarosa PTA 03/07/18 -            Physical Therapy Education     Title: PT OT SLP Therapies (In Progress)     Topic: Physical Therapy (In Progress)     Point: Mobility training (Done)     Learning Progress Summary           Patient Acceptance, E,TB,D, BARRETT,NR by LISA at 11/13/2019 10:10 AM    Comment:  Discussed with pt. need for 24/7 assist at home upon d/c due to cont'd safety awareness and unsteady with gt. at times, muscle spasms, & B LE weakness observed at night with bathroom transfer per nsg.    Acceptance, E,TB,AARON, BARRETT,DU by LISA at 11/11/2019  10:21 AM    Comment:  Reinforced erect posture with gt. , proper hand placement with stand-sit,  need for assist upon d/c at home due to pt. at risk for fall at this time & due to caring for animals at home.    Acceptance, E, VU,NR by ALEISHA at 11/7/2019  1:02 PM    Comment:  Mellisa assessed: 23/28 or moderate fall risk.  Recommend continued use of FWW.  Tag alarm applied.                   Point: Body mechanics (Done)     Learning Progress Summary           Patient Acceptance, E,TB,D, VU,NR by LISA at 11/13/2019 10:10 AM    Comment:  Discussed with pt. need for 24/7 assist at home upon d/c due to cont'd safety awareness and unsteady with gt. at times, muscle spasms, & B LE weakness observed at night with bathroom transfer per nsg.    Acceptance, E,TB,D, VU,DU by LISA at 11/11/2019 10:21 AM    Comment:  Reinforced erect posture with gt. , proper hand placement with stand-sit,  need for assist upon d/c at home due to pt. at risk for fall at this time & due to caring for animals at home.                   Point: Precautions (Done)     Learning Progress Summary           Patient Acceptance, E,TB,D, VU,NR by LISA at 11/13/2019 10:10 AM    Comment:  Discussed with pt. need for 24/7 assist at home upon d/c due to cont'd safety awareness and unsteady with gt. at times, muscle spasms, & B LE weakness observed at night with bathroom transfer per nsg.    Acceptance, E,TB,D, VU,DU by LISA at 11/11/2019 10:21 AM    Comment:  Reinforced erect posture with gt. , proper hand placement with stand-sit,  need for assist upon d/c at home due to pt. at risk for fall at this time & due to caring for animals at home.                               User Key     Initials Effective Dates Name Provider Type Discipline    LISA 03/07/18 -  Ulysses Gill PTA Physical Therapy Assistant PT    ALEISHA 04/03/18 -  Hermilo Coronel, PT Physical Therapist PT                  PT Recommendation and Plan     Plan of Care Reviewed With: patient  Daily Summary of  Progress (PT)  Functional Goal Overall Progress: Physical Therapy: progressing toward functional goals as expected  Recommendations: Physical Therapy: Cont. balance activity, gt. quality, LE strengthening   IRF Plan of Care Review: progress ongoing, continue  Progress, Functional Goals: demonstrating adequate progress  Outcome Summary: Pt. with no muscle spasms during treatment this a.m. Pt. cont's to be SBA with transfers & SBA-CGA due to slightly unsteady gt. with NBOS & trunk sway B, but postural awareness improving. Pt. will need assist at home upon d/c due to pt.'s home situation with caring for mulitple dogs at home and overall pt.'s home environment & pt's inability to undertand her physical impairments at this time.       PT IRF GOALS     Row Name 11/13/19 0815             Bed Mobility Goal 1 (PT-IRF)    Activity/Assistive Device (Bed Mobility Goal 1, PT-IRF)  sit to supine/supine to sit  -JA      [place] Level (Bed Mobility Goal 1, PT-IRF)  independent  -JA      Time Frame (Bed Mobility Goal 1, PT-IRF)  short term goal (STG);5 - 7 days  -JA      Progress/Outcomes (Bed Mobility Goal 1, PT-IRF)  goal not met  -JA         Transfer Goal 1 (PT-IRF)    Activity/Assistive Device (Transfer Goal 1, PT-IRF)  sit-to-stand/stand-to-sit;bed-to-chair/chair-to-bed  -JA      [place] Level (Transfer Goal 1, PT-IRF)  conditional independence  -JA      Time Frame (Transfer Goal 1, PT-IRF)  short term goal (STG);5 - 7 days  -JA      Barriers (Transfer Goal 1, PT-IRF)  Impulsive at times.   -JA      Progress/Outcomes (Transfer Goal 1, PT-IRF)  goal not met  -JA         Gait/Walking Locomotion Goal 1 (PT-IRF)    Activity/Assistive Device (Gait/Walking Locomotion Goal 1, PT-IRF)  walker, rolling  -JA      Gait/Walking Locomotion Distance Goal 1 (PT-IRF)  150'x2  -JA      [place] Level (Gait/Walking Locomotion Goal 1, PT-IRF)  conditional independence  -JA      Time Frame (Gait/Walking Locomotion Goal 1, PT-IRF)   long term goal (LTG);by discharge  -JA      Barriers (Gait/Walking Locomotion Goal 1, PT-IRF)  Stooped posture.   -JA      Progress/Outcomes (Gait/Walking Locomotion Goal 1, PT-IRF)  goal not met  -JA         Gait/Walking Locomotion Goal (PT-IRF)    Gait/Walking Locomotion Goal (PT-IRF)  Tinetti fall risk, score: 25/28  -JA      Time Frame (Gait/Walking Locomotion Goal, PT-IRF)  long term goal (LTG);by discharge  -JA      Progress/Outcomes (Gait/Walking Locomotion Goal, PT-IRF)  goal not met  -JA         Stairs Goal 1 (PT-IRF)    Activity/Assistive Device (Stairs Goal 1, PT-IRF)  using handrail, right  -JA      Number of Stairs (Stairs Goal 1, PT-IRF)  4 steps, R rail.   -JA      [place] Level (Stairs Goal 1, PT-IRF)  conditional independence  -JA      Time Frame (Stairs Goal 1, PT-IRF)  long term goal (LTG);by discharge  -JA      Progress/Outcomes (Stairs Goal 1, PT-IRF)  goal not met  -JA        User Key  (r) = Recorded By, (t) = Taken By, (c) = Cosigned By    Initials Name Provider Type    Ulysses Delarosa PTA Physical Therapy Assistant        Outcome Measures     Row Name 11/13/19 0815 11/12/19 1340 11/12/19 0830       How much help from another person do you currently need...    Turning from your back to your side while in flat bed without using bedrails?  4  -JA  --  3  -KW    Moving from lying on back to sitting on the side of a flat bed without bedrails?  3  -JA  --  3  -KW    Moving to and from a bed to a chair (including a wheelchair)?  3  -JA  --  3  -KW    Standing up from a chair using your arms (e.g., wheelchair, bedside chair)?  3  -JA  --  3  -KW    Climbing 3-5 steps with a railing?  3  -JA  --  3  -KW    To walk in hospital room?  3  -JA  --  3  -KW    AM-PAC 6 Clicks Score (PT)  19  -JA  --  18  -KW       How much help from another is currently needed...    Putting on and taking off regular lower body clothing?  --  3  -RC  --    Bathing (including washing, rinsing, and drying)  --  3   -RC  --    Toileting (which includes using toilet bed pan or urinal)  --  3  -RC  --    Putting on and taking off regular upper body clothing  --  4  -RC  --    Taking care of personal grooming (such as brushing teeth)  --  4  -RC  --    Eating meals  --  4  -RC  --    AM-PAC 6 Clicks Score (OT)  --  21  -RC  --       Functional Assessment    Outcome Measure Options  AM-PAC 6 Clicks Basic Mobility (PT)  -LISA  --  AM-PAC 6 Clicks Basic Mobility (PT)  -SYLVAIN    Row Name 11/11/19 1330 11/11/19 0830          How much help from another person do you currently need...    Turning from your back to your side while in flat bed without using bedrails?  --  3  -JA     Moving from lying on back to sitting on the side of a flat bed without bedrails?  --  3  -JA     Moving to and from a bed to a chair (including a wheelchair)?  --  3  -JA     Standing up from a chair using your arms (e.g., wheelchair, bedside chair)?  --  3  -JA     Climbing 3-5 steps with a railing?  --  3  -JA     To walk in hospital room?  --  3  -JA     AM-PAC 6 Clicks Score (PT)  --  18  -JA        How much help from another is currently needed...    Putting on and taking off regular lower body clothing?  3  -RC  --     Bathing (including washing, rinsing, and drying)  3  -RC  --     Toileting (which includes using toilet bed pan or urinal)  3  -RC  --     Putting on and taking off regular upper body clothing  3  -RC  --     Taking care of personal grooming (such as brushing teeth)  3  -RC  --     Eating meals  4  -RC  --     AM-PAC 6 Clicks Score (OT)  19  -RC  --        Functional Assessment    Outcome Measure Options  --  AM-PAC 6 Clicks Basic Mobility (PT)  -LISA       User Key  (r) = Recorded By, (t) = Taken By, (c) = Cosigned By    Initials Name Provider Type    Ulysses Delarosa, PTA Physical Therapy Assistant    Tiki Deleon COTA/L Occupational Therapy Assistant    Jessica Zhu, PT Physical Therapist             Time Calculation:     PT  Charges     Row Name 11/13/19 1015             Time Calculation    Start Time  0815  -LISA      Stop Time  0950  -LISA      Time Calculation (min)  95 min  -         Time Calculation- PT    Total Timed Code Minutes- PT  95 minute(s)  -         Timed Charges    56292 - PT Therapeutic Exercise Minutes  20  -LISA      00835 -  PT Neuromuscular Reeducation Minutes  10  -LISA      09752 - Gait Training Minutes   30  -LISA      96007 - PT Therapeutic Activity Minutes  20  -LISA      74371 - PT Self Care/Mgmt Minutes  15  -LISA        User Key  (r) = Recorded By, (t) = Taken By, (c) = Cosigned By    Initials Name Provider Type    Ulysses Delarosa PTA Physical Therapy Assistant          Therapy Charges for Today     Code Description Service Date Service Provider Modifiers Qty    47362986791 HC PT NEUROMUSC RE EDUCATION EA 15 MIN 11/13/2019 Ulysses Gill, PTA GP 1    56939883225 HC PT THER PROC EA 15 MIN 11/13/2019 Ulysses Gill, PTA GP 1    72143388047 HC GAIT TRAINING EA 15 MIN 11/13/2019 Ulysses Gill, PTA GP 2    15645253799 HC PT THERAPEUTIC ACT EA 15 MIN 11/13/2019 Ulysses Gill, PTA GP 1    34408274392 HC PT SELF CARE/MGMT/TRAIN EA 15 MIN 11/13/2019 Ulysses Gill, PTA GP 1            PT G-Codes  Outcome Measure Options: AM-PAC 6 Clicks Basic Mobility (PT)  AM-PAC 6 Clicks Score (PT): 19  AM-PAC 6 Clicks Score (OT): 21  Modified [place] Scale: 4 - Moderately severe disability.  Unable to walk without assistance, and unable to attend to own bodily needs without assistance.  Tinetti Total Score: 23      Ulysses Gill PTA  11/13/2019

## 2019-11-14 LAB — GLUCOSE BLDC GLUCOMTR-MCNC: 75 MG/DL (ref 70–130)

## 2019-11-14 PROCEDURE — 97110 THERAPEUTIC EXERCISES: CPT

## 2019-11-14 PROCEDURE — 97116 GAIT TRAINING THERAPY: CPT

## 2019-11-14 PROCEDURE — 99232 SBSQ HOSP IP/OBS MODERATE 35: CPT | Performed by: FAMILY MEDICINE

## 2019-11-14 PROCEDURE — 97530 THERAPEUTIC ACTIVITIES: CPT

## 2019-11-14 PROCEDURE — 25010000002 ENOXAPARIN PER 10 MG: Performed by: FAMILY MEDICINE

## 2019-11-14 PROCEDURE — 97535 SELF CARE MNGMENT TRAINING: CPT

## 2019-11-14 PROCEDURE — 82962 GLUCOSE BLOOD TEST: CPT

## 2019-11-14 RX ADMIN — CYCLOBENZAPRINE HYDROCHLORIDE 10 MG: 10 TABLET, FILM COATED ORAL at 17:48

## 2019-11-14 RX ADMIN — GABAPENTIN 400 MG: 400 CAPSULE ORAL at 17:50

## 2019-11-14 RX ADMIN — GABAPENTIN 400 MG: 400 CAPSULE ORAL at 01:27

## 2019-11-14 RX ADMIN — DIAZEPAM 10 MG: 5 TABLET ORAL at 10:05

## 2019-11-14 RX ADMIN — CYCLOBENZAPRINE HYDROCHLORIDE 10 MG: 10 TABLET, FILM COATED ORAL at 10:05

## 2019-11-14 RX ADMIN — OXYCODONE HYDROCHLORIDE AND ACETAMINOPHEN 1 TABLET: 5; 325 TABLET ORAL at 17:00

## 2019-11-14 RX ADMIN — THERA TABS 1 TABLET: TAB at 10:04

## 2019-11-14 RX ADMIN — ENOXAPARIN SODIUM 40 MG: 40 INJECTION SUBCUTANEOUS at 20:55

## 2019-11-14 RX ADMIN — ASPIRIN 81 MG 81 MG: 81 TABLET ORAL at 10:04

## 2019-11-14 RX ADMIN — BACLOFEN 20 MG: 10 TABLET ORAL at 06:27

## 2019-11-14 RX ADMIN — BACLOFEN 20 MG: 10 TABLET ORAL at 13:58

## 2019-11-14 RX ADMIN — ATORVASTATIN CALCIUM 80 MG: 40 TABLET, FILM COATED ORAL at 20:55

## 2019-11-14 RX ADMIN — SENNOSIDES AND DOCUSATE SODIUM 1 TABLET: 8.6; 5 TABLET ORAL at 20:57

## 2019-11-14 RX ADMIN — ROPINIROLE 1 MG: 1 TABLET, FILM COATED ORAL at 20:55

## 2019-11-14 RX ADMIN — CARVEDILOL 25 MG: 25 TABLET, FILM COATED ORAL at 10:05

## 2019-11-14 RX ADMIN — CLOPIDOGREL BISULFATE 75 MG: 75 TABLET ORAL at 10:05

## 2019-11-14 RX ADMIN — GABAPENTIN 400 MG: 400 CAPSULE ORAL at 13:00

## 2019-11-14 RX ADMIN — CARVEDILOL 25 MG: 25 TABLET, FILM COATED ORAL at 17:48

## 2019-11-14 RX ADMIN — BACLOFEN 20 MG: 10 TABLET ORAL at 21:53

## 2019-11-14 RX ADMIN — GABAPENTIN 400 MG: 400 CAPSULE ORAL at 06:27

## 2019-11-14 NOTE — THERAPY TREATMENT NOTE
Inpatient Rehabilitation - Physical Therapy Treatment Note  [facility]     Patient Name: Rama Goel  : 1945  MRN: [id number]    Today's Date: 2019                 Admit Date: 2019      Visit Dx:      ICD-10-CM ICD-9-CM   1. Left pontine stroke (CMS/HCC) I63.50 434.91   2. Dysarthria R47.1 784.51   3. Oropharyngeal dysphagia R13.12 787.22   4. Impaired physical mobility Z74.09 781.99   5. Impaired mobility and ADLs Z74.09 799.89       Patient Active Problem List   Diagnosis   • Altered mental status   • DJD (degenerative joint disease), multiple sites   • Hypertension   • Left pontine stroke (CMS/HCC)   • CVA (cerebral vascular accident) (CMS/HCC)   • Urinary hesitancy   • Muscle spasm of both lower legs   • Recurrent falls while walking   • Dysarthria   • Restless leg syndrome, uncontrolled       Therapy Treatment    IRF Treatment Summary     Row Name 19 1435 19 1345 19 1040       Evaluation/Treatment Time and Intent    Subjective Information  no complaints  -JA  no complaints  -RC  -- tearful  -RC    Existing Precautions/Restrictions  fall  -JA  fall  -RC  fall  -RC    Document Type  therapy note (daily note)  -JA  therapy note (daily note)  -RC  therapy note (daily note)  -RC    Mode of Treatment  physical therapy;individual therapy  -JA  occupational therapy;individual therapy  -RC  occupational therapy;individual therapy  -RC    Patient/Family Observations  in w/c   -JA  in w/c   -RC  in w/c   -RC    Start Time (Evaluation/Treatment)  1435  -JA  1345  -RC  1040  -RC    Stop Time (Evaluation/Treatment)  1505  -JA  1435  -RC  1200  -RC    Recorded by [JA] Ulysses Gill PTA [RC] Tiki Grover COTA/L [RC] Tiki Grover COTA/L    Row Name 19 0820             Evaluation/Treatment Time and Intent    Subjective Information  no complaints  -JA      Existing Precautions/Restrictions  fall  -JA      Document Type  therapy note (daily note)  -JA      Mode  of Treatment  physical therapy;individual therapy  -JA      Patient/Family Observations  in w/c   -JA      Start Time (Evaluation/Treatment)  0820  -JA      Stop Time (Evaluation/Treatment)  0920  -JA      Recorded by [LISA] Ulysses Gill PTA      Row Name 11/14/19 1435 11/14/19 1345 11/14/19 1040       Cognition/Psychosocial- PT/OT    Affect/Mental Status (Cognitive)  low arousal/lethargic slurred speech during treatment   -JA  low arousal/lethargic  -RC  low arousal/lethargic  -RC    Orientation Status (Cognition)  oriented x 4  -JA  --  --  -RC    Recorded by [LISA] Ulysses Gill PTA [RC] Tiki Grover, MITCHELL/L [RC] Tiki Grover, MITCHELL/L    Row Name 11/14/19 0820             Cognition/Psychosocial- PT/OT    Affect/Mental Status (Cognitive)  low arousal/lethargic  -JA      Orientation Status (Cognition)  oriented x 4  -JA      Recorded by [LISA] Ulysses Gill PTA      Row Name 11/14/19 0820             Bed Mobility Assessment/Treatment    [place] Level (Bed Mobility)  independent  -JA      Rolling Right [place] (Bed Mobility)  independent  -JA      Supine-Sit [place] (Bed Mobility)  supervision  -JA      Sit-Supine [place] (Bed Mobility)  supervision  -JA      Comment (Bed Mobility)  bed mobility focused log roll tech. pt. practiced multiple times  -JA      Recorded by [LISA] Ulysses Gill PTA      Row Name 11/14/19 1435 11/14/19 0820          Transfer Assessment/Treatment    Transfer Assessment/Treatment  sit-stand transfer;stand-sit transfer  -LISA  sit-stand transfer;stand-sit transfer  -JA     Recorded by [LISA] Ulysses Gill PTA [JA] Ulysses Gill, TOAN     Row Name 11/14/19 0820             Bed-Chair Transfer    Bed-Chair [place] (Transfers)  contact guard  -JA      Assistive Device (Bed-Chair Transfers)  walker, front-wheeled;other (see comments) no AD  -JA      Recorded by [LISA] Ulysses Gill PTA      Row Name 11/14/19 0820              Chair-Bed Transfer    Chair-Bed [place] (Transfers)  contact guard  -      Assistive Device (Chair-Bed Transfers)  walker, front-wheeled;other (see comments) no AD  -JA      Recorded by [JA] Ulysses Gill, TOAN      Row Name 11/14/19 1435 11/14/19 0820          Sit-Stand Transfer    Sit-Stand [place] (Transfers)  contact guard;minimum assist (75% patient effort)  -JA  supervision;contact guard;verbal cues  -JA     Assistive Device (Sit-Stand Transfers)  walker, front-wheeled  -JA  walker, front-wheeled  -JA     Recorded by [JA] Ulysses Gill PTA [JA] Ulysses Gill, PTA     Row Name 11/14/19 1435 11/14/19 0820          Stand-Sit Transfer    Stand-Sit [place] (Transfers)  contact guard;verbal cues  -JA  supervision;contact guard;verbal cues  -JA     Assistive Device (Stand-Sit Transfers)  walker, front-wheeled  -JA  walker, front-wheeled  -JA     Recorded by [JA] Ulysses Gill PTA [JA] Ulysses Gill, \[facility] [place] [facility]\""     Row Name 11/14/19 1345             Toilet Transfer    Type (Toilet Transfer)  stand pivot/stand step  -      [place] Level (Toilet Transfer)  moderate assist (50% patient effort)  -      Assistive Device (Toilet Transfer)  wheelchair;grab bars/safety frame  -      Recorded by [RC] Tiki Grover COTA/L      Row Name 11/14/19 1435             Gait/Stairs Assessment/Training    [place] Level (Gait)  contact guard;minimum assist (75% patient effort)  -      Assistive Device (Gait)  walker, front-wheeled  -LISA      Distance in Feet (Gait)  50', 54'  -      Pattern (Gait)  step-through  -      Deviations/Abnormal Patterns (Gait)  base of support, narrow;festinating/shuffling;gait speed decreased  -LISA      Bilateral Gait Deviations  forward flexed posture;heel strike decreased knees flexed at times due to fatigue v.c.'s for posture  -LISA      Comment (Gait/Stairs)  Pt. with very slow gt. & knees B flexed during performance with v.c's needed for erect  posture. Pt. decrease quality of gt. due to pt. lethargic and groggy this p.m.   -JA      Recorded by [JA] Ulysses Gill PTA      Row Name 11/14/19 1040 11/14/19 0820          Wheelchair Mobility/Management    Method of Wheelchair Locomotion (Mobility)  bipedal (lower extremity) propulsion;bimanual (upper extremity) propulsion  -  bimanual (upper extremity) propulsion;bipedal (lower extremity) propulsion  -     Mobility Activities (Wheelchair)  forward propulsion  -  forward propulsion;turning  -     Mobility [place] Level (Wheelchair)  minimum assist (75% patient effort)  -  supervision  -     Forward Propulsion [place] (Wheelchair)  verbal cues  -  supervision  -     Turning [place] (Wheelchair)  --  supervision  -     Distance Propelled in Feet (Wheelchair)  150  -  150  -     Recorded by [RC] Tiik Grover COTA/DIANA [JA] Ulysses Gill PTA     Row Name 11/14/19 1435 11/14/19 0820          Safety Issues, Functional Mobility    Impairments Affecting Function (Mobility)  balance;endurance/activity tolerance;coordination;strength;pain  -  balance;endurance/activity tolerance;coordination;strength;pain  -JA     Recorded by [JA] Ulysses Gill PTA [JA] Ulysses Gill PTA     Row Name 11/14/19 1345             Bathing Assessment/Treatment    Bathing [place] Level  lower body;minimum assist (75% patient effort)  -      Recorded by [RC] Tiki Grover COTA/L      Row Name 11/14/19 1345             Lower Body Dressing Assessment/Treatment    Lower Body Dressing [place] Level  doff;don;pants/bottoms;moderate assist (50% patient effort);shoes/slippers  -      Lower Body Dressing Position  supported sitting;supported standing  -      Recorded by [RC] Tiki Grover COTA/L      Row Name 11/14/19 1345 11/14/19 1040          Fine Motor Testing & Training    Training Activity, Fine Motor Coordination  -- lacing card, clothes pins   -  --  clothes pins   -RC     Recorded by [RC] Tiki Grover COTA/L [RC] Tiki Grover COTA/L     Row Name 11/14/19 1435 11/14/19 1345 11/14/19 0820       Pain Scale: Numbers Pre/Post-Treatment    Pain Scale: Numbers, Pretreatment  0/10 - no pain  -JA  0/10 - no pain  -RC  0/10 - no pain  -JA    Pain Scale: Numbers, Post-Treatment  0/10 - no pain  -JA  0/10 - no pain  -RC  0/10 - no pain  -JA    Pre/Post Treatment Pain Comment  --  --  Pt. with muscle spasms during supine therex, but no pain reported pre/post treatment   -JA    Recorded by [LISA] Ulysses Gill PTA [RC] Tiki Grover COTA/DIANA [JA] Ulysses Gill, TOAN    Row Name 11/14/19 1040             Aerobic Exercise Activity    Exercise Performed (Aerobic, Therapeutic Exercise)  arm bike  -      Expected Outcome (Aerobic, Therapeutic Exercise)  improve functional tolerance, self-care activity;improve performance, BADLs;improve performance, transfer skills  -      Time (Aerobic, Therapeutic Exercise)  40  -RC      Recorded by [RC] Tiki Grover COTA/L      Row Name 11/14/19 0820             Lower Extremity Seated Therapeutic Exercise    Performed, Seated Lower Extremity (Therapeutic Exercise)  LAQ (long arc quad), knee extension;hip flexion/extension  -      Exercise Type, Seated Lower Extremity (Therapeutic Exercise)  AROM (active range of motion)  -      Sets/Reps Detail, Seated Lower Extremity (Therapeutic Exercise)  x20  -JA      Recorded by [JA] Ulysses Gill PTA      Row Name 11/14/19 0820             Lower Extremity Supine Therapeutic Exercise    Performed, Supine Lower Extremity (Therapeutic Exercise)  hip abduction/adduction;ankle dorsiflexion/plantarflexion;gluteal sets;heel slides  -      Exercise Type, Supine Lower Extremity (Therapeutic Exercise)  AROM (active range of motion)  -      Sets/Reps Detail, Supine Lower Extremity (Therapeutic Exercise)  x20  -JA      Comment, Supine Lower Extremity (Therapeutic  Exercise)  Pt. limited ROM at R with ankle DF/PF & had ~3 R LE spasms   -JA      Recorded by [LISA] Ulysses Gill PTA      Row Name 11/14/19 1435 11/14/19 0820          Vital Signs    Pre Systolic BP Rehab  137  -JA  --     Pre Treatment Diastolic BP  66  -JA  --     Post Systolic BP Rehab  --  130  -JA     Post Treatment Diastolic BP  --  68  -JA     Pretreatment Heart Rate (beats/min)  72  -JA  --     Posttreatment Heart Rate (beats/min)  --  67  -JA     Pre Patient Position  Sitting  -JA  Sitting  -JA     Intra Patient Position  Standing  -JA  Supine  -JA     Post Patient Position  Sitting  -JA  Sitting  -JA     Recorded by [LISA] Ulysses Gill PTA [JA] Ulysses Gill PTA     Row Name 11/14/19 1435 11/14/19 1345 11/14/19 1040       Positioning and Restraints    Pre-Treatment Position  sitting in chair/recliner  -JA  sitting in chair/recliner  -RC  sitting in chair/recliner  -RC    Post Treatment Position  chair  -JA  wheelchair  -RC  wheelchair  -RC    In Chair  sitting;call light within reach;encouraged to call for assist;with nsg;exit alarm on  -JA  --  --    In Wheelchair  --  with PT  -RC  encouraged to call for assist;exit alarm on waiting for lunch   -    Recorded by [LISA] Ulysses Gill PTA [RC] Tiki Grover, MITCHELL/L [RC] Tiki Grover, MITCHELL/L    Row Name 11/14/19 0820             Positioning and Restraints    Pre-Treatment Position  sitting in chair/recliner  -JA      Post Treatment Position  wheelchair  -JA      In Wheelchair  sitting;call light within reach;encouraged to call for assist;exit alarm on  -JA      Recorded by [LISA] Ulysses Gill PTA      Row Name 11/14/19 1435 11/14/19 0820          Daily Summary of Progress (PT)    Functional Goal Overall Progress: Physical Therapy  progressing towards functional goals slower than expected  -LISA  progressing toward functional goals as expected  -LISA     Recommendations: Physical Therapy  Pt. may d/c to SNF tomorrow.   -LISA   --     Recorded by [LISA] Ulysses Gill PTA [LISA] Ulysses Gill, TOAN     Row Name 11/14/19 1895             Daily Summary of Progress (OT)    Functional Goal Overall Progress: Occupational Therapy  progressing towards functional goals slower than expected  -      Recommendations: Occupational Therapy  cont poc   -RC      Recorded by [KIERSTEN] Tiki Grover, MITCHELL/L        User Key  (r) = Recorded By, (t) = Taken By, (c) = Cosigned By    Initials Name Effective Dates    Ulysses Delarosa PTA 03/07/18 -      Tiki Grover MITCHELL/L 03/07/18 -            Physical Therapy Education     Title: PT OT SLP Therapies (In Progress)     Topic: Physical Therapy (Done)     Point: Mobility training (Done)     Learning Progress Summary           Patient Acceptance, E,TB,D, VU,NR by LISA at 11/13/2019 10:10 AM    Comment:  Discussed with pt. need for 24/7 assist at home upon d/c due to cont'd safety awareness and unsteady with gt. at times, muscle spasms, & B LE weakness observed at night with bathroom transfer per nsg.    Acceptance, E,TB,D, VU,DU by LISA at 11/11/2019 10:21 AM    Comment:  Reinforced erect posture with gt. , proper hand placement with stand-sit,  need for assist upon d/c at home due to pt. at risk for fall at this time & due to caring for animals at home.    Acceptance, E, VU,NR by ALEISHA at 11/7/2019  1:02 PM    Comment:  Mellisa assessed: 23/28 or moderate fall risk.  Recommend continued use of FWW.  Tag alarm applied.                   Point: Home exercise program (Done)     Learning Progress Summary           Patient Acceptance, E, VU,DU by LISA at 11/14/2019 10:33 AM    Comment:  Reviewed HEP with pictoral description, transfer safety, log roll tech. Reinforced pt. need for 24/7 care upon d/c due to pt. at high risk for fall. Pt. reports will not have that assist.                   Point: Body mechanics (Done)     Learning Progress Summary           Patient Acceptance, E,TB,D, VU,NR by LISA at  11/13/2019 10:10 AM    Comment:  Discussed with pt. need for 24/7 assist at home upon d/c due to cont'd safety awareness and unsteady with gt. at times, muscle spasms, & B LE weakness observed at night with bathroom transfer per nsg.    Acceptance, E,TB,D, VU,DU by LISA at 11/11/2019 10:21 AM    Comment:  Reinforced erect posture with gt. , proper hand placement with stand-sit,  need for assist upon d/c at home due to pt. at risk for fall at this time & due to caring for animals at home.                   Point: Precautions (Done)     Learning Progress Summary           Patient Acceptance, E,TB,D, VU,NR by LISA at 11/13/2019 10:10 AM    Comment:  Discussed with pt. need for 24/7 assist at home upon d/c due to cont'd safety awareness and unsteady with gt. at times, muscle spasms, & B LE weakness observed at night with bathroom transfer per nsg.    Acceptance, E,TB,D, VU,DU by LISA at 11/11/2019 10:21 AM    Comment:  Reinforced erect posture with gt. , proper hand placement with stand-sit,  need for assist upon d/c at home due to pt. at risk for fall at this time & due to caring for animals at home.                               User Key     Initials Effective Dates Name Provider Type Discipline     03/07/18 -  Ulysses Gill, PTA Physical Therapy Assistant PT     04/03/18 -  Hermilo Coronel, PT Physical Therapist PT                Mobility  Admission Goal Date Date Date Date Date Discharge   A.Roll left and right: The ability to roll from lying on back to left and right side, and return to lying on back on the bed.   6        B.Sit to lying: The ability to move from sitting on side of bed to lying flat on the bed.   5        C. Lying to sitting on side of bed: The ability to move from lying on the back to sitting on the side of the bed with feet flat on the floor, and with no back support.   5        D.Sit to stand: The ability to come to a standing position from sitting in a chair, wheelchair, or on the side of  the bed.   4        E. Chair/bed-to-chair transfer: The ability to transfer to and from a bed to a chair (or wheelchair).   4        F.Toilet transfer: The ability to get on and off a toilet or commode.      4        G. Car Transfer: the ability to transfer in and out of a car or van on the passenger side.    10        I.Walk 10 feet: Once standing, the ability to walk at least 10 feet in a room, corridor, or similar space. If admission performance is coded 07, 09, 10, or 88 Skip to [id number], 1 step (curb)   4        J.Walk 50 feet with two turns: Once standing, the ability to walk at least 50 feet and make two turns.   4        K.Walk 150 feet: Once standing, the ability to walk at least 150 feet in a corridor or similar space.   4        L.Walking 10 feet on uneven surfaces: The ability to walk 10 feet on uneven or sloping surfaces (indoor or outdoor), such as turf or gravel.   88        M.1 step (curb): The ability to go up and down a curb and/or up and down one step. If admission performance is coded 07, 09, 10, or 88 Skip to [id number], Picking up object   4        N.4 steps: The ability to go up and down four steps with or without a rail. If admission performance is coded 07, 09, 10, or 88 Skip to [id number], Picking up object   4        O.12 steps: The ability to go up and down 12 steps with or without a rail.   88        P.Picking up object: The ability to bend/stoop from a standing position to  a small object, such as a spoon, from the floor. (Does pt use a wheelchair and/or scooter?) If YES, go to R.    4        R. Wheel 50 feet with two turns: Once seated in wheelchair/scooter, the ability to wheel at least 50 feet and make two turns.   5        S.Wheel 150 feet: Once seated in wheelchair/scooter, the ability to wheel at least 150 feet in a corridor or similar space.   5              PT Recommendation and Plan     Daily Summary of Progress (PT): progress toward functional goals is gradual  Plan for  Continued Treatment (PT): Cont to mobilize  Plan of Care Reviewed With: patient  Daily Summary of Progress (PT)  Functional Goal Overall Progress: Physical Therapy: progressing towards functional goals slower than expected  Recommendations: Physical Therapy: Pt. may d/c to SNF tomorrow.   IRF Plan of Care Review: progress ongoing, continue  Progress, Functional Goals: progress more gradual than expected  Outcome Summary: Pt. lethargic today and decrease tolerance to gt. & overall mobility. Pt. [facility] for transfers this a.m. & [facility] for transfers this p.m. Pt. able to amb. limited this p.m. Cont. to mobilize as pt. able       PT IRF GOALS     Row Name 11/14/19 0820             Bed Mobility Goal 1 (PT-IRF)    Activity/Assistive Device (Bed Mobility Goal 1, PT-IRF)  sit to supine/supine to sit  -JA      [place] Level (Bed Mobility Goal 1, PT-IRF)  independent  -JA      Time Frame (Bed Mobility Goal 1, PT-IRF)  short term goal (STG);5 - 7 days  -JA      Progress/Outcomes (Bed Mobility Goal 1, PT-IRF)  goal not met  -JA         Transfer Goal 1 (PT-IRF)    Activity/Assistive Device (Transfer Goal 1, PT-IRF)  sit-to-stand/stand-to-sit;bed-to-chair/chair-to-bed  -JA      [place] Level (Transfer Goal 1, PT-IRF)  conditional independence  -JA      Time Frame (Transfer Goal 1, PT-IRF)  short term goal (STG);5 - 7 days  -JA      Barriers (Transfer Goal 1, PT-IRF)  Impulsive at times.   -JA      Progress/Outcomes (Transfer Goal 1, PT-IRF)  goal not met  -JA         Gait/Walking Locomotion Goal 1 (PT-IRF)    Activity/Assistive Device (Gait/Walking Locomotion Goal 1, PT-IRF)  walker, rolling  -JA      Gait/Walking Locomotion Distance Goal 1 (PT-IRF)  150'x2  -JA      [place] Level (Gait/Walking Locomotion Goal 1, PT-IRF)  conditional independence  -JA      Time Frame (Gait/Walking Locomotion Goal 1, PT-IRF)  long term goal (LTG);by discharge  -JA      Barriers (Gait/Walking Locomotion Goal 1, PT-IRF)  Stooped  posture.   -JA      Progress/Outcomes (Gait/Walking Locomotion Goal 1, PT-IRF)  goal not met  -JA         Gait/Walking Locomotion Goal (PT-IRF)    Gait/Walking Locomotion Goal (PT-IRF)  Tinetti fall risk, score: 25/28  -JA      Time Frame (Gait/Walking Locomotion Goal, PT-IRF)  long term goal (LTG);by discharge  -JA      Progress/Outcomes (Gait/Walking Locomotion Goal, PT-IRF)  goal not met  -JA         Stairs Goal 1 (PT-IRF)    Activity/Assistive Device (Stairs Goal 1, PT-IRF)  using handrail, right  -JA      Number of Stairs (Stairs Goal 1, PT-IRF)  4 steps, R rail.   -JA      [place] Level (Stairs Goal 1, PT-IRF)  conditional independence  -JA      Time Frame (Stairs Goal 1, PT-IRF)  long term goal (LTG);by discharge  -JA      Progress/Outcomes (Stairs Goal 1, PT-IRF)  goal not met  -JA        User Key  (r) = Recorded By, (t) = Taken By, (c) = Cosigned By    Initials Name Provider Type    Ulysses Delarosa PTA Physical Therapy Assistant        Outcome Measures     Row Name 11/14/19 1345 11/14/19 0820 11/13/19 1340       How much help from another person do you currently need...    Turning from your back to your side while in flat bed without using bedrails?  --  4  -JA  --    Moving from lying on back to sitting on the side of a flat bed without bedrails?  --  3  -JA  --    Moving to and from a bed to a chair (including a wheelchair)?  --  3  -JA  --    Standing up from a chair using your arms (e.g., wheelchair, bedside chair)?  --  3  -JA  --    Climbing 3-5 steps with a railing?  --  3  -JA  --    To walk in hospital room?  --  3  -JA  --    AM-PAC 6 Clicks Score (PT)  --  19  -JA  --       How much help from another is currently needed...    Putting on and taking off regular lower body clothing?  3  -RC  --  3  -RC    Bathing (including washing, rinsing, and drying)  3  -RC  --  3  -RC    Toileting (which includes using toilet bed pan or urinal)  3  -RC  --  3  -RC    Putting on and taking off  regular upper body clothing  4  -RC  --  4  -RC    Taking care of personal grooming (such as brushing teeth)  4  -RC  --  4  -RC    Eating meals  4  -RC  --  4  -RC    AM-PAC 6 Clicks Score (OT)  21  -RC  --  21  -RC       Functional Assessment    Outcome Measure Options  --  AM-PAC 6 Clicks Basic Mobility (PT)  -JA  --    Row Name 11/13/19 0815 11/12/19 1340 11/12/19 0830       How much help from another person do you currently need...    Turning from your back to your side while in flat bed without using bedrails?  4  -JA  --  3  -KW    Moving from lying on back to sitting on the side of a flat bed without bedrails?  3  -JA  --  3  -KW    Moving to and from a bed to a chair (including a wheelchair)?  3  -JA  --  3  -KW    Standing up from a chair using your arms (e.g., wheelchair, bedside chair)?  3  -JA  --  3  -KW    Climbing 3-5 steps with a railing?  3  -JA  --  3  -KW    To walk in hospital room?  3  -JA  --  3  -KW    AM-PAC 6 Clicks Score (PT)  19  -JA  --  18  -KW       How much help from another is currently needed...    Putting on and taking off regular lower body clothing?  --  3  -RC  --    Bathing (including washing, rinsing, and drying)  --  3  -RC  --    Toileting (which includes using toilet bed pan or urinal)  --  3  -RC  --    Putting on and taking off regular upper body clothing  --  4  -RC  --    Taking care of personal grooming (such as brushing teeth)  --  4  -RC  --    Eating meals  --  4  -RC  --    AM-PAC 6 Clicks Score (OT)  --  21  -RC  --       Functional Assessment    Outcome Measure Options  AM-PAC 6 Clicks Basic Mobility (PT)  -JA  --  AM-PAC 6 Clicks Basic Mobility (PT)  -KW      User Key  (r) = Recorded By, (t) = Taken By, (c) = Cosigned By    Initials Name Provider Type    Ulysses Delarosa, PTA Physical Therapy Assistant    Tiki Deleon COTA/L Occupational Therapy Assistant    Jessica Zhu, PT Physical Therapist             Time Calculation:     PT Charges      Row Name 11/14/19 1648 11/14/19 1034          Time Calculation    Start Time  1435  -LISA  0820  -LISA     Stop Time  1505  -LISA  0920  -LISA     Time Calculation (min)  30 min  -JA  60 min  -LISA        Time Calculation- PT    Total Timed Code Minutes- PT  30 minute(s)  -JA  60 minute(s)  -LISA        Timed Charges    45265 - PT Therapeutic Exercise Minutes  --  30  -JA     05279 - Gait Training Minutes   30  -JA  --     25961 - PT Therapeutic Activity Minutes  --  30  -JA       User Key  (r) = Recorded By, (t) = Taken By, (c) = Cosigned By    Initials Name Provider Type    Ulysses Delarosa, PTA Physical Therapy Assistant          Therapy Charges for Today     Code Description Service Date Service Provider Modifiers Qty    04052815667 HC PT NEUROMUSC RE EDUCATION EA 15 MIN 11/13/2019 Ulysses Gill, PTA GP 1    78193692272 HC PT THER PROC EA 15 MIN 11/13/2019 Ulysses Gill, PTA GP 1    73749255679 HC GAIT TRAINING EA 15 MIN 11/13/2019 Ulysses Gill, PTA GP 2    [id number] HC PT THERAPEUTIC ACT EA 15 MIN 11/13/2019 Ulysses Gill, PTA GP 1    46744028083 HC PT SELF CARE/MGMT/TRAIN EA 15 MIN 11/13/2019 Ulysses Gill, PTA GP 1    56338997466 HC PT THER PROC EA 15 MIN 11/14/2019 Ulysses Gill, PTA GP 2    17164754584 HC PT THERAPEUTIC ACT EA 15 MIN 11/14/2019 Ulysses Gill, PTA GP 2    12767731440 HC GAIT TRAINING EA 15 MIN 11/14/2019 Ulysses Gill, PTA GP 2            PT G-Codes  Outcome Measure Options: AM-PAC 6 Clicks Basic Mobility (PT)  AM-PAC 6 Clicks Score (PT): 19  AM-PAC 6 Clicks Score (OT): 21  Modified Nicolasa Scale: 4 - Moderately severe disability.  Unable to walk without assistance, and unable to attend to own bodily needs without assistance.  Tinetti Total Score: 23      Ulysses Gill PTA  11/14/2019

## 2019-11-14 NOTE — DISCHARGE PLACEMENT REQUEST
"La Goel (73 y.o. Female)     Date of Birth Social Security Number Address Home Phone MRN    [id number]  [id number]   [facility] [address] [phone] [id number]    Church Marital Status          None        Admission Date Admission Type Admitting Provider Attending Provider Department, Room/Bed    11/6/19 Elective Castro West MD Hargrove, Kenneth R, MD [facility] ACUTE REHAB, 532/1    Discharge Date Discharge Disposition Discharge Destination                       Attending Provider:  Castro West MD    Allergies:  Aspartame And Phenylalanine, Calcium Channel Blockers, Hydralazine Hcl, Hyzaar [Losartan Potassium-hctz], Lisinopril, Procardia [Nifedipine]    Isolation:  None   Infection:  None   Code Status:  No CPR    Ht:  157.5 cm (62\")   Wt:  71.8 kg (158 lb 4.8 oz)    Admission Cmt:  None   Principal Problem:  Left pontine stroke (CMS/HCC) [I63.50]                 Active Insurance as of 11/6/2019     Primary Coverage     Payor Plan Insurance Group Employer/Plan Group    MEDICARE MEDICARE A & B      Payor Plan Address Payor Plan Phone Number Payor Plan Fax Number Effective Dates    [address] [phone]  11/1/2012 - None Entered    [facility] [id number]       Subscriber Name Subscriber Birth Date Member ID       LA GOEL 1945 [id number]           Secondary Coverage     Payor Plan Insurance Group Employer/Plan Group    [employer] [place] [id number]     Payor Plan Address Payor Plan Phone Number Payor Plan Fax Number Effective Dates    [address] [phone]  1/1/2017 - None Entered    [place] [address]       Subscriber Name Subscriber Birth Date Member ID       LA GOEL 1945 [id number]                 Emergency Contacts      (Rel.) Home Phone Work Phone Mobile Phone    Angela Posada (Power of ) [phone] -- [phone]        Results     Collected  Updated  " Procedure  Result Status      11/12/2019 0730  11/12/2019 0825  Basic Metabolic Panel [[id number]]    (Abnormal)   Blood     Final result  Glucose 151 Abnormally high  mg/dL   BUN 20 mg/dL   Creatinine 0.84 mg/dL   Sodium 139 mmol/L   Potassium 3.9 mmol/L   Chloride 100 mmol/L   CO2 29.0 mmol/L   Calcium 9.3 mg/dL   eGFR Non African Am 67 mL/min/1.73   BUN/Creatinine Ratio 23.8    Anion Gap 10.0 mmol/L          11/12/2019 0730  11/12/2019 0813  CBC Auto Differential [[id number]]   (Abnormal)   Blood     Final result  WBC 5.65 10*3/mm3   RBC 3.65 Abnormally low  10*6/mm3   Hemoglobin 10.8 Abnormally low  g/dL   Hematocrit 32.9 Abnormally low  %   MCV 90.1 fL   MCH 29.6 pg   MCHC 32.8 g/dL   RDW 13.0 %   RDW-SD 42.5 fl   MPV 9.8 fL   Platelets 274 10*3/mm3   Neutrophil Rel % 65.5 %   Lymphocyte Rel % 22.3 %   Monocyte Rel % 8.5 %   Eosinophil Rel % 2.7 %   Basophil Rel % 0.5 %   Immature Granulocyte Rel % 0.5 %   Neutrophils Absolute 3.70 10*3/mm3   Lymphocytes Absolute 1.26 10*3/mm3   Monocytes Absolute 0.48 10*3/mm3   Eosinophils Absolute 0.15 10*3/mm3   Basophils Absolute 0.03 10*3/mm3   Immature Grans, Absolute 0.03 10*3/mm3   nRBC 0.0 /100 WBC          11/07/2019 0547  11/07/2019 0620  CBC Auto Differential [[id number]]   Blood     Final result  WBC 5.54 10*3/mm3   RBC 4.09 10*6/mm3   Hemoglobin 12.0 g/dL   Hematocrit 36.4 %   MCV 89.0 fL   MCH 29.3 pg   MCHC 33.0 g/dL   RDW 12.7 %   RDW-SD 41.3 fl   MPV 9.8 fL   Platelets 247 10*3/mm3   Neutrophil Rel % 61.1 %   Lymphocyte Rel % 24.9 %   Monocyte Rel % 10.5 %   Eosinophil Rel % 2.7 %   Basophil Rel % 0.4 %   Immature Granulocyte Rel % 0.4 %   Neutrophils Absolute 3.39 10*3/mm3   Lymphocytes Absolute 1.38 10*3/mm3   Monocytes Absolute 0.58 10*3/mm3   Eosinophils Absolute 0.15 10*3/mm3   Basophils Absolute 0.02 10*3/mm3   Immature Grans, Absolute 0.02 10*3/mm3   nRBC 0.0 /100 WBC          11/07/2019 0547  11/07/2019 0640  Comprehensive Metabolic Panel [[id number]]     (Abnormal)   Blood     Final result  Glucose 100 Abnormally high  mg/dL   BUN 15 mg/dL   Creatinine 0.70 mg/dL   Sodium 142 mmol/L   Potassium 4.0 mmol/L   Chloride 101 mmol/L   CO2 31.0 Abnormally high  mmol/L   Calcium 9.4 mg/dL   Total Protein 7.1 g/dL   Albumin 3.90 g/dL   ALT (SGPT) 13 U/L   AST (SGOT) 19 U/L   Alkaline Phosphatase 102 U/L   Total Bilirubin 0.6 mg/dL   eGFR Non African Am 82 mL/min/1.73   Globulin 3.2 gm/dL   A/G Ratio 1.2 g/dL   BUN/Creatinine Ratio 21.4    Anion Gap 10.0 mmol/L          11/07/2019 0547  11/07/2019 0640  Iron Profile [[id number]]   (Abnormal)   Blood     Final result  Iron 59 mcg/dL   Iron Saturation 17 Abnormally low  %   Transferrin 235 mg/dL   TIBC 350 mcg/dL          11/07/2019 0547  11/07/2019 0645  TSH [[id number]]   (Abnormal)   Blood     Final result  TSH Baseline 4.810 Abnormally high  uIU/mL          11/07/2019 0547  11/08/2019 0842  Uric Acid [[id number]]   Blood     Final result  Uric Acid 5.6 mg/dL          11/05/2019 1042  11/05/2019 1116  Basic Metabolic Panel [[id number]]    (Abnormal)   Blood     Final result  Glucose 134 Abnormally high  mg/dL   BUN 12 mg/dL   Creatinine 0.66 mg/dL   Sodium 142 mmol/L   Potassium 3.8 mmol/L   Chloride 106 mmol/L   CO2 26.0 mmol/L   Calcium 8.6 mg/dL   eGFR Non African Am 88 mL/min/1.73   BUN/Creatinine Ratio 18.2    Anion Gap 10.0 mmol/L          11/05/2019 1042  11/05/2019 1056  CBC Auto Differential [[id number]]   (Abnormal)   Blood     Final result  WBC 5.60 10*3/mm3   RBC 3.51 Abnormally low  10*6/mm3   Hemoglobin 10.5 Abnormally low  g/dL   Hematocrit 31.8 Abnormally low  %   MCV 90.6 fL   MCH 29.9 pg   MCHC 33.0 g/dL   RDW 13.0 %   RDW-SD 42.8 fl   MPV 9.7 fL   Platelets 204 10*3/mm3   Neutrophil Rel % 66.8 %   Lymphocyte Rel % 21.4 %   Monocyte Rel % 9.1 %   Eosinophil Rel % 2.1 %   Basophil Rel % 0.4 %   Immature Granulocyte Rel % 0.2 %   Neutrophils Absolute 3.74 10*3/mm3   Lymphocytes Absolute 1.20 10*3/mm3   Monocytes  Absolute 0.51 10*3/mm3   Eosinophils Absolute 0.12 10*3/mm3   Basophils Absolute 0.02 10*3/mm3   Immature Grans, Absolute 0.01 10*3/mm3   nRBC 0.0 /100 WBC          11/04/2019 0728  11/04/2019 0817  Lipid Panel [[id number]]    Blood     Final result  Total Cholesterol 112 mg/dL   Triglycerides 78 mg/dL   HDL Cholesterol 53 mg/dL   LDL Cholesterol  43 mg/dL   VLDL Cholesterol 15.6 mg/dL   LDL/HDL Ratio 0.82           11/04/2019 0558  11/04/2019 0723  Hemoglobin A1c [[id number]]    Blood     Final result  Hemoglobin A1C 5.30 %          11/03/2019 2107 11/03/2019 2215  [place] Draw [[id number]]    Blood     Final result  No result data          11/03/2019 2107 11/03/2019 2133  Comprehensive Metabolic Panel [[id number]]    (Abnormal)   Blood     Final result  Glucose 122 Abnormally high  mg/dL   BUN 19 mg/dL   Creatinine 0.94 mg/dL   Sodium 144 mmol/L   Potassium 4.3 mmol/L   Chloride 103 mmol/L   CO2 27.0 mmol/L   Calcium 9.4 mg/dL   Total Protein 7.2 g/dL   Albumin 4.20 g/dL   ALT (SGPT) 17 U/L   AST (SGOT) 29 U/L   Alkaline Phosphatase 121 Abnormally high  U/L   Total Bilirubin 0.6 mg/dL   eGFR Non  Am 59 Abnormally low  mL/min/1.73   Globulin 3.0 gm/dL   A/G Ratio 1.4 g/dL   BUN/Creatinine Ratio 20.2    Anion Gap 14.0 mmol/L          11/03/2019 2107 11/03/2019 2128  Protime-INR [[id number]]    Blood     Final result  Protime 13.6 Seconds   INR 1.06           11/03/2019 2107 11/03/2019 2128  aPTT [[id number]]    Blood     Final result  PTT 27.5 seconds          11/03/2019 2107 11/03/2019 2133  Troponin [[id number]]    Blood     Final result  Troponin T <0.010 ng/mL          11/03/2019 2107 11/03/2019 2154  Type & Screen [[id number]]   Blood     Edited Result - FINAL  ABO Type B   RH type Positive   Antibody Screen Negative   T&S Expiration Date 11/6/2019 11:59:59 PM          11/03/2019 2107 11/03/2019 2111  CBC Auto Differential [[id number]]   (Abnormal)   Blood     Final result  WBC 7.96 10*3/mm3   RBC 3.95  10*6/mm3   Hemoglobin 11.7 Abnormally low  g/dL   Hematocrit 35.9 %   MCV 90.9 fL   MCH 29.6 pg   MCHC 32.6 g/dL   RDW 12.9 %   RDW-SD 42.5 fl   MPV 9.9 fL   Platelets 231 10*3/mm3   Neutrophil Rel % 78.6 Abnormally high  %   Lymphocyte Rel % 11.7 Abnormally low  %   Monocyte Rel % 8.0 %   Eosinophil Rel % 1.0 %   Basophil Rel % 0.3 %   Immature Granulocyte Rel % 0.4 %   Neutrophils Absolute 6.26 10*3/mm3   Lymphocytes Absolute 0.93 10*3/mm3   Monocytes Absolute 0.64 10*3/mm3   Eosinophils Absolute 0.08 10*3/mm3   Basophils Absolute 0.02 10*3/mm3   Immature Grans, Absolute 0.03 10*3/mm3   nRBC 0.0 /100 WBC          11/03/2019 2107 11/03/2019 2117  PREVIOUS HISTORY [[id number]]   Blood     Final result  Previous History No record on File          10/28/2019 0337  11/03/2019 2134  LIPID PANEL [[id number]]   Blood     Final result  Total Cholesterol 170 mg/dL   Triglycerides 88 mg/dL   HDL Cholesterol 64 mg/dL   LDL Cholesterol  88 mg/dL          10/28/2019 0337  11/03/2019 2134  HEMOGLOBIN A1C [[id number]]   Blood     Final result  Hemoglobin A1C 5.6 %          10/28/2019 0337  11/03/2019 2134  PLATELET COUNT [[id number]]  Final result  No result data          10/28/2019 0337  11/03/2019 2134  PROTIME-INR [[id number]]   Blood     Final result  Protime 14.0 seconds   INR 1.1           10/28/2019 0337  11/03/2019 2134  APTT [[id number]]   Blood     Final result  PTT 29.9 seconds          10/27/2019 2313  11/03/2019 2134  Urinalysis, Microscopic Only - [[id number]]   (Abnormal)     Final result  No result data          10/27/2019 1817  11/03/2019 2134  BASIC METABOLIC PANEL [[id number]]   (Abnormal)     Final result  No result data          10/27/2019 1817  11/03/2019 2134  HEPATIC FUNCTION PANEL [[id number]]  Final result  No result data          10/27/2019 1817  11/03/2019 2134  CK [[id number]]  Final result  No result data          10/27/2019 1817  11/03/2019 2134  LACTIC ACID, PLASMA [[id number]]   Blood     Final result   Lactate 0.7  mEq/L          10/27/2019 1817  11/03/2019 2134  MAGNESIUM [[id number]]   Blood     Final result  Magnesium 1.8 mg/dL          10/27/2019 1817  11/03/2019 2134  TROPONIN I REFERENCE [[id number]]   Blood     Final result  Troponin I <0.01 ng/mL          10/27/2019 1817  11/03/2019 2134  Auto Diff [[id number]]  Final result  No result data          10/27/2019 1817  11/03/2019 2134  PROTIME-INR [[id number]]   Blood     Final result  Protime 13.1 seconds   INR 1.0           10/27/2019 1817  11/03/2019 2134  APTT [[id number]]   Blood     Final result  PTT 29.6 seconds                Emergency Contact Information     Name Relation Home Work Mobile    Angela Posada Power of  [phone]          Insurance Information                MEDICARE/MEDIC[employer] A [employer] B Phone: [phone]    Subscriber: Rama Goel Subscriber#: [id number]    Group#:  Precert#:         [employer] Phone: [phone]    Subscriber: Rama Goel Subscriber#: [id number]    Group#: [id number] Precert#:         Only active medications are shown.   Scheduled Meds Sorted by Name   for Rama Goel as of 11/14/19 1304    Legend:                          Inactive     Active     Linked               Medications 11/14/19 11/15/19 11/16/19 11/17/19 11/18/19 11/19/19 11/20/19   aspirin chewable tablet 81 mg   Dose: 81 mg  Freq: Daily Route: PO  Start: 11/07/19 0900    Admin Instructions:   Herbal/drug interaction: Avoid use with ginkgo biloba.  Do not exceed 4 grams of aspirin in a 24 hr period.    If given for pain, use the following pain scale:   Mild Pain = Pain Score of 1-3, CPOT 1-2  Moderate Pain = Pain Score of 4-6, CPOT 3-4  Severe Pain = Pain Score of 7-10, CPOT 5-8    1004      0900      0900      0900      0900      0900      0900        atorvastatin (LIPITOR) tablet 80 mg   Dose: 80 mg  Freq: Nightly Route: PO  Start: 11/06/19 2100    Admin Instructions:   Avoid  grapefruit juice.    2100 2100 2100 2100 2100 2100 2100        baclofen (LIORESAL) tablet 20 mg   Dose: 20 mg  Freq: Every 8 Hours Scheduled Route: PO  Start: 11/11/19 1400    Admin Instructions:   Take with food if GI upset occurs.    0627     1400     2200      0600     1400     2200      0600     1400     2200      0600     1400     2200      0600     1400     2200      0600     1400     2200      0600     1400     2200        carvedilol (COREG) tablet 25 mg   Dose: 25 mg  Freq: 2 Times Daily With Meals Route: PO  Start: 11/06/19 1800    Admin Instructions:   Give with food.    1005     1800      0800     1800      0800     1800      0800     1800      0800     1800      0800     1800      0800     1800        clopidogrel (PLAVIX) tablet 75 mg   Dose: 75 mg  Freq: Daily Route: PO  Start: 11/07/19 0900    1005      0900      0900      0900      0900      0900      0900        enoxaparin (LOVENOX) syringe 40 mg   Dose: 40 mg  Freq: Every 24 Hours Route: SC  Indications of Use: PROPHYLAXIS OF VENOUS THROMBOEMBOLISM  Start: 11/06/19 2100    Admin Instructions:   Give subcutaneous in abdomen only. Do not massage site after injection.    2100 2100 2100 2100 2100 2100 2100        gabapentin (NEURONTIN) capsule 400 mg   Dose: 400 mg  Freq: Every 6 Hours Route: PO  Start: 11/06/19 1800    Admin Instructions:   Pt states she takes this med at 6am, noon, 6 pm and midnight  {DOLORES    0127     0627     1200     1800      0000     0600     1200     1800      0000     0600     1200     1800      0000     0600     1200     1800      0000     0600     1200     1800      0000     0600     1200     1800      0000     0600     1200     1800        rOPINIRole (REQUIP) tablet 1 mg   Dose: 1 mg  Freq: Nightly Route: PO  Start: 11/08/19 2100 2100 2100 2100 2100 2100 2100 2100        sennosides-docusate (PERICOLACE) 8.6-50 MG per tablet 1 tablet    Dose: 1 tablet  Freq: Nightly Route: PO  Start: 11/11/19 2100 2100 2100 2100 2100 2100 2100 2100        THERA tablet 1 tablet   Dose: 1 tablet  Freq: Daily Route: PO  Start: 11/07/19 0900    1004      0900      0900      0900      0900      0900      0900        Medications 11/14/19 11/15/19 11/16/19 11/17/19 11/18/19 11/19/19 11/20/19       Continuous Meds Sorted by Name   for Rama Goel as of 11/14/19 1304    Legend:                          Inactive     Active     Linked               Medications 11/14/19 11/15/19 11/16/19 11/17/19 11/18/19 11/19/19 11/20/19       PRN Meds Sorted by Name   for Rama Goel as of 11/14/19 1304    Legend:                          Inactive     Active     Linked               Medications 11/14/19 11/15/19 11/16/19 11/17/19 11/18/19 11/19/19 11/20/19   acetaminophen (TYLENOL) tablet 650 mg   Dose: 650 mg  Freq: Every 6 Hours PRN Route: PO  PRN Reason: Mild Pain   Start: 11/10/19 1202    Admin Instructions:   Do not exceed 4 grams of acetaminophen in a 24 hr period.    If given for pain, use the following pain scale:   Mild Pain = Pain Score of 1-3, CPOT 1-2  Moderate Pain = Pain Score of 4-6, CPOT 3-4  Severe Pain = Pain Score of 7-10, CPOT 5-8             bisacodyl (DULCOLAX) suppository 10 mg   Dose: 10 mg  Freq: Daily PRN Route: RE  PRN Reason: Constipation  Start: 11/06/19 1646             calcium carbonate (TUMS) chewable tablet 500 mg (200 mg elemental)   Dose: 2 tablet  Freq: 2 Times Daily PRN Route: PO  PRN Reason: Heartburn  Start: 11/06/19 1642    Admin Instructions:   One tablet contains 200 mg elemental calcium.  Take with food.             cyclobenzaprine (FLEXERIL) tablet 10 mg   Dose: 10 mg  Freq: 3 Times Daily PRN Route: PO  PRN Reason: Muscle Spasms  Start: 11/06/19 1343    1005              diazePAM (VALIUM) tablet 10 mg   Dose: 10 mg  Freq: Every 8 Hours PRN Route: PO  PRN Reason: Anxiety  Start: 11/08/19 1030    Admin  Instructions:   {DE Caution: Look alike/sound alike drug alert. Avoid use with [place]'s Wort. Avoid grapefruit juice.    1005              magnesium hydroxide (MILK OF MAGNESIA) suspension 2400 mg/10mL 10 mL   Dose: 10 mL  Freq: Daily PRN Route: PO  PRN Reason: Constipation  Start: 11/06/19 1646             melatonin tablet 5.25 mg   Dose: 5.25 mg  Freq: Nightly PRN Route: PO  PRN Reason: Sleep  Start: 11/06/19 1642             ondansetron (ZOFRAN) tablet 4 mg   Dose: 4 mg  Freq: Every 6 Hours PRN Route: PO  PRN Reasons: Nausea,Vomiting  Start: 11/06/19 1642             oxyCODONE-acetaminophen (PERCOCET) 5-325 MG per tablet 1 tablet   Dose: 1 tablet  Freq: Every 6 Hours PRN Route: PO  PRN Reason: Severe Pain   Start: 11/08/19 1025 End: 11/18/19 1024    Admin Instructions:   [ODLORES]    Do not exceed 4 grams of acetaminophen in a 24 hr period.    If given for pain, use the following pain scale:   Mild Pain = Pain Score of 1-3, CPOT 1-2  Moderate Pain = Pain Score of 4-6, CPOT 3-4  Severe Pain = Pain Score of 7-10, CPOT 5-8        1024-D/C'd        Medications 11/14/19 11/15/19 11/16/19 11/17/19 11/18/19 11/19/19 11/20/19

## 2019-11-14 NOTE — PLAN OF CARE
Problem: Patient Care Overview  Goal: Plan of Care Review  Outcome: Ongoing (interventions implemented as appropriate)   11/14/19 8285   OTHER   Outcome Summary Pt. lethargic today and decrease tolerance to gt. & overall mobility. Pt. [facility] for transfers this a.m. & [facility] for transfers this p.m. Pt. able to amb. limited this p.m. Cont. to mobilize as pt. able    Patient Care Overview   IRF Plan of Care Review progress ongoing, continue   Progress, Functional Goals progress more gradual than expected   Coping/Psychosocial   Plan of Care Reviewed With patient     Goal: Discharge Needs Assessment  Outcome: Ongoing (interventions implemented as appropriate)   11/09/19 0137   Discharge Needs Assessment   Patient/Family Anticipates Transition to home   Patient/Family Anticipated Services at Transition outpatient care   Transportation Anticipated family or friend will provide   Disability   Equipment Currently Used at Home cane, straight;shower chair;walker, rolling

## 2019-11-14 NOTE — PROGRESS NOTES
"     LOS: 8 days   Patient Care Team:  Inna Harris APRN as PCP - General (Family Medicine)  Declan Friedman MD as PCP - Claims Attributed  Maykel Marinelli DPM as Consulting Physician (Podiatry)    Chief Complaint:   Left pontine stroke (CMS/HCC)          Interval History:     SUBJECTIVE: She tells me she has muscle spasms at night and has difficulty walking.  She has a history of multiple falls at night prior to her admission and that the reason she came into the ER.  During the day she worked well with therapy and did not have muscle spasm.  We discussed her plans for after hospitalization.  She plans to go home by herself.  Again says that she cannot use a walker in her bedroom or bathroom.  Her plan is to crawl to the bathroom at night.  She asked about a wheelchair and I am agreeable to writing one but she cannot get a wheelchair.in Her bedroom or bathroom either.  She is not able to get equipment in the bedroom because the dog \"crates\" in the bedroom.  The dog sleep in her bedroom with her as well.  She does not plan to make any changes in the home to accommodate her needs.  She is not safe to drive and I had a discussion with her today as well.  She does not agree to home health says she will go to outpatient if she can get someone to take her.    History taken from: patient RN Reviewed with therapy and  as well    Objective     Vital Signs  Temp:  [98 °F (36.7 °C)] 98 °F (36.7 °C)  Heart Rate:  [66-71] 71  Resp:  [18] 18  BP: (143-145)/(65-67) 143/65      11/12/19  0600 11/13/19  0600 11/14/19  0305   Weight: 72 kg (158 lb 11.7 oz) 71.3 kg (157 lb 3 oz) 71.8 kg (158 lb 4.8 oz)         Physical Exam:     General Appearance:    Alert, cooperative, in no acute distress   Head:    Normocephalic, without obvious abnormality, atraumatic   Eyes:            Lids and lashes normal, conjunctivae and sclerae normal, no   icterus, no pallor, corneas clear, PERRLA   Throat:   No oral lesions, no " thrush, oral mucosa moist   Neck:   No adenopathy, supple, trachea midline, no thyromegaly, no   carotid bruit, no JVD       Lungs:     Clear to auscultation,respirations regular, even and                  Unlabored good bilateral air movement no wheezes    Heart:    Regular rhythm and normal rate, normal S1 and S2, no            murmur, no gallop, no rub, no click no ectopy   Chest Wall:    No abnormalities observed   Abdomen:     Normal bowel sounds, no masses, no organomegaly, soft        non-tender, non-distended, no guarding, no rebound                Tenderness normal exam   Extremities:   Moves all extremities well, no edema, no cyanosis, no             Redness   Skin Brett he has a hemorrhoid and you I think it would be fine okay alright thank you so much appreciated in the sure if she will tell me that she will tell him not good nothing else just because on the hospital with letter to do that right       Skin:   No bleeding, bruising or rash   Lymph nodes:   No palpable adenopathy   Neurologic:   Cranial nerves 2 - 12 grossly intact, sensation intact, DTR       present and equal bilaterally        RESULTS REVIEW:     Lab Results (last 24 hours)     ** No results found for the last 24 hours. **        Imaging Results (Last 72 Hours)     ** No results found for the last 72 hours. **        Nursing notes and therapy notes have been reviewed  I have reviewed medications  Assessment/Plan     Active Hospital Problems    Diagnosis   • **Left pontine stroke (CMS/HCC)   • Restless leg syndrome, uncontrolled     First diagnosed after initial pontine stroke 2018.  Has been treated with Requip at night since that time     • Dysarthria   • Muscle spasm of both lower legs     Worse since this stroke     • Recurrent falls while walking     Due to muscle spasm     • Urinary hesitancy     History of in and out self caths after initial surgery 2018     • DJD (degenerative joint disease), multiple sites     thoracic and  lumbar spine  Status post extensive stabilization of the thoracolumbar spine 2018     • Hypertension   • Altered mental status           PLAN: Continue intensive therapy as tolerated  At this time plan is to discharge home tomorrow.  Prognosis at home is very poor.  Due to recurrent falls  She will not be able to drive  Case management consult sent him wheelchair ordered  At this time with therapy nursing continues to talk with her to try to encourage her to consider further discharge options        This document has been electronically signed by Castro West MD on November 14, 2019 11:04 AM      1116 AM after discussion with nursing and Occupational Therapy the patient is agreeable to short-term skilled nursing facility placement for continued therapy.  Her ultimate goal is still to go home but she is agreeable if possible to short-term skilled nursing placement.  Consult has been sent  I did speak with the patient and she is agreeable and hopefully she will follow through with this

## 2019-11-14 NOTE — PLAN OF CARE
Problem: Patient Care Overview  Goal: Plan of Care Review  Outcome: Ongoing (interventions implemented as appropriate)   11/14/19 3946   OTHER   Outcome Summary lethargic today and tearful, min improvement in PM, participated in lb dressing and bathing task w/ mod @, [facility] act and ue ex. recimmend 24/7 assist. At d/c cont poc    Patient Care Overview   IRF Plan of Care Review progress ongoing, continue   Progress, Functional Goals demonstrating adequate progress   Coping/Psychosocial   Plan of Care Reviewed With patient

## 2019-11-14 NOTE — PLAN OF CARE
Problem: Stroke (IRF) (Adult)  Goal: Promote Optimal Functional [place]  Outcome: Ongoing (interventions implemented as appropriate)  Pt continues to acquire maximum assistance for transfers.    Problem: Fall Risk (Adult)  Goal: Absence of Fall  Outcome: Ongoing (interventions implemented as appropriate)  No falls at this time. Bed alarm on.    Problem: Pain, Chronic (Adult)  Goal: Acceptable Pain/Comfort Level and Functional Ability  Outcome: Ongoing (interventions implemented as appropriate)  Pt has chronic leg pain and spasms.

## 2019-11-14 NOTE — THERAPY TREATMENT NOTE
Inpatient Rehabilitation - Occupational Therapy Treatment Note    [facility]     Patient Name: Rama Goel  : 1945  MRN: [id number]    Today's Date: 2019                 Admit Date: 2019      Visit Dx:    ICD-10-CM ICD-9-CM   1. Left pontine stroke (CMS/HCC) I63.50 434.91   2. Dysarthria R47.1 784.51   3. Oropharyngeal dysphagia R13.12 787.22   4. Impaired physical mobility Z74.09 781.99   5. Impaired mobility and ADLs Z74.09 799.89       Patient Active Problem List   Diagnosis   • Altered mental status   • DJD (degenerative joint disease), multiple sites   • Hypertension   • Left pontine stroke (CMS/HCC)   • CVA (cerebral vascular accident) (CMS/HCC)   • Urinary hesitancy   • Muscle spasm of both lower legs   • Recurrent falls while walking   • Dysarthria   • Restless leg syndrome, uncontrolled         Therapy Treatment    IRF Treatment Summary     Row Name 19 1345 19 1040 19 0820       Evaluation/Treatment Time and Intent    Subjective Information  no complaints  -RC  -- tearful  -RC  no complaints  -JA    Existing Precautions/Restrictions  fall  -RC  fall  -RC  fall  -JA    Document Type  therapy note (daily note)  -RC  therapy note (daily note)  -RC  therapy note (daily note)  -JA    Mode of Treatment  occupational therapy;individual therapy  -RC  occupational therapy;individual therapy  -RC  physical therapy;individual therapy  -JA    Patient/Family Observations  in w/c   -RC  in w/c   -RC  in w/c   -JA    Start Time (Evaluation/Treatment)  1345  -RC  1040  -RC  0820  -JA    Stop Time (Evaluation/Treatment)  1435  -RC  1200  -RC  0920  -JA    Recorded by [RC] Tiki Grover COTA/L [RC] Tiki Grover COTA/DIANA [JA] Ulysses Gill PTA    Row Name 19 1345 19 1040 19 0820       Cognition/Psychosocial- PT/OT    Affect/Mental Status (Cognitive)  low arousal/lethargic  -RC  low arousal/lethargic  -RC  WFL  -JA    Orientation Status (Cognition)   --  --  -  oriented x 4  -JA    Recorded by [RC] Tiki Grover COTA/L [RC] Tiki Grover COTA/DIANA [JA] Ulysses Gill, PTA    Row Name 11/14/19 0820             Bed Mobility Assessment/Treatment    [place] Level (Bed Mobility)  independent  -JA      Rolling Right [place] (Bed Mobility)  independent  -JA      Supine-Sit [place] (Bed Mobility)  supervision  -JA      Sit-Supine [place] (Bed Mobility)  supervision  -JA      Comment (Bed Mobility)  bed mobility focused log roll tech. pt. practiced multiple times  -JA      Recorded by [JA] Ulysses Gill, PTA      Row Name 11/14/19 0820             Transfer Assessment/Treatment    Transfer Assessment/Treatment  sit-stand transfer;stand-sit transfer  -JA      Recorded by [JA] Ulysses Gill, PTA      Row Name 11/14/19 0820             Bed-Chair Transfer    Bed-Chair [place] (Transfers)  contact guard  -JA      Assistive Device (Bed-Chair Transfers)  walker, front-wheeled;other (see comments) no AD  -JA      Recorded by [JA] Ulysses Gill, PTA      Row Name 11/14/19 0820             Chair-Bed Transfer    Chair-Bed [place] (Transfers)  contact guard  -JA      Assistive Device (Chair-Bed Transfers)  walker, front-wheeled;other (see comments) no AD  -JA      Recorded by [JA] Ulysses Gill, PTA      Row Name 11/14/19 0820             Sit-Stand Transfer    Sit-Stand [place] (Transfers)  supervision;contact guard;verbal cues  -JA      Assistive Device (Sit-Stand Transfers)  walker, front-wheeled  -JA      Recorded by [JA] Ulysses Gill, PTA      Row Name 11/14/19 0820             Stand-Sit Transfer    Stand-Sit [place] (Transfers)  supervision;contact guard;verbal cues  -JA      Assistive Device (Stand-Sit Transfers)  walker, front-wheeled  -JA      Recorded by [JA] Ulysses Gill, PTA      Row Name 11/14/19 1343             Toilet Transfer    Type (Toilet Transfer)  stand pivot/stand step  -       [place] Level (Toilet Transfer)  moderate assist (50% patient effort)  -      Assistive Device (Toilet Transfer)  wheelchair;grab bars/safety frame  -      Recorded by [RC] Tiki Grover COTA/L      Row Name 11/14/19 1040 11/14/19 0820          Wheelchair Mobility/Management    Method of Wheelchair Locomotion (Mobility)  bipedal (lower extremity) propulsion;bimanual (upper extremity) propulsion  -  bimanual (upper extremity) propulsion;bipedal (lower extremity) propulsion  -     Mobility Activities (Wheelchair)  forward propulsion  -  forward propulsion;turning  -     Mobility [place] Level (Wheelchair)  minimum assist (75% patient effort)  -  supervision  -JA     Forward Propulsion [place] (Wheelchair)  verbal cues  -  supervision  -     Turning [place] (Wheelchair)  --  supervision  -     Distance Propelled in Feet (Wheelchair)  150  -  150  -     Recorded by [RC] Tiki Grover COTA/DIANA [JA] Ulysses Gill, TOAN     Row Name 11/14/19 0820             Safety Issues, Functional Mobility    Impairments Affecting Function (Mobility)  balance;endurance/activity tolerance;coordination;strength;pain  -      Recorded by [JA] Ulysses Gill PTA      Row Name 11/14/19 1345             Bathing Assessment/Treatment    Bathing [place] Level  lower body;minimum assist (75% patient effort)  -      Recorded by [RC] Tiki Grover COTA/L      Row Name 11/14/19 1345             Lower Body Dressing Assessment/Treatment    Lower Body Dressing [place] Level  doff;don;pants/bottoms;moderate assist (50% patient effort);shoes/slippers  -      Lower Body Dressing Position  supported sitting;supported standing  -RC      Recorded by [RC] Tiki Grover COTA/L      Row Name 11/14/19 1345 11/14/19 1040          Fine Motor Testing & Training    Training Activity, Fine Motor Coordination  -- lacing card, clothes pins   -  -- clothes pins   -     Recorded  by [RC] Tiki Grover COTA/L [RC] Tiki Grover COTA/L     Row Name 11/14/19 1345 11/14/19 0820          Pain Scale: Numbers Pre/Post-Treatment    Pain Scale: Numbers, Pretreatment  0/10 - no pain  -RC  0/10 - no pain  -JA     Pain Scale: Numbers, Post-Treatment  0/10 - no pain  -RC  0/10 - no pain  -JA     Pre/Post Treatment Pain Comment  --  Pt. with muscle spasms during supine therex, but no pain reported pre/post treatment   -     Recorded by [RC] Tiki Grover COTA/L [JA] Ulysses Gill, PTA     Row Name 11/14/19 1040             Aerobic Exercise Activity    Exercise Performed (Aerobic, Therapeutic Exercise)  arm bike  -      Expected Outcome (Aerobic, Therapeutic Exercise)  improve functional tolerance, self-care activity;improve performance, BADLs;improve performance, transfer skills  -      Time (Aerobic, Therapeutic Exercise)  40  -RC      Recorded by [RC] Tiki Grover COTA/L      Row Name 11/14/19 0820             Lower Extremity Seated Therapeutic Exercise    Performed, Seated Lower Extremity (Therapeutic Exercise)  LAQ (long arc quad), knee extension;hip flexion/extension  -      Exercise Type, Seated Lower Extremity (Therapeutic Exercise)  AROM (active range of motion)  -      Sets/Reps Detail, Seated Lower Extremity (Therapeutic Exercise)  x20  -JA      Recorded by [JA] Ulysses Gill PTA      Row Name 11/14/19 0820             Lower Extremity Supine Therapeutic Exercise    Performed, Supine Lower Extremity (Therapeutic Exercise)  hip abduction/adduction;ankle dorsiflexion/plantarflexion;gluteal sets;heel slides  -      Exercise Type, Supine Lower Extremity (Therapeutic Exercise)  AROM (active range of motion)  -LISA      Sets/Reps Detail, Supine Lower Extremity (Therapeutic Exercise)  x20  -JA      Comment, Supine Lower Extremity (Therapeutic Exercise)  Pt. limited ROM at R with ankle DF/PF & had ~3 R LE spasms   -      Recorded by [JA] Ulysses Gill, PTA       Row Name 11/14/19 0820             Vital Signs    Post Systolic BP Rehab  130  -JA      Post Treatment Diastolic BP  68  -JA      Posttreatment Heart Rate (beats/min)  67  -JA      Pre Patient Position  Sitting  -JA      Intra Patient Position  Supine  -JA      Post Patient Position  Sitting  -JA      Recorded by [LISA] Ulysses Gill PTA      Row Name 11/14/19 1345 11/14/19 1040 11/14/19 0820       Positioning and Restraints    Pre-Treatment Position  sitting in chair/recliner  -RC  sitting in chair/recliner  -RC  sitting in chair/recliner  -JA    Post Treatment Position  wheelchair  -RC  wheelchair  -RC  wheelchair  -JA    In Wheelchair  with PT  -RC  encouraged to call for assist;exit alarm on waiting for lunch   -RC  sitting;call light within reach;encouraged to call for assist;exit alarm on  -JA    Recorded by [KIERSTEN] Tiki Grover COTA/L [RC] Tiki Grover COTA/DIANA [JA] Ulysses Gill PTA    Row Name 11/14/19 0820             Daily Summary of Progress (PT)    Functional Goal Overall Progress: Physical Therapy  progressing toward functional goals as expected  -      Recorded by [LISA] Ulysses Gill PTA      Row Name 11/14/19 1345             Daily Summary of Progress (OT)    Functional Goal Overall Progress: Occupational Therapy  progressing towards functional goals slower than expected  -      Recommendations: Occupational Therapy  cont poc   -RC      Recorded by [RC] Tiki Grover MITCHELL/L        User Key  (r) = Recorded By, (t) = Taken By, (c) = Cosigned By    Initials Name Effective Dates     Ulysses Gill PTA 03/07/18 -      Tiki Grover COTA/DIANA 03/07/18 -                  OT Recommendation and Plan         Plan of Care Review  Plan of Care Reviewed With: patient  Daily Summary of Progress (OT)  Functional Goal Overall Progress: Occupational Therapy: progressing towards functional goals slower than expected  Recommendations: Occupational Therapy: cont poc   Plan  of Care Reviewed With: patient  IRF Plan of Care Review: progress ongoing, continue  Progress, Functional Goals: demonstrating adequate progress  Outcome Summary: lethargic today and tearful, min improvement in PM, participated in lb dressing and bathing task w/ mod @, c act and ue ex.  cont poc     OT IRF GOALS     Row Name 11/14/19 1345 11/13/19 1340 11/12/19 1340       Bathing Goal 1 (OT-IRF)    Activity/Device (Bathing Goal 1, OT-IRF)  bathing skills, all  -RC  bathing skills, all  -RC  bathing skills, all  -RC    [place] Level (Bathing Goal 1, OT-IRF)  conditional independence  -RC  conditional independence  -RC  conditional independence  -RC    Time Frame (Bathing Goal 1, OT-IRF)  long term goal (LTG);by discharge  -RC  long term goal (LTG);by discharge  -RC  long term goal (LTG);by discharge  -RC    Progress/Outcomes (Bathing Goal 1, OT-IRF)  goal not met  -RC  goal not met  -RC  goal not met  -RC       UB Dressing Goal 1 (OT-IRF)    Activity/Device (UB Dressing Goal 1, OT-IRF)  upper body dressing  -RC  upper body dressing  -RC  upper body dressing  -RC    [place] (UB Dress Goal 1, OT-IRF)  independent  -RC  independent  -RC  independent  -RC    Time Frame (UB Dressing Goal 1, OT-IRF)  long term goal (LTG);by discharge  -RC  long term goal (LTG);by discharge  -RC  long term goal (LTG);by discharge  -RC    Progress/Outcomes (UB Dressing Goal 1, OT-IRF)  goal not met  -RC  goal not met  -RC  goal not met  -RC       LB Dressing Goal 1 (OT-IRF)    Activity/Device (LB Dressing Goal 1, OT-IRF)  lower body dressing  -RC  lower body dressing  -RC  lower body dressing  -RC    [place] (LB Dressing Goal 1, OT-IRF)  conditional independence  -RC  conditional independence  -RC  conditional independence  -RC    Time Frame (LB Dressing Goal 1, OT-IRF)  long term goal (LTG);by discharge  -RC  long term goal (LTG);by discharge  -RC  long term goal (LTG);by discharge  -RC    Progress/Outcomes (LB Dressing  Goal 1, OT-IRF)  goal not met  -RC  goal not met  -RC  goal not met  -RC    Row Name 11/11/19 1330 11/09/19 0906 11/08/19 1355       Bathing Goal 1 (OT-IRF)    Activity/Device (Bathing Goal 1, OT-IRF)  bathing skills, all  -RC  bathing skills, all  -KD  bathing skills, all  -RC    [place] Level (Bathing Goal 1, OT-IRF)  conditional independence  -RC  conditional independence  -KD  conditional independence  -RC    Time Frame (Bathing Goal 1, OT-IRF)  long term goal (LTG);by discharge  -RC  long term goal (LTG);by discharge  -KD  long term goal (LTG);by discharge  -RC    Progress/Outcomes (Bathing Goal 1, OT-IRF)  goal not met  -RC  goal not met  -KD  goal not met  -RC       UB Dressing Goal 1 (OT-IRF)    Activity/Device (UB Dressing Goal 1, OT-IRF)  upper body dressing  -RC  upper body dressing  -KD  upper body dressing  -RC    [place] (UB Dress Goal 1, OT-IRF)  independent  -RC  independent  -KD  independent  -RC    Time Frame (UB Dressing Goal 1, OT-IRF)  long term goal (LTG);by discharge  -RC  long term goal (LTG);by discharge  -KD  long term goal (LTG);by discharge  -RC    Progress/Outcomes (UB Dressing Goal 1, OT-IRF)  goal not met  -RC  goal not met  -KD  goal not met  -RC       LB Dressing Goal 1 (OT-IRF)    Activity/Device (LB Dressing Goal 1, OT-IRF)  lower body dressing  -RC  lower body dressing  -KD  lower body dressing  -RC    [place] (LB Dressing Goal 1, OT-IRF)  conditional independence  -RC  conditional independence  -KD  conditional independence  -RC    Time Frame (LB Dressing Goal 1, OT-IRF)  long term goal (LTG);by discharge  -RC  long term goal (LTG);by discharge  -KD  long term goal (LTG);by discharge  -RC    Progress/Outcomes (LB Dressing Goal 1, OT-IRF)  goal not met  -RC  goal not met  -KD  goal not met  -RC    Row Name 11/07/19 1051             Bathing Goal 1 (OT-IRF)    Activity/Device (Bathing Goal 1, OT-IRF)  bathing skills, all  -ME      [place] Level (Bathing Goal 1,  OT-IRF)  conditional independence  -ME      Time Frame (Bathing Goal 1, OT-IRF)  long term goal (LTG);by discharge  -ME      Progress/Outcomes (Bathing Goal 1, OT-IRF)  goal not met  -ME         UB Dressing Goal 1 (OT-IRF)    Activity/Device (UB Dressing Goal 1, OT-IRF)  upper body dressing  -ME      [place] (UB Dress Goal 1, OT-IRF)  independent  -ME      Time Frame (UB Dressing Goal 1, OT-IRF)  long term goal (LTG);by discharge  -ME      Progress/Outcomes (UB Dressing Goal 1, OT-IRF)  goal not met  -ME         LB Dressing Goal 1 (OT-IRF)    Activity/Device (LB Dressing Goal 1, OT-IRF)  lower body dressing  -ME      [place] (LB Dressing Goal 1, OT-IRF)  conditional independence  -ME      Time Frame (LB Dressing Goal 1, OT-IRF)  long term goal (LTG);by discharge  -ME      Progress/Outcomes (LB Dressing Goal 1, OT-IRF)  goal not met  -ME        User Key  (r) = Recorded By, (t) = Taken By, (c) = Cosigned By    Initials Name Provider Type    Tiki Deleon MITCHELL/L Occupational Therapy Assistant    Carola Serrano COTA/L Occupational Therapy Assistant    Che Hinkle OT Occupational Therapist          Occupational Therapy Education     Title: PT OT SLP Therapies (In Progress)     Topic: Occupational Therapy (In Progress)     Point: ADL training (In Progress)     Description: Instruct learner(s) on proper safety adaptation and remediation techniques during self care or transfers.   Instruct in proper use of assistive devices.    Learning Progress Summary           Patient Acceptance, E, NR by  at 11/14/2019  2:46 PM    Acceptance, E, NR by  at 11/13/2019  1:57 PM    Acceptance, E, NR by  at 11/12/2019  2:07 PM    Acceptance, E, VU,DU by ME at 11/7/2019  3:40 PM    Comment:  Educated on OT and POC. Educated to continue exercises given to her for translation and fine motor coordination. Educated to call for assist. Educated on hand placement for transfers. Educated on safe mechanics for  completing ADLs.                   Point: Home exercise program (Done)     Description: Instruct learner(s) on appropriate technique for monitoring, assisting and/or progressing therapeutic exercises/activities.    Learning Progress Summary           Patient Acceptance, E, GISSELLE HYDE by ME at 11/7/2019  3:40 PM    Comment:  Educated on OT and POC. Educated to continue exercises given to her for translation and fine motor coordination. Educated to call for assist. Educated on hand placement for transfers. Educated on safe mechanics for completing ADLs.                   Point: Precautions (In Progress)     Description: Instruct learner(s) on prescribed precautions during self-care and functional transfers.    Learning Progress Summary           Patient Acceptance, E, NR by  at 11/14/2019  2:46 PM    Acceptance, E, NR by RC at 11/13/2019  1:57 PM    Acceptance, E, NR by  at 11/12/2019  2:07 PM    Acceptance, E, NR by  at 11/11/2019  1:53 PM    Acceptance, E, NR by  at 11/8/2019  2:46 PM    Acceptance, E, GISSELLE HYDE by ME at 11/7/2019  3:40 PM    Comment:  Educated on OT and POC. Educated to continue exercises given to her for translation and fine motor coordination. Educated to call for assist. Educated on hand placement for transfers. Educated on safe mechanics for completing ADLs.                   Point: Body mechanics (In Progress)     Description: Instruct learner(s) on proper positioning and spine alignment during self-care, functional mobility activities and/or exercises.    Learning Progress Summary           Patient Acceptance, E, NR by  at 11/14/2019  2:46 PM    Acceptance, E, NR by  at 11/13/2019  1:57 PM    Acceptance, E, NR by  at 11/12/2019  2:07 PM    Acceptance, E, NR by  at 11/11/2019  1:53 PM    Acceptance, E, NR by  at 11/8/2019  2:46 PM                               User Key     Initials Effective Dates Name Provider Type Discipline     03/07/18 -  Tiki Grover COTA/DIANA Occupational  Therapy Assistant OT    ME 09/10/19 -  Che Oh OT Occupational Therapist OT                Outcome Measures     Row Name 11/14/19 1345 11/14/19 0820 11/13/19 1340       How much help from another person do you currently need...    Turning from your back to your side while in flat bed without using bedrails?  --  4  -JA  --    Moving from lying on back to sitting on the side of a flat bed without bedrails?  --  3  -JA  --    Moving to and from a bed to a chair (including a wheelchair)?  --  3  -JA  --    Standing up from a chair using your arms (e.g., wheelchair, bedside chair)?  --  3  -JA  --    Climbing 3-5 steps with a railing?  --  3  -JA  --    To walk in hospital room?  --  3  -JA  --    AM-PAC 6 Clicks Score (PT)  --  19 -JA  --       How much help from another is currently needed...    Putting on and taking off regular lower body clothing?  3  -RC  --  3  -RC    Bathing (including washing, rinsing, and drying)  3  -RC  --  3  -RC    Toileting (which includes using toilet bed pan or urinal)  3  -RC  --  3  -RC    Putting on and taking off regular upper body clothing  4  -RC  --  4  -RC    Taking care of personal grooming (such as brushing teeth)  4  -RC  --  4  -RC    Eating meals  4  -RC  --  4  -RC    AM-PAC 6 Clicks Score (OT)  21  -RC  --  21  -RC       Functional Assessment    Outcome Measure Options  --  AM-PAC 6 Clicks Basic Mobility (PT)  -JA  --    Row Name 11/13/19 0815 11/12/19 1340 11/12/19 0830       How much help from another person do you currently need...    Turning from your back to your side while in flat bed without using bedrails?  4  -JA  --  3  -KW    Moving from lying on back to sitting on the side of a flat bed without bedrails?  3  -JA  --  3  -KW    Moving to and from a bed to a chair (including a wheelchair)?  3  -JA  --  3  -KW    Standing up from a chair using your arms (e.g., wheelchair, bedside chair)?  3  -JA  --  3  -KW    Climbing 3-5 steps with a railing?  3  -JA   --  3  -KW    To walk in hospital room?  3  -JA  --  3  -KW    AM-PAC 6 Clicks Score (PT)  19  -JA  --  18  -KW       How much help from another is currently needed...    Putting on and taking off regular lower body clothing?  --  3  -RC  --    Bathing (including washing, rinsing, and drying)  --  3  -RC  --    Toileting (which includes using toilet bed pan or urinal)  --  3  -RC  --    Putting on and taking off regular upper body clothing  --  4  -RC  --    Taking care of personal grooming (such as brushing teeth)  --  4  -RC  --    Eating meals  --  4  -RC  --    AM-PAC 6 Clicks Score (OT)  --  21  -RC  --       Functional Assessment    Outcome Measure Options  AM-PAC 6 Clicks Basic Mobility (PT)  -JA  --  AM-PAC 6 Clicks Basic Mobility (PT)  -KW      User Key  (r) = Recorded By, (t) = Taken By, (c) = Cosigned By    Initials Name Provider Type    Ulysses Delarosa, PTA Physical Therapy Assistant    Tiki Deleon COTA/L Occupational Therapy Assistant    KW Jessica Lind, PT Physical Therapist             Time Calculation:     Time Calculation- OT     Row Name 11/14/19 1449 11/14/19 1442          Time Calculation- OT    OT Start Time  1345  -RC  1040  -     OT Stop Time  1435  -RC  1200  -     OT Time Calculation (min)  50 min  -RC  80 min  -     Total Timed Code Minutes- OT  50 minute(s)  -RC  60 minute(s)  -RC     OT Non-Billable Time (min)  --  20 min  -     OT Received On  11/14/19  -RC  11/14/19  -       User Key  (r) = Recorded By, (t) = Taken By, (c) = Cosigned By    Initials Name Provider Type    Tiki Deleon COTA/L Occupational Therapy Assistant          Therapy Charges for Today     Code Description Service Date Service Provider Modifiers Qty    81781249174 HC OT SELF CARE/MGMT/TRAIN EA 15 MIN 11/13/2019 Tiki Grover COTA/L GO 1    67436294781 HC OT THERAPEUTIC ACT EA 15 MIN 11/13/2019 Tiki Grover COTA/L GO 4    59737558554 HC OT THER PROC EA 15 MIN 11/13/2019  Tiki Grover MITCHELL/L GO 1    [id number]  OT THERAPEUTIC ACT EA 15 MIN 11/13/2019 Tiki Grover MITCHELL/L GO 1    14173529717 HC OT THERAPEUTIC ACT EA 15 MIN 11/14/2019 Tiki Grover, MITCHELL/L GO 2    [id number]  OT THER PROC EA 15 MIN 11/14/2019 Tiki Grover MITCHELL/L GO 2    38713126328 HC OT SELF CARE/MGMT/TRAIN EA 15 MIN 11/14/2019 Tiki Grover MITCHELL/L GO 2    32436319707 HC OT THERAPEUTIC ACT EA 15 MIN 11/14/2019 Tiki Grover MITCHELL/L GO 1                           Self-Care Admission Goal Date Date Date Date Date Discharge   Eating: The ability to use suitable utensils to bring food and/or liquid to the mouth and swallow food and/or liquid once the meal is placed before the patient.   5        Oral hygiene: The ability to use suitable items to clean teeth. Dentures (if applicable): The ability to insert and remove dentures into and from the mouth, and manage denture soaking and rinsing with use of equipment.   5        Toileting hygiene: The ability to maintain perineal hygiene, adjust clothes before and after voiding or having a bowel movement. If managing an ostomy, include wiping the opening but not managing equipment.   3        Shower/bathe self: The ability to bathe self, including washing, rinsing, and drying self (excludes washing of back and hair). Does not include transferring in/out of tub/shower.   5        Upper body dressing: The ability to dress and undress above the waist; including fasteners, if applicable.   5        Lower body dressing: The ability to dress and undress below the waist, including fasteners; does not include footwear.   3        Putting on/taking off footwear: The ability to put on and take off socks and shoes or other footwear that is appropriate for safe mobility; including fasteners, if applicable.   5                  PAULA Barr/DIANA  11/14/2019

## 2019-11-14 NOTE — PATIENT CARE CONFERENCE
Attendance of weekly team conference:   PAULA Russell, Dr. Castro West, Inna Garcia, SLP, Olman Wagner PTA and Mojgan Summers RN    Patient's progress reviewed, FIMs reviewed, discharge date discussed and equipment needs addressed.     Expected Discharge Date: 11-15-19  Anticipated discharge orders/equipment: Pt may need SNF.

## 2019-11-15 VITALS
HEIGHT: 62 IN | WEIGHT: 154.9 LBS | DIASTOLIC BLOOD PRESSURE: 63 MMHG | SYSTOLIC BLOOD PRESSURE: 146 MMHG | TEMPERATURE: 96.5 F | BODY MASS INDEX: 28.5 KG/M2 | HEART RATE: 68 BPM | RESPIRATION RATE: 18 BRPM | OXYGEN SATURATION: 95 %

## 2019-11-15 PROCEDURE — 97110 THERAPEUTIC EXERCISES: CPT

## 2019-11-15 PROCEDURE — 99238 HOSP IP/OBS DSCHRG MGMT 30/<: CPT | Performed by: FAMILY MEDICINE

## 2019-11-15 RX ORDER — AMOXICILLIN 250 MG
1 CAPSULE ORAL NIGHTLY
Start: 2019-11-15

## 2019-11-15 RX ORDER — ACETAMINOPHEN 325 MG/1
650 TABLET ORAL EVERY 6 HOURS PRN
Start: 2019-11-15

## 2019-11-15 RX ORDER — CHOLECALCIFEROL (VITAMIN D3) 125 MCG
5 CAPSULE ORAL NIGHTLY
Start: 2019-11-15

## 2019-11-15 RX ORDER — BACLOFEN 10 MG/1
10 TABLET ORAL 3 TIMES DAILY
Start: 2019-11-15

## 2019-11-15 RX ORDER — OXYCODONE HYDROCHLORIDE AND ACETAMINOPHEN 5; 325 MG/1; MG/1
1 TABLET ORAL EVERY 8 HOURS PRN
Qty: 45 TABLET | Refills: 0 | Status: SHIPPED | OUTPATIENT
Start: 2019-11-15 | End: 2019-12-05

## 2019-11-15 RX ORDER — CYCLOBENZAPRINE HCL 10 MG
10 TABLET ORAL 3 TIMES DAILY PRN
Start: 2019-11-15

## 2019-11-15 RX ORDER — ATORVASTATIN CALCIUM 80 MG/1
80 TABLET, FILM COATED ORAL NIGHTLY
Start: 2019-11-15

## 2019-11-15 RX ADMIN — CLOPIDOGREL BISULFATE 75 MG: 75 TABLET ORAL at 08:03

## 2019-11-15 RX ADMIN — CARVEDILOL 25 MG: 25 TABLET, FILM COATED ORAL at 08:03

## 2019-11-15 RX ADMIN — GABAPENTIN 400 MG: 400 CAPSULE ORAL at 05:14

## 2019-11-15 RX ADMIN — BACLOFEN 20 MG: 10 TABLET ORAL at 05:14

## 2019-11-15 RX ADMIN — THERA TABS 1 TABLET: TAB at 08:03

## 2019-11-15 RX ADMIN — GABAPENTIN 400 MG: 400 CAPSULE ORAL at 00:18

## 2019-11-15 RX ADMIN — ASPIRIN 81 MG 81 MG: 81 TABLET ORAL at 08:03

## 2019-11-15 NOTE — DISCHARGE PLACEMENT REQUEST
"La Goel (73 y.o. Female)     Date of Birth Social Security Number Address Home Phone MRN    1945  [id number]   [facility] [address] [phone] [id number]    Catholic Marital Status          None        Admission Date Admission Type Admitting Provider Attending Provider Department, Room/Bed    11/6/19 Elective Castro West MD Hargrove, Kenneth R, MD [facility] ACUTE REHAB, 532/1    Discharge Date Discharge Disposition Discharge Destination         Skilled Nursing Facility (DC - External)              Attending Provider:  Castro West MD    Allergies:  Aspartame And Phenylalanine, Calcium Channel Blockers, Hydralazine Hcl, Hyzaar [Losartan Potassium-hctz], Lisinopril, Procardia [Nifedipine]    Isolation:  None   Infection:  None   Code Status:  No CPR    Ht:  157.5 cm (62\")   Wt:  70.3 kg (154 lb 14.4 oz)    Admission Cmt:  None   Principal Problem:  Left pontine stroke (CMS/HCC) [I63.50]                 Active Insurance as of 11/6/2019     Primary Coverage     Payor Plan Insurance Group Employer/Plan Group    MEDICARE MEDICARE A & B      Payor Plan Address Payor Plan Phone Number Payor Plan Fax Number Effective Dates    [address] [phone]  11/1/2012 - None Entered    [facility] [address]       Subscriber Name Subscriber Birth Date Member ID       LA GOEL 1945 [id number]           Secondary Coverage     Payor Plan Insurance Group Employer/Plan Group    [employer] [place] [id number]     Payor Plan Address Payor Plan Phone Number Payor Plan Fax Number Effective Dates    [address] [phone]  1/1/2017 - None Entered    [place] [address]       Subscriber Name Subscriber Birth Date Member ID       LA GOEL 1945 [id number]                 Emergency Contacts      (Rel.) Home Phone Work Phone Mobile Phone    Angela Posada (Power of ) [phone] -- " [phone]            Emergency Contact Information     Name Relation Home Work Mobile    Angela Posada Power of  [phone]          Insurance Information                MEDICARE/MEDICARE A & B Phone: [phone]    Subscriber: Rama Goel Subscriber#: [id number]    Group#:  Precert#:         [employer] Phone: [phone]    Subscriber: Rama Goel JOHN Subscriber#: [id number]    Group#: [id number] Precert#:         Only active medications are shown.   Scheduled Meds Sorted by Name   for Rama Goel as of 11/15/19 0854    Legend:                          Inactive     Active     Linked               Medications 11/15/19 11/16/19 11/17/19 11/18/19 11/19/19 11/20/19 11/21/19   aspirin chewable tablet 81 mg   Dose: 81 mg  Freq: Daily Route: PO  Start: 11/07/19 0900    Admin Instructions:   Herbal/drug interaction: Avoid use with ginkgo biloba.  Do not exceed 4 grams of aspirin in a 24 hr period.    If given for pain, use the following pain scale:   Mild Pain = Pain Score of 1-3, CPOT 1-2  Moderate Pain = Pain Score of 4-6, CPOT 3-4  Severe Pain = Pain Score of 7-10, CPOT 5-8    0803      0900      0900      0900      0900      0900      0900        atorvastatin (LIPITOR) tablet 80 mg   Dose: 80 mg  Freq: Nightly Route: PO  Start: 11/06/19 2100    Admin Instructions:   Avoid grapefruit juice.    2100 2100 2100 2100 2100 2100 2100        baclofen (LIORESAL) tablet 20 mg   Dose: 20 mg  Freq: Every 8 Hours Scheduled Route: PO  Start: 11/11/19 1400    Admin Instructions:   Take with food if GI upset occurs.    0514     1400     2200      0600     1400     2200      0600     1400     2200      0600     1400     2200      0600     1400     2200      0600     1400     2200      0600     1400     2200        carvedilol (COREG) tablet 25 mg   Dose: 25 mg  Freq: 2 Times Daily With Meals Route: PO  Start: 11/06/19 1800     Admin Instructions:   Give with food.    0803     1800      0800     1800      0800     1800      0800     1800      0800     1800      0800     1800      0800     1800        clopidogrel (PLAVIX) tablet 75 mg   Dose: 75 mg  Freq: Daily Route: PO  Start: 11/07/19 0900    0803      0900      0900      0900      0900      0900      0900        enoxaparin (LOVENOX) syringe 40 mg   Dose: 40 mg  Freq: Every 24 Hours Route: SC  Indications of Use: PROPHYLAXIS OF VENOUS THROMBOEMBOLISM  Start: 11/06/19 2100    Admin Instructions:   Give subcutaneous in abdomen only. Do not massage site after injection.    2100 2100 2100 2100 2100 2100 2100        gabapentin (NEURONTIN) capsule 400 mg   Dose: 400 mg  Freq: Every 6 Hours Route: PO  Start: 11/06/19 1800    Admin Instructions:   Pt states she takes this med at 6am, noon, 6 pm and midnight  {DOLORES    0018     0514     1200     1800      0000     0600     1200     1800      0000     0600     1200     1800      0000     0600     1200     1800      0000     0600     1200     1800      0000     0600     1200     1800      0000     0600     1200     1800        rOPINIRole (REQUIP) tablet 1 mg   Dose: 1 mg  Freq: Nightly Route: PO  Start: 11/08/19 2100 2100 2100 2100 2100 2100 2100 2100        sennosides-docusate (PERICOLACE) 8.6-50 MG per tablet 1 tablet   Dose: 1 tablet  Freq: Nightly Route: PO  Start: 11/11/19 2100 2100 2100 2100 2100 2100 2100 2100        THERA tablet 1 tablet   Dose: 1 tablet  Freq: Daily Route: PO  Start: 11/07/19 0900    0803      0900      0900      0900      0900      0900      0900        Medications 11/15/19 11/16/19 11/17/19 11/18/19 11/19/19 11/20/19 11/21/19       Continuous Meds Sorted by Name   for Rama Goel as of 11/15/19 0854    Legend:                          Inactive     Active     Linked               Medications 11/15/19 11/16/19 11/17/19  11/18/19 11/19/19 11/20/19 11/21/19       PRN Meds Sorted by Name   for Rama Goel as of 11/15/19 0854    Legend:                          Inactive     Active     Linked               Medications 11/15/19 11/16/19 11/17/19 11/18/19 11/19/19 11/20/19 11/21/19   acetaminophen (TYLENOL) tablet 650 mg   Dose: 650 mg  Freq: Every 6 Hours PRN Route: PO  PRN Reason: Mild Pain   Start: 11/10/19 1202    Admin Instructions:   Do not exceed 4 grams of acetaminophen in a 24 hr period.    If given for pain, use the following pain scale:   Mild Pain = Pain Score of 1-3, CPOT 1-2  Moderate Pain = Pain Score of 4-6, CPOT 3-4  Severe Pain = Pain Score of 7-10, CPOT 5-8             bisacodyl (DULCOLAX) suppository 10 mg   Dose: 10 mg  Freq: Daily PRN Route: RE  PRN Reason: Constipation  Start: 11/06/19 1646             calcium carbonate (TUMS) chewable tablet 500 mg (200 mg elemental)   Dose: 2 tablet  Freq: 2 Times Daily PRN Route: PO  PRN Reason: Heartburn  Start: 11/06/19 1642    Admin Instructions:   One tablet contains 200 mg elemental calcium.  Take with food.             cyclobenzaprine (FLEXERIL) tablet 10 mg   Dose: 10 mg  Freq: 3 Times Daily PRN Route: PO  PRN Reason: Muscle Spasms  Start: 11/06/19 1343             magnesium hydroxide (MILK OF MAGNESIA) suspension 2400 mg/10mL 10 mL   Dose: 10 mL  Freq: Daily PRN Route: PO  PRN Reason: Constipation  Start: 11/06/19 1646             melatonin tablet 5.25 mg   Dose: 5.25 mg  Freq: Nightly PRN Route: PO  PRN Reason: Sleep  Start: 11/06/19 1642             ondansetron (ZOFRAN) tablet 4 mg   Dose: 4 mg  Freq: Every 6 Hours PRN Route: PO  PRN Reasons: Nausea,Vomiting  Start: 11/06/19 1642             oxyCODONE-acetaminophen (PERCOCET) 5-325 MG per tablet 1 tablet   Dose: 1 tablet  Freq: Every 6 Hours PRN Route: PO  PRN Reason: Severe Pain   Start: 11/08/19 1025 End: 11/18/19 1024    Admin Instructions:   [DOLORES]    Do not exceed 4 grams of acetaminophen in a 24 hr  period.    If given for pain, use the following pain scale:   Mild Pain = Pain Score of 1-3, CPOT 1-2  Moderate Pain = Pain Score of 4-6, CPOT 3-4  Severe Pain = Pain Score of 7-10, CPOT 5-8       1024-D/C'd         Medications 11/15/19 11/16/19 11/17/19 11/18/19 11/19/19 11/20/19 11/21/19           Rama Goel #[id number] (CSN:[id number]) (73 y.o. F) (Adm: 11/06/19)   [facility] [id number]   Patient Admission Status Report     Has Admission Order? PTA Med Review Complete? Home Meds Reconciled? Current Orders Reconciled?   Yes Yes No No   Admission Orders     Start   Ordered   11/06/19 1643  Admit To Rehab Once      11/06/19 1646   11/06/19 1351  Admit To Rehab Once      11/06/19 1353   Vital Signs   Report   View Graph     11/12 0700 11/13 0659 11/13 0700  11/14 0659 11/14 0700  11/15 0659 11/15 0700  11/15 0855  Most Recent    Temp (°F)     95.9-98 98 96.7-97.4 96.5  96.5 (35.8)    Heart Rate     62-75 66-71 66-73 68  68    Resp     18 18 18 18  18    BP     124//66 143//67 123//69 146/63  146/63    Weight (kg)     71.3 71.8 70.3    70.3 kg (154 lb 14.4 oz)     Sticky Notes from Clinical Staff   Comment      Last edited by  on  at          Vital Signs (last 2 days)     Date/Time  Temp  Pulse  Resp  BP  Device (Oxygen Therapy)  SpO2   11/15/19 0742  96.5 (35.8)  68  18  146/63  room air  95   11/15/19 0718  --  --  --  --  room air  --   11/14/19 2000  96.7 (35.9)  66  18  123/60  room air  94   Comment rows:   OBSERV: / at 11/14/19 2000 11/14/19 1400  97.4 (36.3)  73  18  136/69  room air  96   11/14/19 0730  --  --  --  --  room air  --   11/13/19 1950  --  --  --  --  room air  --   11/13/19 1948  98 (36.7)  71  18  143/65  room air  98   11/13/19 1808  --  --  --  --  room air  --   11/13/19 1700  98 (36.7)  66  18  145/67  room air  96   11/13/19 0017  --  62  18  151/66  room air  96

## 2019-11-15 NOTE — PLAN OF CARE
Problem: Patient Care Overview  Goal: Plan of Care Review  Outcome: Ongoing (interventions implemented as appropriate)    Goal: Individualization and Mutuality  Outcome: Ongoing (interventions implemented as appropriate)    Goal: Discharge Needs Assessment  Outcome: Ongoing (interventions implemented as appropriate)    Goal: Home Safety Plan  Outcome: Ongoing (interventions implemented as appropriate)    Goal: Coping Plan  Outcome: Ongoing (interventions implemented as appropriate)    Goal: Community Reintegration Plan  Outcome: Ongoing (interventions implemented as appropriate)      Problem: Stroke (IRF) (Adult)  Goal: Promote Optimal Functional Lane  Outcome: Ongoing (interventions implemented as appropriate)      Problem: Fall Risk (Adult)  Goal: Absence of Fall  Outcome: Ongoing (interventions implemented as appropriate)      Problem: Pain, Chronic (Adult)  Goal: Acceptable Pain/Comfort Level and Functional Ability  Outcome: Ongoing (interventions implemented as appropriate)   11/15/19 0257   Pain, Chronic (Adult)   Acceptable Pain/Comfort Level and Functional Ability making progress toward outcome

## 2019-11-15 NOTE — DISCHARGE SUMMARY
[address] [address]  T - [phone]     Rehabilitation Discharge Summary       BRIEF OVERVIEW   PATIENT NAME: Rama Goel                      PHYSICIAN: Castro West MD  : 1945  MRN: [id number]    ADMISSION/DISCHARGE INFO   Admitting Provider: Castro West MD  Discharge Provider: Castro West MD  ADMISSION DATE: 2019   DISCHARGE DATE: November 15, 2019    ADMISSION DIAGNOSES: Left pontine stroke (CMS/HCC) [I63.50]  Left pontine stroke (CMS/HCC) [I63.50]  DISCHARGE DIAGNOSES:   Left pontine stroke (CMS/HCC)    Altered mental status    DJD (degenerative joint disease), multiple sites    Hypertension    Urinary hesitancy    Muscle spasm of both lower legs    Recurrent falls while walking    Dysarthria    Restless leg syndrome, uncontrolled      Primary Care Physician at Discharge: Inna Harris, APRN [phone]      Secondary Discharge Diagnosis  Patient Active Problem List   Diagnosis Code   • Altered mental status R41.82   • DJD (degenerative joint disease), multiple sites M15.9   • Hypertension I10   • Left pontine stroke (CMS/HCC) I63.50   • CVA (cerebral vascular accident) (CMS/HCC) I63.9   • Urinary hesitancy R39.11   • Muscle spasm of both lower legs M62.838   • Recurrent falls while walking R29.6   • Dysarthria R47.1   • Restless leg syndrome, uncontrolled G25.81       Discharge Disposition  Swing bed at [facility] for continued skilled nursing facility needs      Code Status at Discharge: DO NOT RESUSCITATE  Discharge condition:   good     CONSULTS   Consult Orders (all) (From admission, onward)    Start     Ordered    19 1116  Inpatient Case Management  Consult  Once     Provider:  (Not yet assigned)    19 1115    19 0931  Inpatient Case Management  Consult  Once,   Status:  Canceled     Provider:  (Not yet assigned)    19 0933    19  1643  Inpatient consult to Nutrition  Once     Provider:  (Not yet assigned)    11/06/19 1646    11/06/19 1344  Hospitalist (on-call MD unless specified)  Once,   Status:  Canceled     Specialty:  Hospitalist  Provider:  (Not yet assigned)    11/06/19 1345    11/06/19 1344  Inpatient Neurology Consult Stroke  Once,   Status:  Canceled     Specialty:  Neurology  Provider:  Anirudh Marquez MD    11/06/19 1345    11/06/19 1344  Inpatient Rehab Admission Consult  Once,   Status:  Canceled     Provider:  (Not yet assigned)    11/06/19 1345          DETAILS OF HOSPITAL STAY    Functional Status:                ADMIT             Discharge     Eating                                           5                        5   Oral hygiene                                 5                        5  Toilet hygiene                                10                        4  Shower/bathe self                         4                        5    Dressing upper body                     4                        5  Dressing lower body                     4                        4  Putting on/taking off footware       4                        5  Roll left and right                           4                        6  Sit to lying                                      4                        5  Lying to sitting on side of bed        4                        5  Sit to stand                                    4                        4  Chair/bed-to-chair transfer            4                        4  Toilet transfer                                4                        4  Car transfer                                   10                        10  Walk 10 feet                                  4                         4  Walk 50 ft. With two turns             4                         4  Walk 150 feet                                4                         4  Walk 10 ft uneven surface             88                         88  One step (Curb)                            88                        4  Four steps                                     88                        88  Twelve steps                                 88                        4  Picking up an object                      88                        4  Wheel 50 feet with two turns         4                        5  Wheel 150 feet                              4                        5  Expression of ideas and wants     4                        4  Understanding verbal and            non- verbal content             4                        4  Bowel/Bladder                              0/2                   0/0           Scoring as follows:    6-Independent                                    5-Setup or cleanup assistance only                                    4-Supervision or touching assistance                                    3-Partial/moderate assistance.  Craig does less than half.                                    2-Substantial/maximal assistance.  Craig does more than half                                    1-Dependent. Craig does all the effort or more than one person                                    07- patient refused                                    09-not applicable. Pt did not do prior to current illness                                    10-not attempted due to environmental limitations(equipment not available)                                    88-not attempted due to medical condition or safety concerns                                                              Rehabilitation Course she was admitted to the acute rehab unit and she participated well.  She did make good progress but in the evening she continued to have muscle spasms that were resistant to treatment.  I did increase medications with some improvement.  Dysarthria has improved strength range of motion of extremities improved but continued complaints of spasm lower extremities.  On  examination at the time of the spasms I could not document a physical examination consistent with muscle spasms  They were better throughout the day and tends to be worse in the evening    She has continued to progress.  She has had recurrent falls at home and has not been felt safe for her to return home by herself at this time.  She is continue to improve and expect in 2 to 3 weeks she will be able to return home    She has not agreed to make changes in the home so will be safer for her  She has tolerated a diet well.  She has participated well with therapy  Affect is improved        Heart Rate: 68  Resp: 18  BP: 146/63  Temp: 96.5 °F (35.8 °C)   Weight: 70.3 kg (154 lb 14.4 oz)    Pertinent Test Results:    Lab Results (last 72 hours)     Procedure Component Value Units Date/Time    POC Glucose Once [[id number]]  (Normal) Collected:  11/14/19 1153    Specimen:  Blood Updated:  11/14/19 1206     Glucose 75 mg/dL      Comment: : [id number] FRANCO KRISTAMeter ID: [id number]       Basic Metabolic Panel [[id number]]  (Abnormal) Collected:  11/12/19 0730    Specimen:  Blood Updated:  11/12/19 0825     Glucose 151 mg/dL      BUN 20 mg/dL      Creatinine 0.84 mg/dL      Sodium 139 mmol/L      Potassium 3.9 mmol/L      Chloride 100 mmol/L      CO2 29.0 mmol/L      Calcium 9.3 mg/dL      eGFR Non African Amer 67 mL/min/1.73      BUN/Creatinine Ratio 23.8     Anion Gap 10.0 mmol/L     Narrative:       GFR Normal >60  Chronic Kidney Disease <60  Kidney Failure <15        Imaging Results (All)     Procedure Component Value Units Date/Time    XR Foot 3+ View Right [[id number]] Collected:  11/08/19 0850     Updated:  11/08/19 1003    Narrative:       Procedure:  Right foot        Indication:  Right foot pain   .    Technique:  Three views   .    Prior relevant exam:  None.  1. Plantar calcaneal spur. Soft tissue calcifications plantar  aponeurosis suggesting changes of chronic plantar fasciitis.    2.Degenerative changes,  osteophytes seen in the dorsum of the  foot at the articulation between the tarsal navicular and  cuneiforms.    3.Mild degenerative changes first metatarsophalangeal joint.    The right foot is otherwise unremarkable.      Impression:       CONCLUSION: As above.    Electronically signed by:  Noman De La Rosa MD  11/8/2019 10:02 AM [facility]  Workstation: [id number]          Presenting Problem/History of Present Illness   Rama Goel is a 73 y.o. female   Patient is 73 y.o. female presents with complaints of altered mental status and localized weakness and recurrent falls.  Recently diagnosed with a pontine stroke on the left admitted to [facility] work-up showed the left pontine stroke.  She was discharged home but she has not done well.  She has had significant spasms in the lower extremities with pain and recurrent falls.  She says she is fallen about 20 times.  She came into the emergency room because of the persistent pain and spasm.  She was admitted to the hospital for further evaluation to rule out recurrent and or expansion of the stroke.   Work-up showed no acute changes but she did have a subacute left pontine stroke which was felt to be unchanged from findings on MRI at [facility]. Muscle spasms have been treated with muscle relaxers and she has improved  But seems to be worse in the evening.  Also she has known degenerative changes of her thoracic and lumbar spine with an extensive surgery approximately 1 year ago in 2018.  At that time she had a right pontine stroke.  She had some left-sided weakness and urinary retention requiring in and out catheterization for several months.  Over time her symptoms have resolved and she is done much better until this episode.     At this time she is able to void with some hesitancy, she has had some weakness in the lower extremities, she has some weakness in the right upper extremity.  Also had some mild dysarthria and oropharyngeal dysphasia.  She has been  working with therapy and we were  asked to admit her to the acute rehab unit    Physical Exam at Discharge   She is without pain this morning   alert and oriented in no acute distress  Mild dysarthria otherwise ENT unremarkable  Neck is supple no carotid bruits no JVD  Lungs are clear without rales rhonchi or wheezes  Heart rate is regular without murmurs gallops or rubs  Abdomen is soft nontender good bowel sounds no rebound guarding or rigidity    Moves all extremities well with good strength good range of motion.  Cranial nerves are intact  No obvious spasms noted              DISPOSITION   She will be going to [facility] with continued therapy  PT and OT have been ordered     DISCHARGE MEDICATIONS        Your medication list      START taking these medications      Instructions Last Dose Given Next Dose Due   acetaminophen 325 MG tablet  Commonly known as:  TYLENOL      Take 2 tablets by mouth Every 6 (Six) Hours As Needed for Mild Pain .       melatonin 5 MG tablet tablet      Take 1 tablet by mouth Every Night.       oxyCODONE-acetaminophen 5-325 MG per tablet  Commonly known as:  PERCOCET      Take 1 tablet by mouth Every 8 (Eight) Hours As Needed for Severe Pain  for up to 20 days.       sennosides-docusate 8.6-50 MG per tablet  Commonly known as:  PERICOLACE      Take 1 tablet by mouth Every Night.          CHANGE how you take these medications      Instructions Last Dose Given Next Dose Due   atorvastatin 80 MG tablet  Commonly known as:  LIPITOR  What changed:    · medication strength  · how much to take      Take 1 tablet by mouth Every Night.       baclofen 10 MG tablet  Commonly known as:  LIORESAL  What changed:  when to take this      Take 1 tablet by mouth 3 (Three) Times a Day.       cyclobenzaprine 10 MG tablet  Commonly known as:  FLEXERIL  What changed:    · how much to take  · when to take this  · reasons to take this      Take 1 tablet by mouth 3 (Three) Times a Day As Needed  for Muscle Spasms.          CONTINUE taking these medications      Instructions Last Dose Given Next Dose Due   aspirin 81 MG chewable tablet      Chew 81 mg Daily.       carvedilol 25 MG tablet  Commonly known as:  COREG      Take 25 mg by mouth 2 (Two) Times a Day With Meals.       clopidogrel 75 MG tablet  Commonly known as:  PLAVIX      Take 75 mg by mouth Daily.       gabapentin 300 MG capsule  Commonly known as:  NEURONTIN      Take 400 mg by mouth 4 (Four) Times a Day. Takes at 6 12 6 12       rOPINIRole 0.5 MG tablet  Commonly known as:  REQUIP      Take 0.5 mg by mouth Every Night. Take 1 hour before bedtime.       therapeutic multivitamin-minerals tablet      Take  by mouth.          STOP taking these medications    atenolol 100 MG tablet  Commonly known as:  TENORMIN        diazePAM 10 MG tablet  Commonly known as:  VALIUM        diazePAM 2 MG tablet  Commonly known as:  VALIUM        ibuprofen 200 MG tablet  Commonly known as:  ADVIL,MOTRIN        triamterene-hydrochlorothiazide 37.5-25 MG per capsule  Commonly known as:  DYAZIDE        vitamin b complex capsule capsule        vitamin D3 125 MCG (5000 UT) capsule capsule              Where to Get Your Medications      You can get these medications from any pharmacy    Bring a paper prescription for each of these medications  · oxyCODONE-acetaminophen 5-325 MG per tablet     Information about where to get these medications is not yet available    Ask your nurse or doctor about these medications  · acetaminophen 325 MG tablet  · atorvastatin 80 MG tablet  · baclofen 10 MG tablet  · cyclobenzaprine 10 MG tablet  · melatonin 5 MG tablet tablet  · sennosides-docusate 8.6-50 MG per tablet         INSTRUCTIONS   Activity: As tolerated    Diet: Regular diet    Special Instructions:   She will have physical therapy and Occupational Therapy in the swing bed      FOLLOW UP   Additional Instructions for the Follow-ups that You Need to Schedule     Ambulatory  Referral to Physical Therapy Evaluate and treat, Ortho, Neuro; Strengthening; Full weight bearing   As directed      Specialty needed:  Evaluate and treat Ortho Neuro    Exercises:  Strengthening    Weight Bearing Status:  Full weight bearing            Contact information for follow-up providers     Inna Harris APRN Follow up in 1 week(s).    Specialty:  Family Medicine  Contact information:  [address]  [place] [address] [phone]             Declan Friedman MD Follow up in 2 week(s).    Specialty:  Neurosurgery  Contact information:  [address] [facility] [address]  [phone]             Bharat Medrano MD Follow up in 2 week(s).    Specialty:  Urology  Why:  chronic urinary incontinence  Contact information:  [address]  [facility] [address] [phone]                   Contact information for after-discharge care     Destination     [facility] SWING BED .    Service:  Skilled Nursing  Contact information:  [address] [place] [address] [address] Yary Maldonado [address] [phone]                           Occupational Therapy has been ordered               This document has been electronically signed by Castro West MD on November 15, 2019 8:20 AM

## 2019-11-15 NOTE — SIGNIFICANT NOTE
11/15/19 1047   PT Discharge Summary   Expected Discharge Disposition (PT Eval) skilled nursing facility (SNF)   Reason for Discharge Discharge from facility;Per MD order   Outcomes Achieved Unable to make functional progress toward goals at this time   Discharge Destination SNF

## 2019-11-15 NOTE — PLAN OF CARE
Problem: Patient Care Overview  Goal: Plan of Care Review  Outcome: Ongoing (interventions implemented as appropriate)   11/15/19 6055   OTHER   Outcome Summary pt met 0 OT goals, progress slowed after it was recommended to pt she was not ready to go home and care for self, she was often tearfull and groggy, on d/c date she  was tearful throughout tx  she is d/c to snf at this time.    Patient Care Overview   IRF Plan of Care Review progress ongoing, continue   Progress, Functional Goals demonstrating adequate progress   Coping/Psychosocial   Plan of Care Reviewed With patient

## 2019-11-15 NOTE — THERAPY DISCHARGE NOTE
Inpatient Rehabilitation - Occupational Therapy Treatment Note/Discharge  [facility]     Patient Name: Rama Goel  : 1945  MRN: [id number]  Today's Date: 11/15/2019               Admit Date: 2019    Visit Dx:     ICD-10-CM ICD-9-CM   1. Left pontine stroke (CMS/HCC) I63.50 434.91   2. Dysarthria R47.1 784.51   3. Oropharyngeal dysphagia R13.12 787.22   4. Impaired physical mobility Z74.09 781.99   5. Impaired mobility and ADLs Z74.09 799.89     Patient Active Problem List   Diagnosis   • Altered mental status   • DJD (degenerative joint disease), multiple sites   • Hypertension   • Left pontine stroke (CMS/HCC)   • CVA (cerebral vascular accident) (CMS/HCC)   • Urinary hesitancy   • Muscle spasm of both lower legs   • Recurrent falls while walking   • Dysarthria   • Restless leg syndrome, uncontrolled       Therapy Treatment  IRF Treatment Summary     Row Name 11/15/19 0946 11/15/19 0815          Evaluation/Treatment Time and Intent    Subjective Information  -- upset over not going home today   -  no complaints  -     Existing Precautions/Restrictions  fall  -  fall  -JA     Document Type  therapy note (daily note)  -  therapy note (daily note)  -JA     Mode of Treatment  occupational therapy;individual therapy  -  physical therapy;individual therapy  -     Patient/Family Observations  in w/c   -RC  in w/c at [facility] station  -JA     Start Time (Evaluation/Treatment)  0946  -  0815  -JA     Stop Time (Evaluation/Treatment)  1000  -RC  0845  -JA     Recorded by [RC] Tiki Grover COTA/DIANA [JA] Ulysses Gill PTA     Row Name 11/15/19 0946 11/15/19 0815          Cognition/Psychosocial- PT/OT    Affect/Mental Status (Cognitive)  WFL tearful   -  WFL  -JA     Orientation Status (Cognition)  --  oriented x 4  -JA     Recorded by [RC] Tiki Grover COTA/DIANA [JA] Ulysses Gill PTA     Row Name 11/15/19 0815             Safety Issues, Functional Mobility    Impairments  Affecting Function (Mobility)  balance;endurance/activity tolerance;coordination;strength;pain  -JA      Recorded by [JA] Ulysses Gill PTA      Row Name 11/15/19 0946 11/15/19 0815          Pain Scale: Numbers Pre/Post-Treatment    Pain Scale: Numbers, Pretreatment  0/10 - no pain  -RC  0/10 - no pain  -JA     Pain Scale: Numbers, Post-Treatment  0/10 - no pain  -RC  0/10 - no pain  -JA     Recorded by [RC] Tiki Grover COTA/DIANA [JA] Ulysses Gill, PTA     Row Name 11/15/19 0946             Upper Extremity Seated Therapeutic Exercise    Comment, Seated Upper Extremity (Therapeutic Exercise)  six pack ex r ue   -RC      Recorded by [RC] Tiki Grover COTA/L      Row Name 11/15/19 0815             Lower Extremity Seated Therapeutic Exercise    Performed, Seated Lower Extremity (Therapeutic Exercise)  LAQ (long arc quad), knee extension;hip flexion/extension;ankle dorsiflexion/plantarflexion;other (see comments) Gsets   -JA      Exercise Type, Seated Lower Extremity (Therapeutic Exercise)  AROM (active range of motion);isometric contraction, static  -JA      Sets/Reps Detail, Seated Lower Extremity (Therapeutic Exercise)  x20  -JA      Recorded by [JA] Ulysses Gill, TOAN      Row Name 11/15/19 0815             Vital Signs    Pre Patient Position  Sitting  -JA      Intra Patient Position  Sitting  -JA      Post Patient Position  Sitting  -JA      Recorded by [JA] Ulysses Gill PTA      Row Name 11/15/19 0946 11/15/19 0815          Positioning and Restraints    Pre-Treatment Position  sitting in chair/recliner  -  sitting in chair/recliner  -JA     Post Treatment Position  wheelchair  -RC  wheelchair  -JA     In Wheelchair  call light within reach;encouraged to call for assist;exit alarm on  -RC  sitting;with nsg  -JA     Recorded by [KIERSTEN] Tiki Grover COTA/DIANA [JA] Ulysses Gill, PTA     Row Name 11/15/19 0946 11/15/19 0815          Daily Summary of Progress (PT)    Functional  Goal Overall Progress: Physical Therapy  progressing towards functional goals slower than expected  -RC  progressing towards functional goals slower than expected  -LISA     Recommendations: Physical Therapy  d/c to SNF today   -RC  D/c to SNF this p.m.   -LISA     Recorded by [RC] Tiki Grover COTA/DIANA [JA] Ulysses Gill, PTA       User Key  (r) = Recorded By, (t) = Taken By, (c) = Cosigned By    Initials Name Effective Dates    Ulysses Delarosa, PTA 03/07/18 -     RC Tiki Grover COTA/DIANA 03/07/18 -                  OT IRF GOALS     Row Name 11/15/19 0946 11/14/19 1345 11/13/19 1340       Bathing Goal 1 (OT-IRF)    Activity/Device (Bathing Goal 1, OT-IRF)  bathing skills, all  -RC  bathing skills, all  -RC  bathing skills, all  -RC    [place] Level (Bathing Goal 1, OT-IRF)  conditional independence  -RC  conditional independence  -RC  conditional independence  -RC    Time Frame (Bathing Goal 1, OT-IRF)  long term goal (LTG);by discharge  -RC  long term goal (LTG);by discharge  -RC  long term goal (LTG);by discharge  -RC    Progress/Outcomes (Bathing Goal 1, OT-IRF)  goal not met  -RC  goal not met  -RC  goal not met  -RC       UB Dressing Goal 1 (OT-IRF)    Activity/Device (UB Dressing Goal 1, OT-IRF)  upper body dressing  -RC  upper body dressing  -RC  upper body dressing  -RC    [place] (UB Dress Goal 1, OT-IRF)  independent  -RC  independent  -RC  independent  -RC    Time Frame (UB Dressing Goal 1, OT-IRF)  long term goal (LTG);by discharge  -RC  long term goal (LTG);by discharge  -RC  long term goal (LTG);by discharge  -RC    Progress/Outcomes (UB Dressing Goal 1, OT-IRF)  goal not met  -RC  goal not met  -RC  goal not met  -RC       LB Dressing Goal 1 (OT-IRF)    Activity/Device (LB Dressing Goal 1, OT-IRF)  lower body dressing  -RC  lower body dressing  -RC  lower body dressing  -RC    [place] (LB Dressing Goal 1, OT-IRF)  conditional independence  -RC  conditional independence   -RC  conditional independence  -RC    Time Frame (LB Dressing Goal 1, OT-IRF)  long term goal (LTG);by discharge  -RC  long term goal (LTG);by discharge  -RC  long term goal (LTG);by discharge  -RC    Progress/Outcomes (LB Dressing Goal 1, OT-IRF)  goal not met  -RC  goal not met  -RC  goal not met  -RC    Row Name 11/12/19 1340 11/11/19 1330 11/09/19 0906       Bathing Goal 1 (OT-IRF)    Activity/Device (Bathing Goal 1, OT-IRF)  bathing skills, all  -RC  bathing skills, all  -RC  bathing skills, all  -KD    [place] Level (Bathing Goal 1, OT-IRF)  conditional independence  -RC  conditional independence  -RC  conditional independence  -KD    Time Frame (Bathing Goal 1, OT-IRF)  long term goal (LTG);by discharge  -RC  long term goal (LTG);by discharge  -RC  long term goal (LTG);by discharge  -KD    Progress/Outcomes (Bathing Goal 1, OT-IRF)  goal not met  -RC  goal not met  -RC  goal not met  -KD       UB Dressing Goal 1 (OT-IRF)    Activity/Device (UB Dressing Goal 1, OT-IRF)  upper body dressing  -RC  upper body dressing  -RC  upper body dressing  -KD    [place] (UB Dress Goal 1, OT-IRF)  independent  -RC  independent  -RC  independent  -KD    Time Frame (UB Dressing Goal 1, OT-IRF)  long term goal (LTG);by discharge  -RC  long term goal (LTG);by discharge  -RC  long term goal (LTG);by discharge  -KD    Progress/Outcomes (UB Dressing Goal 1, OT-IRF)  goal not met  -RC  goal not met  -RC  goal not met  -KD       LB Dressing Goal 1 (OT-IRF)    Activity/Device (LB Dressing Goal 1, OT-IRF)  lower body dressing  -RC  lower body dressing  -RC  lower body dressing  -KD    [place] (LB Dressing Goal 1, OT-IRF)  conditional independence  -RC  conditional independence  -RC  conditional independence  -KD    Time Frame (LB Dressing Goal 1, OT-IRF)  long term goal (LTG);by discharge  -RC  long term goal (LTG);by discharge  -RC  long term goal (LTG);by discharge  -KD    Progress/Outcomes (LB Dressing Goal 1, OT-IRF)   goal not met  -RC  goal not met  -RC  goal not met  -KD    Row Name 11/08/19 1355 11/07/19 1051          Bathing Goal 1 (OT-IRF)    Activity/Device (Bathing Goal 1, OT-IRF)  bathing skills, all  -RC  bathing skills, all  -ME     [place] Level (Bathing Goal 1, OT-IRF)  conditional independence  -RC  conditional independence  -ME     Time Frame (Bathing Goal 1, OT-IRF)  long term goal (LTG);by discharge  -RC  long term goal (LTG);by discharge  -ME     Progress/Outcomes (Bathing Goal 1, OT-IRF)  goal not met  -RC  goal not met  -ME        UB Dressing Goal 1 (OT-IRF)    Activity/Device (UB Dressing Goal 1, OT-IRF)  upper body dressing  -RC  upper body dressing  -ME     [place] (UB Dress Goal 1, OT-IRF)  independent  -RC  independent  -ME     Time Frame (UB Dressing Goal 1, OT-IRF)  long term goal (LTG);by discharge  -RC  long term goal (LTG);by discharge  -ME     Progress/Outcomes (UB Dressing Goal 1, OT-IRF)  goal not met  -RC  goal not met  -ME        LB Dressing Goal 1 (OT-IRF)    Activity/Device (LB Dressing Goal 1, OT-IRF)  lower body dressing  -RC  lower body dressing  -ME     [place] (LB Dressing Goal 1, OT-IRF)  conditional independence  -RC  conditional independence  -ME     Time Frame (LB Dressing Goal 1, OT-IRF)  long term goal (LTG);by discharge  -RC  long term goal (LTG);by discharge  -ME     Progress/Outcomes (LB Dressing Goal 1, OT-IRF)  goal not met  -RC  goal not met  -ME       User Key  (r) = Recorded By, (t) = Taken By, (c) = Cosigned By    Initials Name Provider Type    Tiki Deleon COTA/L Occupational Therapy Assistant    Carola Serrano COTA/L Occupational Therapy Assistant    Che Hinkle OT Occupational Therapist          Occupational Therapy Education     Title: PT OT SLP Therapies (In Progress)     Topic: Occupational Therapy (In Progress)     Point: ADL training (In Progress)     Description: Instruct learner(s) on proper safety adaptation and remediation  techniques during self care or transfers.   Instruct in proper use of assistive devices.    Learning Progress Summary           Patient Acceptance, E, NR by RC at 11/14/2019  2:46 PM    Acceptance, E, NR by RC at 11/13/2019  1:57 PM    Acceptance, E, NR by RC at 11/12/2019  2:07 PM    Acceptance, E, VU,DU by ME at 11/7/2019  3:40 PM    Comment:  Educated on OT and POC. Educated to continue exercises given to her for translation and fine motor coordination. Educated to call for assist. Educated on hand placement for transfers. Educated on safe mechanics for completing ADLs.                   Point: Home exercise program (Done)     Description: Instruct learner(s) on appropriate technique for monitoring, assisting and/or progressing therapeutic exercises/activities.    Learning Progress Summary           Patient Acceptance, E, VU,DU by ME at 11/7/2019  3:40 PM    Comment:  Educated on OT and POC. Educated to continue exercises given to her for translation and fine motor coordination. Educated to call for assist. Educated on hand placement for transfers. Educated on safe mechanics for completing ADLs.                   Point: Precautions (In Progress)     Description: Instruct learner(s) on prescribed precautions during self-care and functional transfers.    Learning Progress Summary           Patient Acceptance, E, NR by RC at 11/14/2019  2:46 PM    Acceptance, E, NR by RC at 11/13/2019  1:57 PM    Acceptance, E, NR by RC at 11/12/2019  2:07 PM    Acceptance, E, NR by RC at 11/11/2019  1:53 PM    Acceptance, E, NR by RC at 11/8/2019  2:46 PM    Acceptance, E, VU,DU by ME at 11/7/2019  3:40 PM    Comment:  Educated on OT and POC. Educated to continue exercises given to her for translation and fine motor coordination. Educated to call for assist. Educated on hand placement for transfers. Educated on safe mechanics for completing ADLs.                   Point: Body mechanics (In Progress)     Description: Instruct  learner(s) on proper positioning and spine alignment during self-care, functional mobility activities and/or exercises.    Learning Progress Summary           Patient Acceptance, E, NR by  at 11/14/2019  2:46 PM    Acceptance, E, NR by  at 11/13/2019  1:57 PM    Acceptance, E, NR by  at 11/12/2019  2:07 PM    Acceptance, E, NR by  at 11/11/2019  1:53 PM    Acceptance, E, NR by  at 11/8/2019  2:46 PM                               User Key     Initials Effective Dates Name Provider Type Discipline     03/07/18 -  Tiki Grover COTA/L Occupational Therapy Assistant OT    ME 09/10/19 -  Che Oh OT Occupational Therapist OT                  OT Recommendation and Plan  Anticipated Discharge Disposition (OT): home with home health, home with assist  Plan of Care Review  Plan of Care Reviewed With: patient  Daily Summary of Progress (OT)  Functional Goal Overall Progress: Occupational Therapy: progressing towards functional goals slower than expected  Recommendations: Occupational Therapy: cont poc   Plan of Care Reviewed With: patient  IRF Plan of Care Review: progress ongoing, continue  Progress, Functional Goals: demonstrating adequate progress  Outcome Summary: pt met 0 OT goals, progress slowed after it was recommended to pt she was not ready to go home and care for self, she was often tearfull and groggy,  she is d/c to snf  at this time.      Outcome Measures     Row Name 11/15/19 0946 11/15/19 0815 11/14/19 1345       How much help from another person do you currently need...    Turning from your back to your side while in flat bed without using bedrails?  --  4  -JA  --    Moving from lying on back to sitting on the side of a flat bed without bedrails?  --  3  -JA  --    Moving to and from a bed to a chair (including a wheelchair)?  --  3  -JA  --    Standing up from a chair using your arms (e.g., wheelchair, bedside chair)?  --  3  -JA  --    Climbing 3-5 steps with a railing?  --  3  -JA   --    To walk in hospital room?  --  3  -JA  --    AM-PAC 6 Clicks Score (PT)  --  19  -JA  --       How much help from another is currently needed...    Putting on and taking off regular lower body clothing?  3  -RC  --  3  -RC    Bathing (including washing, rinsing, and drying)  3  -RC  --  3  -RC    Toileting (which includes using toilet bed pan or urinal)  3  -RC  --  3  -RC    Putting on and taking off regular upper body clothing  4  -RC  --  4  -RC    Taking care of personal grooming (such as brushing teeth)  4  -RC  --  4  -RC    Eating meals  4  -RC  --  4  -RC    AM-PAC 6 Clicks Score (OT)  21  -RC  --  21  -RC       Functional Assessment    Outcome Measure Options  --  AM-PAC 6 Clicks Basic Mobility (PT)  -JA  --    Row Name 11/14/19 0820 11/13/19 1340 11/13/19 0815       How much help from another person do you currently need...    Turning from your back to your side while in flat bed without using bedrails?  4  -JA  --  4  -JA    Moving from lying on back to sitting on the side of a flat bed without bedrails?  3  -JA  --  3  -JA    Moving to and from a bed to a chair (including a wheelchair)?  3  -JA  --  3  -JA    Standing up from a chair using your arms (e.g., wheelchair, bedside chair)?  3  -JA  --  3  -JA    Climbing 3-5 steps with a railing?  3  -JA  --  3  -JA    To walk in hospital room?  3  -JA  --  3  -JA    AM-PAC 6 Clicks Score (PT)  19  -JA  --  19  -JA       How much help from another is currently needed...    Putting on and taking off regular lower body clothing?  --  3  -RC  --    Bathing (including washing, rinsing, and drying)  --  3  -RC  --    Toileting (which includes using toilet bed pan or urinal)  --  3  -RC  --    Putting on and taking off regular upper body clothing  --  4  -RC  --    Taking care of personal grooming (such as brushing teeth)  --  4  -RC  --    Eating meals  --  4  -RC  --    AM-PAC 6 Clicks Score (OT)  --  21  -RC  --       Functional Assessment    Outcome  Measure Options  AM-PAC 6 Clicks Basic Mobility (PT)  -LISA  --  AM-PAC 6 Clicks Basic Mobility (PT)  -LISA    Row Name 11/12/19 0121             How much help from another is currently needed...    Putting on and taking off regular lower body clothing?  3  -RC      Bathing (including washing, rinsing, and drying)  3  -RC      Toileting (which includes using toilet bed pan or urinal)  3  -RC      Putting on and taking off regular upper body clothing  4  -RC      Taking care of personal grooming (such as brushing teeth)  4  -RC      Eating meals  4  -RC      AM-PAC 6 Clicks Score (OT)  21  -RC        User Key  (r) = Recorded By, (t) = Taken By, (c) = Cosigned By    Initials Name Provider Type    Ulysses Delarosa, PTA Physical Therapy Assistant    Tiki Deleon COTA/L Occupational Therapy Assistant                      Self-Care Admission Goal Date Date Date Date Date Discharge   Eating: The ability to use suitable utensils to bring food and/or liquid to the mouth and swallow food and/or liquid once the meal is placed before the patient.        5   Oral hygiene: The ability to use suitable items to clean teeth. Dentures (if applicable): The ability to insert and remove dentures into and from the mouth, and manage denture soaking and rinsing with use of equipment.        5   Toileting hygiene: The ability to maintain perineal hygiene, adjust clothes before and after voiding or having a bowel movement. If managing an ostomy, include wiping the opening but not managing equipment.        4   Shower/bathe self: The ability to bathe self, including washing, rinsing, and drying self (excludes washing of back and hair). Does not include transferring in/out of tub/shower.        4   Upper body dressing: The ability to dress and undress above the waist; including fasteners, if applicable.        5   Lower body dressing: The ability to dress and undress below the waist, including fasteners; does not include footwear.         3   Putting on/taking off footwear: The ability to put on and take off socks and shoes or other footwear that is appropriate for safe mobility; including fasteners, if applicable.        3         Time Calculation:    Time Calculation- OT     Row Name 11/15/19 1148             Time Calculation- OT    OT Start Time  0946  -RC      OT Stop Time  1000  -RC      OT Time Calculation (min)  14 min  -      Total Timed Code Minutes- OT  14 minute(s)  -      OT Received On  11/15/19  -        User Key  (r) = Recorded By, (t) = Taken By, (c) = Cosigned By    Initials Name Provider Type     Tiki Grover, MITCHELL/L Occupational Therapy Assistant          Therapy Charges for Today     Code Description Service Date Service Provider Modifiers Qty    40388054838 HC OT THERAPEUTIC ACT EA 15 MIN 11/14/2019 Tiki Grover, MITCHELL/L GO 2    81708449214 HC OT THER PROC EA 15 MIN 11/14/2019 Tiki Grover, MITCHELL/L GO 2    48759628892 HC OT SELF CARE/MGMT/TRAIN EA 15 MIN 11/14/2019 Tiki Grover, MITCHELL/L GO 2    55020386911 HC OT THERAPEUTIC ACT EA 15 MIN 11/14/2019 Tiki Grover, MITCHELL/L GO 1    72192787698 HC OT THER PROC EA 15 MIN 11/15/2019 Tiki Grover, MITCHELL/L GO 1               OT Discharge Summary  Anticipated Discharge Disposition (OT): home with home health, home with assist  Reason for Discharge: Per MD order, Discharge from facility  Outcomes Achieved: Unable to make functional progress toward goals at this time  Discharge Destination: SNF    Tiki Grover, MITCHELL/L  11/15/2019

## 2019-11-15 NOTE — THERAPY DISCHARGE NOTE
Inpatient Rehabilitation - Physical Therapy Treatment Note/Discharge  [facility]     Patient Name: Rama Goel  : 1945  MRN: [id number]  Today's Date: 11/15/2019             Admit Date: 2019    Visit Dx:    ICD-10-CM ICD-9-CM   1. Left pontine stroke (CMS/HCC) I63.50 434.91   2. Dysarthria R47.1 784.51   3. Oropharyngeal dysphagia R13.12 787.22   4. Impaired physical mobility Z74.09 781.99   5. Impaired mobility and ADLs Z74.09 799.89     Patient Active Problem List   Diagnosis   • Altered mental status   • DJD (degenerative joint disease), multiple sites   • Hypertension   • Left pontine stroke (CMS/HCC)   • CVA (cerebral vascular accident) (CMS/HCC)   • Urinary hesitancy   • Muscle spasm of both lower legs   • Recurrent falls while walking   • Dysarthria   • Restless leg syndrome, uncontrolled       Physical Therapy Education     Title: PT OT SLP Therapies (In Progress)     Topic: Physical Therapy (Done)     Point: Mobility training (Done)     Learning Progress Summary           Patient Acceptance, E,TB,D, VU,NR by LISA at 2019 10:10 AM    Comment:  Discussed with pt. need for /7 assist at home upon d/c due to cont'd safety awareness and unsteady with gt. at times, muscle spasms, & B LE weakness observed at night with bathroom transfer per nsg.    Acceptance, E,TB,D, VU,DU by LISA at 2019 10:21 AM    Comment:  Reinforced erect posture with gt. , proper hand placement with stand-sit,  need for assist upon d/c at home due to pt. at risk for fall at this time & due to caring for animals at home.    Acceptance, E, VU,NR by ALEISHA at 2019  1:02 PM    Comment:  Mellisa assessed: / or moderate fall risk.  Recommend continued use of FWW.  Tag alarm applied.                   Point: Home exercise program (Done)     Learning Progress Summary           Patient Acceptance, E, VU,DU by LISA at 2019 10:33 AM    Comment:  Reviewed HEP with pictoral description, transfer safety, log roll  tech. Reinforced pt. need for 24/7 care upon d/c due to pt. at high risk for fall. Pt. reports will not have that assist.                   Point: Body mechanics (Done)     Learning Progress Summary           Patient Acceptance, E,TB,D, VU,NR by LISA at 11/13/2019 10:10 AM    Comment:  Discussed with pt. need for 24/7 assist at home upon d/c due to cont'd safety awareness and unsteady with gt. at times, muscle spasms, & B LE weakness observed at night with bathroom transfer per nsg.    Acceptance, E,TB,D, VU,DU by LISA at 11/11/2019 10:21 AM    Comment:  Reinforced erect posture with gt. , proper hand placement with stand-sit,  need for assist upon d/c at home due to pt. at risk for fall at this time & due to caring for animals at home.                   Point: Precautions (Done)     Learning Progress Summary           Patient Acceptance, E,TB,D, VU,NR by LISA at 11/13/2019 10:10 AM    Comment:  Discussed with pt. need for 24/7 assist at home upon d/c due to cont'd safety awareness and unsteady with gt. at times, muscle spasms, & B LE weakness observed at night with bathroom transfer per nsg.    Acceptance, E,TB,D, VU,DU by LISA at 11/11/2019 10:21 AM    Comment:  Reinforced erect posture with gt. , proper hand placement with stand-sit,  need for assist upon d/c at home due to pt. at risk for fall at this time & due to caring for animals at home.                               User Key     Initials Effective Dates Name Provider Type Discipline     03/07/18 -  Ulysses Gill PTA Physical Therapy Assistant PT     04/03/18 -  Hermilo Coronel PT Physical Therapist PT              PT IRF GOALS     Row Name 11/15/19 0815             Bed Mobility Goal 1 (PT-IRF)    Activity/Assistive Device (Bed Mobility Goal 1, PT-IRF)  sit to supine/supine to sit  -      [place] Level (Bed Mobility Goal 1, PT-IRF)  independent  -      Time Frame (Bed Mobility Goal 1, PT-IRF)  short term goal (STG);5 - 7 days  -       Progress/Outcomes (Bed Mobility Goal 1, PT-IRF)  goal not met  -JA         Transfer Goal 1 (PT-IRF)    Activity/Assistive Device (Transfer Goal 1, PT-IRF)  sit-to-stand/stand-to-sit;bed-to-chair/chair-to-bed  -JA      [place] Level (Transfer Goal 1, PT-IRF)  conditional independence  -JA      Time Frame (Transfer Goal 1, PT-IRF)  short term goal (STG);5 - 7 days  -JA      Barriers (Transfer Goal 1, PT-IRF)  Impulsive at times.   -JA      Progress/Outcomes (Transfer Goal 1, PT-IRF)  goal not met  -JA         Gait/Walking Locomotion Goal 1 (PT-IRF)    Activity/Assistive Device (Gait/Walking Locomotion Goal 1, PT-IRF)  walker, rolling  -JA      Gait/Walking Locomotion Distance Goal 1 (PT-IRF)  150'x2  -JA      [place] Level (Gait/Walking Locomotion Goal 1, PT-IRF)  conditional independence  -JA      Time Frame (Gait/Walking Locomotion Goal 1, PT-IRF)  long term goal (LTG);by discharge  -JA      Barriers (Gait/Walking Locomotion Goal 1, PT-IRF)  Stooped posture.   -JA      Progress/Outcomes (Gait/Walking Locomotion Goal 1, PT-IRF)  goal not met  -JA         Gait/Walking Locomotion Goal (PT-IRF)    Gait/Walking Locomotion Goal (PT-IRF)  Tinetti fall risk, score: 25/28  -JA      Time Frame (Gait/Walking Locomotion Goal, PT-IRF)  long term goal (LTG);by discharge  -JA      Progress/Outcomes (Gait/Walking Locomotion Goal, PT-IRF)  goal not met  -JA         Stairs Goal 1 (PT-IRF)    Activity/Assistive Device (Stairs Goal 1, PT-IRF)  using handrail, right  -JA      Number of Stairs (Stairs Goal 1, PT-IRF)  4 steps, R rail.   -JA      [place] Level (Stairs Goal 1, PT-IRF)  conditional independence  -JA      Time Frame (Stairs Goal 1, PT-IRF)  long term goal (LTG);by discharge  -JA      Progress/Outcomes (Stairs Goal 1, PT-IRF)  goal not met  -JA        User Key  (r) = Recorded By, (t) = Taken By, (c) = Cosigned By    Initials Name Provider Type    Ulysses Delarosa, PTA Physical Therapy Assistant           Therapy Treatment    IRF Treatment Summary     Row Name 11/15/19 0815             Evaluation/Treatment Time and Intent    Subjective Information  no complaints  -JA      Existing Precautions/Restrictions  fall  -JA      Document Type  therapy note (daily note)  -JA      Mode of Treatment  physical therapy;individual therapy  -JA      Patient/Family Observations  in w[place] [facility]  -JA      Start Time (Evaluation/Treatment)  0815  -JA      Stop Time (Evaluation/Treatment)  0845  -JA      Recorded by [JA] Ulysses Gill, TOAN      Row Name 11/15/19 0815             Cognition/Psychosocial- PT/OT    Affect/Mental Status (Cognitive)  WFL  -JA      Orientation Status (Cognition)  oriented x 4  -JA      Recorded by [LISA] Ulysses Gill, [facility]      Row Name 11/15/19 0815             Safety Issues, Functional Mobility    Impairments Affecting Function (Mobility)  balance;endurance/activity tolerance;coordination;strength;pain  -JA      Recorded by [JA] Ulysses Gill, TOAN      Row Name 11/15/19 0815             Pain Scale: Numbers Pre/Post-Treatment    Pain Scale: Numbers, Pretreatment  0/10 - no pain  -JA      Pain Scale: Numbers, Post-Treatment  0/10 - no pain  -JA      Recorded by [JA] Ulysses Gill, [facility]      Row Name 11/15/19 0815             Lower Extremity Seated Therapeutic Exercise    Performed, Seated Lower Extremity (Therapeutic Exercise)  LAQ (long arc quad), knee extension;hip flexion/extension;ankle dorsiflexion/plantarflexion;other (see comments) Gsets   -JA      Exercise Type, Seated Lower Extremity (Therapeutic Exercise)  AROM (active range of motion);isometric contraction, static  -JA      Sets/Reps Detail, Seated Lower Extremity (Therapeutic Exercise)  x20  -JA      Recorded by [JA] Ulysses Gill, [facility]      Row Name 11/15/19 0815             Vital Signs    Pre Patient Position  Sitting  -JA      Intra Patient Position  Sitting  -JA      Post Patient Position  Sitting  -JA       Recorded by [LISA] Ulysses Gill PTA      Row Name 11/15/19 0815             Positioning and Restraints    Pre-Treatment Position  sitting in chair/recliner  -LISA      Post Treatment Position  wheelchair  -JA      In Wheelchair  sitting;with nsg  -JA      Recorded by [LISA] Ulysses Gill PTA      Row Name 11/15/19 0815             Daily Summary of Progress (PT)    Functional Goal Overall Progress: Physical Therapy  progressing towards functional goals slower than expected  -LISA      Recommendations: Physical Therapy  D/c to SNF this p.m.   -LISA      Recorded by [LISA] Ulysses Gill PTA        User Key  (r) = Recorded By, (t) = Taken By, (c) = Cosigned By    Initials Name Effective Dates    Ulysses Delarosa PTA 03/07/18 -          Mobility  Admission Goal Date Date Date Date Date Discharge   A.Roll left and right: The ability to roll from lying on back to left and right side, and return to lying on back on the bed.        6   B.Sit to lying: The ability to move from sitting on side of bed to lying flat on the bed.        5   C. Lying to sitting on side of bed: The ability to move from lying on the back to sitting on the side of the bed with feet flat on the floor, and with no back support.        5   D.Sit to stand: The ability to come to a standing position from sitting in a chair, wheelchair, or on the side of the bed.        4   E. Chair/bed-to-chair transfer: The ability to transfer to and from a bed to a chair (or wheelchair).        4   F.Toilet transfer: The ability to get on and off a toilet or commode.           4   G. Car Transfer: the ability to transfer in and out of a car or van on the passenger side.         10   I.Walk 10 feet: Once standing, the ability to walk at least 10 feet in a room, corridor, or similar space. If admission performance is coded 07, 09, 10, or 88 Skip to [id number], 1 step (curb)        4   J.Walk 50 feet with two turns: Once standing, the ability to walk at  least 50 feet and make two turns.        4   K.Walk 150 feet: Once standing, the ability to walk at least 150 feet in a corridor or similar space.        4   L.Walking 10 feet on uneven surfaces: The ability to walk 10 feet on uneven or sloping surfaces (indoor or outdoor), such as turf or gravel.        88   M.1 step (curb): The ability to go up and down a curb and/or up and down one step. If admission performance is coded 07, 09, 10, or 88 Skip to [id number], Picking up object        4   N.4 steps: The ability to go up and down four steps with or without a rail. If admission performance is coded 07, 09, 10, or 88 Skip to [id number], Picking up object        4   O.12 steps: The ability to go up and down 12 steps with or without a rail.        88   P.Picking up object: The ability to bend/stoop from a standing position to  a small object, such as a spoon, from the floor. (Does pt use a wheelchair and/or scooter?) If YES, go to R.         4   R. Wheel 50 feet with two turns: Once seated in wheelchair/scooter, the ability to wheel at least 50 feet and make two turns.        5   S.Wheel 150 feet: Once seated in wheelchair/scooter, the ability to wheel at least 150 feet in a corridor or similar space.        5           PT Recommendation and Plan  Planned Therapy Interventions (PT Eval): bed mobility training, balance training, gait training, patient/family education, postural re-education, stair training, strengthening, stretching, transfer training  Daily Summary of Progress (PT): progress toward functional goals is gradual  Plan for Continued Treatment (PT): Cont to mobilize  Plan of Care Reviewed With: patient    Outcome Measures     Row Name 11/15/19 0815 11/14/19 1345 11/14/19 0820       How much help from another person do you currently need...    Turning from your back to your side while in flat bed without using bedrails?  4  -JA  --  4  -JA    Moving from lying on back to sitting on the side of a flat bed  without bedrails?  3  -JA  --  3  -JA    Moving to and from a bed to a chair (including a wheelchair)?  3  -JA  --  3  -JA    Standing up from a chair using your arms (e.g., wheelchair, bedside chair)?  3  -JA  --  3  -JA    Climbing 3-5 steps with a railing?  3  -JA  --  3  -JA    To walk in hospital room?  3  -JA  --  3  -JA    AM-PAC 6 Clicks Score (PT)  19  -JA  --  19  -JA       How much help from another is currently needed...    Putting on and taking off regular lower body clothing?  --  3  -RC  --    Bathing (including washing, rinsing, and drying)  --  3  -RC  --    Toileting (which includes using toilet bed pan or urinal)  --  3  -RC  --    Putting on and taking off regular upper body clothing  --  4  -RC  --    Taking care of personal grooming (such as brushing teeth)  --  4  -RC  --    Eating meals  --  4  -RC  --    AM-PAC 6 Clicks Score (OT)  --  21  -RC  --       Functional Assessment    Outcome Measure Options  AM-[facility] 6 Clicks Basic Mobility (PT)  -  --  [facility] 6 Clicks Basic Mobility (PT)  -    Row Name 11/13/19 1340 11/13/19 0815 11/12/19 1340       How much help from another person do you currently need...    Turning from your back to your side while in flat bed without using bedrails?  --  4  -JA  --    Moving from lying on back to sitting on the side of a flat bed without bedrails?  --  3  -JA  --    Moving to and from a bed to a chair (including a wheelchair)?  --  3  -JA  --    Standing up from a chair using your arms (e.g., wheelchair, bedside chair)?  --  3  -JA  --    Climbing 3-5 steps with a railing?  --  3  -JA  --    To walk in hospital room?  --  3  -JA  --    AM-PAC 6 Clicks Score (PT)  --  19  -JA  --       How much help from another is currently needed...    Putting on and taking off regular lower body clothing?  3  -RC  --  3  -RC    Bathing (including washing, rinsing, and drying)  3  -RC  --  3  -RC    Toileting (which includes using toilet bed pan or urinal)  3  -RC  --  3   -RC    Putting on and taking off regular upper body clothing  4  -RC  --  4  -RC    Taking care of personal grooming (such as brushing teeth)  4  -RC  --  4  -RC    Eating meals  4  -RC  --  4  -RC    AM-PAC 6 Clicks Score (OT)  21  -RC  --  21  -RC       Functional Assessment    Outcome Measure Options  --  AM-PAC 6 Clicks Basic Mobility (PT)  -JA  --      User Key  (r) = Recorded By, (t) = Taken By, (c) = Cosigned By    Initials Name Provider Type    Ulysses Delarosa PTA Physical Therapy Assistant    RC Tiki Grover COTA/L Occupational Therapy Assistant           Time Calculation:   PT Charges     Row Name 11/15/19 1048             Time Calculation    Start Time  0815  -LISA      Stop Time  0845  -LISA      Time Calculation (min)  30 min  -LISA         Time Calculation- PT    Total Timed Code Minutes- PT  30 minute(s)  -LISA         Timed Charges    25798 - PT Therapeutic Exercise Minutes  30  -JA        User Key  (r) = Recorded By, (t) = Taken By, (c) = Cosigned By    Initials Name Provider Type    Ulysses Delarosa PTA Physical Therapy Assistant          Therapy Charges for Today     Code Description Service Date Service Provider Modifiers Qty    99254722244 HC PT THER PROC EA 15 MIN 11/14/2019 Ulysses Gill, PTA GP 2    36448296538 HC PT THERAPEUTIC ACT EA 15 MIN 11/14/2019 Ulysses Gill, PTA GP 2    90224678987 HC GAIT TRAINING EA 15 MIN 11/14/2019 Ulysses Gill, PTA GP 2    11402907884 HC PT THER PROC EA 15 MIN 11/15/2019 Ulysses Gill PTA GP 2          PT G-Codes  Outcome Measure Options: AM-PAC 6 Clicks Basic Mobility (PT)  AM-PAC 6 Clicks Score (PT): 19  AM-PAC 6 Clicks Score (OT): 21  Modified [place] Scale: 4 - Moderately severe disability.  Unable to walk without assistance, and unable to attend to own bodily needs without assistance.  Tinetti Total Score: 23    PT Discharge Summary  Reason for Discharge: Discharge from facility, Per MD order  Outcomes Achieved: Unable  to make functional progress toward goals at this time  Discharge Destination: SNF    Ulysses Gill, PTA  11/15/2019

## 2019-11-15 NOTE — PLAN OF CARE
Problem: Patient Care Overview  Goal: Plan of Care Review  Outcome: Outcome(s) achieved Date Met: 11/15/19   11/15/19 0257   Patient Care Overview   IRF Plan of Care Review progress ongoing, continue   Progress, Functional Goals demonstrating adequate progress   Coping/Psychosocial   Plan of Care Reviewed With patient     Goal: Individualization and Mutuality  Outcome: Outcome(s) achieved Date Met: 11/15/19    Goal: Home Safety Plan  Outcome: Outcome(s) achieved Date Met: 11/15/19    Goal: Coping Plan  Outcome: Outcome(s) achieved Date Met: 11/15/19    Goal: Community Reintegration Plan  Outcome: Outcome(s) achieved Date Met: 11/15/19      Problem: Stroke (IRF) (Adult)  Goal: Promote Optimal Functional [place]  Outcome: Ongoing (interventions implemented as appropriate)      Problem: Fall Risk (Adult)  Goal: Absence of Fall  Outcome: Outcome(s) achieved Date Met: 11/15/19      Problem: Pain, Chronic (Adult)  Goal: Acceptable Pain/Comfort Level and Functional Ability  Outcome: Unable to achieve outcome(s) by discharge Date Met: 11/15/19

## 2019-11-15 NOTE — PLAN OF CARE
Problem: Patient Care Overview  Goal: Plan of Care Review  Outcome: Unable to achieve outcome(s) by discharge Date Met: 11/15/19   11/15/19 0815   OTHER   Outcome Summary Pt. unable to meet any goals prior to d/c to SNF this p.m. Pt. given pictoral HEP & reviewed this a.m.    Patient Care Overview   IRF Plan of Care Review unable to achieve expected outcomes   Progress, Functional Goals progress more gradual than expected   Coping/Psychosocial   Plan of Care Reviewed With patient     Goal: Discharge Needs Assessment  Outcome: Unable to achieve outcome(s) by discharge Date Met: 11/15/19   11/09/19 0137 11/15/19 0257   Discharge Needs Assessment   Patient/Family Anticipates Transition to --  inpatient rehabilitation facility   Patient/Family Anticipated Services at Transition --  skilled nursing   Transportation Anticipated --  family or friend will provide   Disability   Equipment Currently Used at Home cane, straight;shower chair;walker, rolling --

## 2019-12-23 ENCOUNTER — TRANSCRIBE ORDERS (OUTPATIENT)
Dept: PHYSICAL THERAPY | Facility: CLINIC | Age: 74
End: 2019-12-23

## 2019-12-27 ENCOUNTER — TRANSCRIBE ORDERS (OUTPATIENT)
Dept: PHYSICAL THERAPY | Facility: CLINIC | Age: 74
End: 2019-12-27

## 2019-12-27 DIAGNOSIS — R29.6 MULTIPLE FALLS: Primary | ICD-10-CM

## 2019-12-27 DIAGNOSIS — G81.90 HEMIPLEGIA, UNSPECIFIED ETIOLOGY, UNSPECIFIED HEMIPLEGIA TYPE, UNSPECIFIED LATERALITY (HCC): ICD-10-CM

## 2019-12-31 ENCOUNTER — TRANSCRIBE ORDERS (OUTPATIENT)
Dept: PHYSICAL THERAPY | Facility: CLINIC | Age: 74
End: 2019-12-31

## 2019-12-31 DIAGNOSIS — Z47.89 UNSPECIFIED ORTHOPEDIC AFTERCARE: Primary | ICD-10-CM

## 2019-12-31 DIAGNOSIS — Z98.890 S/P ARTHROSCOPY OF LEFT SHOULDER: ICD-10-CM

## 2020-01-06 ENCOUNTER — TREATMENT (OUTPATIENT)
Dept: PHYSICAL THERAPY | Facility: CLINIC | Age: 75
End: 2020-01-06

## 2020-01-06 DIAGNOSIS — R29.6 MULTIPLE FALLS: Primary | ICD-10-CM

## 2020-01-06 PROCEDURE — 97162 PT EVAL MOD COMPLEX 30 MIN: CPT | Performed by: PHYSICAL THERAPIST

## 2020-01-06 PROCEDURE — 97110 THERAPEUTIC EXERCISES: CPT | Performed by: PHYSICAL THERAPIST

## 2020-01-06 NOTE — PROGRESS NOTES
"Physical Therapy Initial Evaluation and Plan of Care      Patient: Rama Goel   : 1945  Diagnosis/ICD-10 Code:  Multiple falls [R29.6]  Referring practitioner: Montrell Ross*  Date of Initial Visit: 2020  Today's Date: 2020  Patient seen for 1 sessions    Recheck due: 20  MD appt: TBD         Subjective Questionnaire: TU\", 14\" with SPC      Subjective Evaluation    History of Present Illness  Mechanism of injury: Pt presents with c/o balance and gait issues. History of CVA in May 2018 that happened while she was having lumbar surgery. Reports that in October she had another CVA. The second one seemed to affect her R side and her UE function. States within 1 week at the first of November she had another CVA. Reports the last one she had affected her balance quite a bit. Spent some time in the hospital. 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 States she only takes 8 sips of water per day. States between her 2 strokes she fell 27 times and since her last one has fallen several times as well. States her falls mostly occur at night. States towards the end of day is when she starts having more pain and more trouble with her balance and gait. Uses RW at night but uses SPC during the day. Sometimes doesn't use an AD at all. Always feels better during the day. States part of her problem too is that she can't stand straight up and is always bent forward.      Patient Occupation: Retired. Takes care of dogs. Tries to stay active. Quality of life: good    Pain  Current pain ratin  At best pain ratin  At worst pain ratin  Location: Knee & back; chronic knee & back pain  Quality: dull ache  Aggravating factors: ambulation, squatting, prolonged positioning and standing  Progression: no change    Social Support  Lives in: one-story house  Lives with: alone (Does have someone staying with her but stats they work a lot)    Hand dominance: right    Patient Goals  Patient goals for therapy: " See note in Plan of Care section.     "improved balance, decreased pain and increased strength             Objective       Observations     Additional Observation Details  No acute distress. Antalgic gait with SPC. Unsteady at times with gait. Decreased TKE noted bilaterally. Decreased john and step height. Weak core and postural muscles. Forward flexed. Poor postural awareness.    Neurological Testing     Sensation     Hip   Left Hip   Intact: light touch    Right Hip   Intact: light touch    Active Range of Motion     Additional Active Range of Motion Details  Lacking bilateral TKE during gait and stance L>R. R knee flex AROM WFL    L knee ext/flex:  deg  R knee ext 8 deg    Bilateral hip and ankle AROM WFL    Strength/Myotome Testing     Left Hip   Planes of Motion   Flexion: 4+  Abduction: 3+    Right Hip   Planes of Motion   Flexion: 4+  Abduction: 3+    Additional Strength Details  Bilateral shoulder flex/abd MMT 5/5  L knee ext 4-/5, flex 4/5  R knee flex/ext 4/5    L plantar flex 3+/5 - chronic achilles injury. Grossly 4+/5 R ankle      Functional Assessment     Comments  Level ground: FA/EO 30\", FT/EO mild sway 30\"; no LOB with FA with PT providing perturbations but unsteadiness noted.  Airex FA/EO 30\" with increased sway, no LOB. Airex FT/EO increased knee strategy/sway; 25\"         Assessment & Plan     Assessment  Impairments: abnormal gait, abnormal or restricted ROM, activity intolerance, impaired balance, impaired physical strength, lacks appropriate home exercise program and pain with function  Assessment details: Pt is a 74 y.o. female presenting with deficits in BLE functional strength, dynamic balance, and gait performance. Pt with history of 2 CVA's within one week in November 2019. Pt reports she has had several falls since returning home from the hospital, falling multiple times per day. Pt also suffered a CVA in 2018 while having lumbar surgery. Pt demonstrates weakness in overall core and postural stability as well which " "is contributing to her functional mobility deficits. Pt with significantly limited knee ext/TKE during gait and static stance. Scheduled for a L TKA in March 2020. Pt will benefit from skilled PT services to address these deficits for improvements in overall BLE functional strength, core/postural stability, dynamic performance, gait performance, and tolerance/safety to daily functional activities and mobility.  Prognosis: good  Functional Limitations: lifting, walking, uncomfortable because of pain and standing  Goals  Plan Goals: STG's by 1/27/20  1) Demonstrate independence/compliance in HEP  2) Demonstrate improved bilateral hip abd MMT to 4/5  3) Subjectively report no falls at home over a 1 week period  4) Demonstrate good performance/independence with isometric TrA activities during treatment session for functional carryover into daily activity & mobility performance    LTG's by 2/17/20  1) Subjectively report 60% overall improvement or greater  2) Improve TUG score to 12\" or less with use of SPC  3) Perform small obstacle course in hallway consisting of geovany negotiation, compliant surface, and cone negotiation with supervision assist, no LOB, use of SPC.  4) Demonstrate improved bilateral hip flex/abd MMT to 4+/5 or greater  5) Subjectively report no falls at home over a 3 week period or longer    Plan  Therapy options: will be seen for skilled physical therapy services  Planned modality interventions: cryotherapy and thermotherapy (hydrocollator packs)  Planned therapy interventions: abdominal trunk stabilization, balance/weight-bearing training, flexibility, functional ROM exercises, home exercise program, joint mobilization, gait training, neuromuscular re-education, postural training, stretching, strengthening and therapeutic activities  Duration in visits: 12  Treatment plan discussed with: patient  Plan details: *Hx of CVA x3, blood thinner use, fall risk, hx of lumbar fusion* Focus on overall BLE " functional strengthening, core & postural stability, dynamic/static balance activities. Safety with gait. Progress towards small obstacle course type activities. Postural re-education.  education. Pt does have an order from a different MD for prehab for L TKA that she is having in March. Will start that order after PT for balance is complete. Will work on some knee strengthening & AROM currently as this is also affecting her gait and functional mobility performance.        Timed:  Manual Therapy:         mins  72039;  Therapeutic Exercise:    17     mins  24036;     Neuromuscular Hayley:        mins  41795;    Therapeutic Activity:          mins  08703;     Gait Training:           mins  23501;     Ultrasound:          mins  11264;    Electrical Stimulation:         mins  21933 ( );    Untimed:  Electrical Stimulation:         mins  13229 ( );  Mechanical Traction:         mins  94111;     Timed Treatment:  17    mins   Total Treatment:     45   mins    PT SIGNATURE: Michelle Jamison PT   DATE TREATMENT INITIATED: 1/6/2020    Initial Certification  Certification Period: 4/5/2020  I certify that the therapy services are furnished while this patient is under my care.  The services outlined above are required by this patient, and will be reviewed every 90 days.     PHYSICIAN: Castro West MD      DATE:     Please sign and return via fax to  .. Thank you, [facility].

## 2020-01-08 ENCOUNTER — TREATMENT (OUTPATIENT)
Dept: PHYSICAL THERAPY | Facility: CLINIC | Age: 75
End: 2020-01-08

## 2020-01-08 DIAGNOSIS — R29.6 MULTIPLE FALLS: Primary | ICD-10-CM

## 2020-01-08 PROCEDURE — 97110 THERAPEUTIC EXERCISES: CPT | Performed by: PHYSICAL THERAPIST

## 2020-01-08 PROCEDURE — 97530 THERAPEUTIC ACTIVITIES: CPT | Performed by: PHYSICAL THERAPIST

## 2020-01-09 NOTE — PROGRESS NOTES
"   Physical Therapy Daily Progress Note      Patient: Rama Goel   : 1945  Referring practitioner: Montrell Ross*  Date of Initial Visit: Type: THERAPY  Noted: 2020  Today's Date: 2020  Patient seen for 2 sessions    Recheck due: 20  MD appt: TBD       Rama Goel reports: 0% improvement      Subjective Evaluation    History of Present Illness    Subjective comment: Pt states she's doing pretty good with HEP but doesn't know if she's doing the shoulder blade squeezes right. Hasn't fallen since her last PT session.Pain  Current pain ratin           Objective       Observations     Additional Observation Details  Ambulates with SPC; decreased TKE noted bilaterally, forward flexed.     See Exercise, Manual, and Modality Logs for complete treatment.       Assessment & Plan     Assessment  Assessment details: Pt required frequent cues for posture throughout treatment. Notable weakness in core and postural muscles. Fatigues with upright/standing activities this date. Reports compliance with HEP.  Challenged with bwd walking at handrail due to lack of knee ext.    Goals  Plan Goals: STG's by 20  1) Demonstrate independence/compliance in HEP  2) Demonstrate improved bilateral hip abd MMT to 4/5  3) Subjectively report no falls at home over a 1 week period  4) Demonstrate good performance/independence with isometric TrA activities during treatment session for functional carryover into daily activity & mobility performance    LTG's by 20  1) Subjectively report 60% overall improvement or greater  2) Improve TUG score to 12\" or less with use of SPC  3) Perform small obstacle course in hallway consisting of geovany negotiation, compliant surface, and cone negotiation with supervision assist, no LOB, use of SPC.  4) Demonstrate improved bilateral hip flex/abd MMT to 4+/5 or greater  5) Subjectively report no falls at home over a 3 week period or longer    Plan  Duration in " visits: 12  Plan details: Update HEP next visit to include tband rows and shoulder ext, gastroc stretch. Isometric TrA education - seated PN next visit, no moneys next. Continue balance, core work, and postural strengthening.        Visit Diagnoses:    ICD-10-CM ICD-9-CM   1. Multiple falls R29.6 V15.88       Progress per Plan of Care and Progress strengthening /stabilization /functional activity           Timed:  Manual Therapy:         mins  81209;  Therapeutic Exercise:    37     mins  89112;     Neuromuscular Hayley:        mins  16943;    Therapeutic Activity:     8     mins  05823;     Gait Training:           mins  67354;     Ultrasound:          mins  48900;    Electrical Stimulation:         mins  48663 ( );    Untimed:  Electrical Stimulation:         mins  58560 ( );  Mechanical Traction:         mins  77542;     Timed Treatment:   45   mins   Total Treatment:     45   mins  Michelle Jamison, PT  Physical Therapist

## 2020-01-15 ENCOUNTER — TREATMENT (OUTPATIENT)
Dept: PHYSICAL THERAPY | Facility: CLINIC | Age: 75
End: 2020-01-15

## 2020-01-15 DIAGNOSIS — R29.6 MULTIPLE FALLS: Primary | ICD-10-CM

## 2020-01-15 DIAGNOSIS — I69.354 HEMIPARESIS AFFECTING LEFT SIDE AS LATE EFFECT OF CEREBROVASCULAR ACCIDENT (CVA) (HCC): ICD-10-CM

## 2020-01-15 PROCEDURE — 97112 NEUROMUSCULAR REEDUCATION: CPT | Performed by: PHYSICAL THERAPIST

## 2020-01-15 PROCEDURE — 97110 THERAPEUTIC EXERCISES: CPT | Performed by: PHYSICAL THERAPIST

## 2020-01-15 PROCEDURE — 97530 THERAPEUTIC ACTIVITIES: CPT | Performed by: PHYSICAL THERAPIST

## 2020-01-15 NOTE — PROGRESS NOTES
"   Physical Therapy Daily Progress Note      Patient: Rama Goel   : 1945  Referring practitioner: Montrell Ross*  Date of Initial Visit: Type: THERAPY  Noted: 2020  Today's Date: 1/15/2020  Patient seen for 3 sessions  Recheck due: 20  MD appt: TBD       Rama Goel reports: walking a little better      Subjective Evaluation    History of Present Illness    Subjective comment: pt states she continues to have multiple falls; states doesn't always use cane and admits to getting in a hurry at times; most of falls occur at night. \"Want to know how to do stairs for after Sx.\"Pain  No pain reported           Objective       Ambulation     Comments   Gait level surfaces with hurry cane, occasional unsteady, self corrected     See Exercise, Manual, and Modality Logs for complete treatment.       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 Good job with SLR's and added to HEP.    Goals  Plan Goals: STG's by 20  1) Demonstrate independence/compliance in HEP  2) Demonstrate improved bilateral hip abd MMT to 4/5  3) Subjectively report no falls at home over a 1 week period  4) Demonstrate good performance/independence with isometric TrA activities during treatment session for functional carryover into daily activity & mobility performance    LTG's by 20  1) Subjectively report 60% overall improvement or greater  2) Improve TUG score to 12\" or less with use of SPC  3) Perform small obstacle course in hallway consisting of geovany negotiation, compliant surface, and cone negotiation with supervision assist, no LOB, use of SPC.  4) Demonstrate improved bilateral hip flex/abd MMT to 4+/5 or greater  5) Subjectively report no falls at home over a 3 week period or " "longer    Plan  Duration in visits: 12  Plan details: No$ per PT and give to HEP; SL abd SLR; gait train on correct use of RW as part of her \"prehab\"        Visit Diagnoses:    ICD-10-CM ICD-9-CM   1. Multiple falls R29.6 V15.88   2. Hemiparesis affecting left side as late effect of cerebrovascular accident (CVA) (CMS/[facility]) I69.354 438.20       Progress per Plan of Care and Progress strengthening /stabilization /functional activity           Timed:  Manual Therapy:         mins  42655;  Therapeutic Exercise:    30     mins  40788;     Neuromuscular Hayley:    15    mins  69923;    Therapeutic Activity:     15     mins  06160;     Gait Training:           mins  69529;     Ultrasound:          mins  60197;    Electrical Stimulation:         mins  79423 ( );    Untimed:  Electrical Stimulation:         mins  74972 ( );  Mechanical Traction:         mins  29302;     Timed Treatment:   60   mins   Total Treatment:     60   mins  Day Manzano PTA  Physical Therapist                  "

## 2020-01-23 ENCOUNTER — TREATMENT (OUTPATIENT)
Dept: PHYSICAL THERAPY | Facility: CLINIC | Age: 75
End: 2020-01-23

## 2020-01-23 DIAGNOSIS — I69.354 HEMIPARESIS AFFECTING LEFT SIDE AS LATE EFFECT OF CEREBROVASCULAR ACCIDENT (CVA) (HCC): ICD-10-CM

## 2020-01-23 DIAGNOSIS — R29.6 MULTIPLE FALLS: Primary | ICD-10-CM

## 2020-01-23 PROCEDURE — 97530 THERAPEUTIC ACTIVITIES: CPT | Performed by: PHYSICAL THERAPIST

## 2020-01-23 PROCEDURE — 97110 THERAPEUTIC EXERCISES: CPT | Performed by: PHYSICAL THERAPIST

## 2020-01-23 NOTE — PROGRESS NOTES
"   Physical Therapy Daily Progress Note      Patient: Rama Goel   : 1945  Referring practitioner: Montrell Ross*  Date of Initial Visit: Type: THERAPY  Noted: 2020  Today's Date: 2020  Patient seen for 4 sessions  Recheck due: 20  MD appt: TBD       Rama Goel reports: \"a little better\"      Subjective Evaluation    History of Present Illness    Subjective comment: pt states things are going very well, but still having falls; does relay that they are not as frequent. Fam MD says I have osteopenia.Pain  No pain reported           Objective       Ambulation     Comments   Intermittent grinding of knee audible  Pt demonstrates less unsteadiness     See Exercise, Manual, and Modality Logs for complete treatment.       Assessment & Plan     Assessment  Assessment details: Pt improved some with gait steadiness today, improved on stairs; did well with initial review of appropriate walker use, more cues needed for turning 360 deg. Pt again educated on the importance of safety practice and slowing down.    Goals  Plan Goals: STG's by 20  1) Demonstrate independence/compliance in HEP  2) Demonstrate improved bilateral hip abd MMT to 4/5  3) Subjectively report no falls at home over a 1 week period  4) Demonstrate good performance/independence with isometric TrA activities during treatment session for functional carryover into daily activity & mobility performance    LTG's by 20  1) Subjectively report 60% overall improvement or greater  2) Improve TUG score to 12\" or less with use of SPC  3) Perform small obstacle course in hallway consisting of geovany negotiation, compliant surface, and cone negotiation with supervision assist, no LOB, use of SPC.  4) Demonstrate improved bilateral hip flex/abd MMT to 4+/5 or greater  5) Subjectively report no falls at home over a 3 week period or longer    Plan  Duration in visits: 12        Visit Diagnoses:    ICD-10-CM ICD-9-CM   1. " Multiple falls R29.6 V15.88   2. Hemiparesis affecting left side as late effect of cerebrovascular accident (CVA) (CMS/[facility]) I69.354 438.20       Progress per Plan of Care, Progress strengthening /stabilization /functional activity and Other    RC nxt wk       Timed:  Manual Therapy:         mins  54886;  Therapeutic Exercise:    30     mins  63920;     Neuromuscular Hayley:        mins  55425;    Therapeutic Activity:     20     mins  02104;     Gait Training:           mins  11052;     Ultrasound:          mins  27357;    Electrical Stimulation:         mins  92277 ( );    Untimed:  Electrical Stimulation:         mins  65253 ( );  Mechanical Traction:         mins  59217;  Paraffin                               mins  55745;     Timed Treatment:   50   mins   Total Treatment:     50   mins  Day Manzano PTA  Physical Therapist

## 2020-01-27 ENCOUNTER — TREATMENT (OUTPATIENT)
Dept: PHYSICAL THERAPY | Facility: CLINIC | Age: 75
End: 2020-01-27

## 2020-01-27 DIAGNOSIS — R29.6 MULTIPLE FALLS: Primary | ICD-10-CM

## 2020-01-27 DIAGNOSIS — M54.9 ACUTE BACK PAIN, UNSPECIFIED BACK LOCATION, UNSPECIFIED BACK PAIN LATERALITY: ICD-10-CM

## 2020-01-27 DIAGNOSIS — I69.354 HEMIPARESIS AFFECTING LEFT SIDE AS LATE EFFECT OF CEREBROVASCULAR ACCIDENT (CVA) (HCC): ICD-10-CM

## 2020-01-27 PROCEDURE — 97110 THERAPEUTIC EXERCISES: CPT | Performed by: PHYSICAL THERAPIST

## 2020-01-27 NOTE — PROGRESS NOTES
"   Physical Therapy Daily Progress Note      Patient: Rama Goel   : 1945  Referring practitioner: Montrell Ross*  Date of Initial Visit: Type: THERAPY  Noted: 2020  Today's Date: 2020  Patient seen for 5 sessions    Recheck due: 20  MD appt: TBD       Rama Goel reports: 60% improvement         Subjective Evaluation    History of Present Illness    Subjective comment: pt states that she feels that she is 60% better- states that she wishes she could stand up straighterPain  Current pain ratin           Objective       Ambulation     Comments   Ambulates with SPC     See Exercise, Manual, and Modality Logs for complete treatment.       Assessment & Plan     Assessment  Assessment details: Pt required very minimal cues for stair training with SPC and use of HR. Pt did require cues for slowing down and focusing on form. Pt still has very limited knee extension. Pt only complained of pain with     Goals  Plan Goals: STG's by 20  1) Demonstrate independence/compliance in HEP  2) Demonstrate improved bilateral hip abd MMT to 4/5  3) Subjectively report no falls at home over a 1 week period  4) Demonstrate good performance/independence with isometric TrA activities during treatment session for functional carryover into daily activity & mobility performance    LTG's by 20  1) Subjectively report 60% overall improvement or greater  2) Improve TUG score to 12\" or less with use of SPC  3) Perform small obstacle course in hallway consisting of geovany negotiation, compliant surface, and cone negotiation with supervision assist, no LOB, use of SPC.  4) Demonstrate improved bilateral hip flex/abd MMT to 4+/5 or greater  5) Subjectively report no falls at home over a 3 week period or longer    Plan  Duration in visits: 12  Plan details: Possible step up and overs?          Visit Diagnoses:    ICD-10-CM ICD-9-CM   1. Multiple falls R29.6 V15.88   2. Hemiparesis affecting left " side as late effect of cerebrovascular accident (CVA) (CMS/[facility]) I69.354 438.20   3. Acute back pain, unspecified back location, unspecified back pain laterality M54.9 724.5       Progress per Plan of Care and Progress strengthening /stabilization /functional activity           Timed:  Manual Therapy:         mins  08247;  Therapeutic Exercise:    60     mins  92982;     Neuromuscular Hayley:        mins  59364;    Therapeutic Activity:          mins  87434;     Gait Training:           mins  90064;     Ultrasound:          mins  23694;    Electrical Stimulation:         mins  98931 ( );    Untimed:  Electrical Stimulation:         mins  95029 ( );  Mechanical Traction:         mins  27876;     Timed Treatment:   60   mins   Total Treatment:     60   mins  Tiera Hernandez, \[facility] [place] [facility]\""  Physical Therapist

## 2020-01-31 ENCOUNTER — TREATMENT (OUTPATIENT)
Dept: PHYSICAL THERAPY | Facility: CLINIC | Age: 75
End: 2020-01-31

## 2020-01-31 DIAGNOSIS — R29.6 MULTIPLE FALLS: Primary | ICD-10-CM

## 2020-01-31 DIAGNOSIS — I69.354 HEMIPARESIS AFFECTING LEFT SIDE AS LATE EFFECT OF CEREBROVASCULAR ACCIDENT (CVA) (HCC): ICD-10-CM

## 2020-01-31 PROCEDURE — 97110 THERAPEUTIC EXERCISES: CPT | Performed by: PHYSICAL THERAPIST

## 2020-01-31 NOTE — PROGRESS NOTES
Physical Therapy Daily Progress Note      Patient: Rama Goel   : 1945  Referring practitioner: Montrell Ross*  Date of Initial Visit: Type: THERAPY  Noted: 2020  Today's Date: 2020  Patient seen for 6 sessions  Recert due:  20  MD followup:  TBAARON       Rama Goel reports: 60% improvement  Subjective Questionnaire:       Subjective  Pt reports no falls in the past week.      Objective   See Exercise, Manual, and Modality Logs for complete treatment.       Assessment & Plan     Assessment  Assessment details: Pt conts to display ataxic gait, but is able to self correct with cane.  Able to perform fwd/bwd gait and sidestepping at handrail without using the rail.           Visit Diagnoses:    ICD-10-CM ICD-9-CM   1. Multiple falls R29.6 V15.88   2. Hemiparesis affecting left side as late effect of cerebrovascular accident (CVA) (CMS/[facility]) I69.354 438.20                  Timed:  Manual Therapy:         mins  14892;  Therapeutic Exercise:   45      mins  70560;     Neuromuscular Hayley:        mins  39715;    Therapeutic Activity:          mins  16063;     Gait Training:           mins  69950;     Ultrasound:          mins  92003;    Electrical Stimulation:         mins  97437 ( );    Untimed:  Electrical Stimulation:         mins  37042 ( );  Mechanical Traction:         mins  19513;     Timed Treatment:  45    mins   Total Treatment:     45   mins  Gely Schwartz PTA  Physical Therapist

## 2020-02-04 ENCOUNTER — TREATMENT (OUTPATIENT)
Dept: PHYSICAL THERAPY | Facility: CLINIC | Age: 75
End: 2020-02-04

## 2020-02-04 DIAGNOSIS — R29.6 MULTIPLE FALLS: Primary | ICD-10-CM

## 2020-02-04 DIAGNOSIS — I69.354 HEMIPARESIS AFFECTING LEFT SIDE AS LATE EFFECT OF CEREBROVASCULAR ACCIDENT (CVA) (HCC): ICD-10-CM

## 2020-02-04 PROCEDURE — 97110 THERAPEUTIC EXERCISES: CPT | Performed by: PHYSICAL THERAPIST

## 2020-02-04 NOTE — PROGRESS NOTES
"Re-Assessment / Re-Certification      Patient: Rama Goel   : 1945  Diagnosis/ICD-10 Code:  Multiple falls [R29.6]  Referring practitioner: Montrell Ross*  Date of Initial Visit: Type: THERAPY  Noted: 2020  Today's Date: 2020  Patient seen for 7 sessions    Recheck due: 20  MD appt: TBD    Subjective:   Rama Goel reports: 60% improvement  Subjective Questionnaire: TU\", 11\" with SPC  Clinical Progress: improved  Home Program Compliance: Yes  Treatment has included: therapeutic exercise, neuromuscular re-education, therapeutic activity and gait training    Subjective Evaluation    History of Present Illness    Subjective comment: Pt states she hasn't fallen in about 1.5-2 weeks. Very happy about that. L knee has started to hurt a little more but is having it replaced in March. Overall feels like she's doing much better. Reports 60% overall improvement at this time.Pain  No pain reported         Objective       Observations     Additional Observation Details  No acute distress. Antalgic gait with SPC/hurrycane. Less unsteadiness noted with gait, improved john. Decreased TKE noted bilaterally greatest on L. Slightly forward flexed at hips. Rounded shoulders posture.    Neurological Testing     Sensation     Hip   Left Hip   Intact: light touch    Right Hip   Intact: light touch    Knee   Left Knee   Intact: light touch    Right Knee   Intact: light touch     Active Range of Motion     Additional Active Range of Motion Details  Lacking bilateral TKE during gait and stance L>R. R knee flex AROM WFL    L knee ext/flex:  deg  R knee ext 8 deg    Bilateral hip and ankle AROM WFL    Strength/Myotome Testing     Left Hip   Planes of Motion   Flexion: 4+  Abduction: 4-    Right Hip   Planes of Motion   Flexion: 4+  Abduction: 4-    Additional Strength Details  L knee ext 4/5, flex 4+/5  R knee flex/ext 4+/5    L plantar flex 3+/5 - chronic achilles injury. Grossly 4+/5 R " "ankle       Assessment & Plan     Assessment  Impairments: abnormal gait, activity intolerance, impaired balance, impaired physical strength and pain with function  Assessment details: Pt progressing fairly well overall with PT at this time as evidenced by improvements in dynamic balance, safety with functional mobility, and functional strength. Pt reports no falls at home in a 1.5 to 2 week period. More steady with gait. Still tends to speed through activities at times and requires cues to slow down. Static/dynamic balance is improving as well. Less assist needed at handrail for step up and tandem balance activities. Did pretty well with stair training with SPC, very minor cues required. Pt is having L TKA on 3/9/20. Will continue to see pt for ~2 more weeks and d/c at that time due to surgery.  Prognosis: good  Functional Limitations: carrying objects, walking, uncomfortable because of pain and standing  Goals  Plan Goals: STG's  1) Demonstrate independence/compliance in HEP - MET  2) Demonstrate improved bilateral hip abd MMT to 4/5 - partially met  3) Subjectively report no falls at home over a 1 week period - MET  4) Demonstrate good performance/independence with isometric TrA activities during treatment session for functional carryover into daily activity & mobility performance - MET    LTG's by 2/25/20  1) Subjectively report 60% overall improvement or greater - MET  2) Improve TUG score to 12\" or less with use of SPC - MET  3) Perform small obstacle course in hallway consisting of geovany negotiation, compliant surface, and cone negotiation with supervision assist, no LOB, use of SPC. - progressing  4) Demonstrate improved bilateral hip flex/abd MMT to 4+/5 or greater - progressing  5) Subjectively report no falls at home over a 3 week period or longer - progressing    Plan  Therapy options: will be seen for skilled physical therapy services  Planned modality interventions: cryotherapy  Planned therapy " interventions: balance/weight-bearing training, body mechanics training, flexibility, functional ROM exercises, home exercise program, joint mobilization, manual therapy, neuromuscular re-education and postural training  Duration in visits: 5  Treatment plan discussed with: patient  Plan details: Will continue for about 2-3 more weeks and d/c at that time. Pt is scheduled to have TKA in early March. Continue dynamic balance/proprioception activities, continue knee & hip strengthening.        Visit Diagnoses:    ICD-10-CM ICD-9-CM   1. Multiple falls R29.6 V15.88   2. Hemiparesis affecting left side as late effect of cerebrovascular accident (CVA) (CMS/[facility]) I69.354 438.20       Progress toward previous goals: Partially Met      Recommendations: Continue as planned  Timeframe: 3 weeks  Prognosis to achieve goals: good    PT Signature: Michelle Jamison, PT      Based upon review of the patient's progress and continued therapy plan, it is my medical opinion that Rama Goel should continue physical therapy treatment at [facility]  [address] [place] [id number] [phone].    Signature: __________________________________  Montrell Ross MD    Timed:  Manual Therapy:         mins  41955;  Therapeutic Exercise:    49     mins  58928;     Neuromuscular Hayley:        mins  87549;    Therapeutic Activity:          mins  84145;     Gait Training:           mins  18274;     Ultrasound:          mins  26555;    Electrical Stimulation:         mins  92568 ( );    Untimed:  Electrical Stimulation:         mins  00977 ( );  Mechanical Traction:         mins  95861;     Timed Treatment:   49   mins   Total Treatment:     49   mins

## 2020-02-12 ENCOUNTER — TREATMENT (OUTPATIENT)
Dept: PHYSICAL THERAPY | Facility: CLINIC | Age: 75
End: 2020-02-12

## 2020-02-12 DIAGNOSIS — I69.354 HEMIPARESIS AFFECTING LEFT SIDE AS LATE EFFECT OF CEREBROVASCULAR ACCIDENT (CVA) (HCC): ICD-10-CM

## 2020-02-12 DIAGNOSIS — R29.6 MULTIPLE FALLS: Primary | ICD-10-CM

## 2020-02-12 PROCEDURE — 97112 NEUROMUSCULAR REEDUCATION: CPT | Performed by: PHYSICAL THERAPIST

## 2020-02-12 PROCEDURE — 97110 THERAPEUTIC EXERCISES: CPT | Performed by: PHYSICAL THERAPIST

## 2020-02-12 NOTE — PROGRESS NOTES
"   Physical Therapy Daily Progress Note      Patient: Rama Goel   : 1945  Referring practitioner: Montrell Ross*  Date of Initial Visit: Type: THERAPY  Noted: 2020  Today's Date: 2020  Patient seen for 8 sessions    Recheck due: 20  MD appt: KATHY       Rama Goel reports: 60% improvement    Subjective Evaluation    History of Present Illness    Subjective comment: Feeling better. Has missed past 2 appt due to sickness. States she still feels a little weak and hasn't been doing many of her exercises.Pain  No pain reported           Objective       Observations     Additional Observation Details  Ambulates with SPC. Slower gait noted, mildly unsteady at times. Pt has been sick; still seems somewhat weak. Pt reports she feels weak too.     See Exercise, Manual, and Modality Logs for complete treatment.       Assessment & Plan     Assessment  Assessment details: Pt having increased L knee pain. Reports she can tell her shot is wearing off. Pt has been sick and notable fatigue/weakness present during performance of exercises. Progress to tandem stance with airex to which pt did fairly well with CGA - no LOB. Weight shifted more on RLE to avoid pressure on L knee.  Prognosis: good    Goals  Plan Goals: STG's  1) Demonstrate independence/compliance in HEP - MET  2) Demonstrate improved bilateral hip abd MMT to 4/5 - partially met  3) Subjectively report no falls at home over a 1 week period - MET  4) Demonstrate good performance/independence with isometric TrA activities during treatment session for functional carryover into daily activity & mobility performance - MET    LTG's by 20  1) Subjectively report 60% overall improvement or greater - MET  2) Improve TUG score to 12\" or less with use of SPC - MET  3) Perform small obstacle course in hallway consisting of geovany negotiation, compliant surface, and cone negotiation with supervision assist, no LOB, use of SPC. - " progressing  4) Demonstrate improved bilateral hip flex/abd MMT to 4+/5 or greater - progressing  5) Subjectively report no falls at home over a 3 week period or longer - progressing    Plan  Duration in visits: 5  Plan details: Continue for 1-2 more weeks. Resume step up/over on 4 inch step. Continues stairs with SPC. Possible geovany step up/over in // bars.        Visit Diagnoses:    ICD-10-CM ICD-9-CM   1. Multiple falls R29.6 V15.88   2. Hemiparesis affecting left side as late effect of cerebrovascular accident (CVA) (CMS/[facility]) I69.354 438.20       Progress per Plan of Care and Progress strengthening /stabilization /functional activity           Timed:  Manual Therapy:         mins  94455;  Therapeutic Exercise:    30     mins  72082;     Neuromuscular Hayley:    15    mins  05054;    Therapeutic Activity:          mins  81652;     Gait Training:           mins  79941;     Ultrasound:          mins  17334;    Electrical Stimulation:         mins  15450 ( );    Untimed:  Electrical Stimulation:         mins  61654 ( );  Mechanical Traction:         mins  08116;     Timed Treatment:   45   mins   Total Treatment:     45   mins  Michelle Jamison PT  Physical Therapist

## 2020-02-20 ENCOUNTER — TREATMENT (OUTPATIENT)
Dept: PHYSICAL THERAPY | Facility: CLINIC | Age: 75
End: 2020-02-20

## 2020-02-20 DIAGNOSIS — R29.6 MULTIPLE FALLS: Primary | ICD-10-CM

## 2020-02-20 DIAGNOSIS — I69.354 HEMIPARESIS AFFECTING LEFT SIDE AS LATE EFFECT OF CEREBROVASCULAR ACCIDENT (CVA) (HCC): ICD-10-CM

## 2020-02-20 DIAGNOSIS — M54.9 ACUTE BACK PAIN, UNSPECIFIED BACK LOCATION, UNSPECIFIED BACK PAIN LATERALITY: ICD-10-CM

## 2020-02-20 PROCEDURE — 97110 THERAPEUTIC EXERCISES: CPT | Performed by: PHYSICAL THERAPIST

## 2020-02-20 NOTE — PROGRESS NOTES
"   Physical Therapy Daily Progress Note    Patient: Rama Goel   : 1945  Diagnosis/ICD-10 Code:  Multiple falls [R29.6]  Referring practitioner: Montrell Ross*  Date of Initial Visit: Type: THERAPY  Noted: 2020  Today's Date: 2020  Patient seen for 9 sessions         Rama Goel reports:     Subjective Evaluation    History of Present Illness    Subjective comment: Pt reports she had fall on Tuesday messing with the dogs       Objective       Ambulation     Observational Gait     Additional Observational Gait Details  Ambulates with SPC     See Exercise, Manual, and Modality Logs for complete treatment.       Assessment & Plan     Assessment  Assessment details: Pt with good balance throughout, Pt had no c/o pain,     Goals  Plan Goals: Plan Goals: STG's  1) Demonstrate independence/compliance in HEP - MET  2) Demonstrate improved bilateral hip abd MMT to 4/5 - partially met  3) Subjectively report no falls at home over a 1 week period - MET  4) Demonstrate good performance/independence with isometric TrA activities during treatment session for functional carryover into daily activity & mobility performance - MET    LTG's by 20  1) Subjectively report 60% overall improvement or greater - MET  2) Improve TUG score to 12\" or less with use of SPC - MET  3) Perform small obstacle course in hallway consisting of geovany negotiation, compliant surface, and cone negotiation with supervision assist, no LOB, use of SPC. - progressing  4) Demonstrate improved bilateral hip flex/abd MMT to 4+/5 or greater - progressing  5) Subjectively report no falls at home over a 3 week period or longer - progressing        Plan  Plan details: Cont per POC        Progress per Plan of Care and Progress strengthening /stabilization /functional activity           Manual Therapy:         mins  94729;  Therapeutic Exercise:    45     mins  82068;     Neuromuscular Hayley:        mins  46510;    Therapeutic " Activity:          mins  38562;     Gait Training:           mins  15888;     Ultrasound:          mins  31691;    Electrical Stimulation:         mins  19706 ( );  Dry Needling          mins self-pay    Timed Treatment:   45   mins   Total Treatment:     45   mins    Kenneth Benito PTA  Physical Therapist

## 2020-02-24 ENCOUNTER — TREATMENT (OUTPATIENT)
Dept: PHYSICAL THERAPY | Facility: CLINIC | Age: 75
End: 2020-02-24

## 2020-02-24 DIAGNOSIS — R29.6 MULTIPLE FALLS: Primary | ICD-10-CM

## 2020-02-24 DIAGNOSIS — I69.354 HEMIPARESIS AFFECTING LEFT SIDE AS LATE EFFECT OF CEREBROVASCULAR ACCIDENT (CVA) (HCC): ICD-10-CM

## 2020-02-24 PROCEDURE — 97110 THERAPEUTIC EXERCISES: CPT | Performed by: PHYSICAL THERAPIST

## 2020-02-24 PROCEDURE — 97530 THERAPEUTIC ACTIVITIES: CPT | Performed by: PHYSICAL THERAPIST

## 2020-02-24 NOTE — PROGRESS NOTES
"Re-Assessment / Re-Certification      Patient: Rama Goel   : 1945  Diagnosis/ICD-10 Code:  Multiple falls [R29.6]  Referring practitioner: Montrell Ross*  Date of Initial Visit: Type: THERAPY  Noted: 2020  Today's Date: 2020  Patient seen for 10 sessions    Recheck due: 3/16/20  MD appt: TBD  Auth: Medicare guidelines    Subjective:   Rama Goel reports: 65-70% improvement  Subjective Questionnaire: TUG with SPC: 11\", 10\"  Clinical Progress: improved  Home Program Compliance: Yes  Treatment has included: therapeutic exercise, neuromuscular re-education, therapeutic activity and gait training    Subjective Evaluation    History of Present Illness    Subjective comment: Pt states her knee has been bothering her more and more. Ready to have her surgery. Has fallen a few times over the past couple of days but did make it 3 weeks without a fall. Feels like her balance and walking has improved but still limited by her knee. Reports 65-70% overall improvement.Pain  Current pain ratin         Objective       Observations     Additional Observation Details  No acute distress. Antalgic gait with SPC/hurrycane. Continues with improved john. Decreased TKE noted bilaterally greatest on L. Slightly forward flexed at hips. Rounded shoulders posture. Reports intermittently during treatment that she feels that her L knee is going to give way. Did not have any LOB during treatment, minimal unsteadiness noted.    Neurological Testing     Sensation     Hip   Left Hip   Intact: light touch    Right Hip   Intact: light touch    Knee   Left Knee   Intact: light touch    Right Knee   Intact: light touch     Active Range of Motion     Additional Active Range of Motion Details  Lacking bilateral TKE during gait and stance L>R. R knee flex AROM WFL    L knee ext/flex:  deg  R knee ext 9 deg    Bilateral hip and ankle AROM WFL    Strength/Myotome Testing     Left Hip   Planes of Motion "   Flexion: 4+  Abduction: 4    Right Hip   Planes of Motion   Flexion: 4+  Abduction: 4    Additional Strength Details  L knee ext 4+/5, flex 4+/5  R knee flex/ext 4+/5    L plantar flex 3+/5 - chronic achilles injury. Grossly 4+/5 R ankle      Functional Assessment     Comments  Able to ascend/descend stairs in both reciprocal and step to pattern with SPC on R side. Did practice both this date to prepare pt for home management after knee surgery. More difficulty with descent due to decrease L eccentric control - has improved since starting PT.     Assessment & Plan     Assessment  Impairments: abnormal gait, abnormal or restricted ROM, activity intolerance, impaired balance, impaired physical strength, lacks appropriate home exercise program and pain with function  Assessment details: Pt has progressed fairly well overall with PT at this time as evidenced by improvements in functional BLE strength, muscle endurance, dynamic balance, and safety with gait. Pt did well with small obstacle course in parallel bars this date with no LOB or unsteadiness noted. Gait john and performance have improved but pt continues with limited TKE bilaterally, greatest on L. Pt has done well with strength and stability progressions. Pt reports 70% overall improvement at this time. Pt does reports a few falls over the past couple of days but prior to that pt hadn't fallen in over 3 weeks. Pt is scheduled to have L TKA on 3/9/20 therefore we will see pt for 1 more visit to finalize HEP and d/c at that time.  Prognosis: good  Functional Limitations: carrying objects, walking, uncomfortable because of pain and standing  Goals  Plan Goals: STG's all MET  1) Demonstrate independence/compliance in HEP - MET  2) Demonstrate improved bilateral hip abd MMT to 4/5 - METt  3) Subjectively report no falls at home over a 1 week period - MET  4) Demonstrate good performance/independence with isometric TrA activities during treatment session for  "functional carryover into daily activity & mobility performance - MET    LTG's by 3/16/20  1) Subjectively report 60% overall improvement or greater - MET  2) Improve TUG score to 12\" or less with use of SPC - MET  3) Perform small obstacle course in hallway consisting of geovany negotiation, compliant surface, and cone negotiation with supervision assist, no LOB, use of SPC. - progressing  4) Demonstrate improved bilateral hip flex/abd MMT to 4+/5 or greater - partially met  5) Subjectively report no falls at home over a 3 week period or longer - Pt made it 3 weeks but has had 3 falls since then.        Plan  Therapy options: will be seen for skilled physical therapy services  Planned modality interventions: cryotherapy  Planned therapy interventions: balance/weight-bearing training, body mechanics training, flexibility, functional ROM exercises, home exercise program, joint mobilization, manual therapy, neuromuscular re-education, postural training, strengthening, stretching and therapeutic activities  Duration in visits: 1  Treatment plan discussed with: patient  Plan details: Plan to d/c next visit to independent management. Finalize HEP. Pt will be having L TKA surgery on 3/9/20. Continue stair training, functional strengthening, and dynamic balance. Continue gentle obstacle course in parallel bars.        Visit Diagnoses:    ICD-10-CM ICD-9-CM   1. Multiple falls R29.6 V15.88   2. Hemiparesis affecting left side as late effect of cerebrovascular accident (CVA) (CMS/[facility]) I69.354 438.20       Progress toward previous goals: Partially Met      Recommendations: Continue as planned  Timeframe: 1 more visit  Prognosis to achieve goals: good    PT Signature: Michelle Jamison, PT      Based upon review of the patient's progress and continued therapy plan, it is my medical opinion that Rama Goel should continue physical therapy treatment at [facility]  [address] [facility] [address]  [phone].    Signature: __________________________________  Montrell Ross MD    Timed:  Manual Therapy:         mins  20882;  Therapeutic Exercise:    35     mins  55518;     Neuromuscular Hayley:        mins  69460;    Therapeutic Activity:     15     mins  75798;     Gait Training:           mins  15456;     Ultrasound:          mins  03677;    Electrical Stimulation:         mins  64339 ( );    Untimed:  Electrical Stimulation:         mins  13333 ( );  Mechanical Traction:         mins  60976;     Timed Treatment:   50   mins   Total Treatment:     50   mins

## 2020-02-26 ENCOUNTER — TREATMENT (OUTPATIENT)
Dept: PHYSICAL THERAPY | Facility: CLINIC | Age: 75
End: 2020-02-26

## 2020-02-26 DIAGNOSIS — I69.354 HEMIPARESIS AFFECTING LEFT SIDE AS LATE EFFECT OF CEREBROVASCULAR ACCIDENT (CVA) (HCC): ICD-10-CM

## 2020-02-26 DIAGNOSIS — R29.6 MULTIPLE FALLS: Primary | ICD-10-CM

## 2020-02-26 PROCEDURE — 97110 THERAPEUTIC EXERCISES: CPT | Performed by: PHYSICAL THERAPIST

## 2020-02-26 NOTE — PROGRESS NOTES
"   Physical Therapy Daily Progress Note/Discharge      Patient: Rama Goel   : 1945  Referring practitioner: Montrell Ross*  Date of Initial Visit: Type: THERAPY  Noted: 2020  Today's Date: 2020  Patient seen for 11 sessions    Recheck due: 3/16/20  MD appt: TBD  Auth: Medicare guidelines       Rama Goel reports: 65-70% improvement        Subjective Evaluation    History of Present Illness    Subjective comment: pt states that she is good with being D/C today. States that she feels good with her HEPPain  Current pain rating: 3           Objective       Ambulation     Comments   Ambulates with SPC     See Exercise, Manual, and Modality Logs for complete treatment.       Assessment & Plan     Assessment  Assessment details: Pt verbalized and displayed good understanding of continuing HEP. Able to meet all STGs and LTGs.    Goals  Plan Goals: STG's all MET  1) Demonstrate independence/compliance in HEP - MET  2) Demonstrate improved bilateral hip abd MMT to 4/5 - METt  3) Subjectively report no falls at home over a 1 week period - MET  4) Demonstrate good performance/independence with isometric TrA activities during treatment session for functional carryover into daily activity & mobility performance - MET    LTG's by 3/16/20  1) Subjectively report 60% overall improvement or greater - MET  2) Improve TUG score to 12\" or less with use of SPC - MET  3) Perform small obstacle course in hallway consisting of geovany negotiation, compliant surface, and cone negotiation with supervision assist, no LOB, use of SPC. - met  4) Demonstrate improved bilateral hip flex/abd MMT to 4+/5 or greater -  met  5) Subjectively report no falls at home over a 3 week period or longer - Pt made it 3 weeks but has had 3 falls since then -met        Plan  Plan details: D/C to I management with HEP established.        Visit Diagnoses:    ICD-10-CM ICD-9-CM   1. Multiple falls R29.6 V15.88   2. Hemiparesis " affecting left side as late effect of cerebrovascular accident (CVA) (CMS/[facility]) I69.354 438.20       Progress per Plan of Care and Progress strengthening /stabilization /functional activity           Timed:  Manual Therapy:         mins  65936;  Therapeutic Exercise:    45     mins  02023;     Neuromuscular Hayley:        mins  91256;    Therapeutic Activity:          mins  39655;     Gait Training:           mins  07231;     Ultrasound:          mins  54621;    Electrical Stimulation:         mins  83951 ( );    Untimed:  Electrical Stimulation:         mins  65698 ( );  Mechanical Traction:         mins  22493;     Timed Treatment:   45   mins   Total Treatment:     45   mins  Tiera Hernandez, [facility]  Physical Therapist

## 2020-02-28 ENCOUNTER — LAB (OUTPATIENT)
Dept: LAB | Facility: HOSPITAL | Age: 75
End: 2020-02-28

## 2020-02-28 ENCOUNTER — TRANSCRIBE ORDERS (OUTPATIENT)
Dept: LAB | Facility: HOSPITAL | Age: 75
End: 2020-02-28

## 2020-02-28 DIAGNOSIS — I63.00 CEREBRAL INFARCTION DUE TO THROMBOSIS OF PRECEREBRAL ARTERY (HCC): ICD-10-CM

## 2020-02-28 DIAGNOSIS — I63.00 CEREBRAL INFARCTION DUE TO THROMBOSIS OF PRECEREBRAL ARTERY (HCC): Primary | ICD-10-CM

## 2020-02-28 LAB
CLOSE TME COLL+ADP + EPINEP PNL BLD: 26 %
PA COLL PRP: 16 % (ref 70–100)
PLATELET # BLD AUTO: 245 10*3/MM3 (ref 140–450)

## 2020-02-28 PROCEDURE — 85576 BLOOD PLATELET AGGREGATION: CPT

## 2020-02-28 PROCEDURE — 36415 COLL VENOUS BLD VENIPUNCTURE: CPT

## 2020-03-11 ENCOUNTER — TRANSCRIBE ORDERS (OUTPATIENT)
Dept: PHYSICAL THERAPY | Facility: CLINIC | Age: 75
End: 2020-03-11

## 2020-03-11 ENCOUNTER — TREATMENT (OUTPATIENT)
Dept: PHYSICAL THERAPY | Facility: CLINIC | Age: 75
End: 2020-03-11

## 2020-03-11 DIAGNOSIS — M17.12 OSTEOARTHRITIS OF LEFT KNEE, UNSPECIFIED OSTEOARTHRITIS TYPE: Primary | ICD-10-CM

## 2020-03-11 DIAGNOSIS — Z96.652 S/P TOTAL KNEE ARTHROPLASTY, LEFT: ICD-10-CM

## 2020-03-11 PROCEDURE — 97110 THERAPEUTIC EXERCISES: CPT | Performed by: PHYSICAL THERAPIST

## 2020-03-11 PROCEDURE — 97161 PT EVAL LOW COMPLEX 20 MIN: CPT | Performed by: PHYSICAL THERAPIST

## 2020-03-11 NOTE — PROGRESS NOTES
Physical Therapy Initial Evaluation and Plan of Care      Patient: Rama Goel   : 1945  Diagnosis/ICD-10 Code:  Osteoarthritis of left knee, unspecified osteoarthritis type [M17.12]  Referring practitioner: Hector Maurer MD  Date of Initial Visit: 3/11/2020  Today's Date: 3/11/2020  Patient seen for 1 sessions    Recheck due: 20  MD appt: 3/16/20   Auth: Medicare guidelines         Subjective Questionnaire: LEFS:       Subjective Evaluation    History of Present Illness  Date of surgery: 3/9/2020  Mechanism of injury: Pt s/p L TKA 3/9/20. Just got out of hospital today. Has 2 friends that are staying with her at this time and are assisting with ADL's and with taking care of her dogs. Pt has only been home for a short while but states using the RW in the house seems to be okay so far. Her friends have been helping her with the few steps she has to enter her house. States her pain so far has been well controlled. Was using SPC prior to surgery and had PT prior to her surgery for balance/strengthening.      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 Tries to stay active. Always stays busy. Pain  Current pain ratin  At best pain ratin  At worst pain ratin  Quality: dull ache and tight  Relieving factors: ice and change in position  Aggravating factors: squatting, ambulation, stairs, movement, standing, prolonged positioning and sleeping  Progression: no change    Social Support  Lives in: one-story house (3 steps to enter home; single handrail)  Lives with: alone    Treatments  Current treatment: medication  Discharged from (in last 30 days): inpatient hospitalization  Patient Goals  Patient goals for therapy: decreased edema, decreased pain, improved balance, increased motion, increased strength and independence with ADLs/IADLs             Objective       Observations     Additional Observation Details  Ambulates with RW, step to vs step through pattern and  with decreased stance time on L. Decreased TKE noted as well. Slow john. Pt wearing tight pants today and was unable to pull them up past her knee. No calf tenderness or redness noted. Pt reports no symptoms of infection. Bandage covering incision.    Palpation   Left   Tenderness of the distal biceps femoris, distal semimembranosus, distal semitendinosus, rectus femoris and vastus lateralis.     Tenderness   Left Knee   Tenderness in the lateral joint line and medial joint line.     Neurological Testing     Sensation     Knee   Left Knee   Intact: light touch    Right Knee   Intact: light touch     Active Range of Motion   Left Knee   Flexion: 87 degrees   Extension: 10 degrees     Right Knee   Flexion: 135 degrees   Extension: 7 degrees     Additional Active Range of Motion Details  Pt had very limited knee ext prior to surgery.    Patellar Mobility     Right Knee Patellar tendons within functional limits include the medial, lateral, superior and inferior.     Additional Patellar Mobility Details  Taut with patella mobilizations on L in all directions.    Strength/Myotome Testing     Left Knee   Quadriceps contraction: fair (Min assist/CGA for SLR flexion performance; quad lag noted. Cues needed for good quad set)    Right Knee   Flexion: 4+  Extension: 4+  Quadriceps contraction: good         Assessment & Plan     Assessment  Impairments: abnormal gait, abnormal or restricted ROM, activity intolerance, impaired balance, impaired physical strength, lacks appropriate home exercise program, pain with function and weight-bearing intolerance  Assessment details: Pt is a 74 y.o. Female presenting s/p L TKA on 3/9/20. Pt demonstrates an overall decrease in L knee functional ROM, strength, and stability which is limiting performance of functional mobility and daily activity performance. Has 2 friends that are currently staying with her at her home to assist with ADL's/IADL's. Pt requires use of RW for gait for safety  at this time. Pt quite limited in knee ext ROM but had significant limitations prior to surgery as well. Pt required assist with active SLR flex with weak quad set noted. Pt did well overall with initial post op exercises and was educated on HEP. Pt will benefit from skilled PT services to address these deficits for improvements in overall functional knee ROM, functional strength/stability, balance/proprioception, gait performance, and tolerance/performance to daily functional activities and mobility.  Prognosis: good  Functional Limitations: lifting, sleeping, walking, uncomfortable because of pain, moving in bed, sitting and standing  Goals  Plan Goals: STG's by 4/1/20  1) Demonstrate independence/compliance in HEP  2) Demonstrate improved L knee ext AROM to 5 deg or less  3) Demonstrate improved L knee flex AROM to 115 deg or greater  4) Perform active independent L SLR flex 2x10 with no quad lag, good eccentric control  5) Ambulate independently with SPC safely demonstrating good gait mechanics bilaterally    LTG's by 4/22/20  1) Subjectively report 60% overall improvement or greater  2) Improve LEFS score to 35/80 or greater  3) Ambulate independently for 250 ft safely with no AD, good gait mechanics bilaterally  4) Demonstrate improved L knee flex/ext MMT to 4+/5 or greater  5) Demonstrate improved L hip flex/abd MMT to 4+/5 or greater  6) Ascend/descend 5 steps x 5 with reciprocal pattern, good eccentric control, no compensation, SPC/handrail as needed    Plan  Therapy options: will be seen for skilled physical therapy services  Planned modality interventions: cryotherapy and electrical stimulation/Russian stimulation  Planned therapy interventions: balance/weight-bearing training, flexibility, functional ROM exercises, gait training, home exercise program, joint mobilization, manual therapy, neuromuscular re-education, soft tissue mobilization, strengthening, stretching and therapeutic activities  Duration  in visits: 15  Treatment plan discussed with: patient  Plan details: *Hx of CVA x3, blood thinner use, fall risk, hx of lumbar fusion*    Bandage to be removed at first MD post op appt on 3/16/20. Focus on overall functional ROM activities, patella and tibiofemoral mobilizations prn, manual/STM prn to soft tissue at knee (hamstring, quad). Knee/qaud strengthening and stability. Gait training. Balance/proprioception        Timed:  Manual Therapy:    6     mins  73879;  Therapeutic Exercise:    23     mins  63262;     Neuromuscular Hayley:        mins  27393;    Therapeutic Activity:          mins  72019;     Gait Training:           mins  02056;     Ultrasound:          mins  44986;    Electrical Stimulation:         mins  85166 ( );    Untimed:  Electrical Stimulation:         mins  01612 ( );  Mechanical Traction:         mins  78200;     Timed Treatment:   29   mins   Total Treatment:     47   mins    PT SIGNATURE: Michelle Jamison, PT   DATE TREATMENT INITIATED: 3/11/2020    Initial Certification  Certification Period: 6/9/2020  I certify that the therapy services are furnished while this patient is under my care.  The services outlined above are required by this patient, and will be reviewed every 90 days.     PHYSICIAN: Hector Maurer MD      DATE:     Please sign and return via fax to  .. Thank you, [facility].

## 2020-03-13 ENCOUNTER — TREATMENT (OUTPATIENT)
Dept: PHYSICAL THERAPY | Facility: CLINIC | Age: 75
End: 2020-03-13

## 2020-03-13 DIAGNOSIS — M17.12 OSTEOARTHRITIS OF LEFT KNEE, UNSPECIFIED OSTEOARTHRITIS TYPE: Primary | ICD-10-CM

## 2020-03-13 DIAGNOSIS — Z96.652 S/P TOTAL KNEE ARTHROPLASTY, LEFT: ICD-10-CM

## 2020-03-13 PROCEDURE — 97110 THERAPEUTIC EXERCISES: CPT | Performed by: PHYSICAL THERAPIST

## 2020-03-13 PROCEDURE — 97140 MANUAL THERAPY 1/> REGIONS: CPT | Performed by: PHYSICAL THERAPIST

## 2020-03-13 PROCEDURE — G0283 ELEC STIM OTHER THAN WOUND: HCPCS | Performed by: PHYSICAL THERAPIST

## 2020-03-13 NOTE — PROGRESS NOTES
Physical Therapy Daily Progress Note      Patient: Rama Goel   : 1945  Referring practitioner: Hector Maurer MD  Date of Initial Visit: Type: THERAPY  Noted: 3/11/2020  Today's Date: 3/13/2020  Patient seen for 2 sessions  Recert due:  20  MD followup:  3-16-20  Auth:  Per Medicare       Rama Goel reports:   Subjective Questionnaire:       Subjective  Pt voices concern about the amount of swelling she has.  Pre RX pain 5/10.     Objective       Active Range of Motion   Left Knee   Flexion: 107 degrees   Extension: 2 degrees     Additional Active Range of Motion Details  Ext with heel propped & QS     See Exercise, Manual, and Modality Logs for complete treatment.       Assessment & Plan     Assessment  Impairments: abnormal gait, abnormal or restricted ROM, activity intolerance, impaired balance, impaired physical strength, lacks appropriate home exercise program, pain with function and weight-bearing intolerance  Assessment details: Pt moving much better today.  Made significant increase in ROM.  Conts with weak quad set.  Tolerated treatment well with no c/o pain increase.    Prognosis: good  Functional Limitations: lifting, sleeping, walking, uncomfortable because of pain, moving in bed, sitting and standing  Goals  Plan Goals: STG's by 20  1) Demonstrate independence/compliance in HEP  2) Demonstrate improved L knee ext AROM to 5 deg or less  3) Demonstrate improved L knee flex AROM to 115 deg or greater  4) Perform active independent L SLR flex 2x10 with no quad lag, good eccentric control  5) Ambulate independently with SPC safely demonstrating good gait mechanics bilaterally    LTG's by 20  1) Subjectively report 60% overall improvement or greater  2) Improve LEFS score to 35/80 or greater  3) Ambulate independently for 250 ft safely with no AD, good gait mechanics bilaterally  4) Demonstrate improved L knee flex/ext MMT to 4+/5 or greater  5) Demonstrate improved L  hip flex/abd MMT to 4+/5 or greater  6) Ascend/descend 5 steps x 5 with reciprocal pattern, good eccentric control, no compensation, SPC/handrail as needed    Plan  Therapy options: will be seen for skilled physical therapy services  Planned modality interventions: cryotherapy and electrical stimulation/Russian stimulation  Planned therapy interventions: balance/weight-bearing training, flexibility, functional ROM exercises, gait training, home exercise program, joint mobilization, manual therapy, neuromuscular re-education, soft tissue mobilization, strengthening, stretching and therapeutic activities  Duration in visits: 15  Treatment plan discussed with: patient  Plan details: CR/TR, mini squats        Visit Diagnoses:    ICD-10-CM ICD-9-CM   1. Osteoarthritis of left knee, unspecified osteoarthritis type M17.12 715.96   2. S/P total knee arthroplasty, left Z96.652 V43.65                  Timed:  Manual Therapy:  10      mins  95941;  Therapeutic Exercise:    35     mins  85715;     Neuromuscular Hayley:        mins  79083;    Therapeutic Activity:          mins  39127;     Gait Training:           mins  21623;     Ultrasound:          mins  85304;    Electrical Stimulation:         mins  27201 ( );    Untimed:  Electrical Stimulation:         mins  25489 ( );  Mechanical Traction:         mins  42190;     Timed Treatment:  45    mins   Total Treatment:    60    mins  Gely Schwartz PTA  Physical Therapist

## 2020-03-17 ENCOUNTER — TREATMENT (OUTPATIENT)
Dept: PHYSICAL THERAPY | Facility: CLINIC | Age: 75
End: 2020-03-17

## 2020-03-17 DIAGNOSIS — Z96.652 S/P TOTAL KNEE ARTHROPLASTY, LEFT: ICD-10-CM

## 2020-03-17 DIAGNOSIS — M17.12 OSTEOARTHRITIS OF LEFT KNEE, UNSPECIFIED OSTEOARTHRITIS TYPE: Primary | ICD-10-CM

## 2020-03-17 PROCEDURE — 97110 THERAPEUTIC EXERCISES: CPT | Performed by: PHYSICAL THERAPIST

## 2020-03-17 PROCEDURE — G0283 ELEC STIM OTHER THAN WOUND: HCPCS | Performed by: PHYSICAL THERAPIST
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
## 2020-03-18 ENCOUNTER — TREATMENT (OUTPATIENT)
Dept: PHYSICAL THERAPY | Facility: CLINIC | Age: 75
End: 2020-03-18

## 2020-03-18 DIAGNOSIS — M17.12 OSTEOARTHRITIS OF LEFT KNEE, UNSPECIFIED OSTEOARTHRITIS TYPE: Primary | ICD-10-CM

## 2020-03-18 DIAGNOSIS — Z96.652 S/P TOTAL KNEE ARTHROPLASTY, LEFT: ICD-10-CM

## 2020-03-18 PROCEDURE — 97110 THERAPEUTIC EXERCISES: CPT | Performed by: PHYSICAL THERAPIST

## 2020-03-18 PROCEDURE — G0283 ELEC STIM OTHER THAN WOUND: HCPCS | Performed by: PHYSICAL THERAPIST

## 2020-03-18 NOTE — PROGRESS NOTES
"   Physical Therapy Daily Progress Note      Patient: Rama Goel   : 1945  Referring practitioner: Hector Maurer MD  Date of Initial Visit: Type: THERAPY  Noted: 3/11/2020  Today's Date: 3/18/2020  Patient seen for 4 sessions    Recert due:  20  MD followup:    Auth:  Per Medicare       Rama Goel reports: \"getting better\"        Subjective Evaluation    History of Present Illness    Subjective comment: pt states that she feels that she is more stiff todayPain  Current pain ratin           Objective       Active Range of Motion   Left Knee   Flexion: 110 degrees   Extension: 1 degrees     Ambulation     Comments   Ambulates with FWRW     See Exercise, Manual, and Modality Logs for complete treatment.       Assessment & Plan     Assessment  Assessment details: Pt did quite well with all therex today. Good motivation and effort throughout. Demos good quad set. Good improvement in AROM flexion today    Goals  Plan Goals: STG's by 20  1) Demonstrate independence/compliance in HEP  2) Demonstrate improved L knee ext AROM to 5 deg or less  3) Demonstrate improved L knee flex AROM to 115 deg or greater  4) Perform active independent L SLR flex 2x10 with no quad lag, good eccentric control  5) Ambulate independently with SPC safely demonstrating good gait mechanics bilaterally    LTG's by 20  1) Subjectively report 60% overall improvement or greater  2) Improve LEFS score to 35/80 or greater  3) Ambulate independently for 250 ft safely with no AD, good gait mechanics bilaterally  4) Demonstrate improved L knee flex/ext MMT to 4+/5 or greater  5) Demonstrate improved L hip flex/abd MMT to 4+/5 or greater  6) Ascend/descend 5 steps x 5 with reciprocal pattern, good eccentric control, no compensation, SPC/handrail as needed        Plan  Duration in visits: 15  Plan details: Possible mini squats and standing hip abd/ext        Visit Diagnoses:    ICD-10-CM ICD-9-CM   1. Osteoarthritis " of left knee, unspecified osteoarthritis type M17.12 715.96   2. S/P total knee arthroplasty, left Z96.652 V43.65       Progress per Plan of Care and Progress strengthening /stabilization /functional activity           Timed:  Manual Therapy:    5     mins  59118;  Therapeutic Exercise:    40     mins  27401;     Neuromuscular Hayley:        mins  16098;    Therapeutic Activity:          mins  00930;     Gait Training:           mins  79590;     Ultrasound:          mins  73439;    Electrical Stimulation:         mins  42030 ( );    Untimed:  Electrical Stimulation:    15     mins  99447 ( );  Mechanical Traction:         mins  65623;     Timed Treatment:   45   mins   Total Treatment:     60   mins  Tiera Hernandez, TOAN  Physical Therapist

## 2020-03-20 ENCOUNTER — TREATMENT (OUTPATIENT)
Dept: PHYSICAL THERAPY | Facility: CLINIC | Age: 75
End: 2020-03-20

## 2020-03-20 DIAGNOSIS — M17.12 OSTEOARTHRITIS OF LEFT KNEE, UNSPECIFIED OSTEOARTHRITIS TYPE: Primary | ICD-10-CM

## 2020-03-20 DIAGNOSIS — Z96.652 S/P TOTAL KNEE ARTHROPLASTY, LEFT: ICD-10-CM

## 2020-03-20 PROCEDURE — 97110 THERAPEUTIC EXERCISES: CPT | Performed by: PHYSICAL THERAPIST

## 2020-03-20 PROCEDURE — G0283 ELEC STIM OTHER THAN WOUND: HCPCS | Performed by: PHYSICAL THERAPIST

## 2020-03-20 NOTE — PROGRESS NOTES
Physical Therapy Daily Progress Note      Patient: Rama Goel   : 1945  Referring practitioner: Hector Maurer MD  Date of Initial Visit: Type: THERAPY  Noted: 3/11/2020  Today's Date: 3/20/2020  Patient seen for 5 sessions  Recert due:  20  MD followup:  20       Rama Goel reports: 75% improvement  Subjective Questionnaire:       Subjective  Pt reports having the most pain medially.  Is working on HEP.  Pre RX pain 2/10.     Objective       Active Range of Motion   Left Knee   Flexion: 110 degrees   Extension: 0 degrees     Ambulation     Observational Gait   Gait: within functional limits   Decreased walking speed.     Additional Observational Gait Details  Using RW     See Exercise, Manual, and Modality Logs for complete treatment.       Assessment & Plan     Assessment  Assessment details: Pt conts with quad lag during SLR.  Did reach full extension today.      Goals  Plan Goals: STG's by 20  1) Demonstrate independence/compliance in HEP  2) Demonstrate improved L knee ext AROM to 5 deg or less  3) Demonstrate improved L knee flex AROM to 115 deg or greater  4) Perform active independent L SLR flex 2x10 with no quad lag, good eccentric control  5) Ambulate independently with SPC safely demonstrating good gait mechanics bilaterally    LTG's by 20  1) Subjectively report 60% overall improvement or greater  2) Improve LEFS score to 35/80 or greater  3) Ambulate independently for 250 ft safely with no AD, good gait mechanics bilaterally  4) Demonstrate improved L knee flex/ext MMT to 4+/5 or greater  5) Demonstrate improved L hip flex/abd MMT to 4+/5 or greater  6) Ascend/descend 5 steps x 5 with reciprocal pattern, good eccentric control, no compensation, SPC/handrail as needed        Plan  Duration in visits: 15  Plan details: Step ups         Visit Diagnoses:    ICD-10-CM ICD-9-CM   1. Osteoarthritis of left knee, unspecified osteoarthritis type M17.12 715.96   2.  S/P total knee arthroplasty, left Z96.652 V43.65                  Timed:  Manual Therapy:         mins  12617;  Therapeutic Exercise:    45     mins  81991;     Neuromuscular Hayley:        mins  35922;    Therapeutic Activity:          mins  98847;     Gait Training:           mins  41384;     Ultrasound:          mins  99642;    Electrical Stimulation:         mins  68221 ( );    Untimed:  Electrical Stimulation:   15    mins  59572 ( );  Mechanical Traction:         mins  62105;     Timed Treatment:  45    mins   Total Treatment:   60     mins  [facility] [place]  Physical Therapist

## 2020-03-23 ENCOUNTER — TREATMENT (OUTPATIENT)
Dept: PHYSICAL THERAPY | Facility: CLINIC | Age: 75
End: 2020-03-23

## 2020-03-23 DIAGNOSIS — Z96.652 S/P TOTAL KNEE ARTHROPLASTY, LEFT: ICD-10-CM

## 2020-03-23 DIAGNOSIS — M17.12 OSTEOARTHRITIS OF LEFT KNEE, UNSPECIFIED OSTEOARTHRITIS TYPE: Primary | ICD-10-CM

## 2020-03-23 PROCEDURE — G0283 ELEC STIM OTHER THAN WOUND: HCPCS | Performed by: PHYSICAL THERAPIST

## 2020-03-23 PROCEDURE — 97110 THERAPEUTIC EXERCISES: CPT | Performed by: PHYSICAL THERAPIST

## 2020-03-23 NOTE — PROGRESS NOTES
Physical Therapy Daily Progress Note      Patient: Rama Goel   : 1945  Referring practitioner: Hector Maurer MD  Date of Initial Visit: Type: THERAPY  Noted: 3/11/2020  Today's Date: 3/23/2020  Patient seen for 6 sessions    Recert due:  20  MD followup:  20  Auth:  Per Medicare       Rama Goel reports: 75% improvement    Subjective Evaluation    History of Present Illness    Subjective comment: Pt states she feels like she's turned a corner after the last therapy session. Knee still feels stiff at times but otherwise she's doing good.Pain  Current pain ratin           Objective       Observations     Additional Observation Details  Ambulates with RW, decreased john. Improved heel strike. Slightly forward flexed at hips     See Exercise, Manual, and Modality Logs for complete treatment.       Assessment & Plan     Assessment  Assessment details: Did well with new CKC therex/strengthening activities. Did progress HEP due to limitations with visits this week due to corona virus/clinic precautions. Pt continues to maintain neutral ext and was able to improve knee flex AROM. Did educate pt on continuing HEP daily and not slacking on ROM. Pt verbalized understanding. Continues with mild lag with active SLR but able to improve with cues for good quad set.    Goals  Plan Goals: STG's by 20  1) Demonstrate independence/compliance in HEP - MET  2) Demonstrate improved L knee ext AROM to 5 deg or less - MET  3) Demonstrate improved L knee flex AROM to 115 deg or greater - MET  4) Perform active independent L SLR flex 2x10 with no quad lag, good eccentric control  5) Ambulate independently with SPC safely demonstrating good gait mechanics bilaterally    LTG's by 20  1) Subjectively report 60% overall improvement or greater  2) Improve LEFS score to 35/80 or greater  3) Ambulate independently for 250 ft safely with no AD, good gait mechanics bilaterally  4) Demonstrate  improved L knee flex/ext MMT to 4+/5 or greater  5) Demonstrate improved L hip flex/abd MMT to 4+/5 or greater  6) Ascend/descend 5 steps x 5 with reciprocal pattern, good eccentric control, no compensation, SPC/handrail as needed        Plan  Duration in visits: 15  Plan details: Continue to progress CKC activities. Lat step ups next visit. Continue standing hip abd/ext SLR.        Visit Diagnoses:    ICD-10-CM ICD-9-CM   1. Osteoarthritis of left knee, unspecified osteoarthritis type M17.12 715.96   2. S/P total knee arthroplasty, left Z96.652 V43.65       Progress per Plan of Care and Progress strengthening /stabilization /functional activity           Timed:  Manual Therapy:    6     mins  77929;  Therapeutic Exercise:    40     mins  36519;     Neuromuscular Hayley:        mins  31611;    Therapeutic Activity:          mins  30837;     Gait Training:           mins  88172;     Ultrasound:          mins  47107;    Electrical Stimulation:         mins  00006 ( );    Untimed:  Electrical Stimulation:    15     mins  88526 ( );  Mechanical Traction:         mins  02704;     Timed Treatment:   46   mins   Total Treatment:     61   mins  Michelle Jamison, PT  Physical Therapist

## 2020-03-25 ENCOUNTER — TREATMENT (OUTPATIENT)
Dept: PHYSICAL THERAPY | Facility: CLINIC | Age: 75
End: 2020-03-25

## 2020-03-25 DIAGNOSIS — M17.12 OSTEOARTHRITIS OF LEFT KNEE, UNSPECIFIED OSTEOARTHRITIS TYPE: Primary | ICD-10-CM

## 2020-03-25 DIAGNOSIS — Z96.652 S/P TOTAL KNEE ARTHROPLASTY, LEFT: ICD-10-CM

## 2020-03-25 PROCEDURE — G0283 ELEC STIM OTHER THAN WOUND: HCPCS | Performed by: PHYSICAL THERAPIST

## 2020-03-25 PROCEDURE — 97110 THERAPEUTIC EXERCISES: CPT | Performed by: PHYSICAL THERAPIST

## 2020-03-25 NOTE — PROGRESS NOTES
Physical Therapy Daily Progress Note      Patient: Rama Goel   : 1945  Referring practitioner: Hector Maurer MD  Date of Initial Visit: Type: THERAPY  Noted: 3/11/2020  Today's Date: 3/25/2020  Patient seen for 7 sessions    Recert due:  20  MD followup:  20  Auth:  Per Medicare       Rama Goel reports: 75% improvement    Subjective Evaluation    History of Present Illness    Subjective comment: Pt states she's been very busy today. has done a lot of cleaning and his been up on her feet all day. Groomed 2 dogs as well. Knee just a little sore & tight this afternoon.Pain  Current pain ratin           Objective       Active Range of Motion   Left Knee   Flexion: 111 degrees   Extension: 2 degrees      See Exercise, Manual, and Modality Logs for complete treatment.       Assessment & Plan     Assessment  Assessment details: Pt more taut into ext today but reports she was up on her feet all day today. Did well with new CKC strengthening activities. Did require minor cues at times with standing SLR activities. Quad lag more exaggerated today. Educated pt on not doing too much at home and taking frequent rest breaks as needed. Pt verbalized understanding.    Goals  Plan Goals: STG's by 20  1) Demonstrate independence/compliance in HEP - MET  2) Demonstrate improved L knee ext AROM to 5 deg or less - MET  3) Demonstrate improved L knee flex AROM to 115 deg or greater - MET  4) Perform active independent L SLR flex 2x10 with no quad lag, good eccentric control  5) Ambulate independently with SPC safely demonstrating good gait mechanics bilaterally    LTG's by 20  1) Subjectively report 60% overall improvement or greater  2) Improve LEFS score to 35/80 or greater  3) Ambulate independently for 250 ft safely with no AD, good gait mechanics bilaterally  4) Demonstrate improved L knee flex/ext MMT to 4+/5 or greater  5) Demonstrate improved L hip flex/abd MMT to 4+/5 or  greater  6) Ascend/descend 5 steps x 5 with reciprocal pattern, good eccentric control, no compensation, SPC/handrail as needed        Plan  Duration in visits: 15  Plan details: Continue to progress CKC activities and proprioception activities. Recheck next week. Increase step ups to 6 inch. Try reciprocal stairs.        Visit Diagnoses:    ICD-10-CM ICD-9-CM   1. Osteoarthritis of left knee, unspecified osteoarthritis type M17.12 715.96   2. S/P total knee arthroplasty, left Z96.652 V43.65       Progress per Plan of Care and Progress strengthening /stabilization /functional activity           Timed:  Manual Therapy:    6     mins  31444;  Therapeutic Exercise:    40     mins  05749;     Neuromuscular Hayley:        mins  45672;    Therapeutic Activity:          mins  67209;     Gait Training:           mins  71711;     Ultrasound:          mins  46172;    Electrical Stimulation:         mins  87385 ( );    Untimed:  Electrical Stimulation:    10     mins  39844 ( );  Mechanical Traction:         mins  47414;     Timed Treatment:   46   mins   Total Treatment:     56   mins  Michelle Jamison PT  Physical Therapist

## 2020-03-30 ENCOUNTER — TREATMENT (OUTPATIENT)
Dept: PHYSICAL THERAPY | Facility: CLINIC | Age: 75
End: 2020-03-30

## 2020-03-30 DIAGNOSIS — Z96.652 S/P TOTAL KNEE ARTHROPLASTY, LEFT: ICD-10-CM

## 2020-03-30 DIAGNOSIS — M17.12 OSTEOARTHRITIS OF LEFT KNEE, UNSPECIFIED OSTEOARTHRITIS TYPE: Primary | ICD-10-CM

## 2020-03-30 PROCEDURE — 97110 THERAPEUTIC EXERCISES: CPT | Performed by: PHYSICAL THERAPIST

## 2020-03-30 PROCEDURE — 97140 MANUAL THERAPY 1/> REGIONS: CPT | Performed by: PHYSICAL THERAPIST

## 2020-03-30 PROCEDURE — G0283 ELEC STIM OTHER THAN WOUND: HCPCS | Performed by: PHYSICAL THERAPIST

## 2020-03-30 NOTE — PROGRESS NOTES
Physical Therapy Daily Progress Note      Patient: Rama Goel   : 1945  Referring practitioner: Hector Maurer MD  Date of Initial Visit: Type: THERAPY  Noted: 3/11/2020  Today's Date: 3/30/2020  Patient seen for 8 sessions    Recert due:  20  MD followup:  20  Auth:  Per Medicare       Rama Goel reports: 75% improvement    Subjective Evaluation    History of Present Illness    Subjective comment: Pt states she's been really working hard on her knee ROM. Feels like it's getting better.Pain  Current pain rating: 3           Objective       Observations     Additional Observation Details  Ambulates with no AD, mild antalgic gait LLE. Equal step length.    Active Range of Motion   Left Knee   Flexion: 116 degrees   Extension: 0 degrees      See Exercise, Manual, and Modality Logs for complete treatment.       Assessment & Plan     Assessment  Assessment details: Pt demonstrates good improvements in both knee flex & ext ROM this date. Able to achieve 0 deg of ext this date. Pt demonstrates fairly good eccentric control with fwd step ups this date. Pt TTP at medial knee and into medial thigh but responded well to STM to this area with reports of decreased pain after.    Goals  Plan Goals: STG's by 20  1) Demonstrate independence/compliance in HEP - MET  2) Demonstrate improved L knee ext AROM to 5 deg or less - MET  3) Demonstrate improved L knee flex AROM to 115 deg or greater - MET  4) Perform active independent L SLR flex 2x10 with no quad lag, good eccentric control  5) Ambulate independently with SPC safely demonstrating good gait mechanics bilaterally    LTG's by 20  1) Subjectively report 60% overall improvement or greater  2) Improve LEFS score to 35/80 or greater  3) Ambulate independently for 250 ft safely with no AD, good gait mechanics bilaterally  4) Demonstrate improved L knee flex/ext MMT to 4+/5 or greater  5) Demonstrate improved L hip flex/abd MMT to 4+/5 or  greater  6) Ascend/descend 5 steps x 5 with reciprocal pattern, good eccentric control, no compensation, SPC/handrail as needed        Plan  Duration in visits: 15  Plan details: Recheck next visit. Reciprocal stairs next.        Visit Diagnoses:    ICD-10-CM ICD-9-CM   1. Osteoarthritis of left knee, unspecified osteoarthritis type M17.12 715.96   2. S/P total knee arthroplasty, left Z96.652 V43.65       Progress per Plan of Care and Progress strengthening /stabilization /functional activity           Timed:  Manual Therapy:    8     mins  86412;  Therapeutic Exercise:    32     mins  22683;     Neuromuscular Hayley:        mins  75457;    Therapeutic Activity:          mins  98196;     Gait Training:           mins  73229;     Ultrasound:          mins  20638;    Electrical Stimulation:         mins  57806 ( );    Untimed:  Electrical Stimulation:    10     mins  95720 ( );  Mechanical Traction:         mins  09952;     Timed Treatment:   40   mins   Total Treatment:     50   mins  Michelle Jamison, PT  Physical Therapist

## 2020-04-02 ENCOUNTER — TREATMENT (OUTPATIENT)
Dept: PHYSICAL THERAPY | Facility: CLINIC | Age: 75
End: 2020-04-02

## 2020-04-02 DIAGNOSIS — Z96.652 S/P TOTAL KNEE ARTHROPLASTY, LEFT: ICD-10-CM

## 2020-04-02 DIAGNOSIS — M17.12 OSTEOARTHRITIS OF LEFT KNEE, UNSPECIFIED OSTEOARTHRITIS TYPE: Primary | ICD-10-CM

## 2020-04-02 PROCEDURE — 97110 THERAPEUTIC EXERCISES: CPT | Performed by: PHYSICAL THERAPIST

## 2020-04-02 NOTE — PROGRESS NOTES
Physical Therapy Daily Progress Note      Patient: Rama Goel   : 1945  Referring practitioner: Hector Maurer MD  Date of Initial Visit: Type: THERAPY  Noted: 3/11/2020  Today's Date: 2020  Patient seen for 9 sessions    Recert due:  20  MD followup:  20  Auth:  Per Medicare       Rama Goel reports: 75% improvement    Subjective Evaluation    History of Present Illness    Subjective comment: Pt states her knee feels really good today, better than it ever has before. Did a lot around the house yesterday and was sore last night. No soreness today when she woke up.Pain  No pain reported           Objective       Observations     Additional Observation Details  Ambulates with no AD, slight antalgic LLE.    Active Range of Motion   Left Knee   Flexion: 123 degrees   Extension: 0 degrees      See Exercise, Manual, and Modality Logs for complete treatment.       Assessment & Plan     Assessment  Assessment details: Very good increase in knee flex AROM. ROM in both ext & flex WNL for TKA. Pt doing well with CK strength progressions. Able to perform reciprocal stairs this date, more challenged eccentrically with descent from 6 inch step vs 4 inch step. Pt progressing nicely overall.    Goals  Plan Goals: STG's by 20  1) Demonstrate independence/compliance in HEP - MET  2) Demonstrate improved L knee ext AROM to 5 deg or less - MET  3) Demonstrate improved L knee flex AROM to 115 deg or greater - MET  4) Perform active independent L SLR flex 2x10 with no quad lag, good eccentric control  5) Ambulate independently with SPC safely demonstrating good gait mechanics bilaterally    LTG's by 20  1) Subjectively report 60% overall improvement or greater  2) Improve LEFS score to 35/80 or greater  3) Ambulate independently for 250 ft safely with no AD, good gait mechanics bilaterally  4) Demonstrate improved L knee flex/ext MMT to 4+/5 or greater  5) Demonstrate improved L hip  flex/abd MMT to 4+/5 or greater  6) Ascend/descend 5 steps x 5 with reciprocal pattern, good eccentric control, no compensation, SPC/handrail as needed        Plan  Duration in visits: 15  Plan details: Recheck next visit. Continue reciprocal stairs. Possible cybex leg press next.        Visit Diagnoses:    ICD-10-CM ICD-9-CM   1. Osteoarthritis of left knee, unspecified osteoarthritis type M17.12 715.96   2. S/P total knee arthroplasty, left Z96.652 V43.65       Progress per Plan of Care and Progress strengthening /stabilization /functional activity           Timed:  Manual Therapy:    5     mins  58955;  Therapeutic Exercise:    40     mins  70103;     Neuromuscular Hayley:        mins  25913;    Therapeutic Activity:          mins  85786;     Gait Training:           mins  89769;     Ultrasound:          mins  56781;    Electrical Stimulation:         mins  85916 ( );    Untimed:  Electrical Stimulation:         mins  37526 ( );  Mechanical Traction:         mins  39998;     Timed Treatment:   45   mins   Total Treatment:     45   mins  Michelle Jamison PT  Physical Therapist

## 2020-04-06 ENCOUNTER — TREATMENT (OUTPATIENT)
Dept: PHYSICAL THERAPY | Facility: CLINIC | Age: 75
End: 2020-04-06

## 2020-04-06 DIAGNOSIS — Z96.652 S/P TOTAL KNEE ARTHROPLASTY, LEFT: ICD-10-CM

## 2020-04-06 DIAGNOSIS — M17.12 OSTEOARTHRITIS OF LEFT KNEE, UNSPECIFIED OSTEOARTHRITIS TYPE: Primary | ICD-10-CM

## 2020-04-06 PROCEDURE — 97110 THERAPEUTIC EXERCISES: CPT | Performed by: PHYSICAL THERAPIST

## 2020-04-06 NOTE — PROGRESS NOTES
Re-Assessment / Re-Certification      Patient: Rama Goel   : 1945  Diagnosis/ICD-10 Code:  Osteoarthritis of left knee, unspecified osteoarthritis type [M17.12]  Referring practitioner: Hector Maurer MD  Date of Initial Visit: Type: THERAPY  Noted: 3/11/2020  Today's Date: 2020  Patient seen for 10 sessions    Recert due:  20  MD followup:  20  Auth:  Per Medicare    Subjective:   Rama Goel reports: 80-85% improvement  Subjective Questionnaire: LEFS: 47/80  Clinical Progress: improved  Home Program Compliance: Yes  Treatment has included: therapeutic exercise, neuromuscular re-education, gait training, electrical stimulation and cryotherapy    Subjective Evaluation    History of Present Illness    Subjective comment: Pt states she drove to [place] yesterday and her knee is a little stiff and sore today. Feels better now than it did this morning. Overall reports she's very pleased with her knee. Feels like ROM is better than it ever has been, not walking with cane as much anymore, and doing better functionally at home.Pain  Current pain rating: 3         Objective       Observations     Additional Observation Details  Ambulates with no AD into clinic. Mildly antalgic LLE. Mild edema present L knee. Incision/scar healing well. Mild scar tissue restrictions noted at L knee but overall looks good.    Palpation   Left   Tenderness of the vastus medialis.     Tenderness   Left Knee   Tenderness in the medial joint line.     Neurological Testing     Sensation     Knee   Left Knee   Intact: light touch    Right Knee   Intact: light touch     Active Range of Motion   Left Knee   Flexion: 124 degrees   Extension: 0 degrees     Patellar Mobility     Right Knee Patellar tendons within functional limits include the medial, lateral, superior and inferior.     Additional Patellar Mobility Details  Very minimal tightness noted with patella mobilizations in all directions; more taut  inferiorly.    Strength/Myotome Testing     Left Hip   Planes of Motion   Flexion: 4+  Abduction: 4    Left Knee   Flexion: 4+  Extension: 4+  Quadriceps contraction: good (no quad lag with active SLR)    Functional Assessment     Comments  Pt able to ascend/descend training steps (6 inch & 4 inch side) reciprocally with bilateral hand rails. Pt more challenged eccentrically with descent at 6 inch step.     Assessment & Plan     Assessment  Impairments: abnormal gait, abnormal or restricted ROM, activity intolerance, impaired balance, impaired physical strength and pain with function  Assessment details: Pt is progressing very well overall with PT at this time as evidenced by improvements in functional knee ROM, strength/stability, gait performance, and functional activity tolerance. Pt's knee AROM WNL for TKA; able to achieve neutral ext. Strength is improving nicely, pt able to perform SLR with no lag. Overall has fairly good eccentric control with step downs but more challenged with descending 6 inch step vs 4 inch step. Pt doing well with proprioception progressions. Gait performance has improved nicely as well but pt still slightly antalgic without an AD at this time. Pt will continue to benefit from skilled PT services to further improve functional knee/hip strength, stability, balance/proprioception, and tolerance/performance to daily functional activities.  Prognosis: good  Functional Limitations: carrying objects, lifting, walking, uncomfortable because of pain and standing  Goals  Plan Goals: STG's all met  1) Demonstrate independence/compliance in HEP - MET  2) Demonstrate improved L knee ext AROM to 5 deg or less - MET  3) Demonstrate improved L knee flex AROM to 115 deg or greater - MET  4) Perform active independent L SLR flex 2x10 with no quad lag, good eccentric control - MET  5) Ambulate independently with SPC safely demonstrating good gait mechanics bilaterally - MET    LTG's by 4/27/20  1)  Subjectively report 60% overall improvement or greater - MET  2) Improve LEFS score to 35/80 or greater - MET  3) Ambulate independently for 250 ft safely with no AD, good gait mechanics bilaterally - partially met; progressing  4) Demonstrate improved L knee flex/ext MMT to 4+/5 or greater - MET  5) Demonstrate improved L hip flex/abd MMT to 4+/5 or greater - progressing  6) Ascend/descend 5 steps x 5 with reciprocal pattern, good eccentric control, no compensation, SPC/handrail as needed - progressing    Plan  Therapy options: will be seen for skilled physical therapy services  Planned modality interventions: cryotherapy and electrical stimulation/Russian stimulation  Planned therapy interventions: balance/weight-bearing training, flexibility, functional ROM exercises, gait training, home exercise program, joint mobilization, manual therapy, neuromuscular re-education, soft tissue mobilization, strengthening, stretching and therapeutic activities  Duration in visits: 6  Treatment plan discussed with: patient  Plan details: Will continue for 2-3 more weeks and d/c at that time. Continue to progress functional knee/hip strengthening and dynamic balance. Work on finalizing HEP. Possibly try cybex leg press next visit.        Visit Diagnoses:    ICD-10-CM ICD-9-CM   1. Osteoarthritis of left knee, unspecified osteoarthritis type M17.12 715.96   2. S/P total knee arthroplasty, left Z96.652 V43.65       Progress toward previous goals: Partially Met      Recommendations: Continue as planned  Timeframe: 2-3 more weeks  Prognosis to achieve goals: good    PT Signature: Michelle Jamison, PT      Based upon review of the patient's progress and continued therapy plan, it is my medical opinion that Rama Goel should continue physical therapy treatment at [facility]  [address] [facility] [address] [phone].    Signature: __________________________________   Hector Maurer MD    Timed:  Manual Therapy:    4     mins  23746;  Therapeutic Exercise:    40     mins  02196;     Neuromuscular Hayley:        mins  06771;    Therapeutic Activity:          mins  23414;     Gait Training:           mins  23269;     Ultrasound:          mins  76002;    Electrical Stimulation:         mins  27775 ( );    Untimed:  Electrical Stimulation:         mins  98911 ( );  Mechanical Traction:         mins  31910;     Timed Treatment:   44   mins   Total Treatment:     44   mins

## 2020-04-15 ENCOUNTER — TREATMENT (OUTPATIENT)
Dept: PHYSICAL THERAPY | Facility: CLINIC | Age: 75
End: 2020-04-15

## 2020-04-15 DIAGNOSIS — Z96.652 S/P TOTAL KNEE ARTHROPLASTY, LEFT: ICD-10-CM

## 2020-04-15 DIAGNOSIS — M17.12 OSTEOARTHRITIS OF LEFT KNEE, UNSPECIFIED OSTEOARTHRITIS TYPE: Primary | ICD-10-CM

## 2020-04-15 PROCEDURE — G0283 ELEC STIM OTHER THAN WOUND: HCPCS | Performed by: PHYSICAL THERAPIST

## 2020-04-15 PROCEDURE — 97110 THERAPEUTIC EXERCISES: CPT | Performed by: PHYSICAL THERAPIST

## 2020-04-15 NOTE — PROGRESS NOTES
Physical Therapy Daily Progress Note      Patient: Rama Goel   : 1945  Referring practitioner: Hector Maurer MD  Date of Initial Visit: Type: THERAPY  Noted: 3/11/2020  Today's Date: 4/15/2020  Patient seen for 11 sessions    Recert due:  20  MD followup:  20  Auth:  Per Medicare       Rama Goel reports: 80-85% improvement      Subjective Evaluation    History of Present Illness    Subjective comment: Pt states knee is doing good, MD very pleased with her progress thus far.Pain  No pain reported            Objective       Observations     Additional Observation Details  Ambulates with SPC this date, very minimal antalgics.    Active Range of Motion   Left Knee   Flexion: 123 degrees   Extension: 0 degrees      See Exercise, Manual, and Modality Logs for complete treatment.       Assessment & Plan     Assessment  Assessment details: Did well with introduction to cybex strengthening however needed cues to slow down and improve control with leg press. Continues with good ROM improvements. Progressed proprioception activities, able to perform BOSU step ups with handrail support with fairly good eccentric control noted. Pt progressing nicely overall and anticipate d/c in the next couple of weeks.  Prognosis: good    Goals  Plan Goals: STG's all met  1) Demonstrate independence/compliance in HEP - MET  2) Demonstrate improved L knee ext AROM to 5 deg or less - MET  3) Demonstrate improved L knee flex AROM to 115 deg or greater - MET  4) Perform active independent L SLR flex 2x10 with no quad lag, good eccentric control - MET  5) Ambulate independently with SPC safely demonstrating good gait mechanics bilaterally - MET    LTG's by 20  1) Subjectively report 60% overall improvement or greater - MET  2) Improve LEFS score to 35/80 or greater - MET  3) Ambulate independently for 250 ft safely with no AD, good gait mechanics bilaterally - partially met; progressing  4) Demonstrate  improved L knee flex/ext MMT to 4+/5 or greater - MET  5) Demonstrate improved L hip flex/abd MMT to 4+/5 or greater - progressing  6) Ascend/descend 5 steps x 5 with reciprocal pattern, good eccentric control, no compensation, SPC/handrail as needed - progressing    Plan  Duration in visits: 6  Treatment plan discussed with: patient  Plan details: Continue cybex strengthening and proprioception/balance progressions.        Visit Diagnoses:    ICD-10-CM ICD-9-CM   1. Osteoarthritis of left knee, unspecified osteoarthritis type M17.12 715.96   2. S/P total knee arthroplasty, left Z96.652 V43.65       Progress per Plan of Care and Progress strengthening /stabilization /functional activity           Timed:  Manual Therapy:    2     mins  16607;  Therapeutic Exercise:    38     mins  58728;     Neuromuscular Hayley:        mins  95851;    Therapeutic Activity:          mins  05801;     Gait Training:           mins  16836;     Ultrasound:          mins  44939;    Electrical Stimulation:         mins  95925 ( );    Untimed:  Electrical Stimulation:    9     mins  54212 ( );  Mechanical Traction:         mins  50301;     Timed Treatment:   40   mins   Total Treatment:     49   mins  Michelle Jamison PT  Physical Therapist

## 2020-04-16 ENCOUNTER — TREATMENT (OUTPATIENT)
Dept: PHYSICAL THERAPY | Facility: CLINIC | Age: 75
End: 2020-04-16

## 2020-04-16 DIAGNOSIS — M17.12 OSTEOARTHRITIS OF LEFT KNEE, UNSPECIFIED OSTEOARTHRITIS TYPE: Primary | ICD-10-CM

## 2020-04-16 DIAGNOSIS — Z96.652 S/P TOTAL KNEE ARTHROPLASTY, LEFT: ICD-10-CM

## 2020-04-16 PROCEDURE — G0283 ELEC STIM OTHER THAN WOUND: HCPCS | Performed by: PHYSICAL THERAPIST

## 2020-04-16 PROCEDURE — 97110 THERAPEUTIC EXERCISES: CPT | Performed by: PHYSICAL THERAPIST

## 2020-04-16 NOTE — PROGRESS NOTES
Physical Therapy Daily Progress Note      Patient: Rama Goel   : 1945  Referring practitioner: Hector Maurer MD  Date of Initial Visit: Type: THERAPY  Noted: 3/11/2020  Today's Date: 2020  Patient seen for 12 sessions    Recert due:  20  MD followup:  20  Auth:  Per Medicare       Rama Goel reports: 90% improvement    Subjective Evaluation    History of Present Illness    Subjective comment: Pt states she was a little sore this morning after yesterdays treatment but feels ok now. Could tell she worked some different muscles.Pain  Current pain ratin           Objective       Active Range of Motion   Left Knee   Flexion: 125 degrees   Extension: 0 degrees      See Exercise, Manual, and Modality Logs for complete treatment.       Assessment & Plan     Assessment  Assessment details: Pt continues to do very well. Better performance with cybex leg press activities; more controlled this date. Knee ROM is doing great.  Prognosis: good    Goals  Plan Goals: STG's all met  1) Demonstrate independence/compliance in HEP - MET  2) Demonstrate improved L knee ext AROM to 5 deg or less - MET  3) Demonstrate improved L knee flex AROM to 115 deg or greater - MET  4) Perform active independent L SLR flex 2x10 with no quad lag, good eccentric control - MET  5) Ambulate independently with SPC safely demonstrating good gait mechanics bilaterally - MET    LTG's by 20  1) Subjectively report 60% overall improvement or greater - MET  2) Improve LEFS score to 35/80 or greater - MET  3) Ambulate independently for 250 ft safely with no AD, good gait mechanics bilaterally - partially met; progressing  4) Demonstrate improved L knee flex/ext MMT to 4+/5 or greater - MET  5) Demonstrate improved L hip flex/abd MMT to 4+/5 or greater - progressing  6) Ascend/descend 5 steps x 5 with reciprocal pattern, good eccentric control, no compensation, SPC/handrail as needed -  progressing    Plan  Duration in visits: 6  Treatment plan discussed with: patient  Plan details: Will continue to finalize HEP over the next few visits and d/c at that time.        Visit Diagnoses:    ICD-10-CM ICD-9-CM   1. Osteoarthritis of left knee, unspecified osteoarthritis type M17.12 715.96   2. S/P total knee arthroplasty, left Z96.652 V43.65       Progress per Plan of Care and Progress strengthening /stabilization /functional activity           Timed:  Manual Therapy:         mins  75405;  Therapeutic Exercise:    39     mins  00969;     Neuromuscular Hayley:        mins  89946;    Therapeutic Activity:          mins  32491;     Gait Training:           mins  52631;     Ultrasound:          mins  14196;    Electrical Stimulation:         mins  58824 ( );    Untimed:  Electrical Stimulation:    10     mins  07543 ( );  Mechanical Traction:         mins  48132;     Timed Treatment:   39   mins   Total Treatment:     49   mins  Michelle Jamison, PT  Physical Therapist

## 2020-04-20 ENCOUNTER — TREATMENT (OUTPATIENT)
Dept: PHYSICAL THERAPY | Facility: CLINIC | Age: 75
End: 2020-04-20

## 2020-04-20 DIAGNOSIS — M17.12 OSTEOARTHRITIS OF LEFT KNEE, UNSPECIFIED OSTEOARTHRITIS TYPE: Primary | ICD-10-CM

## 2020-04-20 DIAGNOSIS — Z96.652 S/P TOTAL KNEE ARTHROPLASTY, LEFT: ICD-10-CM

## 2020-04-20 PROCEDURE — 97110 THERAPEUTIC EXERCISES: CPT | Performed by: PHYSICAL THERAPIST

## 2020-04-20 NOTE — PROGRESS NOTES
Physical Therapy Daily Progress Note      Patient: Rama Goel   : 1945  Referring practitioner: Hector Maurer MD  Date of Initial Visit: Type: THERAPY  Noted: 3/11/2020  Today's Date: 2020  Patient seen for 13 sessions    Recert due:  20  MD followup:  20  Auth:  Per Medicare       Rama Goel reports: 90% improvement      Subjective Evaluation    History of Present Illness    Subjective comment: Pt states knee is feeling great. Has been doing a lot of work around and inside her home and her knee is doing good. Balance getting better but still off a little due to her hx of strokes.Pain  No pain reported           Objective       Active Range of Motion   Left Knee   Flexion: 128 degrees   Extension: 0 degrees      See Exercise, Manual, and Modality Logs for complete treatment.       Assessment & Plan     Assessment  Assessment details: Pt did quite well overall with obstacle course activities with only SBA. Some mild unsteadiness with stepping over hurdles at times but recovers easily. Good improvements noted in eccentric quad control. Able to set up cybex equipment with cues.  Prognosis: good    Goals  Plan Goals: STG's all met  1) Demonstrate independence/compliance in HEP - MET  2) Demonstrate improved L knee ext AROM to 5 deg or less - MET  3) Demonstrate improved L knee flex AROM to 115 deg or greater - MET  4) Perform active independent L SLR flex 2x10 with no quad lag, good eccentric control - MET  5) Ambulate independently with SPC safely demonstrating good gait mechanics bilaterally - MET    LTG's by 20  1) Subjectively report 60% overall improvement or greater - MET  2) Improve LEFS score to 35/80 or greater - MET  3) Ambulate independently for 250 ft safely with no AD, good gait mechanics bilaterally - partially met; progressing  4) Demonstrate improved L knee flex/ext MMT to 4+/5 or greater - MET  5) Demonstrate improved L hip flex/abd MMT to 4+/5 or greater  - progressing  6) Ascend/descend 5 steps x 5 with reciprocal pattern, good eccentric control, no compensation, SPC/handrail as needed - progressing    Plan  Duration in visits: 6  Treatment plan discussed with: patient  Plan details: Will plan to d/c pt next week to independent management. Continue obstacle course activities. Continue to progress strengthening.        Visit Diagnoses:    ICD-10-CM ICD-9-CM   1. Osteoarthritis of left knee, unspecified osteoarthritis type M17.12 715.96   2. S/P total knee arthroplasty, left Z96.652 V43.65       Progress per Plan of Care and Progress strengthening /stabilization /functional activity           Timed:  Manual Therapy:    3     mins  04988;  Therapeutic Exercise:    44     mins  48951;     Neuromuscular Hayley:        mins  10909;    Therapeutic Activity:          mins  90676;     Gait Training:           mins  21924;     Ultrasound:          mins  62390;    Electrical Stimulation:         mins  27297 ( );    Untimed:  Electrical Stimulation:         mins  64682 ( );  Mechanical Traction:         mins  52085;     Timed Treatment:   47   mins   Total Treatment:     47   mins  Michelle Jamison, PT  Physical Therapist

## 2020-04-22 ENCOUNTER — TREATMENT (OUTPATIENT)
Dept: PHYSICAL THERAPY | Facility: CLINIC | Age: 75
End: 2020-04-22

## 2020-04-22 DIAGNOSIS — Z96.652 S/P TOTAL KNEE ARTHROPLASTY, LEFT: ICD-10-CM

## 2020-04-22 DIAGNOSIS — M17.12 OSTEOARTHRITIS OF LEFT KNEE, UNSPECIFIED OSTEOARTHRITIS TYPE: Primary | ICD-10-CM

## 2020-04-22 PROCEDURE — 97112 NEUROMUSCULAR REEDUCATION: CPT | Performed by: PHYSICAL THERAPIST

## 2020-04-22 PROCEDURE — 97110 THERAPEUTIC EXERCISES: CPT | Performed by: PHYSICAL THERAPIST

## 2020-04-22 NOTE — PROGRESS NOTES
Physical Therapy Daily Progress Note      Patient: Rama Goel   : 1945  Referring practitioner: Hector Maurer MD  Date of Initial Visit: Type: THERAPY  Noted: 3/11/2020  Today's Date: 2020  Patient seen for 14 sessions    Recert due:  20  MD followup:  20  Auth:  Per Medicare       Rama Goel reports: 90% improvement    Subjective Evaluation    History of Present Illness    Subjective comment: Pt states she worked out in her flowers/garden all day yesterday without trouble. Was a little sore last night but felt fine this morning.Pain  No pain reported           Objective       Active Range of Motion   Left Knee   Flexion: 128 degrees   Extension: 0 degrees      See Exercise, Manual, and Modality Logs for complete treatment.       Assessment & Plan     Assessment  Assessment details: Good performance overall with airex tandem stance balance. More challenged with RLE leading and requires cues to put more weight through her LLE. Does require intermittent HHA at handrail. Continues to do well with strength/balance progressions. Plan to d/c next week.  Prognosis: good    Goals  Plan Goals: STG's all met  1) Demonstrate independence/compliance in HEP - MET  2) Demonstrate improved L knee ext AROM to 5 deg or less - MET  3) Demonstrate improved L knee flex AROM to 115 deg or greater - MET  4) Perform active independent L SLR flex 2x10 with no quad lag, good eccentric control - MET  5) Ambulate independently with SPC safely demonstrating good gait mechanics bilaterally - MET    LTG's by 20  1) Subjectively report 60% overall improvement or greater - MET  2) Improve LEFS score to 35/80 or greater - MET  3) Ambulate independently for 250 ft safely with no AD, good gait mechanics bilaterally - partially met; progressing  4) Demonstrate improved L knee flex/ext MMT to 4+/5 or greater - MET  5) Demonstrate improved L hip flex/abd MMT to 4+/5 or greater - progressing  6)  Ascend/descend 5 steps x 5 with reciprocal pattern, good eccentric control, no compensation, SPC/handrail as needed - progressing    Plan  Duration in visits: 6  Treatment plan discussed with: patient  Plan details: Plan to d/c next week to independent management. Continue CKC strengthening and proprioception/balance activities.        Visit Diagnoses:    ICD-10-CM ICD-9-CM   1. Osteoarthritis of left knee, unspecified osteoarthritis type M17.12 715.96   2. S/P total knee arthroplasty, left Z96.652 V43.65       Progress per Plan of Care and Progress strengthening /stabilization /functional activity           Timed:  Manual Therapy:         mins  96282;  Therapeutic Exercise:    37     mins  71787;     Neuromuscular Hayley:    10    mins  52157;    Therapeutic Activity:          mins  70017;     Gait Training:           mins  38257;     Ultrasound:          mins  68490;    Electrical Stimulation:         mins  08714 ( );    Untimed:  Electrical Stimulation:         mins  74892 ( );  Mechanical Traction:         mins  90036;     Timed Treatment:   47   mins   Total Treatment:     47   mins  Michelle Jamison PT  Physical Therapist

## 2020-04-27 ENCOUNTER — TREATMENT (OUTPATIENT)
Dept: PHYSICAL THERAPY | Facility: CLINIC | Age: 75
End: 2020-04-27

## 2020-04-27 DIAGNOSIS — M17.12 OSTEOARTHRITIS OF LEFT KNEE, UNSPECIFIED OSTEOARTHRITIS TYPE: Primary | ICD-10-CM

## 2020-04-27 DIAGNOSIS — Z96.652 S/P TOTAL KNEE ARTHROPLASTY, LEFT: ICD-10-CM

## 2020-04-27 PROCEDURE — 97110 THERAPEUTIC EXERCISES: CPT | Performed by: PHYSICAL THERAPIST

## 2020-04-27 NOTE — PROGRESS NOTES
Physical Therapy Daily Progress Note      Patient: Rama Goel   : 1945  Referring practitioner: Hector Maurer MD  Date of Initial Visit: Type: THERAPY  Noted: 3/11/2020  Today's Date: 2020  Patient seen for 15 sessions    Recert due:  20  MD followup:  20  Auth:  Per Medicare       Rama Goel reports: 90% improvement    Subjective Evaluation    History of Present Illness    Subjective comment: Pt states knee is a little stiff this afternoon. Has been outside working in her garden/flowers all day. Feels like the swelling has gone down in her knee. Has noticed some intermittent clicking in her knee but it's non-painful.Pain  No pain reported           Objective       Observations     Additional Observation Details  Ambulates with SPC this date. Fairly good gait mechanics noted. At times does shuffle feet but much better overall.    Active Range of Motion   Left Knee   Flexion: 126 degrees   Extension: 0 degrees      See Exercise, Manual, and Modality Logs for complete treatment.       Assessment & Plan     Assessment  Assessment details: Pt more steady with obstacle course and balance activities this date. Did well cybex hamstring curls. Able to set up all cybex equipment with only minor cues. Knee ROM continues to be WFL. Will finalize HEP next visit and d/c to independent management.  Prognosis: good    Goals  Plan Goals: STG's all met  1) Demonstrate independence/compliance in HEP - MET  2) Demonstrate improved L knee ext AROM to 5 deg or less - MET  3) Demonstrate improved L knee flex AROM to 115 deg or greater - MET  4) Perform active independent L SLR flex 2x10 with no quad lag, good eccentric control - MET  5) Ambulate independently with SPC safely demonstrating good gait mechanics bilaterally - MET    LTG's by 20  1) Subjectively report 60% overall improvement or greater - MET  2) Improve LEFS score to 35/80 or greater - MET  3) Ambulate independently for 250 ft  safely with no AD, good gait mechanics bilaterally - MET  4) Demonstrate improved L knee flex/ext MMT to 4+/5 or greater - MET  5) Demonstrate improved L hip flex/abd MMT to 4+/5 or greater - progressing  6) Ascend/descend 5 steps x 5 with reciprocal pattern, good eccentric control, no compensation, SPC/handrail as needed - progressing    Plan  Duration in visits: 1  Treatment plan discussed with: patient  Plan details: Plan to d/c next visit to independent management and free 30 day fitness membership when fitness center re-opens. Finalize HEP.        Visit Diagnoses:    ICD-10-CM ICD-9-CM   1. Osteoarthritis of left knee, unspecified osteoarthritis type M17.12 715.96   2. S/P total knee arthroplasty, left Z96.652 V43.65       Progress per Plan of Care and Progress strengthening /stabilization /functional activity           Timed:  Manual Therapy:    3     mins  57951;  Therapeutic Exercise:    45     mins  18771;     Neuromuscular Hayley:        mins  20820;    Therapeutic Activity:          mins  98273;     Gait Training:           mins  65639;     Ultrasound:          mins  75816;    Electrical Stimulation:         mins  47508 ( );    Untimed:  Electrical Stimulation:         mins  65242 ( );  Mechanical Traction:         mins  39299;     Timed Treatment:   48   mins   Total Treatment:     48   mins  Michelle Jamison PT  Physical Therapist

## 2020-04-30 ENCOUNTER — TREATMENT (OUTPATIENT)
Dept: PHYSICAL THERAPY | Facility: CLINIC | Age: 75
End: 2020-04-30

## 2020-04-30 DIAGNOSIS — Z96.652 S/P TOTAL KNEE ARTHROPLASTY, LEFT: ICD-10-CM

## 2020-04-30 DIAGNOSIS — M17.12 OSTEOARTHRITIS OF LEFT KNEE, UNSPECIFIED OSTEOARTHRITIS TYPE: Primary | ICD-10-CM

## 2020-04-30 PROCEDURE — 97110 THERAPEUTIC EXERCISES: CPT | Performed by: PHYSICAL THERAPIST

## 2020-04-30 NOTE — PROGRESS NOTES
Physical Therapy Daily Progress Note / Discharge Summary      Patient: Rama Goel   : 1945  Referring practitioner: Hector Maurer MD  Date of Initial Visit: Type: THERAPY  Noted: 3/11/2020  Today's Date: 2020  Patient seen for 16 sessions       Rama Goel reports: 90-95% improvement    Subjective Evaluation    History of Present Illness    Subjective comment: Pt states she's able to do all the things, and even more, than she was able to do prior to surgery. Reports 90-95% improvement at this time. States her balance is the only thing that's still troublesome but that's more so to do with her strokes. Very pleased with her progress.       Objective       Observations     Additional Observation Details  Ambulates with [facility] this date. Fairly good gait mechanics noted. At times does shuffle feet but much better overall.    Active Range of Motion   Left Knee   Flexion: 130 degrees   Extension: 0 degrees     Strength/Myotome Testing     Left Hip   Planes of Motion   Flexion: 4+  Abduction: 4+    Left Knee   Flexion: 5  Extension: 5  Quadriceps contraction: good    Functional Assessment     Comments  Pt able to ascend/descend training steps (6 inch & 4 inch side) reciprocally with single handrail and cane. Good eccentric control noted.     See Exercise, Manual, and Modality Logs for complete treatment.       Assessment & Plan     Assessment  Assessment details: Pt has progressed very well overall with PT, meeting all therapy goals this time. Knee ROM is WFL. Pt demonstrates good improvements in functional knee/hip strength & stability. Pt able to set-up cybex equipment with cues. Did discuss that fitness staff will be available to assist with set-up if needed. Did well with obstacle course activity with no LOB; some unsteadiness when walking through cones. Pt has been compliant with HEP and reports 90-95% overall improvement. Pt discharged to independent management and free 30 day fitness  membership. HEP finalized.  Prognosis: good    Goals  Plan Goals: STG's all met  1) Demonstrate independence/compliance in HEP - MET  2) Demonstrate improved L knee ext AROM to 5 deg or less - MET  3) Demonstrate improved L knee flex AROM to 115 deg or greater - MET  4) Perform active independent L SLR flex 2x10 with no quad lag, good eccentric control - MET  5) Ambulate independently with SPC safely demonstrating good gait mechanics bilaterally - MET    LTG's by 4/27/20  1) Subjectively report 60% overall improvement or greater - MET  2) Improve LEFS score to 35/80 or greater - MET  3) Ambulate independently for 250 ft safely with no AD, good gait mechanics bilaterally - MET  4) Demonstrate improved L knee flex/ext MMT to 4+/5 or greater - MET  5) Demonstrate improved L hip flex/abd MMT to 4+/5 or greater - MET  6) Ascend/descend 5 steps x 5 with reciprocal pattern, good eccentric control, no compensation, SPC/handrail as needed - MET    Plan  Therapy options: will not be seen for skilled physical therapy services  Plan details: Pt discharged to independent management and free 30 day fitness membership when fitness center re-opens.        Visit Diagnoses:    ICD-10-CM ICD-9-CM   1. Osteoarthritis of left knee, unspecified osteoarthritis type M17.12 715.96   2. S/P total knee arthroplasty, left Z96.652 V43.65       Other Discharged           Timed:  Manual Therapy:         mins  57671;  Therapeutic Exercise:    49     mins  34326;     Neuromuscular Hayley:        mins  30861;    Therapeutic Activity:          mins  95315;     Gait Training:           mins  10845;     Ultrasound:          mins  17487;    Electrical Stimulation:         mins  85381 ( );    Untimed:  Electrical Stimulation:         mins  55921 ( );  Mechanical Traction:         mins  99827;     Timed Treatment:   49   mins   Total Treatment:     49   mins  Michelle Jamison, PT  Physical Therapist

## 2022-08-01 ENCOUNTER — APPOINTMENT (OUTPATIENT)
Dept: GENERAL RADIOLOGY | Facility: HOSPITAL | Age: 77
End: 2022-08-01

## 2022-08-01 ENCOUNTER — HOSPITAL ENCOUNTER (EMERGENCY)
Facility: HOSPITAL | Age: 77
Discharge: HOME | End: 2022-08-01
Attending: EMERGENCY MEDICINE | Admitting: EMERGENCY MEDICINE

## 2022-08-01 VITALS
HEART RATE: 69 BPM | SYSTOLIC BLOOD PRESSURE: 184 MMHG | WEIGHT: 190 LBS | RESPIRATION RATE: 16 BRPM | HEIGHT: 62 IN | TEMPERATURE: 98.5 F | OXYGEN SATURATION: 98 % | DIASTOLIC BLOOD PRESSURE: 72 MMHG | BODY MASS INDEX: 34.96 KG/M2

## 2022-08-01 DIAGNOSIS — M25.461 PREPATELLAR EFFUSION OF RIGHT KNEE: Primary | ICD-10-CM

## 2022-08-01 DIAGNOSIS — M25.551 ACUTE RIGHT HIP PAIN: ICD-10-CM

## 2022-08-01 PROCEDURE — 73564 X-RAY EXAM KNEE 4 OR MORE: CPT

## 2022-08-01 PROCEDURE — 73502 X-RAY EXAM HIP UNI 2-3 VIEWS: CPT

## 2022-08-01 PROCEDURE — 99281 EMR DPT VST MAYX REQ PHY/QHP: CPT

## 2022-08-01 RX ORDER — ACETAMINOPHEN 325 MG/1
650 TABLET ORAL ONCE
Status: DISCONTINUED | OUTPATIENT
Start: 2022-08-01 | End: 2022-08-01 | Stop reason: HOSPADM

## 2022-08-01 NOTE — ED PROVIDER NOTES
"Subjective   Patient presents to the ER with complaints of right knee pain and right hip pain secondary to a fall on Thursday 4 days ago.  She states her left leg went out on her and she fell landing on her right knee and then rolling back onto her hip.  She denies hitting her head.  She denies any neck or back pain at this time.  She states her knee hurts when she bears weight and is slightly swollen at this time.  She states her right hip hurts her whenever she is lying down on her right side.  She has not taken anything for pain at home.  She has been ambulatory.          Review of Systems   Musculoskeletal: Negative for back pain and neck pain.        Right hip right knee pain   Neurological: Negative for syncope.       Past Medical History:   Diagnosis Date   • Arrhythmia     intermittent   • CVA (cerebral vascular accident) ([facility]) 2018    also had one last week 10/2019 in [facility]   • DJD (degenerative joint disease), multiple sites     thoracic and lumbar spine   • Hypertension    • Lupus ([facility])     drug induced   • Stroke ([facility])        Allergies   Allergen Reactions   • Aspartame And Phenylalanine Swelling     Pt reports requiring a hospital admission w/epinephrine and benadryl.   • Calcium Channel Blockers Other (See Comments)   • Hydralazine Hcl Other (See Comments)   • Hyzaar [Losartan Potassium-Hctz]    • Lisinopril    • Procardia [Nifedipine]        Past Surgical History:   Procedure Laterality Date   • ANKLE SURGERY Right     \"bone replacement with coral\"   • APPENDECTOMY     •  SECTION     • CHOLECYSTECTOMY     • PARATHYROIDECTOMY     • ROTATOR CUFF REPAIR Left    • SPINAL FUSION  2018    T11-S1   • TYMPANOPLASTY         Family History   Problem Relation Age of Onset   • Hypertension Mother    • Heart disease Father    • Hypertension Brother        Social History     Socioeconomic History   • Marital status:    Tobacco Use   • Smoking status: Never Smoker   • Smokeless tobacco: " "Never Used   Substance and Sexual Activity   • Alcohol use: Yes     Comment: occasional   • Drug use: Defer   • Sexual activity: Defer           Objective    BP (!) 184/72 (BP Location: Left arm, Patient Position: Sitting)   Pulse 69   Temp 98.5 °F (36.9 °C) (Oral)   Resp 16   Ht 157.5 cm (62\")   Wt 86.2 kg (190 lb)   SpO2 98%   BMI 34.75 kg/m²     Physical Exam  Vitals and nursing note reviewed.   Constitutional:       General: She is not in acute distress.     Appearance: She is well-developed. She is obese. She is not ill-appearing.   HENT:      Head: Normocephalic and atraumatic.   Cardiovascular:      Rate and Rhythm: Normal rate.   Pulmonary:      Effort: Pulmonary effort is normal.   Musculoskeletal:         General: Swelling, tenderness and signs of injury present.      Cervical back: Normal range of motion.      Comments: Right knee with slight swelling and tenderness.  Limited range of motion secondary to pain.  Right hip is nontender to palpation.   Skin:     General: Skin is warm and dry.      Capillary Refill: Capillary refill takes less than 2 seconds.   Neurological:      Mental Status: She is alert and oriented to person, place, and time.      Coordination: Coordination normal.   Psychiatric:         Behavior: Behavior normal.         Thought Content: Thought content normal.         Judgment: Judgment normal.         Procedures  XR Knee 4+ View Right    Result Date: 8/1/2022  Narrative: Four view right knee HISTORY: Pain after falling AP, lateral, tunnel and oblique views obtained. COMPARISON: None FINDINGS: No fracture or dislocation. Patellar, femoral and tibial spurs. Moderate to marked narrowing of the medial joint space. Very small suprapatellar effusion. No other osseous or articular abnormality. Atherosclerotic calcifications.     Impression: CONCLUSION: No fracture or dislocation. Patellar, femoral and tibial spurs. Moderate to marked narrowing of the medial joint space. Very small " suprapatellar effusion. [id number] Electronically signed by:  Luis Karimi MD  8/1/2022 4:55 PM CDT Workstation: [id number]    XR Hip With or Without Pelvis 2 - 3 View Right    Result Date: 8/1/2022  Narrative: Two view right hip with single view pelvis HISTORY: Pain after falling AP and frog-leg lateral views of the right hip and AP film of the pelvis obtained. COMPARISON: Pelvis November 3, 2019 FINDINGS: No fracture or dislocation. Minimal degenerative changes each iliac crest. Screws and rods remain in place in the lumbar spine, sacral ala and iliac bones. No other osseous or articular abnormality. 1.7 cm right-sided calcified uterine fibroid.     Impression: CONCLUSION: No fracture or dislocation. Minimal degenerative changes each iliac crest. Screws and rods remain in place in the lumbar spine, sacral ala and iliac bones. 1.7 cm right-sided calcified uterine fibroid. [id number] Electronically signed by:  Luis Karimi MD  8/1/2022 4:52 PM CDT Workstation: [id number]             ED Course  ED Course as of 08/01/22 2015   Mon Aug 01, 2022   1748 Went to discuss work up with patient. Patient is not located in the [facility]. Patient has eloped per nursing staff.  [SH]      ED Course User Index  [SH] Melissa Marsh APRN                                           [facility]    Final diagnoses:   Prepatellar effusion of right knee   Acute right hip pain       ED Disposition  ED Disposition     ED Disposition   Eloped    Condition   --    Comment   --             No follow-up provider specified.       Medication List      No changes were made to your prescriptions during this visit.          Melissa Marsh APRN  08/01/22 2021

## 2022-09-27 ENCOUNTER — OFFICE VISIT (OUTPATIENT)
Dept: SURGERY | Age: 77
End: 2022-09-27
Payer: MEDICARE

## 2022-09-27 VITALS
WEIGHT: 184 LBS | HEIGHT: 61 IN | BODY MASS INDEX: 34.74 KG/M2 | TEMPERATURE: 97.6 F | SYSTOLIC BLOOD PRESSURE: 148 MMHG | DIASTOLIC BLOOD PRESSURE: 70 MMHG

## 2022-09-27 DIAGNOSIS — K43.2 VENTRAL INCISIONAL HERNIA: ICD-10-CM

## 2022-09-27 DIAGNOSIS — Z86.73 H/O: CVA (CEREBROVASCULAR ACCIDENT): ICD-10-CM

## 2022-09-27 PROCEDURE — G8427 DOCREV CUR MEDS BY ELIG CLIN: HCPCS | Performed by: SURGERY

## 2022-09-27 PROCEDURE — 1090F PRES/ABSN URINE INCON ASSESS: CPT | Performed by: SURGERY

## 2022-09-27 PROCEDURE — 3017F COLORECTAL CA SCREEN DOC REV: CPT | Performed by: SURGERY

## 2022-09-27 PROCEDURE — G8400 PT W/DXA NO RESULTS DOC: HCPCS | Performed by: SURGERY

## 2022-09-27 PROCEDURE — 1036F TOBACCO NON-USER: CPT | Performed by: SURGERY

## 2022-09-27 PROCEDURE — G8417 CALC BMI ABV UP PARAM F/U: HCPCS | Performed by: SURGERY

## 2022-09-27 PROCEDURE — 99203 OFFICE O/P NEW LOW 30 MIN: CPT | Performed by: SURGERY

## 2022-09-27 PROCEDURE — 1123F ACP DISCUSS/DSCN MKR DOCD: CPT | Performed by: SURGERY

## 2022-09-27 RX ORDER — BACLOFEN 10 MG/1
10 TABLET ORAL 2 TIMES DAILY PRN
COMMUNITY
Start: 2019-11-15 | End: 2022-12-02

## 2022-09-27 RX ORDER — ROPINIROLE 0.5 MG/1
0.5 TABLET, FILM COATED ORAL NIGHTLY
COMMUNITY

## 2022-09-27 RX ORDER — ASPIRIN 81 MG/1
81 TABLET, CHEWABLE ORAL DAILY
COMMUNITY

## 2022-09-27 RX ORDER — ACETAMINOPHEN 325 MG/1
650 TABLET ORAL EVERY 6 HOURS PRN
COMMUNITY
Start: 2019-11-15

## 2022-09-27 RX ORDER — CARVEDILOL 25 MG/1
25 TABLET ORAL 2 TIMES DAILY WITH MEALS
COMMUNITY

## 2022-09-27 RX ORDER — GABAPENTIN 300 MG/1
400 CAPSULE ORAL 4 TIMES DAILY
COMMUNITY

## 2022-09-27 RX ORDER — CLOPIDOGREL BISULFATE 75 MG/1
75 TABLET ORAL DAILY
COMMUNITY

## 2022-09-27 ASSESSMENT — ENCOUNTER SYMPTOMS
SORE THROAT: 0
EYE PAIN: 0
ABDOMINAL PAIN: 1
SHORTNESS OF BREATH: 0
ABDOMINAL DISTENTION: 1
VOMITING: 0
WHEEZING: 0
EYE REDNESS: 0
CHOKING: 0
CONSTIPATION: 0
DIARRHEA: 0
BACK PAIN: 1
FACIAL SWELLING: 0
CHEST TIGHTNESS: 0
EYE DISCHARGE: 0

## 2022-09-27 NOTE — PROGRESS NOTES
SUBJECTIVE:  Ms. Rose Richardson is a 78 y.o. female who presents today with abdominal pain. Patient states for the last few months to having midepigastric pain. History of cholecystectomy. States she felt a ripping of her mid abdomen years ago after her cholecystectomy. Has noted increased discomfort over the last few months. Denies fevers or chills. Patient with history of CVA x3. These were after her back surgery. Now complains of hyperesthesia and sneezing. Past Medical History:   Diagnosis Date    Arthritis     CVA (cerebral vascular accident) ([address]     1st occuring during back surgery, 3 more since    Hemorrhoids     Herpes zoster     Hyperlipidemia     Hypertension     Irregular heartbeat     Restless legs syndrome      Past Surgical History:   Procedure Laterality Date    ANKLE SURGERY Right     right ankle repair    APPENDECTOMY      BACK SURGERY      BREAST SURGERY Left     2019     SECTION      CHOLECYSTECTOMY      COLONOSCOPY  2010    KNEE SURGERY      SHOULDER SURGERY Left     THYROID SURGERY      para thyroid tumor removed    TYMPANOPLASTY      replacement     Current Outpatient Medications   Medication Sig Dispense Refill    acetaminophen (TYLENOL) 325 MG tablet Take 650 mg by mouth every 6 hours as needed      aspirin 81 MG chewable tablet Take 81 mg by mouth daily      carvedilol (COREG) 25 MG tablet Take 25 mg by mouth 2 times daily (with meals)      clopidogrel (PLAVIX) 75 MG tablet Take 75 mg by mouth daily      gabapentin (NEURONTIN) 300 MG capsule Take 400 mg by mouth 4 times daily. baclofen (LIORESAL) 10 MG tablet Take 10 mg by mouth 2 times daily as needed      carbidopa-levodopa (SINEMET)  MG per tablet Take 1 tablet by mouth nightly      rOPINIRole (REQUIP) 0.5 MG tablet Take 0.5 mg by mouth nightly      Cyclobenzaprine HCl (FLEXERIL PO) Take by mouth       No current facility-administered medications for this visit.      Allergies: Aspartame and phenylalanine, Calcium channel blockers, Hydralazine hcl, Lisinopril, Losartan potassium-hctz, and Nifedipine    Family History   Problem Relation Age of Onset    High Blood Pressure Mother     Heart Disease Father     Liver Cancer Neg Hx     Liver Disease Neg Hx     Esophageal Cancer Neg Hx     Rectal Cancer Neg Hx     Stomach Cancer Neg Hx     Colon Cancer Neg Hx     Colon Polyps Neg Hx        Social History     Tobacco Use    Smoking status: Never    Smokeless tobacco: Never   Substance Use Topics    Alcohol use: Yes     Comment: mild       Review of Systems   Constitutional:  Positive for activity change and fatigue. Negative for chills and fever. HENT:  Negative for facial swelling, hearing loss and sore throat. Eyes:  Negative for pain, discharge and redness. Respiratory:  Negative for choking, chest tightness, shortness of breath and wheezing. Cardiovascular:  Negative for chest pain and palpitations. Gastrointestinal:  Positive for abdominal distention and abdominal pain. Negative for constipation, diarrhea and vomiting. Musculoskeletal:  Positive for arthralgias, back pain, myalgias and neck pain. Negative for neck stiffness. Neurological:  Positive for seizures, weakness and numbness. Negative for dizziness and speech difficulty. Psychiatric/Behavioral:  Negative for agitation, hallucinations and suicidal ideas. The patient is nervous/anxious. OBJECTIVE:  BP (!) 148/70 (Site: Left Upper Arm, Position: Sitting, Cuff Size: Medium Adult)   Temp 97.6 °F (36.4 °C) (Temporal)   Ht 5' 1\" (1.549 m)   Wt 184 lb (83.5 kg)   BMI 34.77 kg/m²   Physical Exam  Constitutional:       Appearance: Normal appearance. She is ill-appearing. HENT:      Head: Normocephalic and atraumatic. Right Ear: External ear normal.      Left Ear: External ear normal.      Nose: Nose normal.   Eyes:      Pupils: Pupils are equal, round, and reactive to light.    Pulmonary:      Effort: Pulmonary effort is normal.      Breath

## 2022-09-30 ENCOUNTER — TELEPHONE (OUTPATIENT)
Dept: SURGERY | Age: 77
End: 2022-09-30

## 2022-09-30 NOTE — TELEPHONE ENCOUNTER
Eugenio Garcia requests that clinic return their call. The best time to reach her is Anytime. Eugenio Garcia missed a call from office and she did request next time to call her back to back due to it takes her a little while to get to her phone. Patient says she has had narvaez and is slow getting to the phone so please try calling 2-3 times in a row. No outgoing call noted for reason. Please contact patient to advise. Thank you.